# Patient Record
Sex: FEMALE | Race: BLACK OR AFRICAN AMERICAN | Employment: UNEMPLOYED | URBAN - METROPOLITAN AREA
[De-identification: names, ages, dates, MRNs, and addresses within clinical notes are randomized per-mention and may not be internally consistent; named-entity substitution may affect disease eponyms.]

---

## 2017-01-02 ENCOUNTER — HOSPITAL ENCOUNTER (EMERGENCY)
Age: 32
Discharge: HOME | End: 2017-01-02
Attending: EMERGENCY MEDICINE
Payer: MEDICAID

## 2017-01-02 VITALS
DIASTOLIC BLOOD PRESSURE: 86 MMHG | TEMPERATURE: 98.6 F | RESPIRATION RATE: 20 BRPM | OXYGEN SATURATION: 96 % | SYSTOLIC BLOOD PRESSURE: 144 MMHG

## 2017-01-02 DIAGNOSIS — S61.309A NAIL AVULSION, FINGER, INITIAL ENCOUNTER: Primary | ICD-10-CM

## 2017-01-02 PROCEDURE — 99282 EMERGENCY DEPT VISIT SF MDM: CPT

## 2017-01-02 RX ORDER — IBUPROFEN 800 MG/1
800 TABLET ORAL
Qty: 15 TAB | Refills: 0 | Status: SHIPPED | OUTPATIENT
Start: 2017-01-02 | End: 2017-01-07

## 2017-01-02 NOTE — DISCHARGE INSTRUCTIONS
SPECIFIC PATIENT INSTRUCTIONS FROM THE PHYSICIAN WHO TREATED YOU IN THE ER TODAY:  1. Ibuprofen as prescribed until finished. 2. IF Norco was prescribed for pain not controlled with the ibuprofen, then take it as prescribed. 3. Apply ice for the first 24-48 hours after the injury occurred, several times a day. After 48 hours from time of injury, apply heat to injury several times a day the next few days. 4. Return if any concerns or worsening condition(s). 5. FOLLOW UP APPOINTMENT:  Your primary doctor in 1-2 days. Toenail or Fingernail Avulsion: Care Instructions  Your Care Instructions  Losing a toenail or fingernail because of an injury is called avulsion. The nail may be completely or partially torn off after a trauma to the area. Your doctor may have removed the nail, put part of it back into place, or repaired the nail bed. Your toe or finger may be sore after treatment. You may have stitches. You may have some swelling, color changes, and bloody crusting on or around the wound for 2 or 3 days. This is normal. Taking good care of your wound at home will help it heal quickly and reduce your chance of infection. The wound should heal within a few weeks. If completely removed, fingernails may take 6 months to grow back. Toenails may take 12 to 18 months to grow back. Injured nails may look different when they grow back. Follow-up care is a key part of your treatment and safety. Be sure to make and go to all appointments, and call your doctor if you are having problems. It's also a good idea to know your test results and keep a list of the medicines you take. How can you care for yourself at home? · If possible, prop up the injured area on a pillow anytime you sit or lie down during the next 3 days. Try to keep it above the level of your heart. This will help reduce swelling. · Leave the bandage on, and if you have stitches, do not get them wet for the first 24 to 48 hours.  Use a plastic bag to cover the area when you shower. · If your doctor told you how to care for your wound, follow your doctor's instructions. If you did not get instructions, follow this general advice:  ¨ After the first 24 to 48 hours, you can remove the bandage and gently wash around the wound with clean water 2 times a day. If the bandage sticks to the wound, use warm water to loosen it. Do not scrub or soak the area. ¨ You may cover the wound with a thin layer of petroleum jelly, such as Vaseline, and a nonstick bandage. ¨ Apply more petroleum jelly and replace the bandage as needed. · Do not go swimming. · If you have stitches, do not remove them on your own. Your doctor will tell you when to return to have the stitches removed. · Be safe with medicines. Take pain medicines exactly as directed. ¨ If the doctor gave you a prescription medicine for pain, take it as prescribed. ¨ If you are not taking a prescription pain medicine, ask your doctor if you can take an over-the-counter medicine. · If your doctor prescribed antibiotics, take them as directed. Do not stop taking them just because you feel better. You need to take the full course of antibiotics. When should you call for help? Call your doctor now or seek immediate medical care if:  · The skin near the wound is cool or pale or changes color. · The wound starts to bleed, and blood soaks through the bandage. Oozing small amounts of blood is normal.  · You have signs of infection, such as:  ¨ Increased pain, swelling, warmth, or redness. ¨ Red streaks leading from your toe or finger. ¨ Pus draining from your toe or finger. ¨ Swollen lymph nodes in your neck, armpits, or groin. ¨ A fever. Watch closely for changes in your health, and be sure to contact your doctor if:  · You have problems with the nail as it grows back. · You do not get better as expected. Where can you learn more? Go to http://sue-michael.info/.   Enter B723 in the search box to learn more about \"Toenail or Fingernail Avulsion: Care Instructions. \"  Current as of: May 27, 2016  Content Version: 11.1  © 5593-0204 [employer]. Care instructions adapted under license by [employer] (which disclaims liability or warranty for this information). If you have questions about a medical condition or this instruction, always ask your healthcare professional. [address] any warranty or liability for your use of this information. [employer] Activation    Thank you for requesting access to [employer]. Please follow the instructions below to securely access and download your online medical record. [employer] allows you to send messages to your doctor, view your test results, renew your prescriptions, schedule appointments, and more. How Do I Sign Up? 1. In your internet browser, go to [email or web address]. 2. Click on the First Time User? Click Here link in the Sign In box. You will see the New Member Sign Up page. 3. Enter your [employer] Access Code exactly as it appears below. You will not need to use this code after youve completed the sign-up process. If you do not sign up before the expiration date, you must request a new code. [employer] Access Code: [email or web address]  Expires: 2017  1:29 PM (This is the date your [employer] access code will )    4. Enter the last four digits of your Social Security Number (xxxx) and Date of Birth (mm/dd/yyyy) as indicated and click Submit. You will be taken to the next sign-up page. 5. Create a [employer] ID. This will be your [employer] login ID and cannot be changed, so think of one that is secure and easy to remember. 6. Create a [employer] password. You can change your password at any time. 7. Enter your Password Reset Question and Answer. This can be used at a later time if you forget your password. 8. Enter your e-mail address.  You will receive e-mail notification when new information is available in [employer]. 9. Click Sign Up. You can now view and download portions of your medical record. 10. Click the Download Summary menu link to download a portable copy of your medical information. Additional Information    If you have questions, please visit the Frequently Asked Questions section of the [employer] website at [email or web address] Remember, [employer] is NOT to be used for urgent needs. For medical emergencies, dial 911.

## 2017-01-02 NOTE — ED NOTES
I have reviewed discharge instructions with the patient. The patient verbalized understanding. Patient armband removed and shredded  Pt left ED ambulatory, alert and in NAD.
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
## 2017-02-10 LAB — N. GONORRHEA, EXTERNAL: NORMAL

## 2017-03-20 ENCOUNTER — APPOINTMENT (OUTPATIENT)
Dept: GENERAL RADIOLOGY | Age: 32
End: 2017-03-20
Attending: EMERGENCY MEDICINE
Payer: MEDICAID

## 2017-03-20 ENCOUNTER — HOSPITAL ENCOUNTER (EMERGENCY)
Age: 32
Discharge: HOME | End: 2017-03-21
Attending: EMERGENCY MEDICINE
Payer: MEDICAID

## 2017-03-20 VITALS
TEMPERATURE: 98.2 F | BODY MASS INDEX: 43.4 KG/M2 | WEIGHT: 293 LBS | OXYGEN SATURATION: 98 % | DIASTOLIC BLOOD PRESSURE: 87 MMHG | HEIGHT: 69 IN | SYSTOLIC BLOOD PRESSURE: 151 MMHG | RESPIRATION RATE: 24 BRPM | HEART RATE: 81 BPM

## 2017-03-20 DIAGNOSIS — R73.9 HYPERGLYCEMIA: ICD-10-CM

## 2017-03-20 DIAGNOSIS — J45.21 MILD INTERMITTENT ASTHMA WITH ACUTE EXACERBATION: Primary | ICD-10-CM

## 2017-03-20 DIAGNOSIS — R07.9 CHEST PAIN, UNSPECIFIED TYPE: ICD-10-CM

## 2017-03-20 DIAGNOSIS — I10 ESSENTIAL HYPERTENSION: ICD-10-CM

## 2017-03-20 LAB
ANION GAP BLD CALC-SCNC: 10 MMOL/L (ref 3–18)
BASOPHILS # BLD AUTO: 0 K/UL (ref 0–0.1)
BASOPHILS # BLD: 0 % (ref 0–2)
BUN SERPL-MCNC: 11 MG/DL (ref 7–18)
BUN/CREAT SERPL: 14 (ref 12–20)
CALCIUM SERPL-MCNC: 8.9 MG/DL (ref 8.5–10.1)
CHLORIDE SERPL-SCNC: 106 MMOL/L (ref 100–108)
CK MB CFR SERPL CALC: NORMAL % (ref 0–4)
CK MB CFR SERPL CALC: NORMAL % (ref 0–4)
CK MB SERPL-MCNC: <1 NG/ML (ref 5–25)
CK MB SERPL-MCNC: <1 NG/ML (ref 5–25)
CK SERPL-CCNC: 88 U/L (ref 26–192)
CK SERPL-CCNC: 93 U/L (ref 26–192)
CO2 SERPL-SCNC: 26 MMOL/L (ref 21–32)
CREAT SERPL-MCNC: 0.79 MG/DL (ref 0.6–1.3)
DIFFERENTIAL METHOD BLD: ABNORMAL
EOSINOPHIL # BLD: 0.1 K/UL (ref 0–0.4)
EOSINOPHIL NFR BLD: 2 % (ref 0–5)
ERYTHROCYTE [DISTWIDTH] IN BLOOD BY AUTOMATED COUNT: 14.2 % (ref 11.6–14.5)
GLUCOSE SERPL-MCNC: 161 MG/DL (ref 74–99)
HCT VFR BLD AUTO: 36.3 % (ref 35–45)
HGB BLD-MCNC: 11.8 G/DL (ref 12–16)
LYMPHOCYTES # BLD AUTO: 36 % (ref 21–52)
LYMPHOCYTES # BLD: 2.3 K/UL (ref 0.9–3.6)
MCH RBC QN AUTO: 26 PG (ref 24–34)
MCHC RBC AUTO-ENTMCNC: 32.5 G/DL (ref 31–37)
MCV RBC AUTO: 80.1 FL (ref 74–97)
MONOCYTES # BLD: 0.3 K/UL (ref 0.05–1.2)
MONOCYTES NFR BLD AUTO: 4 % (ref 3–10)
NEUTS SEG # BLD: 3.7 K/UL (ref 1.8–8)
NEUTS SEG NFR BLD AUTO: 58 % (ref 40–73)
PLATELET # BLD AUTO: 262 K/UL (ref 135–420)
PMV BLD AUTO: 10.6 FL (ref 9.2–11.8)
POTASSIUM SERPL-SCNC: 3.7 MMOL/L (ref 3.5–5.5)
RBC # BLD AUTO: 4.53 M/UL (ref 4.2–5.3)
SODIUM SERPL-SCNC: 142 MMOL/L (ref 136–145)
TROPONIN I SERPL-MCNC: <0.02 NG/ML (ref 0–0.04)
TROPONIN I SERPL-MCNC: <0.02 NG/ML (ref 0–0.04)
WBC # BLD AUTO: 6.4 K/UL (ref 4.6–13.2)

## 2017-03-20 PROCEDURE — 94640 AIRWAY INHALATION TREATMENT: CPT

## 2017-03-20 PROCEDURE — 93005 ELECTROCARDIOGRAM TRACING: CPT

## 2017-03-20 PROCEDURE — 85025 COMPLETE CBC W/AUTO DIFF WBC: CPT | Performed by: EMERGENCY MEDICINE

## 2017-03-20 PROCEDURE — 74011250637 HC RX REV CODE- 250/637: Performed by: EMERGENCY MEDICINE

## 2017-03-20 PROCEDURE — 82550 ASSAY OF CK (CPK): CPT | Performed by: EMERGENCY MEDICINE

## 2017-03-20 PROCEDURE — 74011000250 HC RX REV CODE- 250: Performed by: EMERGENCY MEDICINE

## 2017-03-20 PROCEDURE — 99285 EMERGENCY DEPT VISIT HI MDM: CPT

## 2017-03-20 PROCEDURE — 71010 XR CHEST PORT: CPT

## 2017-03-20 PROCEDURE — 80048 BASIC METABOLIC PNL TOTAL CA: CPT | Performed by: EMERGENCY MEDICINE

## 2017-03-20 PROCEDURE — 74011636637 HC RX REV CODE- 636/637: Performed by: EMERGENCY MEDICINE

## 2017-03-20 RX ORDER — PREDNISONE 20 MG/1
60 TABLET ORAL
Status: COMPLETED | OUTPATIENT
Start: 2017-03-20 | End: 2017-03-20

## 2017-03-20 RX ORDER — ALBUTEROL SULFATE 90 UG/1
2 AEROSOL, METERED RESPIRATORY (INHALATION)
Status: COMPLETED | OUTPATIENT
Start: 2017-03-20 | End: 2017-03-20

## 2017-03-20 RX ORDER — AZITHROMYCIN 250 MG/1
TABLET, FILM COATED ORAL
Qty: 6 TAB | Refills: 0 | Status: SHIPPED | OUTPATIENT
Start: 2017-03-20 | End: 2017-03-25

## 2017-03-20 RX ORDER — IPRATROPIUM BROMIDE AND ALBUTEROL SULFATE 2.5; .5 MG/3ML; MG/3ML
3 SOLUTION RESPIRATORY (INHALATION) ONCE
Status: COMPLETED | OUTPATIENT
Start: 2017-03-20 | End: 2017-03-20

## 2017-03-20 RX ORDER — PREDNISONE 50 MG/1
50 TABLET ORAL DAILY
Qty: 5 TAB | Refills: 0 | Status: SHIPPED | OUTPATIENT
Start: 2017-03-20 | End: 2017-03-25

## 2017-03-20 RX ORDER — LISINOPRIL AND HYDROCHLOROTHIAZIDE 12.5; 2 MG/1; MG/1
1 TABLET ORAL
Status: COMPLETED | OUTPATIENT
Start: 2017-03-20 | End: 2017-03-20

## 2017-03-20 RX ORDER — LISINOPRIL AND HYDROCHLOROTHIAZIDE 10; 12.5 MG/1; MG/1
1 TABLET ORAL DAILY
Qty: 30 TAB | Refills: 0 | Status: ON HOLD | OUTPATIENT
Start: 2017-03-20 | End: 2018-01-22

## 2017-03-20 RX ADMIN — LISINOPRIL AND HYDROCHLOROTHIAZIDE 1 TABLET: 12.5; 2 TABLET ORAL at 22:40

## 2017-03-20 RX ADMIN — PREDNISONE 60 MG: 20 TABLET ORAL at 21:04

## 2017-03-20 RX ADMIN — IPRATROPIUM BROMIDE AND ALBUTEROL SULFATE 3 ML: .5; 3 SOLUTION RESPIRATORY (INHALATION) at 20:46

## 2017-03-20 RX ADMIN — ALBUTEROL SULFATE 2 PUFF: 90 AEROSOL, METERED RESPIRATORY (INHALATION) at 22:34

## 2017-03-20 NOTE — ED TRIAGE NOTES
Pt c/o chest pain yesterday but feels worse today. Right side is weak & pressure in chest & back. Pt with cough times 1 month.

## 2017-03-21 LAB
ATRIAL RATE: 99 BPM
CALCULATED P AXIS, ECG09: -3 DEGREES
CALCULATED R AXIS, ECG10: 19 DEGREES
CALCULATED T AXIS, ECG11: 49 DEGREES
DIAGNOSIS, 93000: NORMAL
P-R INTERVAL, ECG05: 158 MS
Q-T INTERVAL, ECG07: 332 MS
QRS DURATION, ECG06: 76 MS
QTC CALCULATION (BEZET), ECG08: 426 MS
VENTRICULAR RATE, ECG03: 99 BPM

## 2017-03-21 NOTE — ED NOTES
I have reviewed discharge instructions with the patient. The patient verbalized understanding. Discharge medications reviewed with patient and appropriate educational materials and side effects teaching were provided. Patient armband removed and shredded. Pt is AxOx4, NAD. Pt states that her chest pain and SOB feels better. Pt ambulated out of ED with steady gait.

## 2017-03-21 NOTE — ED PROVIDER NOTES
HPI Comments: 8:09 PM Huong Christopher is a 27 y.o. female with a hx of asthma and HTN who presents to the ED c/o intermittent sharp chest pain that started yesterday. The chest pain radiates into her right arm and into her right upper back. She also reports SOB for the past couple days with associated wheezing. Her chest pain has improved and it now feels like and irritation that radiates into her right arm and right upper back where it becomes a heaviness. She has been using her inhalers more often than usual. She has a three year old and a [de-identified] year old at home, but she does not pick them up. She admits to occasional alcohol use and occasional tobacco use. She is not on blood pressure medications, but she states that her PCP sometimes puts her on \"fluid pills\". The pt has additional complaints of blurred vision which she states sometimes occurs when her BP is elevated. The pt denies sweating, abdominal pain, cardiac hx, recent travel, hx of DVT, birth control use, hx of diabetes, calf pain, and any further complaints. The history is provided by the patient. Past Medical History:   Diagnosis Date    Asthma     Asthma     Community acquired pneumonia     Hypertension     Obese        Past Surgical History:   Procedure Laterality Date    DILATION AND CURETTAGE           Family History:   Problem Relation Age of Onset    Asthma Mother     Hypertension Mother     Diabetes Mother     Asthma Father        Social History     Social History    Marital status: SINGLE     Spouse name: N/A    Number of children: N/A    Years of education: N/A     Occupational History    Not on file.      Social History Main Topics    Smoking status: Current Some Day Smoker     Packs/day: 0.50     Types: Cigarettes    Smokeless tobacco: Never Used      Comment: cigars twice weekly    Alcohol use 1.0 oz/week     2 Standard drinks or equivalent per week      Comment: occaisonally    Drug use: No    Sexual activity: Yes Partners: Female     Birth control/ protection: None     Other Topics Concern    Not on file     Social History Narrative         ALLERGIES: Review of patient's allergies indicates no known allergies. Review of Systems   Constitutional: Negative. Negative for activity change and appetite change. HENT: Negative for congestion, ear discharge, ear pain, facial swelling, nosebleeds, postnasal drip, sinus pressure, sneezing and tinnitus. Eyes: Positive for visual disturbance (blurred vision). Negative for pain, discharge and redness. Respiratory: Positive for shortness of breath and wheezing. Negative for apnea, cough, choking, chest tightness and stridor. Cardiovascular: Positive for chest pain. Negative for leg swelling. Gastrointestinal: Negative for abdominal distention, abdominal pain, anal bleeding, blood in stool, constipation, diarrhea, nausea and vomiting. Genitourinary: Negative for decreased urine volume, difficulty urinating, dyspareunia, dysuria, flank pain, frequency, hematuria, pelvic pain, urgency, vaginal bleeding and vaginal discharge. Musculoskeletal: Positive for back pain (right upper back) and myalgias (Right arm pain). Negative for arthralgias, gait problem, joint swelling, neck pain and neck stiffness. Skin: Negative for color change. Neurological: Negative for dizziness, tremors, seizures, speech difficulty, weakness, numbness and headaches. Hematological: Negative for adenopathy. Does not bruise/bleed easily. Psychiatric/Behavioral: Negative for agitation, dysphoric mood and self-injury. The patient is not nervous/anxious. Vitals:    03/20/17 2115 03/20/17 2130 03/20/17 2145 03/20/17 2236   BP: (!) 134/98 (!) 146/115 129/77 134/65   Pulse: (!) 103 94 95 88   Resp: 22 23 15 20   Temp:       SpO2: 96% 96% 96% 96%   Weight:       Height:                Physical Exam   Constitutional: She is oriented to person, place, and time.  She appears well-developed and well-nourished. Obese   HENT:   Head: Normocephalic and atraumatic. Right Ear: External ear normal.   Left Ear: External ear normal.   Nose: Nose normal.   Mouth/Throat: Oropharynx is clear and moist.   Eyes: Conjunctivae and EOM are normal. Pupils are equal, round, and reactive to light. Neck: Normal range of motion. Neck supple. Cardiovascular: Regular rhythm, normal heart sounds and intact distal pulses. Pulmonary/Chest: Effort normal. No respiratory distress. She has wheezes (1-2+ wheezes). She has no rales. She exhibits no tenderness. Abdominal: Soft. Bowel sounds are normal. She exhibits no distension and no mass. There is no tenderness. There is no rebound and no guarding. Musculoskeletal: Normal range of motion. She exhibits edema (Trace lower extremity edema). Neurological: She is alert and oriented to person, place, and time. Skin: Skin is warm and dry. No rash noted. No erythema. Psychiatric: She has a normal mood and affect. Her behavior is normal. Judgment normal.   Nursing note and vitals reviewed. MDM  Number of Diagnoses or Management Options  Essential hypertension:   Mild intermittent asthma with acute exacerbation:   Diagnosis management comments: Chest pain, differential to include coronary artery disease related,pericardial disease, vascular disease, PE, esophageal or gastric conditions, gallbladder disease,musculoskeletal abnormalities,other possible etiologies. Suspect an asthmatic etiology. Will trend labs,ecg,treat, follow         Amount and/or Complexity of Data Reviewed  Clinical lab tests: ordered  Tests in the radiology section of CPT®: ordered    Risk of Complications, Morbidity, and/or Mortality  Presenting problems: moderate      ED Course       Procedures  Vitals:  Patient Vitals for the past 12 hrs:   Temp Pulse Resp BP SpO2   03/20/17 2236 - 88 20 134/65 96 %   03/20/17 2145 - 95 15 129/77 96 %   03/20/17 2130 - 94 23 (!) 146/115 96 %   03/20/17 2115 - (!) 103 22 (!) 134/98 96 %   03/20/17 2100 - 97 24 (!) 139/107 96 %   03/20/17 2045 - 88 21 (!) 124/94 98 %   03/20/17 2030 - 88 22 (!) 147/108 98 %   03/20/17 1631 98.2 °F (36.8 °C) 96 18 (!) 152/95 100 %   Pulse ox reviewed and WNL      Medications ordered:   Medications   albuterol-ipratropium (DUO-NEB) 2.5 MG-0.5 MG/3 ML (3 mL Nebulization Given 3/20/17 2046)   predniSONE (DELTASONE) tablet 60 mg (60 mg Oral Given 3/20/17 2104)   albuterol (PROVENTIL HFA, VENTOLIN HFA, PROAIR HFA) inhaler 2 Puff (2 Puffs Inhalation Given 3/20/17 2234)   lisinopril-hydroCHLOROthiazide (PRINZIDE, ZESTORETIC) 20-12.5 mg per tablet 1 Tab (1 Tab Oral Given 3/20/17 2240)         Lab findings:  Recent Results (from the past 12 hour(s))   EKG, 12 LEAD, INITIAL    Collection Time: 03/20/17  4:22 PM   Result Value Ref Range    Ventricular Rate 99 BPM    Atrial Rate 99 BPM    P-R Interval 158 ms    QRS Duration 76 ms    Q-T Interval 332 ms    QTC Calculation (Bezet) 426 ms    Calculated P Axis -3 degrees    Calculated R Axis 19 degrees    Calculated T Axis 49 degrees    Diagnosis       Normal sinus rhythm  Nonspecific T wave abnormality  Abnormal ECG  When compared with ECG of 06-MAY-2014 08:36,  T wave inversion no longer evident in Inferior leads  Nonspecific T wave abnormality, worse in Lateral leads     CARDIAC PANEL,(CK, CKMB & TROPONIN)    Collection Time: 03/20/17  8:21 PM   Result Value Ref Range    CK 93 26 - 192 U/L    CK - MB <1.0 <3.6 ng/ml    CK-MB Index Cannot be calulated 0.0 - 4.0 %    Troponin-I, Qt. <0.02 0.0 - 0.045 NG/ML   CBC WITH AUTOMATED DIFF    Collection Time: 03/20/17  8:21 PM   Result Value Ref Range    WBC 6.4 4.6 - 13.2 K/uL    RBC 4.53 4.20 - 5.30 M/uL    HGB 11.8 (L) 12.0 - 16.0 g/dL    HCT 36.3 35.0 - 45.0 %    MCV 80.1 74.0 - 97.0 FL    MCH 26.0 24.0 - 34.0 PG    MCHC 32.5 31.0 - 37.0 g/dL    RDW 14.2 11.6 - 14.5 %    PLATELET 247 014 - 386 K/uL    MPV 10.6 9.2 - 11.8 FL    NEUTROPHILS 58 40 - 73 % LYMPHOCYTES 36 21 - 52 %    MONOCYTES 4 3 - 10 %    EOSINOPHILS 2 0 - 5 %    BASOPHILS 0 0 - 2 %    ABS. NEUTROPHILS 3.7 1.8 - 8.0 K/UL    ABS. LYMPHOCYTES 2.3 0.9 - 3.6 K/UL    ABS. MONOCYTES 0.3 0.05 - 1.2 K/UL    ABS. EOSINOPHILS 0.1 0.0 - 0.4 K/UL    ABS. BASOPHILS 0.0 0.0 - 0.1 K/UL    DF AUTOMATED     METABOLIC PANEL, BASIC    Collection Time: 03/20/17  8:21 PM   Result Value Ref Range    Sodium 142 136 - 145 mmol/L    Potassium 3.7 3.5 - 5.5 mmol/L    Chloride 106 100 - 108 mmol/L    CO2 26 21 - 32 mmol/L    Anion gap 10 3.0 - 18 mmol/L    Glucose 161 (H) 74 - 99 mg/dL    BUN 11 7.0 - 18 MG/DL    Creatinine 0.79 0.6 - 1.3 MG/DL    BUN/Creatinine ratio 14 12 - 20      GFR est AA >60 >60 ml/min/1.73m2    GFR est non-AA >60 >60 ml/min/1.73m2    Calcium 8.9 8.5 - 10.1 MG/DL   CARDIAC PANEL,(CK, CKMB & TROPONIN)    Collection Time: 03/20/17 10:50 PM   Result Value Ref Range    CK 88 26 - 192 U/L    CK - MB <1.0 <3.6 ng/ml    CK-MB Index Cannot be calulated 0.0 - 4.0 %    Troponin-I, Qt. <0.02 0.0 - 0.045 NG/ML       EKG interpretation by ED Physician:  Normal sinus rhythm, rate 99BPM, no STEMI per Dr. Roblero Expose, CT or other radiology findings or impressions:  XR CHEST PORT      No acute changes per Dr. Dagoberto Keating notes, Consult notes or additional Procedure notes:  Blood sugar may be elevated due to the steroids that she took, or she may have diabetes. Will discuss following it closely and following up with her PCP.    11:23 PM Pt reevaluated at this time and is resting comfortably in NAD. Discussed results and findings, as well as, diagnosis and plan for discharge. Pt verbalizes understanding and agreement with plan. All questions addressed at this time. Disposition:  Diagnosis:   1. Mild intermittent asthma with acute exacerbation    2. Essential hypertension    3. Chest pain, unspecified type    4.  Hyperglycemia        Disposition:Discharge    Follow-up Information     Follow up With Details Comments Contact Info    Talat Peralta MD Call in 1 day  [address]      [facility]  As needed, If symptoms worsen [address] [facility] [address]  [phone]           Patient's Medications   Start Taking    AZITHROMYCIN (ZITHROMAX Z-ANKUSH) 250 MG TABLET    Take 2 on day one then one a day    LISINOPRIL-HYDROCHLOROTHIAZIDE (PRINZIDE, ZESTORETIC) 10-12.5 MG PER TABLET    Take 1 Tab by mouth daily. PREDNISONE (DELTASONE) 50 MG TABLET    Take 1 Tab by mouth daily for 5 days. Continue Taking    TRIAMTERENE-HYDROCHLOROTHIAZID PO    Take  by mouth. These Medications have changed    No medications on file   Stop Taking    No medications on file         SCRIBE ATTESTATION STATEMENT  Documented by: Kirk Solares for, and in the presence of, Carlito Jones MD 8:20 PM     Signed by: Shruthi Camilo, 03/20/17 8:20 PM        PROVIDER ATTESTATION STATEMENT  I personally performed the services described in the documentation, reviewed the documentation, as recorded by the scribe in my presence, and it accurately and completely records my words and actions.   Carlito Jones MD

## 2017-03-21 NOTE — DISCHARGE INSTRUCTIONS
Asthma Attack: Care Instructions  Your Care Instructions    During an asthma attack, the airways swell and narrow. This makes it hard to breathe. Severe asthma attacks can be life-threatening, but you can help prevent them by keeping your asthma under control and treating symptoms before they get bad. Symptoms include being short of breath, having chest tightness, coughing, and wheezing. Noting and treating these symptoms can also help you avoid future trips to the emergency room. The doctor has checked you carefully, but problems can develop later. If you notice any problems or new symptoms, get medical treatment right away. Follow-up care is a key part of your treatment and safety. Be sure to make and go to all appointments, and call your doctor if you are having problems. It's also a good idea to know your test results and keep a list of the medicines you take. How can you care for yourself at home? · Follow your asthma action plan to prevent and treat attacks. If you don't have an asthma action plan, work with your doctor to create one. · Take your asthma medicines exactly as prescribed. Talk to your doctor right away if you have any questions about how to take them. ¨ Use your quick-relief medicine when you have symptoms of an attack. Quick-relief medicine is usually an albuterol inhaler. Some people need to use quick-relief medicine before they exercise. ¨ Take your controller medicine every day, not just when you have symptoms. Controller medicine is usually an inhaled corticosteroid. The goal is to prevent problems before they occur. Don't use your controller medicine to treat an attack that has already started. It doesn't work fast enough to help. ¨ If your doctor prescribed corticosteroid pills to use during an attack, take them exactly as prescribed. It may take hours for the pills to work, but they may make the episode shorter and help you breathe better.   ¨ Keep your quick-relief medicine with you at all times. · Talk to your doctor before using other medicines. Some medicines, such as aspirin, can cause asthma attacks in some people. · If you have a peak flow meter, use it to check how well you are breathing. This can help you predict when an asthma attack is going to occur. Then you can take medicine to prevent the asthma attack or make it less severe. · Do not smoke or allow others to smoke around you. Avoid smoky places. Smoking makes asthma worse. If you need help quitting, talk to your doctor about stop-smoking programs and medicines. These can increase your chances of quitting for good. · Learn what triggers an asthma attack for you, and avoid the triggers when you can. Common triggers include colds, smoke, air pollution, dust, pollen, mold, pets, cockroaches, stress, and cold air. · Avoid colds and the flu. Get a pneumococcal vaccine shot. If you have had one before, ask your doctor if you need a second dose. Get a flu vaccine every fall. If you must be around people with colds or the flu, wash your hands often. When should you call for help? Call 911 anytime you think you may need emergency care. For example, call if:  · You have severe trouble breathing. Call your doctor now or seek immediate medical care if:  · Your symptoms do not get better after you have followed your asthma action plan. · You have new or worse trouble breathing. · Your coughing and wheezing get worse. · You cough up dark brown or bloody mucus (sputum). · You have a new or higher fever. Watch closely for changes in your health, and be sure to contact your doctor if:  · You need to use quick-relief medicine on more than 2 days a week (unless it is just for exercise). · You cough more deeply or more often, especially if you notice more mucus or a change in the color of your mucus. · You are not getting better as expected. Where can you learn more?   Go to http://sue-michael.info/. Enter H971 in the search box to learn more about \"Asthma Attack: Care Instructions. \"  Current as of: May 23, 2016  Content Version: 11.1  © 8108-5179 [email or web address]. Care instructions adapted under license by [employer] (which disclaims liability or warranty for this information). If you have questions about a medical condition or this instruction, always ask your healthcare professional. [place] [address] any warranty or liability for your use of this information. Learning About Asthma Triggers  What are triggers? When you have asthma, certain things can make your symptoms worse. These are called triggers. They include:  · Cigarette smoke or air pollution. · Things you are allergic to, such as:  ¨ Pollen, mold, or dust mites. ¨ Pet hair, skin, or saliva. · Illnesses, like colds, flu, or pneumonia. · Exercise. · Dry, cold air. How do triggers affect asthma? Triggers can make it harder for your lungs to work as they should and can lead to sudden difficulty breathing and other symptoms. When you are around a trigger, an asthma attack is more likely. If your symptoms are severe, you may need emergency treatment or have to go to the hospital for treatment. If you know what your triggers are and can avoid them, you may be able to prevent asthma attacks, reduce how often you have them, and make them less severe. What can you do to avoid triggers? The first thing is to know your triggers. When you are having symptoms, note the things around you that might be causing them. Then look for patterns in what may be triggering your symptoms. Record your triggers on a piece of paper or in an asthma diary. When you have your list of possible triggers, work with your doctor to find ways to avoid them. You also can check how well your lungs are working by measuring your peak expiratory flow (PEF) throughout the day.  Your PEF may drop when you are near things that trigger symptoms. Here are some ways to avoid a few common triggers. · Do not smoke or allow others to smoke around you. If you need help quitting, talk to your doctor about stop-smoking programs and medicines. These can increase your chances of quitting for good. · If there is a lot of pollution, pollen, or dust outside, stay at home and keep your windows closed. Use an air conditioner or air filter in your home. Check your local weather report or newspaper for air quality and pollen reports. · Get a flu shot every year. Talk to your doctor about getting a pneumococcal shot. Wash your hands often to prevent infections. · Avoid exercising outdoors in cold weather. If you are outdoors in cold weather, wear a scarf around your face and breathe through your nose. How can you manage an asthma attack? · If you have an asthma action plan, follow the plan. In general:  ¨ Use your quick-relief inhaler as directed by your doctor. If your symptoms do not get better after you use your medicine, have someone take you to the emergency room. Call an ambulance if needed. ¨ If your doctor has given you other inhaled medicines or steroid pills, take them as directed. Where can you learn more? Go to [employer]  Enter M564 in the search box to learn more about \"Learning About Asthma Triggers. \"   © 4223-8208 [employer]. Care instructions adapted under license by [employer] (which disclaims liability or warranty for this information). This care instruction is for use with your licensed healthcare professional. If you have questions about a medical condition or this instruction, always ask your healthcare professional. [place] [address] any warranty or liability for your use of this information. Content Version: 57.1.019545; Last Revised: February 23, 2012                   Learning About Asthma  What is asthma?     Asthma is a long-term condition that affects your breathing. It causes the airways that lead to the lungs to swell. People with asthma may have asthma attacks. During an asthma attack, the airways tighten and become narrower. This makes it hard to breathe, and you may wheeze or cough. If you have a bad asthma attack, you may need emergency care. Asthma affects people in different ways. Some people only have asthma attacks during allergy season, or when they breathe in cold air, or when they exercise. Others have many bad attacks that send them to the doctor often. What are the symptoms? Symptoms of asthma can be mild or severe. You may have mild attacks now and then, you may have severe symptoms every day, or you may have something in between. How often you have symptoms can also change. When you have asthma, you may:  · Wheeze, making a loud or soft whistling noise when you breathe in and out. · Cough a lot. · Feel tightness in your chest.  · Feel short of breath. · Have trouble sleeping because of coughing or having a hard time breathing. · Get tired quickly during exercise. Your symptoms may be worse at night. How can you prevent asthma attacks? Certain things can make asthma symptoms worse. These are called triggers. When you are around a trigger, an asthma attack is more likely. Common triggers include:  · Cigarette smoke or air pollution. · Things you are allergic to, such as:  ¨ Pollen, mold, or dust mites. ¨ Pet hair, skin, or saliva. · Illnesses, like colds, flu, or pneumonia. · Exercise. · Dry, cold air. Here are some ways to avoid a few common triggers:  · Do not smoke or allow others to smoke around you. If you need help quitting, talk to your doctor about stop-smoking programs and medicines. These can increase your chances of quitting for good. · If there is a lot of pollution, pollen, or dust outside, stay at home and keep your windows closed. Use an air conditioner or air filter in your home. Check your local weather report or newspaper for air quality and pollen reports. · Get the flu vaccine every year. Talk to your doctor about getting a pneumococcal shot. Wash your hands often to prevent infections. · Avoid exercising outdoors in cold weather. If you are outdoors in cold weather, wear a scarf around your face and breathe through your nose. How is asthma treated? There are two parts to treating asthma, which are outlined in your asthma action plan. The goals are to:  · Control asthma over the long term. The asthma action plan tells you which medicine you may need to take every day. This is called a controller medicine. It helps to reduce the swelling of the airways and prevent asthma attacks. · Treat asthma attacks when they occur. The asthma action plan tells you what to do when you have an asthma attack. It helps you identify triggers that can cause your attacks. You use quick-relief medicine during an attack. The asthma plan also helps you track your symptoms and know how well the treatment is working. Follow-up care is a key part of your treatment and safety. Be sure to make and go to all appointments, and call your doctor if you are having problems. It's also a good idea to know your test results and keep a list of the medicines you take. Where can you learn more? Go to http://sue-michael.info/. Enter 6544 [id number] in the search box to learn more about \"Learning About Asthma. \"  Current as of: May 23, 2016  Content Version: 11.1  © 0983-5029 [employer]. Care instructions adapted under license by [employer] (which disclaims liability or warranty for this information). If you have questions about a medical condition or this instruction, always ask your healthcare professional. [place] [address] any warranty or liability for your use of this information.          DASH Diet: Care Instructions  Your Care Instructions  The DASH diet is an eating plan that can help lower your blood pressure. DASH stands for Dietary Approaches to Stop Hypertension. Hypertension is high blood pressure. The DASH diet focuses on eating foods that are high in calcium, potassium, and magnesium. These nutrients can lower blood pressure. The foods that are highest in these nutrients are fruits, vegetables, low-fat dairy products, nuts, seeds, and legumes. But taking calcium, potassium, and magnesium supplements instead of eating foods that are high in those nutrients does not have the same effect. The DASH diet also includes whole grains, fish, and poultry. The DASH diet is one of several lifestyle changes your doctor may recommend to lower your high blood pressure. Your doctor may also want you to decrease the amount of sodium in your diet. Lowering sodium while following the DASH diet can lower blood pressure even further than just the DASH diet alone. Follow-up care is a key part of your treatment and safety. Be sure to make and go to all appointments, and call your doctor if you are having problems. It's also a good idea to know your test results and keep a list of the medicines you take. How can you care for yourself at home? Following the DASH diet  · Eat 4 to 5 servings of fruit each day. A serving is 1 medium-sized piece of fruit, ½ cup chopped or canned fruit, 1/4 cup dried fruit, or 4 ounces (½ cup) of fruit juice. Choose fruit more often than fruit juice. · Eat 4 to 5 servings of vegetables each day. A serving is 1 cup of lettuce or raw leafy vegetables, ½ cup of chopped or cooked vegetables, or 4 ounces (½ cup) of vegetable juice. Choose vegetables more often than vegetable juice. · Get 2 to 3 servings of low-fat and fat-free dairy each day. A serving is 8 ounces of milk, 1 cup of yogurt, or 1 ½ ounces of cheese. · Eat 6 to 8 servings of grains each day.  A serving is 1 slice of bread, 1 ounce of dry cereal, or ½ cup of cooked rice, pasta, or cooked cereal. Try to choose whole-grain products as much as possible. · Limit lean meat, poultry, and fish to 2 servings each day. A serving is 3 ounces, about the size of a deck of cards. · Eat 4 to 5 servings of nuts, seeds, and legumes (cooked dried beans, lentils, and split peas) each week. A serving is 1/3 cup of nuts, 2 tablespoons of seeds, or ½ cup of cooked beans or peas. · Limit fats and oils to 2 to 3 servings each day. A serving is 1 teaspoon of vegetable oil or 2 tablespoons of salad dressing. · Limit sweets and added sugars to 5 servings or less a week. A serving is 1 tablespoon jelly or jam, ½ cup sorbet, or 1 cup of lemonade. · Eat less than 2,300 milligrams (mg) of sodium a day. If you limit your sodium to 1,500 mg a day, you can lower your blood pressure even more. Tips for success  · Start small. Do not try to make dramatic changes to your diet all at once. You might feel that you are missing out on your favorite foods and then be more likely to not follow the plan. Make small changes, and stick with them. Once those changes become habit, add a few more changes. · Try some of the following:  ¨ Make it a goal to eat a fruit or vegetable at every meal and at snacks. This will make it easy to get the recommended amount of fruits and vegetables each day. ¨ Try yogurt topped with fruit and nuts for a snack or healthy dessert. ¨ Add lettuce, tomato, cucumber, and onion to sandwiches. ¨ Combine a ready-made pizza crust with low-fat mozzarella cheese and lots of vegetable toppings. Try using tomatoes, squash, spinach, broccoli, carrots, cauliflower, and onions. ¨ Have a variety of cut-up vegetables with a low-fat dip as an appetizer instead of chips and dip. ¨ Sprinkle sunflower seeds or chopped almonds over salads. Or try adding chopped walnuts or almonds to cooked vegetables. ¨ Try some vegetarian meals using beans and peas. Add garbanzo or kidney beans to salads.  Make burritos and tacos with mashed malone beans or black beans. Where can you learn more? Go to http://sue-michael.info/. Enter U561 in the search box to learn more about \"DASH Diet: Care Instructions. \"  Current as of: March 23, 2016  Content Version: 11.1  © 9716-2542 [employer]. Care instructions adapted under license by [employer] (which disclaims liability or warranty for this information). If you have questions about a medical condition or this instruction, always ask your healthcare professional. [address] any warranty or liability for your use of this information. Chest Pain: Care Instructions  Your Care Instructions  There are many things that can cause chest pain. Some are not serious and will get better on their own in a few days. But some kinds of chest pain need more testing and treatment. Your doctor may have recommended a follow-up visit in the next 8 to 12 hours. If you are not getting better, you may need more tests or treatment. Even though your doctor has released you, you still need to watch for any problems. The doctor carefully checked you, but sometimes problems can develop later. If you have new symptoms or if your symptoms do not get better, get medical care right away. If you have worse or different chest pain or pressure that lasts more than 5 minutes or you passed out (lost consciousness), call 911 or seek other emergency help right away. A medical visit is only one step in your treatment. Even if you feel better, you still need to do what your doctor recommends, such as going to all suggested follow-up appointments and taking medicines exactly as directed. This will help you recover and help prevent future problems. How can you care for yourself at home? · Rest until you feel better. · Take your medicine exactly as prescribed. Call your doctor if you think you are having a problem with your medicine.   · Do not drive after taking a prescription pain medicine. When should you call for help? Call 911 if:  · You passed out (lost consciousness). · You have severe difficulty breathing. · You have symptoms of a heart attack. These may include:  ¨ Chest pain or pressure, or a strange feeling in your chest.  ¨ Sweating. ¨ Shortness of breath. ¨ Nausea or vomiting. ¨ Pain, pressure, or a strange feeling in your back, neck, jaw, or upper belly or in one or both shoulders or arms. ¨ Lightheadedness or sudden weakness. ¨ A fast or irregular heartbeat. After you call 911, the  may tell you to chew 1 adult-strength or 2 to 4 low-dose aspirin. Wait for an ambulance. Do not try to drive yourself. Call your doctor today if:  · You have any trouble breathing. · Your chest pain gets worse. · You are dizzy or lightheaded, or you feel like you may faint. · You are not getting better as expected. · You are having new or different chest pain. Where can you learn more? Go to http://sue-michael.info/. Enter A120 in the search box to learn more about \"Chest Pain: Care Instructions. \"  Current as of: May 27, 2016  Content Version: 11.1  © 4172-8644 Healthwise, Incorporated. Care instructions adapted under license by [employer] (which disclaims liability or warranty for this information). If you have questions about a medical condition or this instruction, always ask your healthcare professional. [place] [address] any warranty or liability for your use of this information. High Blood Pressure: Care Instructions  Your Care Instructions  If your blood pressure is usually above 140/90, you have high blood pressure, or hypertension. That means the top number is 140 or higher or the bottom number is 90 or higher, or both. Despite what a lot of people think, high blood pressure usually doesn't cause headaches or make you feel dizzy or lightheaded. It usually has no symptoms.  But it does increase your risk for heart attack, stroke, and kidney or eye damage. The higher your blood pressure, the more your risk increases. Your doctor will give you a goal for your blood pressure. Your goal will be based on your health and your age. An example of a goal is to keep your blood pressure below 140/90. Lifestyle changes, such as eating healthy and being active, are always important to help lower blood pressure. You might also take medicine to reach your blood pressure goal.  Follow-up care is a key part of your treatment and safety. Be sure to make and go to all appointments, and call your doctor if you are having problems. It's also a good idea to know your test results and keep a list of the medicines you take. How can you care for yourself at home? Medical treatment  · If you stop taking your medicine, your blood pressure will go back up. You may take one or more types of medicine to lower your blood pressure. Be safe with medicines. Take your medicine exactly as prescribed. Call your doctor if you think you are having a problem with your medicine. · Talk to your doctor before you start taking aspirin every day. Aspirin can help certain people lower their risk of a heart attack or stroke. But taking aspirin isn't right for everyone, because it can cause serious bleeding. · See your doctor regularly. You may need to see the doctor more often at first or until your blood pressure comes down. · If you are taking blood pressure medicine, talk to your doctor before you take decongestants or anti-inflammatory medicine, such as ibuprofen. Some of these medicines can raise blood pressure. · Learn how to check your blood pressure at home. Lifestyle changes  · Stay at a healthy weight. This is especially important if you put on weight around the waist. Losing even 10 pounds can help you lower your blood pressure. · If your doctor recommends it, get more exercise. Walking is a good choice.  Bit by bit, increase the amount you walk every day. Try for at least 30 minutes on most days of the week. You also may want to swim, bike, or do other activities. · Avoid or limit alcohol. Talk to your doctor about whether you can drink any alcohol. · Try to limit how much sodium you eat to less than 2,300 milligrams (mg) a day. Your doctor may ask you to try to eat less than 1,500 mg a day. · Eat plenty of fruits (such as bananas and oranges), vegetables, legumes, whole grains, and low-fat dairy products. · Lower the amount of saturated fat in your diet. Saturated fat is found in animal products such as milk, cheese, and meat. Limiting these foods may help you lose weight and also lower your risk for heart disease. · Do not smoke. Smoking increases your risk for heart attack and stroke. If you need help quitting, talk to your doctor about stop-smoking programs and medicines. These can increase your chances of quitting for good. When should you call for help? Call 911 anytime you think you may need emergency care. This may mean having symptoms that suggest that your blood pressure is causing a serious heart or blood vessel problem. Your blood pressure may be over 180/110. For example, call 911 if:  · You have symptoms of a heart attack. These may include:  ¨ Chest pain or pressure, or a strange feeling in the chest.  ¨ Sweating. ¨ Shortness of breath. ¨ Nausea or vomiting. ¨ Pain, pressure, or a strange feeling in the back, neck, jaw, or upper belly or in one or both shoulders or arms. ¨ Lightheadedness or sudden weakness. ¨ A fast or irregular heartbeat. · You have symptoms of a stroke. These may include:  ¨ Sudden numbness, tingling, weakness, or loss of movement in your face, arm, or leg, especially on only one side of your body. ¨ Sudden vision changes. ¨ Sudden trouble speaking. ¨ Sudden confusion or trouble understanding simple statements. ¨ Sudden problems with walking or balance.   ¨ A sudden, severe headache that is different from past headaches. · You have severe back or belly pain. Do not wait until your blood pressure comes down on its own. Get help right away. Call your doctor now or seek immediate care if:  · Your blood pressure is much higher than normal (such as 180/110 or higher), but you don't have symptoms. · You think high blood pressure is causing symptoms, such as:  ¨ Severe headache. ¨ Blurry vision. Watch closely for changes in your health, and be sure to contact your doctor if:  · Your blood pressure measures 140/90 or higher at least 2 times. That means the top number is 140 or higher or the bottom number is 90 or higher, or both. · You think you may be having side effects from your blood pressure medicine. · Your blood pressure is usually normal, but it goes above normal at least 2 times. Where can you learn more? Go to http://sue-michael.info/. Enter Y783 in the search box to learn more about \"High Blood Pressure: Care Instructions. \"  Current as of: August 8, 2016  Content Version: 11.1  © 2255-3176 [employer]. Care instructions adapted under license by [employer] (which disclaims liability or warranty for this information). If you have questions about a medical condition or this instruction, always ask your healthcare professional. [address] any warranty or liability for your use of this information. Learning About High Blood Pressure  What is high blood pressure? Blood pressure is a measure of how hard the blood pushes against the walls of your arteries. It's normal for blood pressure to go up and down throughout the day, but if it stays up, you have high blood pressure. Another name for high blood pressure is hypertension. Two numbers tell you your blood pressure. The first number is the systolic pressure. It shows how hard the blood pushes when your heart is pumping.  The second number is the diastolic pressure. It shows how hard the blood pushes between heartbeats, when your heart is relaxed and filling with blood. A blood pressure of less than 120/80 (say \"120 over 80\") is ideal for an adult. High blood pressure is 140/90 or higher. You have high blood pressure if your top number is 140 or higher or your bottom number is 90 or higher, or both. Many people fall into the category in between, called prehypertension. People with prehypertension need to make lifestyle changes to bring their blood pressure down and help prevent or delay high blood pressure. What happens when you have high blood pressure? · Blood flows through your arteries with too much force. Over time, this damages the walls of your arteries. But you can't feel it. High blood pressure usually doesn't cause symptoms. · Fat and calcium start to build up in your arteries. This buildup is called plaque. Plaque makes your arteries narrower and stiffer. Blood can't flow through them as easily. · This lack of good blood flow starts to damage some of the organs in your body. This can lead to problems such as coronary artery disease and heart attack, heart failure, stroke, kidney failure, and eye damage. How can you prevent high blood pressure? · Stay at a healthy weight. · Try to limit how much sodium you eat to less than 2,300 milligrams (mg) a day. If you limit your sodium to 1,500 mg a day, you can lower your blood pressure even more. ¨ Buy foods that are labeled \"unsalted,\" \"sodium-free,\" or \"low-sodium. \" Foods labeled \"reduced-sodium\" and \"light sodium\" may still have too much sodium. ¨ Flavor your food with garlic, lemon juice, onion, vinegar, herbs, and spices instead of salt. Do not use soy sauce, steak sauce, onion salt, garlic salt, mustard, or ketchup on your food. ¨ Use less salt (or none) when recipes call for it. You can often use half the salt a recipe calls for without losing flavor. · Be physically active.  Get at least 30 minutes of exercise on most days of the week. Walking is a good choice. You also may want to do other activities, such as running, swimming, cycling, or playing tennis or team sports. · Limit alcohol to 2 drinks a day for men and 1 drink a day for women. · Eat plenty of fruits, vegetables, and low-fat dairy products. Eat less saturated and total fats. How is high blood pressure treated? · Your doctor will suggest making lifestyle changes. For example, your doctor may ask you to eat healthy foods, quit smoking, lose extra weight, and be more active. · If lifestyle changes don't help enough or your blood pressure is very high, you will have to take medicine every day. Follow-up care is a key part of your treatment and safety. Be sure to make and go to all appointments, and call your doctor if you are having problems. It's also a good idea to know your test results and keep a list of the medicines you take. Where can you learn more? Go to http://sue-michael.info/. Enter P501 in the search box to learn more about \"Learning About High Blood Pressure. \"  Current as of: March 23, 2016  Content Version: 11.1  © 2628-0028 [employer]. Care instructions adapted under license by [employer] (which disclaims liability or warranty for this information). If you have questions about a medical condition or this instruction, always ask your healthcare professional. [place] [address] any warranty or liability for your use of this information.

## 2017-03-21 NOTE — ED NOTES
Patient presents with right sided chest onset at rest,constant. There is a history of asthma,with an ongoing episode at this time

## 2017-07-12 ENCOUNTER — OFFICE VISIT (OUTPATIENT)
Dept: OBGYN CLINIC | Age: 32
End: 2017-07-12

## 2017-07-12 VITALS
SYSTOLIC BLOOD PRESSURE: 140 MMHG | DIASTOLIC BLOOD PRESSURE: 78 MMHG | WEIGHT: 293 LBS | BODY MASS INDEX: 43.4 KG/M2 | HEART RATE: 91 BPM | HEIGHT: 69 IN

## 2017-07-12 DIAGNOSIS — N92.6 MISSED MENSES: Primary | ICD-10-CM

## 2017-07-12 LAB
HCG URINE, QL. (POC): POSITIVE
VALID INTERNAL CONTROL?: YES

## 2017-07-12 NOTE — MR AVS SNAPSHOT
Visit Information Date & Time Provider Department Dept. Phone Encounter #  
 7/12/2017 11:00 AM [facility] [id number] Follow-up Instructions Return in about 7 days (around 7/19/2017), or if symptoms worsen or fail to improve, for prenatal care/US. Your Appointments 7/18/2017 10:45 AM  
OB VISIT with Daysi Powell MD  
[facility] ([facility]) Appt Note: 8 wks ob  
 [address], [address] [address] [facility] [address]  
[phone]  
  
   
 [address], [address] [address] [facility] Upcoming Health Maintenance Date Due  
 PAP AKA CERVICAL CYTOLOGY 6/22/2014 INFLUENZA AGE 9 TO ADULT 8/1/2017 Allergies as of 7/12/2017  Review Complete On: 7/12/2017 By: Daysi Powell MD  
 No Known Allergies Current Immunizations  Reviewed on 6/13/2013 No immunizations on file. Not reviewed this visit You Were Diagnosed With   
  
 Codes Comments Missed menses    -  Primary ICD-10-CM: N92.6 ICD-9-CM: 626.4 Vitals BP Pulse Height(growth percentile) Weight(growth percentile) LMP BMI  
 140/78 (BP 1 Location: Right arm, BP Patient Position: Sitting) 91 5' 9\" (1.753 m) (!) 413 lb 8 oz (187.6 kg) 05/24/2017 61.06 kg/m2 OB Status Smoking Status Pregnant Current Some Day Smoker BMI and BSA Data Body Mass Index Body Surface Area 61.06 kg/m 2 3.02 m 2 Preferred Pharmacy Pharmacy Name Phone [facility]  [facility], [address] [address] [place], [place] [address] [phone] Your Updated Medication List  
  
   
This list is accurate as of: 7/12/17 12:19 PM.  Always use your most recent med list.  
  
  
  
  
 lisinopril-hydroCHLOROthiazide 10-12.5 mg per tablet Commonly known as:  Arsh Sinning Take 1 Tab by mouth daily. PNV No.22-Iron Cbn&Gluc-FA-DOS 27-1-50 mg Tab Take 1 Tab by mouth daily for 1 dose. TRIAMTERENE-HYDROCHLOROTHIAZID PO Take  by mouth. Prescriptions Sent to Pharmacy Refills PNV No.22-Iron Cbn&Gluc-FA-DOS 27-1-50 mg tab 4 Sig: Take 1 Tab by mouth daily for 1 dose. Class: Normal  
 Pharmacy: [facility] [address] #: [phone] Route: Oral  
  
We Performed the Following AMB POC URINE PREGNANCY TEST, VISUAL COLOR COMPARISON [20176 CPT(R)] Follow-up Instructions Return in about 7 days (around 7/19/2017), or if symptoms worsen or fail to improve, for prenatal care/US. Patient Instructions Secondary Amenorrhea: Care Instructions Your Care Instructions Amenorrhea means you do not have menstrual periods. There are two types. Primary amenorrhea means you never start your periods. Secondary amenorrhea means you have had periods, and then they stop, especially for more than 3 months. Even if you don't have periods, you could still get pregnant. You may not know what caused your periods to stop. Possible causes include pregnancy, hormonal changes, and losing or gaining a lot of weight quickly. Some medicines and stress could also cause it. Being active in endurance sports can also cause you to miss your period or stop menstruating. Female athletes may try to lose or maintain weight in harmful ways. These include dieting too much or binging and purging. But doing these things can lead to eating disorders, amenorrhea, and osteoporosis. If you exercise less or gain a little weight, your periods will probably start again. Your doctor may order tests to find out why your periods have stopped. Your doctor may give you the hormone progestin. It can cause you to have a period. Talk to your doctor if you do not have a period for 3 months or more. Going for a long amount of time without a period can raise your chance of getting cancer of the lining of the uterus later in life. Follow-up care is a key part of your treatment and safety. Be sure to make and go to all appointments, and call your doctor if you are having problems. It's also a good idea to know your test results and keep a list of the medicines you take. How can you care for yourself at home? · Eat a healthy, balanced diet. This includes fruits, vegetables, whole grains, proteins, and low-fat dairy products. · Do light exercise, unless your doctor told you not to exercise. · Use birth control if you do not want to get pregnant. When should you call for help? Watch closely for changes in your health, and be sure to contact your doctor if: 
· You think you might be pregnant. · You have very heavy bleeding after not having had your period for several months. Where can you learn more? Go to http://sue-michael.info/. Enter [id number] in the search box to learn more about \"Secondary Amenorrhea: Care Instructions. \" Current as of: October 13, 2016 Content Version: 11.3 © 6104-4654 [employer]. Care instructions adapted under license by [employer] (which disclaims liability or warranty for this information). If you have questions about a medical condition or this instruction, always ask your healthcare professional. [address] any warranty or liability for your use of this information. Introducing [facility]! Latia Sheridan introduces [employer] patient portal. Now you can access parts of your medical record, email your doctor's office, and request medication refills online. 1. In your internet browser, go to [email or web address] 2. Click on the First Time User? Click Here link in the Sign In box. You will see the New Member Sign Up page. 3. Enter your [employer] Access Code exactly as it appears below. You will not need to use this code after youve completed the sign-up process.  If you do not sign up before the expiration date, you must request a new code. · [employer] Access Code: [email or web address] Expires: 10/10/2017 12:18 PM 
 
4. Enter the last four digits of your Social Security Number (xxxx) and Date of Birth (mm/dd/yyyy) as indicated and click Submit. You will be taken to the next sign-up page. 5. Create a [employer] ID. This will be your [employer] login ID and cannot be changed, so think of one that is secure and easy to remember. 6. Create a [employer] password. You can change your password at any time. 7. Enter your Password Reset Question and Answer. This can be used at a later time if you forget your password. 8. Enter your e-mail address. You will receive e-mail notification when new information is available in [address] 9. Click Sign Up. You can now view and download portions of your medical record. 10. Click the Download Summary menu link to download a portable copy of your medical information. If you have questions, please visit the Frequently Asked Questions section of the [employer] website. Remember, [employer] is NOT to be used for urgent needs. For medical emergencies, dial 911. Now available from your iPhone and Android! Please provide this summary of care documentation to your next provider. Your primary care clinician is listed as Esau Coins. If you have any questions after today's visit, please call [phone].

## 2017-07-12 NOTE — LETTER
NOTIFICATION OF RETURN TO WORK / SCHOOL 
 
7/12/2017 12:07 PM 
 
Ms. Mary [address] [phone] [address] [address] Julissa Catherine To Whom It May Concern: 
 
Negin Florian was under the care of [address] [place] [address] from July 12, 2017. She is approximately 7 weeks pregnant. Julissa Catherine If there are questions or concerns please have the patient contact our office. Sincerely, Lorelei Bourgeois MD

## 2017-07-12 NOTE — PROGRESS NOTES
Progress Note    Patient: Fariba Maria  MRN: [id number]  Date: 7/12/2017     Age:  27 y.o.,      Sex: female    YOB: 1985    Fariba Maria is a 27y.o. year-old female, G 8 P 5 ( all normal vaginal deliveries)  whose last normal menstrual period was 5/24/17 . She is not on any contraceptive. She presents today for amenorrhea. Review of Systems: General, Cardiovascular, Respiratory, Gastrointestinal, Genitourinary, Neuropsychiatry, Musculoskeletal. Patient denies any problems associated with these systems except for morbid obesity. .    General Examination: She is a well-developed, well-nourished female in no acute distress. Abdomen--soft, nontender, and normal active. Pelvic exam--External genitalia and BUS--normal.             Cervix: Normal    Vagina: vaginal discharge normal and physiologic                          Uterus: enlarged, < 8 weeks size, regular contour    Adnexa: normal bimanual exam    Impression. ----Amenorrhea. Morbid Obesity. Plan: --RTC-1 week to start her prenatal care. Rx- for prenatal care-sent.     Camila Henley MD  July 12, 2017

## 2017-07-12 NOTE — PATIENT INSTRUCTIONS
Secondary Amenorrhea: Care Instructions  Your Care Instructions  Amenorrhea means you do not have menstrual periods. There are two types. Primary amenorrhea means you never start your periods. Secondary amenorrhea means you have had periods, and then they stop, especially for more than 3 months. Even if you don't have periods, you could still get pregnant. You may not know what caused your periods to stop. Possible causes include pregnancy, hormonal changes, and losing or gaining a lot of weight quickly. Some medicines and stress could also cause it. Being active in endurance sports can also cause you to miss your period or stop menstruating. Female athletes may try to lose or maintain weight in harmful ways. These include dieting too much or binging and purging. But doing these things can lead to eating disorders, amenorrhea, and osteoporosis. If you exercise less or gain a little weight, your periods will probably start again. Your doctor may order tests to find out why your periods have stopped. Your doctor may give you the hormone progestin. It can cause you to have a period. Talk to your doctor if you do not have a period for 3 months or more. Going for a long amount of time without a period can raise your chance of getting cancer of the lining of the uterus later in life. Follow-up care is a key part of your treatment and safety. Be sure to make and go to all appointments, and call your doctor if you are having problems. It's also a good idea to know your test results and keep a list of the medicines you take. How can you care for yourself at home? · Eat a healthy, balanced diet. This includes fruits, vegetables, whole grains, proteins, and low-fat dairy products. · Do light exercise, unless your doctor told you not to exercise. · Use birth control if you do not want to get pregnant. When should you call for help?   Watch closely for changes in your health, and be sure to contact your doctor if:  · You think you might be pregnant. · You have very heavy bleeding after not having had your period for several months. Where can you learn more? Go to http://sue-michael.info/. Enter [id number] in the search box to learn more about \"Secondary Amenorrhea: Care Instructions. \"  Current as of: October 13, 2016  Content Version: 11.3  © 6939-7168 [employer]. Care instructions adapted under license by [employer] (which disclaims liability or warranty for this information). If you have questions about a medical condition or this instruction, always ask your healthcare professional. [address] any warranty or liability for your use of this information.

## 2017-07-18 ENCOUNTER — ROUTINE PRENATAL (OUTPATIENT)
Dept: OBGYN CLINIC | Age: 32
End: 2017-07-18

## 2017-07-18 ENCOUNTER — HOSPITAL ENCOUNTER (OUTPATIENT)
Dept: ULTRASOUND IMAGING | Age: 32
Discharge: HOME | End: 2017-07-18
Attending: OBSTETRICS & GYNECOLOGY
Payer: MEDICAID

## 2017-07-18 VITALS
BODY MASS INDEX: 43.4 KG/M2 | DIASTOLIC BLOOD PRESSURE: 90 MMHG | SYSTOLIC BLOOD PRESSURE: 143 MMHG | HEART RATE: 75 BPM | WEIGHT: 293 LBS | HEIGHT: 69 IN

## 2017-07-18 DIAGNOSIS — N91.2 AMENORRHEA: ICD-10-CM

## 2017-07-18 DIAGNOSIS — I10 ESSENTIAL HYPERTENSION: ICD-10-CM

## 2017-07-18 DIAGNOSIS — Z34.81 ENCOUNTER FOR SUPERVISION OF OTHER NORMAL PREGNANCY IN FIRST TRIMESTER: ICD-10-CM

## 2017-07-18 DIAGNOSIS — N91.2 AMENORRHEA: Primary | ICD-10-CM

## 2017-07-18 PROCEDURE — 76817 TRANSVAGINAL US OBSTETRIC: CPT

## 2017-07-18 RX ORDER — LABETALOL 200 MG/1
200 TABLET, FILM COATED ORAL 2 TIMES DAILY
Qty: 60 TAB | Refills: 3 | Status: SHIPPED | OUTPATIENT
Start: 2017-07-18 | End: 2018-04-26 | Stop reason: SDUPTHER

## 2017-07-18 NOTE — MR AVS SNAPSHOT
Visit Information Date & Time Provider Department Dept. Phone Encounter #  
 7/18/2017 10:45 AM Josue Mauricio, [address] [place] [address] [phone] [id number] Follow-up Instructions Return in about 4 weeks (around 8/15/2017). Follow-up and Disposition History Your Appointments 8/15/2017  9:15 AM  
OB VISIT with Tanya Cobian MD  
[facility] ([address]) Appt Note: 12 wk ob  
 [address], [address] [place] [id number]  
[phone]  
  
   
 [address], [address] [address] [place] Upcoming Health Maintenance Date Due  
 PAP AKA CERVICAL CYTOLOGY 6/22/2014 INFLUENZA AGE 9 TO ADULT 8/1/2017 Allergies as of 7/18/2017  Review Complete On: 7/18/2017 By: Josue Mauricio MD  
 No Known Allergies Current Immunizations  Reviewed on 6/13/2013 No immunizations on file. Not reviewed this visit You Were Diagnosed With   
  
 Codes Comments Amenorrhea    -  Primary ICD-10-CM: N91.2 ICD-9-CM: 626.0 Essential hypertension     ICD-10-CM: I10 
ICD-9-CM: 401.9 Encounter for supervision of other normal pregnancy in first trimester     ICD-10-CM: Z34.81 ICD-9-CM: V22.1 Vitals BP Pulse Height(growth percentile) Weight(growth percentile) LMP BMI  
 143/90 (BP 1 Location: Right arm, BP Patient Position: Sitting) 75 5' 9\" (1.753 m) (!) 416 lb 8 oz (188.9 kg) 05/24/2017 61.51 kg/m2 OB Status Smoking Status Pregnant Current Some Day Smoker BMI and BSA Data Body Mass Index Body Surface Area 61.51 kg/m 2 3.03 m 2 Preferred Pharmacy Pharmacy Name Phone [facility], [address] [place] [address],[address] [place] [address] [phone] Your Updated Medication List  
  
   
This list is accurate as of: 7/18/17  9:37 PM.  Always use your most recent med list.  
  
  
  
  
 labetalol 200 mg tablet Commonly known as:  Kylevernon Weston  
 Take 1 Tab by mouth two (2) times a day. Indications: hypertension  
  
 lisinopril-hydroCHLOROthiazide 10-12.5 mg per tablet Commonly known as:  Omega  Take 1 Tab by mouth daily. TRIAMTERENE-HYDROCHLOROTHIAZID PO Take  by mouth. Prescriptions Sent to Pharmacy Refills  
 labetalol (NORMODYNE) 200 mg tablet 3 Sig: Take 1 Tab by mouth two (2) times a day. Indications: hypertension Class: Normal  
 Pharmacy: [facility] Erla Schwab, [address] [place] [address] #: [phone] Route: Oral  
  
Follow-up Instructions Return in about 4 weeks (around 8/15/2017). Patient Instructions Secondary Amenorrhea: Care Instructions Your Care Instructions Amenorrhea means you do not have menstrual periods. There are two types. Primary amenorrhea means you never start your periods. Secondary amenorrhea means you have had periods, and then they stop, especially for more than 3 months. Even if you don't have periods, you could still get pregnant. You may not know what caused your periods to stop. Possible causes include pregnancy, hormonal changes, and losing or gaining a lot of weight quickly. Some medicines and stress could also cause it. Being active in endurance sports can also cause you to miss your period or stop menstruating. Female athletes may try to lose or maintain weight in harmful ways. These include dieting too much or binging and purging. But doing these things can lead to eating disorders, amenorrhea, and osteoporosis. If you exercise less or gain a little weight, your periods will probably start again. Your doctor may order tests to find out why your periods have stopped. Your doctor may give you the hormone progestin. It can cause you to have a period. Talk to your doctor if you do not have a period for 3 months or more.  Going for a long amount of time without a period can raise your chance of getting cancer of the lining of the uterus later in life. Follow-up care is a key part of your treatment and safety. Be sure to make and go to all appointments, and call your doctor if you are having problems. It's also a good idea to know your test results and keep a list of the medicines you take. How can you care for yourself at home? · Eat a healthy, balanced diet. This includes fruits, vegetables, whole grains, proteins, and low-fat dairy products. · Do light exercise, unless your doctor told you not to exercise. · Use birth control if you do not want to get pregnant. When should you call for help? Watch closely for changes in your health, and be sure to contact your doctor if: 
· You think you might be pregnant. · You have very heavy bleeding after not having had your period for several months. Where can you learn more? Go to http://sue-michael.info/. Enter [id number] in the search box to learn more about \"Secondary Amenorrhea: Care Instructions. \" Current as of: October 13, 2016 Content Version: 11.3 © 1385-3768 [employer]. Care instructions adapted under license by [employer] (which disclaims liability or warranty for this information). If you have questions about a medical condition or this instruction, always ask your healthcare professional. [address] any warranty or liability for your use of this information. High Blood Pressure: Care Instructions Your Care Instructions If your blood pressure is usually above 140/90, you have high blood pressure, or hypertension. That means the top number is 140 or higher or the bottom number is 90 or higher, or both. Despite what a lot of people think, high blood pressure usually doesn't cause headaches or make you feel dizzy or lightheaded. It usually has no symptoms.  But it does increase your risk for heart attack, stroke, and kidney or eye damage. The higher your blood pressure, the more your risk increases. Your doctor will give you a goal for your blood pressure. Your goal will be based on your health and your age. An example of a goal is to keep your blood pressure below 140/90. Lifestyle changes, such as eating healthy and being active, are always important to help lower blood pressure. You might also take medicine to reach your blood pressure goal. 
Follow-up care is a key part of your treatment and safety. Be sure to make and go to all appointments, and call your doctor if you are having problems. It's also a good idea to know your test results and keep a list of the medicines you take. How can you care for yourself at home? Medical treatment · If you stop taking your medicine, your blood pressure will go back up. You may take one or more types of medicine to lower your blood pressure. Be safe with medicines. Take your medicine exactly as prescribed. Call your doctor if you think you are having a problem with your medicine. · Talk to your doctor before you start taking aspirin every day. Aspirin can help certain people lower their risk of a heart attack or stroke. But taking aspirin isn't right for everyone, because it can cause serious bleeding. · See your doctor regularly. You may need to see the doctor more often at first or until your blood pressure comes down. · If you are taking blood pressure medicine, talk to your doctor before you take decongestants or anti-inflammatory medicine, such as ibuprofen. Some of these medicines can raise blood pressure. · Learn how to check your blood pressure at home. Lifestyle changes · Stay at a healthy weight. This is especially important if you put on weight around the waist. Losing even 10 pounds can help you lower your blood pressure. · If your doctor recommends it, get more exercise. Walking is a good choice. Bit by bit, increase the amount you walk every day.  Try for at least 30 minutes on most days of the week. You also may want to swim, bike, or do other activities. · Avoid or limit alcohol. Talk to your doctor about whether you can drink any alcohol. · Try to limit how much sodium you eat to less than 2,300 milligrams (mg) a day. Your doctor may ask you to try to eat less than 1,500 mg a day. · Eat plenty of fruits (such as bananas and oranges), vegetables, legumes, whole grains, and low-fat dairy products. · Lower the amount of saturated fat in your diet. Saturated fat is found in animal products such as milk, cheese, and meat. Limiting these foods may help you lose weight and also lower your risk for heart disease. · Do not smoke. Smoking increases your risk for heart attack and stroke. If you need help quitting, talk to your doctor about stop-smoking programs and medicines. These can increase your chances of quitting for good. When should you call for help? Call 911 anytime you think you may need emergency care. This may mean having symptoms that suggest that your blood pressure is causing a serious heart or blood vessel problem. Your blood pressure may be over 180/110. For example, call 911 if: 
· You have symptoms of a heart attack. These may include: ¨ Chest pain or pressure, or a strange feeling in the chest. 
¨ Sweating. ¨ Shortness of breath. ¨ Nausea or vomiting. ¨ Pain, pressure, or a strange feeling in the back, neck, jaw, or upper belly or in one or both shoulders or arms. ¨ Lightheadedness or sudden weakness. ¨ A fast or irregular heartbeat. · You have symptoms of a stroke. These may include: 
¨ Sudden numbness, tingling, weakness, or loss of movement in your face, arm, or leg, especially on only one side of your body. ¨ Sudden vision changes. ¨ Sudden trouble speaking. ¨ Sudden confusion or trouble understanding simple statements. ¨ Sudden problems with walking or balance. ¨ A sudden, severe headache that is different from past headaches. · You have severe back or belly pain. Do not wait until your blood pressure comes down on its own. Get help right away. Call your doctor now or seek immediate care if: 
· Your blood pressure is much higher than normal (such as 180/110 or higher), but you don't have symptoms. · You think high blood pressure is causing symptoms, such as: ¨ Severe headache. ¨ Blurry vision. Watch closely for changes in your health, and be sure to contact your doctor if: 
· Your blood pressure measures 140/90 or higher at least 2 times. That means the top number is 140 or higher or the bottom number is 90 or higher, or both. · You think you may be having side effects from your blood pressure medicine. · Your blood pressure is usually normal, but it goes above normal at least 2 times. Where can you learn more? Go to http://sue-michael.info/. Enter A075 in the search box to learn more about \"High Blood Pressure: Care Instructions. \" Current as of: August 8, 2016 Content Version: 11.3 © 9461-4485 [employer]er[employer]. Care instructions adapted under license by [employer] (which disclaims liability or warranty for this information). If you have questions about a medical condition or this instruction, always ask your healthcare professional. [address] any warranty or liability for your use of this information. Introducing [facility]! Jossy Neil introduces [employer] patient portal. Now you can access parts of your medical record, email your doctor's office, and request medication refills online. 1. In your internet browser, go to [email or web address] 2. Click on the First Time User? Click Here link in the Sign In box. You will see the New Member Sign Up page. 3. Enter your [employer] Access Code exactly as it appears below. You will not need to use this code after youve completed the sign-up process.  If you do not sign up before the expiration date, you must request a new code. · [facility] Access Code: [email or web address] Expires: 10/10/2017 12:18 PM 
 
4. Enter the last four digits of your Social Security Number (xxxx) and Date of Birth (mm/dd/yyyy) as indicated and click Submit. You will be taken to the next sign-up page. 5. Create a [facility] ID. This will be your [facility] login ID and cannot be changed, so think of one that is secure and easy to remember. 6. Create a [facility] password. You can change your password at any time. 7. Enter your Password Reset Question and Answer. This can be used at a later time if you forget your password. 8. Enter your e-mail address. You will receive e-mail notification when new information is available in [address] 9. Click Sign Up. You can now view and download portions of your medical record. 10. Click the Download Summary menu link to download a portable copy of your medical information. If you have questions, please visit the Frequently Asked Questions section of the [facility] website. Remember, [facility] is NOT to be used for urgent needs. For medical emergencies, dial 911. Now available from your iPhone and Android! Please provide this summary of care documentation to your next provider. Your primary care clinician is listed as Ivan Flecther. If you have any questions after today's visit, please call [phone].

## 2017-07-18 NOTE — PATIENT INSTRUCTIONS
Secondary Amenorrhea: Care Instructions  Your Care Instructions  Amenorrhea means you do not have menstrual periods. There are two types. Primary amenorrhea means you never start your periods. Secondary amenorrhea means you have had periods, and then they stop, especially for more than 3 months. Even if you don't have periods, you could still get pregnant. You may not know what caused your periods to stop. Possible causes include pregnancy, hormonal changes, and losing or gaining a lot of weight quickly. Some medicines and stress could also cause it. Being active in endurance sports can also cause you to miss your period or stop menstruating. Female athletes may try to lose or maintain weight in harmful ways. These include dieting too much or binging and purging. But doing these things can lead to eating disorders, amenorrhea, and osteoporosis. If you exercise less or gain a little weight, your periods will probably start again. Your doctor may order tests to find out why your periods have stopped. Your doctor may give you the hormone progestin. It can cause you to have a period. Talk to your doctor if you do not have a period for 3 months or more. Going for a long amount of time without a period can raise your chance of getting cancer of the lining of the uterus later in life. Follow-up care is a key part of your treatment and safety. Be sure to make and go to all appointments, and call your doctor if you are having problems. It's also a good idea to know your test results and keep a list of the medicines you take. How can you care for yourself at home? · Eat a healthy, balanced diet. This includes fruits, vegetables, whole grains, proteins, and low-fat dairy products. · Do light exercise, unless your doctor told you not to exercise. · Use birth control if you do not want to get pregnant. When should you call for help?   Watch closely for changes in your health, and be sure to contact your doctor if:  · You think you might be pregnant. · You have very heavy bleeding after not having had your period for several months. Where can you learn more? Go to http://sue-michael.info/. Enter [id number] in the search box to learn more about \"Secondary Amenorrhea: Care Instructions. \"  Current as of: October 13, 2016  Content Version: 11.3  © 3899-2694 [employer]. Care instructions adapted under license by [employer] (which disclaims liability or warranty for this information). If you have questions about a medical condition or this instruction, always ask your healthcare professional. [place] [address] any warranty or liability for your use of this information. High Blood Pressure: Care Instructions  Your Care Instructions  If your blood pressure is usually above 140/90, you have high blood pressure, or hypertension. That means the top number is 140 or higher or the bottom number is 90 or higher, or both. Despite what a lot of people think, high blood pressure usually doesn't cause headaches or make you feel dizzy or lightheaded. It usually has no symptoms. But it does increase your risk for heart attack, stroke, and kidney or eye damage. The higher your blood pressure, the more your risk increases. Your doctor will give you a goal for your blood pressure. Your goal will be based on your health and your age. An example of a goal is to keep your blood pressure below 140/90. Lifestyle changes, such as eating healthy and being active, are always important to help lower blood pressure. You might also take medicine to reach your blood pressure goal.  Follow-up care is a key part of your treatment and safety. Be sure to make and go to all appointments, and call your doctor if you are having problems. It's also a good idea to know your test results and keep a list of the medicines you take. How can you care for yourself at home?   Medical treatment  · If you stop taking your medicine, your blood pressure will go back up. You may take one or more types of medicine to lower your blood pressure. Be safe with medicines. Take your medicine exactly as prescribed. Call your doctor if you think you are having a problem with your medicine. · Talk to your doctor before you start taking aspirin every day. Aspirin can help certain people lower their risk of a heart attack or stroke. But taking aspirin isn't right for everyone, because it can cause serious bleeding. · See your doctor regularly. You may need to see the doctor more often at first or until your blood pressure comes down. · If you are taking blood pressure medicine, talk to your doctor before you take decongestants or anti-inflammatory medicine, such as ibuprofen. Some of these medicines can raise blood pressure. · Learn how to check your blood pressure at home. Lifestyle changes  · Stay at a healthy weight. This is especially important if you put on weight around the waist. Losing even 10 pounds can help you lower your blood pressure. · If your doctor recommends it, get more exercise. Walking is a good choice. Bit by bit, increase the amount you walk every day. Try for at least 30 minutes on most days of the week. You also may want to swim, bike, or do other activities. · Avoid or limit alcohol. Talk to your doctor about whether you can drink any alcohol. · Try to limit how much sodium you eat to less than 2,300 milligrams (mg) a day. Your doctor may ask you to try to eat less than 1,500 mg a day. · Eat plenty of fruits (such as bananas and oranges), vegetables, legumes, whole grains, and low-fat dairy products. · Lower the amount of saturated fat in your diet. Saturated fat is found in animal products such as milk, cheese, and meat. Limiting these foods may help you lose weight and also lower your risk for heart disease. · Do not smoke. Smoking increases your risk for heart attack and stroke.  If you need help quitting, talk to your doctor about stop-smoking programs and medicines. These can increase your chances of quitting for good. When should you call for help? Call 911 anytime you think you may need emergency care. This may mean having symptoms that suggest that your blood pressure is causing a serious heart or blood vessel problem. Your blood pressure may be over 180/110. For example, call 911 if:  · You have symptoms of a heart attack. These may include:  ¨ Chest pain or pressure, or a strange feeling in the chest.  ¨ Sweating. ¨ Shortness of breath. ¨ Nausea or vomiting. ¨ Pain, pressure, or a strange feeling in the back, neck, jaw, or upper belly or in one or both shoulders or arms. ¨ Lightheadedness or sudden weakness. ¨ A fast or irregular heartbeat. · You have symptoms of a stroke. These may include:  ¨ Sudden numbness, tingling, weakness, or loss of movement in your face, arm, or leg, especially on only one side of your body. ¨ Sudden vision changes. ¨ Sudden trouble speaking. ¨ Sudden confusion or trouble understanding simple statements. ¨ Sudden problems with walking or balance. ¨ A sudden, severe headache that is different from past headaches. · You have severe back or belly pain. Do not wait until your blood pressure comes down on its own. Get help right away. Call your doctor now or seek immediate care if:  · Your blood pressure is much higher than normal (such as 180/110 or higher), but you don't have symptoms. · You think high blood pressure is causing symptoms, such as:  ¨ Severe headache. ¨ Blurry vision. Watch closely for changes in your health, and be sure to contact your doctor if:  · Your blood pressure measures 140/90 or higher at least 2 times. That means the top number is 140 or higher or the bottom number is 90 or higher, or both. · You think you may be having side effects from your blood pressure medicine.   · Your blood pressure is usually normal, but it goes above normal at least 2 times. Where can you learn more? Go to http://sue-michael.info/. Enter A003 in the search box to learn more about \"High Blood Pressure: Care Instructions. \"  Current as of: August 8, 2016  Content Version: 11.3  © 4404-2729 [employer]. Care instructions adapted under license by [employer] (which disclaims liability or warranty for this information). If you have questions about a medical condition or this instruction, always ask your healthcare professional. [address] any warranty or liability for your use of this information.

## 2017-07-18 NOTE — PROGRESS NOTES
Madeline Olsen presents today for her first prenatal visit. OB History      Para Term  AB Living    8 5 5  1 5    SAB TAB Ectopic Molar Multiple Live Births    1             I have reviewed the patient's medical, obstetrical, social, and family histories, medications, and available lab results. Subjective:   She has no unusual complaints    Objective:   See Prenatal Flowsheet and Physical Exam section. Assessment/Plan:   Normal pregnancy  Routine Prenatal care    ICD-10-CM ICD-9-CM    1. Amenorrhea N91.2 626.0 US TRANSVAGINAL      CANCELED: US TRANSVAGINAL   2.  Essential hypertension I10 401.9 labetalol (NORMODYNE) 200 mg tablet

## 2017-07-31 LAB
ABO GROUP BLD: NORMAL
ANTIBODY SCREEN, EXTERNAL: NORMAL
BASOPHILS # BLD AUTO: 0 X10E3/UL (ref 0–0.2)
BASOPHILS NFR BLD AUTO: 0 %
C TRACH RRNA SPEC QL NAA+PROBE: NEGATIVE
CFTR MUT ANL BLD/T: NORMAL
EOSINOPHIL # BLD AUTO: 0.1 X10E3/UL (ref 0–0.4)
EOSINOPHIL NFR BLD AUTO: 2 %
ERYTHROCYTE [DISTWIDTH] IN BLOOD BY AUTOMATED COUNT: 15.4 % (ref 12.3–15.4)
GENE DIS ANL CARRIER INTERP-IMP: NORMAL
GRBS, EXTERNAL: NORMAL
HBSAG, EXTERNAL: NORMAL
HBV SURFACE AG SERPL QL IA: NEGATIVE
HCT VFR BLD AUTO: 34.3 % (ref 34–46.6)
HGB A MFR BLD: 97.9 % (ref 94–98)
HGB A2 MFR BLD COLUMN CHROM: 2.1 % (ref 0.7–3.1)
HGB BLD-MCNC: 11.2 G/DL (ref 11.1–15.9)
HGB C MFR BLD: 0 %
HGB F MFR BLD: 0 % (ref 0–2)
HGB FRACT BLD-IMP: NORMAL
HGB S BLD QL SOLY: NEGATIVE
HGB S MFR BLD: 0 %
HIV 1+2 AB+HIV1 P24 AG SERPL QL IA: NON REACTIVE
HIV, EXTERNAL: NORMAL
IMM GRANULOCYTES # BLD: 0 X10E3/UL (ref 0–0.1)
IMM GRANULOCYTES NFR BLD: 0 %
LYMPHOCYTES # BLD AUTO: 1.8 X10E3/UL (ref 0.7–3.1)
LYMPHOCYTES NFR BLD AUTO: 33 %
MCH RBC QN AUTO: 26 PG (ref 26.6–33)
MCHC RBC AUTO-ENTMCNC: 32.7 G/DL (ref 31.5–35.7)
MCV RBC AUTO: 80 FL (ref 79–97)
MONOCYTES # BLD AUTO: 0.2 X10E3/UL (ref 0.1–0.9)
MONOCYTES NFR BLD AUTO: 4 %
N GONORRHOEA RRNA SPEC QL NAA+PROBE: NEGATIVE
NEUTROPHILS # BLD AUTO: 3.3 X10E3/UL (ref 1.4–7)
NEUTROPHILS NFR BLD AUTO: 61 %
PLATELET # BLD AUTO: 247 X10E3/UL (ref 150–379)
RBC # BLD AUTO: 4.3 X10E6/UL (ref 3.77–5.28)
RH BLD: POSITIVE
RPR SER QL: NON REACTIVE
RPR, EXTERNAL: NORMAL
RUBELLA, EXTERNAL: NORMAL
RUBV IGG SERPL IA-ACNC: 1.87 INDEX
T VAGINALIS RRNA SPEC QL NAA+PROBE: NEGATIVE
WBC # BLD AUTO: 5.5 X10E3/UL (ref 3.4–10.8)

## 2017-08-15 ENCOUNTER — ROUTINE PRENATAL (OUTPATIENT)
Dept: OBGYN CLINIC | Age: 32
End: 2017-08-15

## 2017-08-15 VITALS
HEIGHT: 69 IN | SYSTOLIC BLOOD PRESSURE: 145 MMHG | DIASTOLIC BLOOD PRESSURE: 89 MMHG | WEIGHT: 293 LBS | HEART RATE: 77 BPM | BODY MASS INDEX: 43.4 KG/M2

## 2017-08-15 DIAGNOSIS — O99.210 OBESITY AFFECTING PREGNANCY: Primary | ICD-10-CM

## 2017-08-15 NOTE — PROGRESS NOTES
This is a 76-year-old female  8 para 6016 at 11 weeks and 1 day with chronic hypertension and morbid obesity and pregnancy. She denies any bleeding or pelvic pain. See flow sheet  Pelvic ultrasound showed a viable 11 week 1 day fetus with fetal heart in the 140s. Morbid obesity and chronic hypertension at 11 weeks and 1 day. Patient will continue labetalol twice daily. She will follow-up in 1 month.

## 2017-08-15 NOTE — PATIENT INSTRUCTIONS
Weeks 10 to 14 of Your Pregnancy: Care Instructions  Your Care Instructions    By weeks 10 to 14 of your pregnancy, the placenta has formed inside your uterus. It is possible to hear your baby's heartbeat with a special ultrasound device. Your baby's eyes can and do move. The arms and legs can bend. This is a good time to think about testing for birth defects. There are two types of tests: screening and diagnostic. Screening tests show the chance that a baby has a certain birth defect. They can't tell you for sure that your baby has a problem. Diagnostic tests show if a baby has a certain birth defect. It's your choice whether to have these tests. You and your partner can talk to your doctor or midwife about birth defects tests. Follow-up care is a key part of your treatment and safety. Be sure to make and go to all appointments, and call your doctor if you are having problems. It's also a good idea to know your test results and keep a list of the medicines you take. How can you care for yourself at home? Decide about tests  · You can have screening tests and diagnostic tests to check for birth defects. The decision to have a test for birth defects is personal. Think about your age, your chance of passing on a family disease, your need to know about any problems, and what you might do after you have the test results. ¨ Triple or quadruple (quad) blood tests. These screening tests can be done between 15 and 20 weeks of pregnancy. They check the amounts of three or four substances in your blood. The doctor looks at these test results, along with your age and other factors, to find out the chance that your baby may have certain problems. ¨ Amniocentesis. This diagnostic test is used to look for chromosomal problems in the baby's cells.  It can be done between 15 and 20 weeks of pregnancy, usually around week 16.  ¨ Nuchal translucency test. This test uses ultrasound to measure the thickness of the area at the back of the baby's neck. An increase in the thickness can be an early sign of Down syndrome. ¨ Chorionic villus sampling (CVS). This is a test that looks for certain genetic problems with your baby. The same genes that are in your baby are in the placenta. A small piece of the placenta is taken out and tested. This test is done when you are 10 to 13 weeks pregnant. Ease discomfort  · Slow down and take naps when you feel tired. · If your emotions swing, talk to someone. Crying, anxiety, and concentration problems are common. · If your gums bleed, try a softer toothbrush. If your gums are puffy and bleed a lot, see your dentist.  · If you feel dizzy:  ¨ Get up slowly after sitting or lying down. ¨ Drink plenty of fluids. ¨ Eat small snacks to keep your blood sugar stable. ¨ Put your head between your legs as though you were tying your shoelaces. ¨ Lie down with your legs higher than your head. Use pillows to prop up your feet. · If you have a headache:  ¨ Lie down. ¨ Ask your partner or a good friend for a neck massage. ¨ Try cool cloths over your forehead or across the back of your neck. ¨ Use acetaminophen (Tylenol) for pain relief. Do not use nonsteroidal anti-inflammatory drugs (NSAIDs), such as ibuprofen (Advil, Motrin) or naproxen (Aleve), unless your doctor says it is okay. · If you have a nosebleed, pinch your nose gently, and hold it for a short while. To prevent nosebleeds, try massaging a small dab of petroleum jelly, such as Vaseline, in your nostrils. · If your nose is stuffed up, try saline (saltwater) nose sprays. Do not use decongestant sprays. Care for your breasts  · Wear a bra that gives you good support. · Know that changes in your breasts are normal.  ¨ Your breasts may get larger and more tender. Tenderness usually gets better by 12 weeks. ¨ Your nipples may get darker and larger, and small bumps around your nipples may show more.   ¨ The veins in your chest and breasts may show more. · Don't worry about \"toughening'\" your nipples. Breastfeeding will naturally do this. Where can you learn more? Go to http://sue-michael.info/. Enter M091 in the search box to learn more about \"Weeks 10 to 14 of Your Pregnancy: Care Instructions. \"  Current as of: March 16, 2017  Content Version: 11.3  © 6955-4601 [employer]. Care instructions adapted under license by [employer] (which disclaims liability or warranty for this information). If you have questions about a medical condition or this instruction, always ask your healthcare professional. [address] any warranty or liability for your use of this information.

## 2017-10-10 ENCOUNTER — ROUTINE PRENATAL (OUTPATIENT)
Dept: OBGYN CLINIC | Age: 32
End: 2017-10-10

## 2017-10-10 VITALS
HEIGHT: 69 IN | HEART RATE: 88 BPM | WEIGHT: 293 LBS | DIASTOLIC BLOOD PRESSURE: 83 MMHG | SYSTOLIC BLOOD PRESSURE: 139 MMHG | BODY MASS INDEX: 43.4 KG/M2

## 2017-10-10 DIAGNOSIS — O10.919 CHRONIC HYPERTENSION AFFECTING PREGNANCY: Primary | ICD-10-CM

## 2017-10-10 DIAGNOSIS — O99.212 OBESITY AFFECTING PREGNANCY IN SECOND TRIMESTER: ICD-10-CM

## 2017-10-10 DIAGNOSIS — Z34.82 ENCOUNTER FOR SUPERVISION OF OTHER NORMAL PREGNANCY, SECOND TRIMESTER: ICD-10-CM

## 2017-10-10 NOTE — PATIENT INSTRUCTIONS

## 2017-10-10 NOTE — PROGRESS NOTES
19w1d   Presents to the office for group prenatal care visit  Delivered all children vaginally  Hx of PreE in first pregnancy, denies vision changes, H/A or epigastric pain  Has not been taking HTN medication in 2 weeks, highly encouraged to take as directed  Discussed taking BP at home  C/o pelvic pain c/w round ligament pain, painful when moving  Denies VB or LOF  Discussed maternity band and slow movements, PT if needed  Rh +  Reviewed prenatal labs  AFP Quad screen collected  Morph US ordered  Pt has questions re: lying on stomach in pregnancy, answered all questions  Discussed proper nutrition in pregnancy, healthy vs non-healthy foods  Counseled on appropriate weight gain in pregnancy  Reviewed group guidelines and orientation  Discussed prenatal testing  She verbalizes understanding of all teaching  See flow sheet  A/P: Normal prenatal visit.   F/U in 4 weeks

## 2017-10-14 LAB
2ND TRIMESTER 4 SCREEN SERPL-IMP: NORMAL
2ND TRIMESTER 4 SCREEN SERPL-IMP: NORMAL
AFP ADJ MOM SERPL: 0.97
AFP SERPL-MCNC: 33.1 NG/ML
AGE AT DELIVERY: 33.1 YEARS
COMMENTS,018272: NORMAL
FET TS 18 RISK FROM MAT AGE: NORMAL
FET TS 21 RISK FROM MAT AGE: 436
GA METHOD: NORMAL
GA: 19.1 WEEKS
HCG ADJ MOM SERPL: 0.88
HCG SERPL-ACNC: NORMAL MIU/ML
IDDM PATIENT QL: NO
INHIBIN A ADJ MOM SERPL: 0.85
INHIBIN A SERPL-MCNC: 102.45 PG/ML
MULTIPLE PREGNANCY: NO
NEURAL TUBE DEFECT RISK FETUS: NORMAL %
RESULTS, 017389: NORMAL
TS 18 RISK FETUS: NORMAL
TS 21 RISK FETUS: 1510
U ESTRIOL ADJ MOM SERPL: 0.67
U ESTRIOL SERPL-MCNC: 0.91 NG/ML

## 2017-10-17 ENCOUNTER — HOSPITAL ENCOUNTER (OUTPATIENT)
Dept: ULTRASOUND IMAGING | Age: 32
Discharge: HOME | End: 2017-10-17
Attending: ADVANCED PRACTICE MIDWIFE
Payer: MEDICAID

## 2017-10-17 DIAGNOSIS — O10.919 CHRONIC HYPERTENSION AFFECTING PREGNANCY: ICD-10-CM

## 2017-10-17 DIAGNOSIS — O99.212 OBESITY AFFECTING PREGNANCY IN SECOND TRIMESTER: ICD-10-CM

## 2017-10-17 PROCEDURE — 76805 OB US >/= 14 WKS SNGL FETUS: CPT

## 2017-11-14 ENCOUNTER — HOSPITAL ENCOUNTER (OUTPATIENT)
Dept: ULTRASOUND IMAGING | Age: 32
Discharge: HOME | End: 2017-11-14
Attending: OBSTETRICS & GYNECOLOGY
Payer: MEDICAID

## 2017-11-14 ENCOUNTER — ROUTINE PRENATAL (OUTPATIENT)
Dept: OBGYN CLINIC | Age: 32
End: 2017-11-14

## 2017-11-14 VITALS
DIASTOLIC BLOOD PRESSURE: 76 MMHG | HEIGHT: 69 IN | WEIGHT: 293 LBS | SYSTOLIC BLOOD PRESSURE: 136 MMHG | HEART RATE: 75 BPM | BODY MASS INDEX: 43.4 KG/M2

## 2017-11-14 DIAGNOSIS — Z34.82 ENCOUNTER FOR SUPERVISION OF OTHER NORMAL PREGNANCY IN SECOND TRIMESTER: ICD-10-CM

## 2017-11-14 DIAGNOSIS — O42.012 PRETERM PREMATURE RUPTURE OF MEMBRANES WITH ONSET OF LABOR WITHIN 24 HOURS OF RUPTURE IN SECOND TRIMESTER: Primary | ICD-10-CM

## 2017-11-14 DIAGNOSIS — O42.012 PRETERM PREMATURE RUPTURE OF MEMBRANES WITH ONSET OF LABOR WITHIN 24 HOURS OF RUPTURE IN SECOND TRIMESTER: ICD-10-CM

## 2017-11-14 PROCEDURE — 76815 OB US LIMITED FETUS(S): CPT

## 2017-11-14 NOTE — MR AVS SNAPSHOT
Visit Information Date & Time Provider Department Dept. Phone Encounter #  
 2017 10:45 AM Christina Byrd, [facility] [phone] [id number] Follow-up Instructions Return in about 4 weeks (around 2017). 2017  9:00 AM  
OB VISIT with Christina Byrd MD  
[facility] ([address]) Appt Note: ob visit 28 wks 1 hr gtt [address], [place]isha Carole [facility] [id number]  
[phone]  
  
   
 [address], [address] [facility] Upcoming Health Maintenance Date Due  
 PAP AKA CERVICAL CYTOLOGY 2014 Influenza Age 5 to Adult 2017 Allergies as of 2017  Review Complete On: 2017 By: Christina Byrd MD  
 No Known Allergies Current Immunizations  Reviewed on 2013 No immunizations on file. Not reviewed this visit You Were Diagnosed With   
  
 Codes Comments  premature rupture of membranes with onset of labor within 24 hours of rupture in second trimester    -  Primary ICD-10-CM: O42.012 
ICD-9-CM: 658.13 Encounter for supervision of other normal pregnancy in second trimester     ICD-10-CM: Z34.82 
ICD-9-CM: V22.1 Vitals BP Pulse Height(growth percentile) Weight(growth percentile) LMP BMI  
 136/76 (BP 1 Location: Right arm, BP Patient Position: Sitting) 75 5' 9\" (1.753 m) (!) 412 lb (186.9 kg) 2017 60.84 kg/m2 OB Status Smoking Status Pregnant Current Some Day Smoker BMI and BSA Data Body Mass Index Body Surface Area 60.84 kg/m 2 3.02 m 2 Preferred Pharmacy [facility] Name Phone [facility] [address] [place] [address],[address] [place], [place] [address] [phone] Your Updated Medication List  
  
   
This list is accurate as of: 17  1:32 PM.  Always use your most recent med list.  
  
  
  
  
 labetalol 200 mg tablet Commonly known as:  Dominga Rooney  
 Take 1 Tab by mouth two (2) times a day. Indications: hypertension  
  
 lisinopril-hydroCHLOROthiazide 10-12.5 mg per tablet Commonly known as:  Sonja Hodgkin Take 1 Tab by mouth daily. TRIAMTERENE-HYDROCHLOROTHIAZID PO Take  by mouth. Follow-up Instructions Return in about 4 weeks (around 2017). To-Do List   
 2017 Imaging:  US PREG UTS > 14 WKS SNGL   
  
 2017  2:00 PM  
  Appointment with [address] at [address] [place] [address] ([phone]) OUTSIDE FILMS  - Any outside films related to the study being scheduled should be brought with you on the day of the exam.  If this cannot be done there may be a delay in the reading of the study. MEDICATIONS  - Patient must bring a complete list of all medications currently taking to include prescriptions, over-the-counter meds, herbals, vitamins & any dietary supplements  GENERAL INSTRUCTIONS:  No more than two family members allowed in exam room with patient. Any children under the age of 8 must be accompanied by an adult. Patient Instructions Learning About Premature Rupture of Membranes (PROM) What is premature rupture of membranes? Before a baby is born, the amniotic sac breaks open. This causes amniotic fluid to either leak slowly or gush out. It's often called \"having your water break. \" When this happens before contractions start, it is called premature rupture of membranes (PROM). PROM can occur at any time during pregnancy before labor begins. Early PROM can happen before 37 full weeks of pregnancy. Then it's called  premature rupture of membranes, or pPROM. Smoking while pregnant increases the risk of PROM. What happens when you have PROM? · Labor usually starts soon after PROM. If it doesn't, your doctor may induce labor (use medicine to start it). · The amniotic sac protects the baby from infection.  After the sac is torn, the risk for infection is much higher. If you think your sac has broken open, avoid letting anything enter your vagina. Don't have sex or flush your vagina with fluid (douche). · After the sac ruptures, the baby moves down into the pelvis. The baby may press on the umbilical cord. This is not common, but it can cut off the baby's oxygen and blood supplies. In that case the baby must be delivered quickly. What are the symptoms? · When your water breaks, it often feels like a large gush of water. Or it may feel like you're leaking a small amount of water. · Water from the amniotic sac is normally a cloudy-white to an varinder-straw color. If you notice that your water is dark or greenish, foul-smelling, or bloody, tell your doctor. How is PROM treated? · Your doctor will probably have you go to the hospital. 
· If labor doesn't start in 12 to 24 hours, your doctor may want to induce. · If your doctor is worried about infection, you may be given antibiotics. Follow-up care is a key part of your treatment and safety. Be sure to make and go to all appointments, and call your doctor if you are having problems. It's also a good idea to know your test results and keep a list of the medicines you take. Where can you learn more? Go to http://sue-michael.info/. Enter U686 in the search box to learn more about \"Learning About Premature Rupture of Membranes (PROM). \" 
Current as of: March 16, 2017 Content Version: 11.4 © 0826-3828 [employer]. Care instructions adapted under license by [employer] (which disclaims liability or warranty for this information). If you have questions about a medical condition or this instruction, always ask your healthcare professional. [place] [address] any warranty or liability for your use of this information. Weeks 22 to 26 of Your Pregnancy: Care Instructions Your Care Instructions As you enter your 7th month of pregnancy at week 26, your baby's lungs are growing stronger and getting ready to breathe. You may notice that your baby responds to the sound of your or your partner's voice. You may also notice that your baby does less turning and twisting and more squirming or jerking. Jerking often means that your baby has the hiccups. Hiccups are perfectly normal and are only temporary. You may want to think about attending a childbirth preparation class. This is also a good time to start thinking about whether you want to have pain medicine during labor. Most pregnant women are tested for gestational diabetes between weeks 25 and 28. Gestational diabetes occurs when your blood sugar level gets too high when you're pregnant. The test is important, because you can have gestational diabetes and not know it. But the condition can cause problems for your baby. Follow-up care is a key part of your treatment and safety. Be sure to make and go to all appointments, and call your doctor if you are having problems. It's also a good idea to know your test results and keep a list of the medicines you take. How can you care for yourself at home? Ease discomfort from your baby's kicking · Change your position. Sometimes this will cause your baby to change position too. · Take a deep breath while you raise your arm over your head. Then breathe out while you drop your arm. Do Kegel exercises to prevent urine from leaking · You can do Kegel exercises while you stand or sit. ¨ Squeeze the same muscles you would use to stop your urine. Your belly and thighs should not move. ¨ Hold the squeeze for 3 seconds, and then relax for 3 seconds. ¨ Start with 3 seconds. Then add 1 second each week until you are able to squeeze for 10 seconds. ¨ Repeat the exercise 10 to 15 times for each session. Do three or more sessions each day. Ease or reduce swelling in your feet, ankles, hands, and fingers · If your fingers are puffy, take off your rings. · Do not eat high-salt foods, such as potato chips. · Prop up your feet on a stool or couch as much as possible. Sleep with pillows under your feet. · Do not stand for long periods of time or wear tight shoes. · Wear support stockings. Where can you learn more? Go to http://sue-michael.info/. Enter G264 in the search box to learn more about \"Weeks 22 to 26 of Your Pregnancy: Care Instructions. \" Current as of: March 16, 2017 Content Version: 11.4 © 3895-3806 [employer]. Care instructions adapted under license by Pollsb (which disclaims liability or warranty for this information). If you have questions about a medical condition or this instruction, always ask your healthcare professional. [address] any warranty or liability for your use of this information. Introducing hospitals & HEALTH SERVICES! Kory Fiore introduces CollabNet patient portal. Now you can access parts of your medical record, email your doctor's office, and request medication refills online. 1. In your internet browser, go to [email or web address]/Trust Digital 2. Click on the First Time User? Click Here link in the Sign In box. You will see the New Member Sign Up page. 3. Enter your CollabNet Access Code exactly as it appears below. You will not need to use this code after youve completed the sign-up process. If you do not sign up before the expiration date, you must request a new code. · CollabNet Access Code: [email or web address] Expires: 1/8/2018 11:36 AM 
 
4. Enter the last four digits of your Social Security Number (xxxx) and Date of Birth (mm/dd/yyyy) as indicated and click Submit. You will be taken to the next sign-up page. 5. Create a CollabNet ID. This will be your CollabNet login ID and cannot be changed, so think of one that is secure and easy to remember. 6. Create a [email or web address] password. You can change your password at any time. 7. Enter your Password Reset Question and Answer. This can be used at a later time if you forget your password. 8. Enter your e-mail address. You will receive e-mail notification when new information is available in [address] 9. Click Sign Up. You can now view and download portions of your medical record. 10. Click the Download Summary menu link to download a portable copy of your medical information. If you have questions, please visit the Frequently Asked Questions section of the [email or web address] website. Remember, [email or web address] is NOT to be used for urgent needs. For medical emergencies, dial 911. Now available from your iPhone and Android! Please provide this summary of care documentation to your next provider. Your primary care clinician is listed as Vin. If you have any questions after today's visit, please call [phone].

## 2017-11-14 NOTE — PATIENT INSTRUCTIONS
Learning About Premature Rupture of Membranes (PROM)  What is premature rupture of membranes? Before a baby is born, the amniotic sac breaks open. This causes amniotic fluid to either leak slowly or gush out. It's often called \"having your water break. \" When this happens before contractions start, it is called premature rupture of membranes (PROM). PROM can occur at any time during pregnancy before labor begins. Early PROM can happen before 37 full weeks of pregnancy. Then it's called  premature rupture of membranes, or pPROM. Smoking while pregnant increases the risk of PROM. What happens when you have PROM? · Labor usually starts soon after PROM. If it doesn't, your doctor may induce labor (use medicine to start it). · The amniotic sac protects the baby from infection. After the sac is torn, the risk for infection is much higher. If you think your sac has broken open, avoid letting anything enter your vagina. Don't have sex or flush your vagina with fluid (douche). · After the sac ruptures, the baby moves down into the pelvis. The baby may press on the umbilical cord. This is not common, but it can cut off the baby's oxygen and blood supplies. In that case the baby must be delivered quickly. What are the symptoms? · When your water breaks, it often feels like a large gush of water. Or it may feel like you're leaking a small amount of water. · Water from the amniotic sac is normally a cloudy-white to an varinder-straw color. If you notice that your water is dark or greenish, foul-smelling, or bloody, tell your doctor. How is PROM treated? · Your doctor will probably have you go to the hospital.  · If labor doesn't start in 12 to 24 hours, your doctor may want to induce. · If your doctor is worried about infection, you may be given antibiotics. Follow-up care is a key part of your treatment and safety. Be sure to make and go to all appointments, and call your doctor if you are having problems. It's also a good idea to know your test results and keep a list of the medicines you take. Where can you learn more? Go to http://sue-michael.info/. Enter A322 in the search box to learn more about \"Learning About Premature Rupture of Membranes (PROM). \"  Current as of: March 16, 2017  Content Version: 11.4  © 1936-8996 [employer]. Care instructions adapted under license by [employer] (which disclaims liability or warranty for this information). If you have questions about a medical condition or this instruction, always ask your healthcare professional. [place] [address] any warranty or liability for your use of this information. Weeks 22 to 26 of Your Pregnancy: Care Instructions  Your Care Instructions    As you enter your 7th month of pregnancy at week 26, your baby's lungs are growing stronger and getting ready to breathe. You may notice that your baby responds to the sound of your or your partner's voice. You may also notice that your baby does less turning and twisting and more squirming or jerking. Jerking often means that your baby has the hiccups. Hiccups are perfectly normal and are only temporary. You may want to think about attending a childbirth preparation class. This is also a good time to start thinking about whether you want to have pain medicine during labor. Most pregnant women are tested for gestational diabetes between weeks 25 and 28. Gestational diabetes occurs when your blood sugar level gets too high when you're pregnant. The test is important, because you can have gestational diabetes and not know it. But the condition can cause problems for your baby. Follow-up care is a key part of your treatment and safety. Be sure to make and go to all appointments, and call your doctor if you are having problems. It's also a good idea to know your test results and keep a list of the medicines you take.   How can you care for yourself at home? Ease discomfort from your baby's kicking  · Change your position. Sometimes this will cause your baby to change position too. · Take a deep breath while you raise your arm over your head. Then breathe out while you drop your arm. Do Kegel exercises to prevent urine from leaking  · You can do Kegel exercises while you stand or sit. ¨ Squeeze the same muscles you would use to stop your urine. Your belly and thighs should not move. ¨ Hold the squeeze for 3 seconds, and then relax for 3 seconds. ¨ Start with 3 seconds. Then add 1 second each week until you are able to squeeze for 10 seconds. ¨ Repeat the exercise 10 to 15 times for each session. Do three or more sessions each day. Ease or reduce swelling in your feet, ankles, hands, and fingers  · If your fingers are puffy, take off your rings. · Do not eat high-salt foods, such as potato chips. · Prop up your feet on a stool or couch as much as possible. Sleep with pillows under your feet. · Do not stand for long periods of time or wear tight shoes. · Wear support stockings. Where can you learn more? Go to http://sue-michael.info/. Enter G264 in the search box to learn more about \"Weeks 22 to 26 of Your Pregnancy: Care Instructions. \"  Current as of: March 16, 2017  Content Version: 11.4  © 9815-8935 [employer]. Care instructions adapted under license by We[employer]alk (which disclaims liability or warranty for this information). If you have questions about a medical condition or this instruction, always ask your healthcare professional. [place] [address] any warranty or liability for your use of this information.

## 2017-11-14 NOTE — PROGRESS NOTES
G 8 P 5@ 24 w 1 d. Has no complaints except for possible PROM. See prenatal flow sheet. Normal prenatal exam.  RTC 4 weeks. Will schedule for US to evaluate the fluids.

## 2017-12-18 ENCOUNTER — ROUTINE PRENATAL (OUTPATIENT)
Dept: OBGYN CLINIC | Age: 32
End: 2017-12-18

## 2017-12-18 VITALS
WEIGHT: 293 LBS | BODY MASS INDEX: 60.69 KG/M2 | DIASTOLIC BLOOD PRESSURE: 80 MMHG | SYSTOLIC BLOOD PRESSURE: 124 MMHG | HEART RATE: 89 BPM

## 2017-12-18 DIAGNOSIS — J06.9 UPPER RESPIRATORY TRACT INFECTION, UNSPECIFIED TYPE: ICD-10-CM

## 2017-12-18 DIAGNOSIS — O99.213 OBESITY AFFECTING PREGNANCY IN THIRD TRIMESTER: ICD-10-CM

## 2017-12-18 DIAGNOSIS — Z34.83 PRENATAL CARE, SUBSEQUENT PREGNANCY IN THIRD TRIMESTER: Primary | ICD-10-CM

## 2017-12-18 DIAGNOSIS — Z34.83 PRENATAL CARE, SUBSEQUENT PREGNANCY IN THIRD TRIMESTER: ICD-10-CM

## 2017-12-18 NOTE — PATIENT INSTRUCTIONS
Upper Respiratory Infection (Cold): Care Instructions  Your Care Instructions    An upper respiratory infection, or URI, is an infection of the nose, sinuses, or throat. URIs are spread by coughs, sneezes, and direct contact. The common cold is the most frequent kind of URI. The flu and sinus infections are other kinds of URIs. Almost all URIs are caused by viruses. Antibiotics won't cure them. But you can treat most infections with home care. This may include drinking lots of fluids and taking over-the-counter pain medicine. You will probably feel better in 4 to 10 days. The doctor has checked you carefully, but problems can develop later. If you notice any problems or new symptoms, get medical treatment right away. Follow-up care is a key part of your treatment and safety. Be sure to make and go to all appointments, and call your doctor if you are having problems. It's also a good idea to know your test results and keep a list of the medicines you take. How can you care for yourself at home? · To prevent dehydration, drink plenty of fluids, enough so that your urine is light yellow or clear like water. Choose water and other caffeine-free clear liquids until you feel better. If you have kidney, heart, or liver disease and have to limit fluids, talk with your doctor before you increase the amount of fluids you drink. · Take an over-the-counter pain medicine, such as acetaminophen (Tylenol), ibuprofen (Advil, Motrin), or naproxen (Aleve). Read and follow all instructions on the label. · Before you use cough and cold medicines, check the label. These medicines may not be safe for young children or for people with certain health problems. · Be careful when taking over-the-counter cold or flu medicines and Tylenol at the same time. Many of these medicines have acetaminophen, which is Tylenol. Read the labels to make sure that you are not taking more than the recommended dose.  Too much acetaminophen (Tylenol) can be harmful. · Get plenty of rest.  · Do not smoke or allow others to smoke around you. If you need help quitting, talk to your doctor about stop-smoking programs and medicines. These can increase your chances of quitting for good. When should you call for help? Call 911 anytime you think you may need emergency care. For example, call if:  ? · You have severe trouble breathing. ?Call your doctor now or seek immediate medical care if:  ? · You seem to be getting much sicker. ? · You have new or worse trouble breathing. ? · You have a new or higher fever. ? · You have a new rash. ? Watch closely for changes in your health, and be sure to contact your doctor if:  ? · You have a new symptom, such as a sore throat, an earache, or sinus pain. ? · You cough more deeply or more often, especially if you notice more mucus or a change in the color of your mucus. ? · You do not get better as expected. Where can you learn more? Go to http://sue-michael.info/. Enter Y996 in the search box to learn more about \"Upper Respiratory Infection (Cold): Care Instructions. \"  Current as of: May 12, 2017  Content Version: 11.4  © 3631-9139 Healthwise, Incorporated. Care instructions adapted under license by [employer] (which disclaims liability or warranty for this information). If you have questions about a medical condition or this instruction, always ask your healthcare professional. [place] [address] any warranty or liability for your use of this information.

## 2017-12-18 NOTE — MR AVS SNAPSHOT
Visit Information Date & Time Provider Department Dept. Phone Encounter #  
 12/18/2017 10:30 AM Coleen Stevenson, [address] [place] [address] [id number] Follow-up Instructions Return in about 2 weeks (around 1/1/2018). Upcoming Health Maintenance Date Due  
 PAP AKA CERVICAL CYTOLOGY 6/22/2014 Influenza Age 5 to Adult 8/1/2017 OB 3RD TRIMESTER TDAP 12/4/2017 Allergies as of 12/18/2017  Review Complete On: 12/18/2017 By: Coleen Stevenson CNM No Known Allergies Current Immunizations  Reviewed on 6/13/2013 No immunizations on file. Not reviewed this visit You Were Diagnosed With   
  
 Codes Comments Prenatal care, subsequent pregnancy in third trimester    -  Primary ICD-10-CM: Z34.83 ICD-9-CM: V22.1 Obesity affecting pregnancy in third trimester     ICD-10-CM: O99.213 ICD-9-CM: 649.13 Upper respiratory tract infection, unspecified type     ICD-10-CM: J06.9 ICD-9-CM: 465.9 Vitals BP Pulse Weight(growth percentile) LMP BMI OB Status 124/80 (BP 1 Location: Right arm, BP Patient Position: Sitting) 89 (!) 411 lb (186.4 kg) 05/24/2017 60.69 kg/m2 Pregnant Smoking Status Current Some Day Smoker Vitals History BMI and BSA Data Body Mass Index Body Surface Area  
 60.69 kg/m 2 3.01 m 2 Preferred Pharmacy Pharmacy Name Phone [facility], [address] [place] [address],[address] [place] [address] [phone] Your Updated Medication List  
  
   
This list is accurate as of: 12/18/17 11:36 AM.  Always use your most recent med list.  
  
  
  
  
 labetalol 200 mg tablet Commonly known as:  Ramona Almeida Take 1 Tab by mouth two (2) times a day. Indications: hypertension  
  
 lisinopril-hydroCHLOROthiazide 10-12.5 mg per tablet Commonly known as:  Noé Doyle Take 1 Tab by mouth daily. TRIAMTERENE-HYDROCHLOROTHIAZID PO Take  by mouth. We Performed the Following REFERRAL TO PRIMARY CARE [AMQ582 CPT(R)] Comments:  
 Please evaluate patient for URI and SOB in pregnancy. Patient c/o productive cough for approximately 1 month. Follow-up Instructions Return in about 2 weeks (around 1/1/2018). To-Do List   
 12/18/2017 Lab:  [facility] 1-HR SCREEN Referral Information Referral ID Referred By Referred To  
  
 [id number] MD Samantha Trejo [address] [place] [address] [facility] [place], [address]  Phone: [phone] Fax: [phone] Visits Status Start Date End Date 1 New Request 12/18/17 12/18/18 If your referral has a status of pending review or denied, additional information will be sent to support the outcome of this decision. Patient Instructions Upper Respiratory Infection (Cold): Care Instructions Your Care Instructions An upper respiratory infection, or URI, is an infection of the nose, sinuses, or throat. URIs are spread by coughs, sneezes, and direct contact. The common cold is the most frequent kind of URI. The flu and sinus infections are other kinds of URIs. Almost all URIs are caused by viruses. Antibiotics won't cure them. But you can treat most infections with home care. This may include drinking lots of fluids and taking over-the-counter pain medicine. You will probably feel better in 4 to 10 days. The doctor has checked you carefully, but problems can develop later. If you notice any problems or new symptoms, get medical treatment right away. Follow-up care is a key part of your treatment and safety. Be sure to make and go to all appointments, and call your doctor if you are having problems. It's also a good idea to know your test results and keep a list of the medicines you take. How can you care for yourself at home? · To prevent dehydration, drink plenty of fluids, enough so that your urine is light yellow or clear like water. Choose water and other caffeine-free clear liquids until you feel better. If you have kidney, heart, or liver disease and have to limit fluids, talk with your doctor before you increase the amount of fluids you drink. · Take an over-the-counter pain medicine, such as acetaminophen (Tylenol), ibuprofen (Advil, Motrin), or naproxen (Aleve). Read and follow all instructions on the label. · Before you use cough and cold medicines, check the label. These medicines may not be safe for young children or for people with certain health problems. · Be careful when taking over-the-counter cold or flu medicines and Tylenol at the same time. Many of these medicines have acetaminophen, which is Tylenol. Read the labels to make sure that you are not taking more than the recommended dose. Too much acetaminophen (Tylenol) can be harmful. · Get plenty of rest. 
· Do not smoke or allow others to smoke around you. If you need help quitting, talk to your doctor about stop-smoking programs and medicines. These can increase your chances of quitting for good. When should you call for help? Call 911 anytime you think you may need emergency care. For example, call if: 
? · You have severe trouble breathing. ?Call your doctor now or seek immediate medical care if: 
? · You seem to be getting much sicker. ? · You have new or worse trouble breathing. ? · You have a new or higher fever. ? · You have a new rash. ? Watch closely for changes in your health, and be sure to contact your doctor if: 
? · You have a new symptom, such as a sore throat, an earache, or sinus pain. ? · You cough more deeply or more often, especially if you notice more mucus or a change in the color of your mucus. ? · You do not get better as expected. Where can you learn more? Go to http://sue-michael.info/. Enter G968 in the search box to learn more about \"Upper Respiratory Infection (Cold): Care Instructions. \" Current as of: May 12, 2017 Content Version: 11.4 © 9161-8046 Healthwise, Incorporated. Care instructions adapted under license by [employer] (which disclaims liability or warranty for this information). If you have questions about a medical condition or this instruction, always ask your healthcare professional. [place] [address] any warranty or liability for your use of this information. Introducing [facility]! [employer] introduces Wowboard patient portal. Now you can access parts of your medical record, email your doctor's office, and request medication refills online. 1. In your internet browser, go to [email or web address] 2. Click on the First Time User? Click Here link in the Sign In box. You will see the New Member Sign Up page. 3. Enter your Wowboard Access Code exactly as it appears below. You will not need to use this code after youve completed the sign-up process. If you do not sign up before the expiration date, you must request a new code. · Wowboard Access Code: [email or web address] Expires: 1/8/2018 11:36 AM 
 
4. Enter the last four digits of your Social Security Number (xxxx) and Date of Birth (mm/dd/yyyy) as indicated and click Submit. You will be taken to the next sign-up page. 5. Create a Wowboard ID. This will be your Wowboard login ID and cannot be changed, so think of one that is secure and easy to remember. 6. Create a Wowboard password. You can change your password at any time. 7. Enter your Password Reset Question and Answer. This can be used at a later time if you forget your password. 8. Enter your e-mail address. You will receive e-mail notification when new information is available in [address] 9. Click Sign Up. You can now view and download portions of your medical record. 10. Click the Download Summary menu link to download a portable copy of your medical information. If you have questions, please visit the Frequently Asked Questions section of the [employer] website. Remember, [employer] is NOT to be used for urgent needs. For medical emergencies, dial 911. Now available from your iPhone and Android! Please provide this summary of care documentation to your next provider. Your primary care clinician is listed as Ramona Holbrook. If you have any questions after today's visit, please call [phone].

## 2017-12-18 NOTE — PROGRESS NOTES
@ 29w 0d  No OB complaints of VB, pelvic pain or LOF  Unable to do 1 hr GTT today - ordered in CC and sent to lab for testing  Productive cough --> clear sputum, lasting 1 month, accompanied with SOB  Has tried Robitussin without relief, denies hx of asthma  Referred to PCP for URI evaluation  Obesity --> discussed weight gain, losing weight, has appetite, discussed maintaining weight and 11-15lb weight gain  Reviewed US --> normal BRIONNA and fetal weight  See flow sheet  A/P: Routine OB visit  RTC in 2 weeks

## 2018-01-22 ENCOUNTER — HOSPITAL ENCOUNTER (EMERGENCY)
Age: 33
Discharge: HOME | End: 2018-01-22
Attending: OBSTETRICS & GYNECOLOGY | Admitting: OBSTETRICS & GYNECOLOGY
Payer: MEDICAID

## 2018-01-22 ENCOUNTER — ROUTINE PRENATAL (OUTPATIENT)
Dept: OBGYN CLINIC | Age: 33
End: 2018-01-22

## 2018-01-22 ENCOUNTER — APPOINTMENT (OUTPATIENT)
Dept: ULTRASOUND IMAGING | Age: 33
End: 2018-01-22
Attending: OBSTETRICS & GYNECOLOGY
Payer: MEDICAID

## 2018-01-22 VITALS
WEIGHT: 293 LBS | RESPIRATION RATE: 17 BRPM | SYSTOLIC BLOOD PRESSURE: 145 MMHG | OXYGEN SATURATION: 100 % | HEIGHT: 69 IN | TEMPERATURE: 97 F | DIASTOLIC BLOOD PRESSURE: 84 MMHG | BODY MASS INDEX: 43.4 KG/M2 | HEART RATE: 96 BPM

## 2018-01-22 VITALS
HEART RATE: 91 BPM | SYSTOLIC BLOOD PRESSURE: 131 MMHG | RESPIRATION RATE: 18 BRPM | WEIGHT: 293 LBS | TEMPERATURE: 98.6 F | HEIGHT: 69 IN | BODY MASS INDEX: 43.4 KG/M2 | DIASTOLIC BLOOD PRESSURE: 77 MMHG

## 2018-01-22 DIAGNOSIS — O99.213 OBESITY AFFECTING PREGNANCY IN THIRD TRIMESTER: ICD-10-CM

## 2018-01-22 DIAGNOSIS — O10.919 CHRONIC HYPERTENSION AFFECTING PREGNANCY: ICD-10-CM

## 2018-01-22 DIAGNOSIS — Z34.93 PRENATAL CARE, THIRD TRIMESTER: Primary | ICD-10-CM

## 2018-01-22 DIAGNOSIS — O99.810 GLUCOSE INTOLERANCE OF PREGNANCY: ICD-10-CM

## 2018-01-22 LAB
ALBUMIN SERPL-MCNC: 2.4 G/DL (ref 3.4–5)
ALBUMIN/GLOB SERPL: 0.6 {RATIO} (ref 0.8–1.7)
ALP SERPL-CCNC: 74 U/L (ref 45–117)
ALT SERPL-CCNC: 10 U/L (ref 13–56)
ANION GAP SERPL CALC-SCNC: 7 MMOL/L (ref 3–18)
APPEARANCE UR: CLEAR
AST SERPL-CCNC: 7 U/L (ref 15–37)
BILIRUB SERPL-MCNC: 0.2 MG/DL (ref 0.2–1)
BILIRUB UR QL: NEGATIVE
BUN SERPL-MCNC: 6 MG/DL (ref 7–18)
BUN/CREAT SERPL: 14 (ref 12–20)
CALCIUM SERPL-MCNC: 8.4 MG/DL (ref 8.5–10.1)
CHLORIDE SERPL-SCNC: 108 MMOL/L (ref 100–108)
CO2 SERPL-SCNC: 26 MMOL/L (ref 21–32)
COLOR UR: YELLOW
CREAT SERPL-MCNC: 0.42 MG/DL (ref 0.6–1.3)
CREAT UR-MCNC: 208 MG/DL (ref 30–125)
ERYTHROCYTE [DISTWIDTH] IN BLOOD BY AUTOMATED COUNT: 14.7 % (ref 11.6–14.5)
GLOBULIN SER CALC-MCNC: 4.2 G/DL (ref 2–4)
GLUCOSE SERPL-MCNC: 82 MG/DL (ref 74–99)
GLUCOSE UR QL STRIP.AUTO: 100 MG/DL
HCT VFR BLD AUTO: 30.4 % (ref 35–45)
HGB BLD-MCNC: 9.7 G/DL (ref 12–16)
KETONES UR-MCNC: NEGATIVE MG/DL
LDH SERPL L TO P-CCNC: 122 U/L (ref 81–234)
LEUKOCYTE ESTERASE UR QL STRIP: NEGATIVE
MCH RBC QN AUTO: 23.9 PG (ref 24–34)
MCHC RBC AUTO-ENTMCNC: 31.9 G/DL (ref 31–37)
MCV RBC AUTO: 74.9 FL (ref 74–97)
NITRITE UR QL: POSITIVE
PH UR: 6 [PH] (ref 5–9)
PLATELET # BLD AUTO: 199 K/UL (ref 135–420)
PMV BLD AUTO: 10.6 FL (ref 9.2–11.8)
POTASSIUM SERPL-SCNC: 3.6 MMOL/L (ref 3.5–5.5)
PROT SERPL-MCNC: 6.6 G/DL (ref 6.4–8.2)
PROT UR QL: ABNORMAL MG/DL
PROT UR-MCNC: 21 MG/DL
PROT/CREAT UR-RTO: 0.1
RBC # BLD AUTO: 4.06 M/UL (ref 4.2–5.3)
RBC # UR STRIP: NEGATIVE /UL
SERVICE CMNT-IMP: ABNORMAL
SODIUM SERPL-SCNC: 141 MMOL/L (ref 136–145)
SP GR UR: >1.03 (ref 1–1.02)
URATE SERPL-MCNC: 3.7 MG/DL (ref 2.6–7.2)
UROBILINOGEN UR QL: 1 EU/DL (ref 0.2–1)
WBC # BLD AUTO: 6.6 K/UL (ref 4.6–13.2)

## 2018-01-22 PROCEDURE — 76818 FETAL BIOPHYS PROFILE W/NST: CPT

## 2018-01-22 PROCEDURE — 83615 LACTATE (LD) (LDH) ENZYME: CPT | Performed by: OBSTETRICS & GYNECOLOGY

## 2018-01-22 PROCEDURE — 99281 EMR DPT VST MAYX REQ PHY/QHP: CPT

## 2018-01-22 PROCEDURE — 36415 COLL VENOUS BLD VENIPUNCTURE: CPT | Performed by: OBSTETRICS & GYNECOLOGY

## 2018-01-22 PROCEDURE — 80053 COMPREHEN METABOLIC PANEL: CPT | Performed by: OBSTETRICS & GYNECOLOGY

## 2018-01-22 PROCEDURE — 84550 ASSAY OF BLOOD/URIC ACID: CPT | Performed by: OBSTETRICS & GYNECOLOGY

## 2018-01-22 PROCEDURE — 85027 COMPLETE CBC AUTOMATED: CPT | Performed by: OBSTETRICS & GYNECOLOGY

## 2018-01-22 PROCEDURE — 84156 ASSAY OF PROTEIN URINE: CPT | Performed by: OBSTETRICS & GYNECOLOGY

## 2018-01-22 PROCEDURE — 59025 FETAL NON-STRESS TEST: CPT

## 2018-01-22 PROCEDURE — 99283 EMERGENCY DEPT VISIT LOW MDM: CPT

## 2018-01-22 PROCEDURE — 81003 URINALYSIS AUTO W/O SCOPE: CPT

## 2018-01-22 RX ORDER — ALBUTEROL SULFATE 90 UG/1
1-2 AEROSOL, METERED RESPIRATORY (INHALATION)
COMMUNITY
Start: 2017-05-04 | End: 2021-10-14 | Stop reason: SDUPTHER

## 2018-01-22 NOTE — PROGRESS NOTES
34w0d   Presents to the office for a routine prenatal visit. 1 hr GTT in progress  Obesity in pregnancy --> 422 lbs  HTN --> taking labetalol 200mg BID  Hx of PIH during 1st preg, 16 years ago  C/o seeing spots, no H/A, decreased FM since yesterday, no VB or pelvic pain  Will send to L&D now for  NST and BPP  Also start fetal surveillance   Rh +  Counseled on weight gain in pregnancy  Discussed PTL precautions and when to visit hospital  She verbalizes understanding of all teaching  See flow sheet  A/P: Normal prenatal visit.   F/U in 1 weeks - Send to L&D now, pt verbalizes understanding

## 2018-01-22 NOTE — IP AVS SNAPSHOT
[address] Patient: Johanne Griffin MRN: [id number] :1985 A check jewels indicates which time of day the medication should be taken. My Medications ASK your doctor about these medications Instructions Each Dose to Equal  
 Morning Noon Evening Bedtime  
 albuterol 90 mcg/actuation inhaler Commonly known as:  PROVENTIL HFA, VENTOLIN HFA, PROAIR HFA Your last dose was: Your next dose is:    
   
   
 1-2 Puffs. 1-2 Puff  
    
   
   
   
  
 labetalol 200 mg tablet Commonly known as:  Dorla [place] Your last dose was: Your next dose is: Take 1 Tab by mouth two (2) times a day. Indications: hypertension 200 mg  
    
   
   
   
  
 TRIAMTERENE-HYDROCHLOROTHIAZID PO Your last dose was: Your next dose is: Take  by mouth.

## 2018-01-22 NOTE — DISCHARGE INSTRUCTIONS
Please keep all appointments. Please drink plenty of fluids. Please return to L&D on Wednesday for NST. Please return to L&D for any bleeding, leakage of fluid, decreased fetal movement, or contraction pain lasting longer than one hour with worsening intensity. Patient discharged without removing armband and transfered to another healthcare acute, sub acute , or extended care facility. Informed of privacy risks if armband lost or stolen     MyChart Activation    Thank you for requesting access to [employer]. Please follow the instructions below to securely access and download your online medical record. [employer] allows you to send messages to your doctor, view your test results, renew your prescriptions, schedule appointments, and more. How Do I Sign Up? 1. In your internet browser, go to [email or web address]  2. Click on the First Time User? Click Here link in the Sign In box. You will be redirect to the New Member Sign Up page. 3. Enter your [employer] Access Code exactly as it appears below. You will not need to use this code after youve completed the sign-up process. If you do not sign up before the expiration date, you must request a new code. [employer] Access Code: [email or web address]  Expires: 2018  9:03 AM (This is the date your [employer] access code will )    4. Enter the last four digits of your Social Security Number (xxxx) and Date of Birth (mm/dd/yyyy) as indicated and click Submit. You will be taken to the next sign-up page. 5. Create a [employer] ID. This will be your [employer] login ID and cannot be changed, so think of one that is secure and easy to remember. 6. Create a [employer] password. You can change your password at any time. 7. Enter your Password Reset Question and Answer. This can be used at a later time if you forget your password. 8. Enter your e-mail address. You will receive e-mail notification when new information is available in [address] 9. Click Sign Up.  You can now view and download portions of your medical record. 10. Click the Download Summary menu link to download a portable copy of your medical information. Additional Information    If you have questions, please visit the Frequently Asked Questions section of the [employer] website at [email or web address] Remember, [employer] is NOT to be used for urgent needs. For medical emergencies, dial 911.

## 2018-01-22 NOTE — PROGRESS NOTES
Pt returned from [place]. Mother now feels baby move more. BPP 8/10, unable to keep patient on for full twenty minutes due to loss of contact 2/2 large body habitus. However, areas of tracing where baby was being monitored shows baseline of 135bpm with moderate variabilty, +accels, no decels, no contractions. BP  123-130/70s, PIH labs wnls, Pr/Cr ratio pending. Pt asymptomatic. Will go ahead discharge patient home. With  labor and preeclampsia options and Kick counts. Follow up with [facility].

## 2018-01-22 NOTE — PROGRESS NOTES
Chief Complaint   Patient presents with    Routine Prenatal Visit     (Rm #7) pt also states she doesnt feel the baby moving as much as he usually does   Jerson

## 2018-01-22 NOTE — H&P
History & Physical    Name: Geri Mtz MRN: [id number]  SSN: [id number]    YOB: 1985  Age: 34 y.o. Sex: female        Subjective:     Estimated Date of Delivery: 3/5/18  OB History      Para Term  AB Living    8 6 6  1 6    SAB TAB Ectopic Molar Multiple Live Births    1               Ms. Mary Owen was sent from the office for evaluation of pregnancy  at 34w0d for decreased fetal movement x1d. Pt states her baby usually moves alot. , however last ngiht she could not feel the baby move at all. Denies ctx, lof, or vaginal bleeding. Prenatal course is complicated by Chtn on labetalol 200mg BID. Her BP in the office was 145/84. She denies h/a, blurred vision, cp, sob, or RUQ pain. Her. Please see prenatal records for details. Past Medical History:   Diagnosis Date    Asthma     Asthma     Community acquired pneumonia     Hypertension     Obese      Past Surgical History:   Procedure Laterality Date    DILATION AND CURETTAGE       No Known Allergies  Prior to Admission medications    Medication Sig Start Date End Date Taking? Authorizing Provider   albuterol (PROVENTIL HFA, VENTOLIN HFA, PROAIR HFA) 90 mcg/actuation inhaler 1-2 Puffs. 17  Yes Historical Provider   labetalol (NORMODYNE) 200 mg tablet Take 1 Tab by mouth two (2) times a day. Indications: hypertension 17  Yes Coleen Laird MD   TRIAMTERENE-HYDROCHLOROTHIAZID PO Take  by mouth. Yes Aman Campbell MD      Social History     Occupational History    Not on file.      Social History Main Topics    Smoking status: Current Some Day Smoker     Packs/day: 0.50     Types: Cigarettes    Smokeless tobacco: Never Used      Comment: cigars twice weekly    Alcohol use 1.0 oz/week     2 Standard drinks or equivalent per week      Comment: occaisonally    Drug use: No    Sexual activity: Yes     Partners: Female     Birth control/ protection: None     Family History   Problem Relation Age of Onset    Asthma Mother  Hypertension Mother     Diabetes Mother     Asthma Father        Review of Systems: A comprehensive review of systems was negative except for that written in the HPI. Objective:     Vitals:  Vitals:    01/22/18 1102 01/22/18 1121   Pulse:  (!) 105   Resp:  18   Temp:  98.6 °F (37 °C)   Weight: (!) 422 lb (191.4 kg)    Height: 5' 9\" (1.753 m)         Physical Exam:  Patient without distress. Heart: Regular rate and rhythm  Lung: clear to auscultation throughout lung fields, no wheezes, no rales, no rhonchi and normal respiratory effort  Abdomen: gravid, soft, nontender  Membranes:  Intact  Fetal Heart Rate: Baseline: 140 per minute  Variability: moderate  Accelerations: no  Decelerations: none  Uterine contractions: none    Prenatal Labs:   Lab Results   Component Value Date/Time    Rubella, External Immune 11/19/2012    GrBStrep, External Negative 05/23/2013    HBsAg, External Negative 11/19/2012    HIV, External Negative 11/19/2012    RPR, External Non Reactive 11/19/2012    Gonorrhea, External Negative 11/19/2012    Chlamydia, External Negative 11/19/2012        No results found for this or any previous visit (from the past 24 hour(s)).     Assessment/Plan:     Patient Active Problem List   Diagnosis Code    Obesity complicating pregnancy, childbirth, or the puerperium, unspecified as to episode of care or not applicable(649.10) [id number]    Hyperglycemia R73.9    Cough R05       Plan: 34w0d with chtn presents with decreased fetal movement   NST, BPP   Chtn r/o preeclampsia-> PIH labs, Pr/Cr ratio, BP monitoring, continue on Labetalol 200mg BID   If fetal status reassuring, PIH labs and BP wnl   Will d/c home   F/u  1 week with [facility]    Signed By:  Jennifer Fernandez MD     January 22, 2018 12:06 PM

## 2018-01-22 NOTE — PROGRESS NOTES
presents to [email or web address] from MD office for decreased fetal movement and unable to obtain heart beat patient has hx of asthma Chronic HTN and Obesity.  Denies bleeding and leaking of fluid TOCO and EFM applied    1220 Dr Dannielle Berman @  Bedside for hx and physical    [address] to ultrasound for BPP via wheelchair    [phone] Patient returned from ultra sound placed back on monitor for 20 minutes    1522  Dip in strip pt states she was switching hands to hold TOCO on    1555 Patient given verbal discharge instructions verbalized understanding of teaching    [phone]  Patient ambulated off the unit in no distress

## 2018-01-22 NOTE — PATIENT INSTRUCTIONS

## 2018-01-22 NOTE — IP AVS SNAPSHOT
[address] [id number] [facility] Bertha Tolentino 17 Patient: Jos Moran MRN: [id number] :1985 About your hospitalization You were admitted on:  N/A You last received care in the:  [facility] 2 [address] [facility] You were discharged on:  2018 Why you were hospitalized Your primary diagnosis was:  Not on File Follow-up Information None Your Scheduled Appointments 2018  4:00 PM EST  
OB VISIT with Zuleyka Santos MD  
[facility] ([address] [place] [address]) [address], [address] [address]  
[phone] Discharge Orders None A check jewels indicates which time of day the medication should be taken. My Medications ASK your doctor about these medications Instructions Each Dose to Equal  
 Morning Noon Evening Bedtime  
 albuterol 90 mcg/actuation inhaler Commonly known as:  PROVENTIL HFA, VENTOLIN HFA, PROAIR HFA Your last dose was: Your next dose is:    
   
   
 1-2 Puffs. 1-2 Puff  
    
   
   
   
  
 labetalol 200 mg tablet Commonly known as:  Costa Merrill Your last dose was: Your next dose is: Take 1 Tab by mouth two (2) times a day. Indications: hypertension 200 mg  
    
   
   
   
  
 TRIAMTERENE-HYDROCHLOROTHIAZID PO Your last dose was: Your next dose is: Take  by mouth. Discharge Instructions Please keep all appointments. Please drink plenty of fluids. Please return to L&D on Wednesday for NST. Please return to L&D for any bleeding, leakage of fluid, decreased fetal movement, or contraction pain lasting longer than one hour with worsening intensity. Patient discharged without removing armband and transfered to another healthcare acute, sub acute , or extended care facility.   Informed of privacy risks if armband lost or stolen MyChart Activation Thank you for requesting access to [employer]. Please follow the instructions below to securely access and download your online medical record. [employer] allows you to send messages to your doctor, view your test results, renew your prescriptions, schedule appointments, and more. How Do I Sign Up? 1. In your internet browser, go to [email or web address] 
2. Click on the First Time User? Click Here link in the Sign In box. You will be redirect to the New Member Sign Up page. 3. Enter your [employer] Access Code exactly as it appears below. You will not need to use this code after youve completed the sign-up process. If you do not sign up before the expiration date, you must request a new code. [employer] Access Code: [email or web address] Expires: 2018  9:03 AM (This is the date your [employer] access code will ) 4. Enter the last four digits of your Social Security Number (xxxx) and Date of Birth (mm/dd/yyyy) as indicated and click Submit. You will be taken to the next sign-up page. 5. Create a [employer] ID. This will be your [employer] login ID and cannot be changed, so think of one that is secure and easy to remember. 6. Create a [employer] password. You can change your password at any time. 7. Enter your Password Reset Question and Answer. This can be used at a later time if you forget your password. 8. Enter your e-mail address. You will receive e-mail notification when new information is available in [address] 9. Click Sign Up. You can now view and download portions of your medical record. 10. Click the Download Summary menu link to download a portable copy of your medical information. Additional Information If you have questions, please visit the Frequently Asked Questions section of the [employer] website at [email or web address] Remember, [employer] is NOT to be used for urgent needs.  For medical emergencies, dial 911. Introducing [facility]! [place] [facility] introduces [employer] patient portal. Now you can access parts of your medical record, email your doctor's office, and request medication refills online. 1. In your internet browser, go to [email or web address] 2. Click on the First Time User? Click Here link in the Sign In box. You will see the New Member Sign Up page. 3. Enter your [employer] Access Code exactly as it appears below. You will not need to use this code after youve completed the sign-up process. If you do not sign up before the expiration date, you must request a new code. · [employer] Access Code: [email or web address] Expires: 4/22/2018  9:03 AM 
 
4. Enter the last four digits of your Social Security Number (xxxx) and Date of Birth (mm/dd/yyyy) as indicated and click Submit. You will be taken to the next sign-up page. 5. Create a [employer] ID. This will be your [employer] login ID and cannot be changed, so think of one that is secure and easy to remember. 6. Create a [employer] password. You can change your password at any time. 7. Enter your Password Reset Question and Answer. This can be used at a later time if you forget your password. 8. Enter your e-mail address. You will receive e-mail notification when new information is available in [address] 9. Click Sign Up. You can now view and download portions of your medical record. 10. Click the Download Summary menu link to download a portable copy of your medical information. If you have questions, please visit the Frequently Asked Questions section of the [employer] website. Remember, [employer] is NOT to be used for urgent needs. For medical emergencies, dial 911. Now available from your iPhone and Android! Unresulted Labs-Please follow up with your PCP about these lab tests Order Current Status LD In process Providers Seen During Your Hospitalization Provider Specialty Primary office phone Elver Durant MD Obstetrics & Gynecology [phone] Your Primary Care Physician (PCP) Primary Care Physician Office Phone Office Fax [place] Monday [phone] You are allergic to the following No active allergies Recent Documentation Height Weight BMI OB Status Smoking Status 1.753 m (!) 191.4 kg 62.32 kg/m2 Pregnant Current Some Day Smoker Emergency Contacts Name Discharge Info Relation Home Work Mobile [employer] CAREGIVER [3] Other Relative [6] [phone] Melonie Kim DISCHARGE CAREGIVER [3] Parent [1] [phone] Patient Belongings The following personal items are in your possession at time of discharge: 
                             
 
  
  
Discharge Instructions Attachments/References PREGNANCY: BIOPHYSICAL PROFILE (ENGLISH) PREGNANCY: GENERAL INFO (ENGLISH) PREGNANCY: KICK COUNTS (ENGLISH) PREGNANCY: PRECAUTIONS (ENGLISH) PREGNANCY: WEEKS 34 TO 36 (ENGLISH) Patient Handouts Biophysical Profile: About This Test 
What is it? A biophysical profile (BPP) measures the health of your baby during your pregnancy. The BPP checks your baby's heart rate, muscle tone, movement, and breathing. It also measures the amount of amniotic fluid around your baby. Looking at these five areas helps your doctor know how well your baby is doing. This test is painless. It uses only a monitor and ultrasound sensors, which are placed on your belly. Why is this test done? A BPP is often done in the last trimester of pregnancy when there is any question about how the baby is doing. Some women with high-risk pregnancies may have a BPP test every week or twice a week during later pregnancy.  
How can you prepare for the test? 
· If you smoke, you will be asked to stop smoking for 2 hours before testing. This is because smoking affects your baby's heart rate and movements. · You may be asked to drink water or other liquids just before testing. You will be able to empty your bladder after the test. 
What happens during the test? 
The [facility] has two parts. First you have a nonstress test, and then you have a fetal ultrasound. For the tests, you will lie back on a padded exam table. If you become short of breath or lightheaded while lying on your back, say so. The technician can help you change your position. Nonstress test 
· Two elastic belts with sensors are placed across your belly. One sensor tracks your baby's heart rate with reflected sound waves (Doppler ultrasound). The other sensor measures how long your contractions are, if you are having any. · You may hear your baby's heartbeat as a beeping sound. You may see it printed out on a chart. · You may be asked to push a button on the machine when your baby moves or you have a contraction. This helps your doctor look at how your baby's heart reacts to movement and contractions. · If there isn't much movement, it may be because the baby is asleep. If this happens during your test, the technician may try to wake the baby with a loud noise or by having you eat or drink something. Fetal ultrasound · A gel will be spread on your belly. This helps the passage of sound waves. · A small, handheld sensor will be pressed against the gel on your skin and moved across your belly a few times. · You may be able to watch the screen to see the picture of your baby during the test. 
How long does the test take? · The nonstress test will take about 20 to 40 minutes. · The fetal ultrasound will take about 30 to 60 minutes. What happens after the test? 
· The doctor will read the results from the test and talk to you about them. You will also find out if more testing is needed. · You will probably be able to go home right away. · You can go back to your usual activities right away. · If you smoke, you may have noticed that not smoking for 2 hours before testing helps your baby get more oxygen and move more. If you need help quitting, talk to your doctor about stop-smoking programs and medicines. These can increase your chances of quitting for good. Follow-up care is a key part of your treatment and safety. Be sure to make and go to all appointments, and call your doctor if you are having problems. It's also a good idea to keep a list of the medicines you take. Ask your doctor when you can expect to have your test results. Where can you learn more? Go to http://sueGigaCretemichael.info/. Enter G639 in the search box to learn more about \"Biophysical Profile: About This Test.\" Current as of: March 16, 2017 Content Version: 11.4 © 5270-8329 Eyeview. Care instructions adapted under license by M[employer]Dot Network (which disclaims liability or warranty for this information). If you have questions about a medical condition or this instruction, always ask your healthcare professional. [address] any warranty or liability for your use of this information. Learning About Pregnancy Your Care Instructions Your health in the early weeks of your pregnancy is particularly important for your baby's health. Take good care of yourself. Anything you do that harms your body can also harm your baby. Make sure to go to all of your doctor appointments. Regular checkups will help keep you and your baby healthy. How can you care for yourself at home? Diet ? · Eat a balanced diet. Make sure your diet includes plenty of beans, peas, and leafy green vegetables. ? · Do not skip meals or go for many hours without eating. If you are nauseated, try to eat a small, healthy snack every 2 to 3 hours.   
? · Do not eat fish that has a high level of mercury, such as shark, swordfish, or mackerel. Do not eat more than one can of tuna each week. ? · Drink plenty of fluids, enough so that your urine is light yellow or clear like water. If you have kidney, heart, or liver disease and have to limit fluids, talk with your doctor before you increase the amount of fluids you drink. ? · Cut down on caffeine, such as coffee, tea, and cola. ? · Do not drink alcohol, such as beer, wine, or hard liquor. ? · Take a multivitamin that contains at least 400 micrograms (mcg) of folic acid to help prevent birth defects. Fortified cereal and whole wheat bread are good additional sources of folic acid. ? · Increase the calcium in your diet. Try to drink a quart of skim milk each day. You may also take calcium supplements and choose foods such as cheese and yogurt. ? Lifestyle ? · Make sure you go to your follow-up appointments. ? · Get plenty of rest. You may be unusually tired while you are pregnant. ? · Get at least 30 minutes of exercise on most days of the week. Walking is a good choice. If you have not exercised in the past, start out slowly. Take several short walks each day. ? · Do not smoke. If you need help quitting, talk to your doctor about stop-smoking programs. These can increase your chances of quitting for good. ? · Do not touch cat feces or litter boxes. Also, wash your hands after you handle raw meat, and fully cook all meat before you eat it. Wear gloves when you work in the yard or garden, and wash your hands well when you are done. Cat feces, raw or undercooked meat, and contaminated dirt can cause an infection that may harm your baby or lead to a miscarriage. ? · Do not use saunas or hot tubs. Raising your body temperature may harm your baby. ? · Avoid chemical fumes, paint fumes, or poisons. ? · Do not use illegal drugs or alcohol. Medicines ? · Review all of your medicines with your doctor.  Some of your routine medicines may need to be changed to protect your baby. ? · Use acetaminophen (Tylenol) to relieve minor problems, such as a mild headache or backache or a mild fever with cold symptoms. Do not use nonsteroidal anti-inflammatory drugs (NSAIDs), such as ibuprofen (Advil, Motrin) or naproxen (Aleve), unless your doctor says it is okay. ? · Do not take two or more pain medicines at the same time unless the doctor told you to. Many pain medicines have acetaminophen, which is Tylenol. Too much acetaminophen (Tylenol) can be harmful. ? · Take your medicines exactly as prescribed. Call your doctor if you think you are having a problem with your medicine. ?To manage morning sickness ? · If you feel sick when you first wake up, try eating a small snack (such as crackers) before you get out of bed. Allow some time to digest the snack, and then get out of bed slowly. ? · Do not skip meals or go for long periods without eating. An empty stomach can make nausea worse. ? · Eat small, frequent meals instead of three large meals each day. ? · Drink plenty of fluids. Sports drinks, such as Gatorade or Powerade, are good choices. ? · Eat foods that are high in protein but low in fat. ? · If you are taking iron supplements, ask your doctor if they are necessary. Iron can make nausea worse. ? · Avoid any smells, such as coffee, that make you feel sick. ? · Get lots of rest. Morning sickness may be worse when you are tired. Follow-up care is a key part of your treatment and safety. Be sure to make and go to all appointments, and call your doctor if you are having problems. It's also a good idea to know your test results and keep a list of the medicines you take. Where can you learn more? Go to http://sue-michael.info/. Enter E692 in the search box to learn more about \"Learning About Pregnancy. \" Current as of: March 16, 2017 Content Version: 11.4 © 3189-0098 Miralupa. Care instructions adapted under license by Perfect Market (which disclaims liability or warranty for this information). If you have questions about a medical condition or this instruction, always ask your healthcare professional. [address] any warranty or liability for your use of this information. Counting Your Baby's Kicks: Care Instructions Your Care Instructions Counting your baby's kicks is one way your doctor can tell that your baby is healthy. Most women-especially in a first pregnancy-feel their baby move for the first time between 16 and 22 weeks. The movement may feel like flutters rather than kicks. Your baby may move more at certain times of the day. When you are active, you may notice less kicking than when you are resting. At your prenatal visits, your doctor will ask whether the baby is active. In your last trimester, your doctor may ask you to count the number of times you feel your baby move. Follow-up care is a key part of your treatment and safety. Be sure to make and go to all appointments, and call your doctor if you are having problems. It's also a good idea to know your test results and keep a list of the medicines you take. How do you count fetal kicks? · A common method of checking your baby's movement is to count the number of kicks or moves you feel in 1 hour. Ten movements (such as kicks, flutters, or rolls) in 1 hour are normal. Some doctors suggest that you count in the morning until you get to 10 movements. Then you can quit for that day and start again the next day. · Pick your baby's most active time of day to count. This may be any time from morning to evening. · If you do not feel 10 movements in an hour, your baby may be sleeping. Wait for the next hour and count again. When should you call for help? Call your doctor now or seek immediate medical care if: ? · You noticed that your baby has stopped moving or is moving much less than normal. ? Watch closely for changes in your health, and be sure to contact your doctor if you have any problems. Where can you learn more? Go to http://sue-michael.info/. Enter N573 in the search box to learn more about \"Counting Your Baby's Kicks: Care Instructions. \" Current as of: 2017 Content Version: 11.4 © 5098-7083 Likewise Software. Care instructions adapted under license by Operax (which disclaims liability or warranty for this information). If you have questions about a medical condition or this instruction, always ask your healthcare professional. [address] any warranty or liability for your use of this information. Pregnancy Precautions: Care Instructions Your Care Instructions There is no sure way to prevent labor before your due date ( labor) or to prevent most other pregnancy problems. But there are things you can do to increase your chances of a healthy pregnancy. Go to your appointments, follow your doctor's advice, and take good care of yourself. Eat well, and exercise (if your doctor agrees). And make sure to drink plenty of water. Follow-up care is a key part of your treatment and safety. Be sure to make and go to all appointments, and call your doctor if you are having problems. It's also a good idea to know your test results and keep a list of the medicines you take. How can you care for yourself at home? · Make sure you go to your prenatal appointments. At each visit, your doctor will check your blood pressure. Your doctor will also check to see if you have protein in your urine. High blood pressure and protein in urine are signs of preeclampsia. This condition can be dangerous for you and your baby.  
· Drink plenty of fluids, enough so that your urine is light yellow or clear like water. Dehydration can cause contractions. If you have kidney, heart, or liver disease and have to limit fluids, talk with your doctor before you increase the amount of fluids you drink. · Tell your doctor right away if you notice any symptoms of an infection, such as: ¨ Burning when you urinate. ¨ A foul-smelling discharge from your vagina. ¨ Vaginal itching. ¨ Unexplained fever. ¨ Unusual pain or soreness in your uterus or lower belly. · Eat a balanced diet. Include plenty of foods that are high in calcium and iron. ¨ Foods high in calcium include milk, cheese, yogurt, almonds, and broccoli. ¨ Foods high in iron include red meat, shellfish, poultry, eggs, beans, raisins, whole-grain bread, and leafy green vegetables. · Do not smoke. If you need help quitting, talk to your doctor about stop-smoking programs and medicines. These can increase your chances of quitting for good. · Do not drink alcohol or use illegal drugs. · Follow your doctor's directions about activity. Your doctor will let you know how much, if any, exercise you can do. · Ask your doctor if you can have sex. If you are at risk for early labor, your doctor may ask you to not have sex. · Take care to prevent falls. During pregnancy, your joints are loose, and your balance is off. Sports such as bicycling, skiing, or in-line skating can increase your risk of falling. And don't ride horses or motorcycles, dive, water ski, scuba dive, or parachute jump while you are pregnant. · Avoid getting very hot. Do not use saunas or hot tubs. Avoid staying out in the sun in hot weather for long periods. Take acetaminophen (Tylenol) to lower a high fever. · Do not take any over-the-counter or herbal medicines or supplements without talking to your doctor or pharmacist first. 
When should you call for help? Call 911 anytime you think you may need emergency care. For example, call if: 
? · You passed out (lost consciousness). ? · You have severe vaginal bleeding. ? · You have severe pain in your belly or pelvis. ? · You have had fluid gushing or leaking from your vagina and you know or think the umbilical cord is bulging into your vagina. If this happens, immediately get down on your knees so your rear end (buttocks) is higher than your head. This will decrease the pressure on the cord until help arrives. · ?Call your doctor now or seek immediate medical care if: 
? · You have signs of preeclampsia, such as: 
¨ Sudden swelling of your face, hands, or feet. ¨ New vision problems (such as dimness or blurring). ¨ A severe headache. ? · You have any vaginal bleeding. ? · You have belly pain or cramping. ? · You have a fever. ? · You have had regular contractions (with or without pain) for an hour. This means that you have 8 or more within 1 hour or 4 or more in 20 minutes after you change your position and drink fluids. ? · You have a sudden release of fluid from your vagina. ? · You have low back pain or pelvic pressure that does not go away. ? · You notice that your baby has stopped moving or is moving much less than normal. ? Watch closely for changes in your health, and be sure to contact your doctor if you have any problems. Where can you learn more? Go to http://sue-michael.info/. Enter [id number] in the search box to learn more about \"Pregnancy Precautions: Care Instructions. \" Current as of: March 16, 2017 Content Version: 11.4 © 2086-2577 Think Sky. Care instructions adapted under license by Arkmicro (which disclaims liability or warranty for this information). If you have questions about a medical condition or this instruction, always ask your healthcare professional. [address] [address] any warranty or liability for your use of this information. Weeks 34 to 36 of Your Pregnancy: Care Instructions Your Care Instructions By now, your baby and your belly have grown quite large. It is almost time to give birth. A full-term pregnancy can deliver between 37 and 42 weeks. Your baby's lungs are almost ready to breathe air. The bones in your baby's head are now firm enough to protect it, but soft enough to move down through the birth canal. 
You may feel excited, happy, anxious, or scared. You may wonder how you will know if you are in labor or what to expect during labor. Try to be flexible in your expectations of the birth. Because each birth is different, there is no way to know exactly what childbirth will be like for you. This care sheet will help you know what to expect and how to prepare. This may make your childbirth easier. If you haven't already had the Tdap shot during this pregnancy, talk to your doctor about getting it. It will help protect your  against pertussis infection. In the 36th week, most women have a test for group B streptococcus (GBS). GBS is a common bacteria that can live in the vagina and rectum. It can make your baby sick after birth. If you test positive, you will get antibiotics during labor. The medicine will keep your baby from getting the bacteria. Follow-up care is a key part of your treatment and safety. Be sure to make and go to all appointments, and call your doctor if you are having problems. It's also a good idea to know your test results and keep a list of the medicines you take. How can you care for yourself at home? Learn about pain relief choices · Pain is different for every woman. Talk with your doctor about your feelings about pain. · You can choose from several types of pain relief. These include medicine or breathing techniques, as well as comfort measures. You can use more than one option. · If you choose to have pain medicine during labor, talk to your doctor about your options.  Some medicines lower anxiety and help with some of the pain. Others make your lower body numb so that you won't feel pain. · Be sure to tell your doctor about your pain medicine choice before you start labor or very early in your labor. You may be able to change your mind as labor progresses. · Rarely, a woman is put to sleep by medicine given through a mask or an IV. Labor and delivery · The first stage of labor has three parts: early, active, and transition. ¨ Most women have early labor at home. You can stay busy or rest, eat light snacks, drink clear fluids, and start counting contractions. ¨ When talking during a contraction gets hard, you may be moving to active labor. During active labor, you should head for the hospital if you are not there already. ¨ You are in active labor when contractions come every 3 to 4 minutes and last about 60 seconds. Your cervix is opening more rapidly. ¨ If your water breaks, contractions will come faster and stronger. ¨ During transition, your cervix is stretching, and contractions are coming more rapidly. ¨ You may want to push, but your cervix might not be ready. Your doctor will tell you when to push. · The second stage starts when your cervix is completely opened and you are ready to push. ¨ Contractions are very strong to push the baby down the birth canal. 
¨ You will feel the urge to push. You may feel like you need to have a bowel movement. ¨ You may be coached to push with contractions. These contractions will be very strong, but you will not have them as often. You can get a little rest between contractions. ¨ You may be emotional and irritable. You may not be aware of what is going on around you. ¨ One last push, and your baby is born. · The third stage is when a few more contractions push out the placenta. This may take 30 minutes or less. · The fourth stage is the welcome recovery. You may feel overwhelmed with emotions and exhausted but alert. This is a good time to start breastfeeding. Where can you learn more? Go to http://sue-michael.info/. Enter T428 in the search box to learn more about \"Weeks 34 to 36 of Your Pregnancy: Care Instructions. \" Current as of: March 16, 2017 Content Version: 11.4 © 7801-3158 [employer]. Care instructions adapted under license by [employer] (which disclaims liability or warranty for this information). If you have questions about a medical condition or this instruction, always ask your healthcare professional. [address] any warranty or liability for your use of this information. Please provide this summary of care documentation to your next provider. Signatures-by signing, you are acknowledging that this After Visit Summary has been reviewed with you and you have received a copy. Patient Signature:  ____________________________________________________________ Date:  ____________________________________________________________  
  
Johanne Mcneal Provider Signature:  ____________________________________________________________ Date:  ____________________________________________________________

## 2018-01-23 LAB
BASOPHILS # BLD AUTO: 0 X10E3/UL (ref 0–0.2)
BASOPHILS NFR BLD AUTO: 0 %
EOSINOPHIL # BLD AUTO: 0.1 X10E3/UL (ref 0–0.4)
EOSINOPHIL NFR BLD AUTO: 2 %
ERYTHROCYTE [DISTWIDTH] IN BLOOD BY AUTOMATED COUNT: 16.2 % (ref 12.3–15.4)
GTT GEST 2H PNL UR+SERPL: 214 MG/DL (ref 65–139)
HCT VFR BLD AUTO: 30.7 % (ref 34–46.6)
HGB BLD-MCNC: 9.5 G/DL (ref 11.1–15.9)
IMM GRANULOCYTES # BLD: 0 X10E3/UL (ref 0–0.1)
IMM GRANULOCYTES NFR BLD: 0 %
LYMPHOCYTES # BLD AUTO: 1.5 X10E3/UL (ref 0.7–3.1)
LYMPHOCYTES NFR BLD AUTO: 23 %
MCH RBC QN AUTO: 24 PG (ref 26.6–33)
MCHC RBC AUTO-ENTMCNC: 30.9 G/DL (ref 31.5–35.7)
MCV RBC AUTO: 78 FL (ref 79–97)
MONOCYTES # BLD AUTO: 0.2 X10E3/UL (ref 0.1–0.9)
MONOCYTES NFR BLD AUTO: 3 %
NEUTROPHILS # BLD AUTO: 4.6 X10E3/UL (ref 1.4–7)
NEUTROPHILS NFR BLD AUTO: 72 %
PLATELET # BLD AUTO: 206 X10E3/UL (ref 150–379)
RBC # BLD AUTO: 3.96 X10E6/UL (ref 3.77–5.28)
WBC # BLD AUTO: 6.4 X10E3/UL (ref 3.4–10.8)

## 2018-01-24 DIAGNOSIS — D50.9 IRON DEFICIENCY ANEMIA DURING PREGNANCY: ICD-10-CM

## 2018-01-24 DIAGNOSIS — O99.019 IRON DEFICIENCY ANEMIA DURING PREGNANCY: ICD-10-CM

## 2018-01-24 DIAGNOSIS — O24.410 DIET CONTROLLED GESTATIONAL DIABETES MELLITUS (GDM) IN THIRD TRIMESTER: Primary | ICD-10-CM

## 2018-01-24 RX ORDER — LANCETS
EACH MISCELLANEOUS
Qty: 100 EACH | Refills: 1 | Status: SHIPPED | OUTPATIENT
Start: 2018-01-24 | End: 2020-01-27 | Stop reason: ALTCHOICE

## 2018-01-24 RX ORDER — FERROUS SULFATE 325(65) MG
325 TABLET, DELAYED RELEASE (ENTERIC COATED) ORAL
Qty: 90 TAB | Refills: 2 | Status: SHIPPED | OUTPATIENT
Start: 2018-01-24 | End: 2020-01-27 | Stop reason: ALTCHOICE

## 2018-01-24 RX ORDER — INSULIN PUMP SYRINGE, 3 ML
EACH MISCELLANEOUS
Qty: 1 KIT | Refills: 0 | Status: SHIPPED | OUTPATIENT
Start: 2018-01-24 | End: 2020-01-27 | Stop reason: ALTCHOICE

## 2018-01-24 NOTE — PROGRESS NOTES
Please notify patient that she has gestational diabetes. I have escribed her diabetes testing supplies to her pharmacy. She will need to start taking her blood sugar four times per day: upon waking before breakfast and two hours after breakfast, lunch and dinner. We will send her to [facility] for diabetes counseling. In the meantime, she should limit carbohydrates and sugary foods/drinks and focus on lean meats, vegetables, water and moderate dairy. Lastly, her iron is low and she will need to take the iron supplements also sent to her pharmacy.

## 2018-01-25 ENCOUNTER — TELEPHONE (OUTPATIENT)
Dept: OBGYN CLINIC | Age: 33
End: 2018-01-25

## 2018-01-25 NOTE — TELEPHONE ENCOUNTER
Call made to patient using two identifiers, name and , patient was given lab results. Also informed the patient that she will need to start taking her blood sugar four times per day: upon waking before breakfast and two hours after breakfast, lunch and dinner. Referral was faxed to [facility] for diabetes counseling. Pt verbalized understanding.

## 2018-01-31 ENCOUNTER — ROUTINE PRENATAL (OUTPATIENT)
Dept: OBGYN CLINIC | Age: 33
End: 2018-01-31

## 2018-01-31 VITALS
DIASTOLIC BLOOD PRESSURE: 78 MMHG | OXYGEN SATURATION: 100 % | HEIGHT: 69 IN | HEART RATE: 65 BPM | RESPIRATION RATE: 18 BRPM | BODY MASS INDEX: 43.4 KG/M2 | SYSTOLIC BLOOD PRESSURE: 129 MMHG | TEMPERATURE: 97.6 F | WEIGHT: 293 LBS

## 2018-01-31 DIAGNOSIS — O99.213 OBESITY AFFECTING PREGNANCY IN THIRD TRIMESTER: ICD-10-CM

## 2018-01-31 DIAGNOSIS — O24.410 DIET CONTROLLED GESTATIONAL DIABETES MELLITUS (GDM) IN THIRD TRIMESTER: ICD-10-CM

## 2018-01-31 DIAGNOSIS — Z34.93 PRENATAL CARE, THIRD TRIMESTER: Primary | ICD-10-CM

## 2018-01-31 DIAGNOSIS — O10.919 CHRONIC HYPERTENSION AFFECTING PREGNANCY: ICD-10-CM

## 2018-01-31 NOTE — PROGRESS NOTES
35w2d   Presents to the office for a routine prenatal visit. GDM --> 1 Failed hr GTT, 200+  Referred to [facility], missed GDM appointment, rescheduled today in office  Ate sauteed chicken with broccoli for dinner last night and breakfast this AM  , 2 hr , discussed taking BS QID and risks of uncontrolled DM at length  Discussed possible need for insulin  Bi-weekly NST with alternating BPP --> will start tomorrow at L&D  Obesity in pregnancy --> 422 lbs  HTN --> taking labetalol 200mg BID, PIH labs WNL at L&D  Hx of [facility] during 1st preg, 16 years ago  Denies vision changes, H/A or epigastric pain  Rh +  Counseled on weight gain in pregnancy  Discussed PTL precautions and when to visit hospital  Reviewed kick counts  She verbalizes understanding of all teaching  See flow sheet  A/P: Normal prenatal visit.   F/U in 1 weeks - GBS/CT/GC/trich next visit

## 2018-01-31 NOTE — PATIENT INSTRUCTIONS
Weeks 34 to 36 of Your Pregnancy: Care Instructions  Your Care Instructions    By now, your baby and your belly have grown quite large. It is almost time to give birth. A full-term pregnancy can deliver between 37 and 42 weeks. Your baby's lungs are almost ready to breathe air. The bones in your baby's head are now firm enough to protect it, but soft enough to move down through the birth canal.  You may feel excited, happy, anxious, or scared. You may wonder how you will know if you are in labor or what to expect during labor. Try to be flexible in your expectations of the birth. Because each birth is different, there is no way to know exactly what childbirth will be like for you. This care sheet will help you know what to expect and how to prepare. This may make your childbirth easier. If you haven't already had the Tdap shot during this pregnancy, talk to your doctor about getting it. It will help protect your  against pertussis infection. In the 36th week, most women have a test for group B streptococcus (GBS). GBS is a common bacteria that can live in the vagina and rectum. It can make your baby sick after birth. If you test positive, you will get antibiotics during labor. The medicine will keep your baby from getting the bacteria. Follow-up care is a key part of your treatment and safety. Be sure to make and go to all appointments, and call your doctor if you are having problems. It's also a good idea to know your test results and keep a list of the medicines you take. How can you care for yourself at home? Learn about pain relief choices  · Pain is different for every woman. Talk with your doctor about your feelings about pain. · You can choose from several types of pain relief. These include medicine or breathing techniques, as well as comfort measures. You can use more than one option. · If you choose to have pain medicine during labor, talk to your doctor about your options.  Some medicines lower anxiety and help with some of the pain. Others make your lower body numb so that you won't feel pain. · Be sure to tell your doctor about your pain medicine choice before you start labor or very early in your labor. You may be able to change your mind as labor progresses. · Rarely, a woman is put to sleep by medicine given through a mask or an IV. Labor and delivery  · The first stage of labor has three parts: early, active, and transition. ¨ Most women have early labor at home. You can stay busy or rest, eat light snacks, drink clear fluids, and start counting contractions. ¨ When talking during a contraction gets hard, you may be moving to active labor. During active labor, you should head for the hospital if you are not there already. ¨ You are in active labor when contractions come every 3 to 4 minutes and last about 60 seconds. Your cervix is opening more rapidly. ¨ If your water breaks, contractions will come faster and stronger. ¨ During transition, your cervix is stretching, and contractions are coming more rapidly. ¨ You may want to push, but your cervix might not be ready. Your doctor will tell you when to push. · The second stage starts when your cervix is completely opened and you are ready to push. ¨ Contractions are very strong to push the baby down the birth canal.  ¨ You will feel the urge to push. You may feel like you need to have a bowel movement. ¨ You may be coached to push with contractions. These contractions will be very strong, but you will not have them as often. You can get a little rest between contractions. ¨ You may be emotional and irritable. You may not be aware of what is going on around you. ¨ One last push, and your baby is born. · The third stage is when a few more contractions push out the placenta. This may take 30 minutes or less. · The fourth stage is the welcome recovery. You may feel overwhelmed with emotions and exhausted but alert.  This is a good time to start breastfeeding. Where can you learn more? Go to http://sue-michael.info/. Enter T486 in the search box to learn more about \"Weeks 34 to 36 of Your Pregnancy: Care Instructions. \"  Current as of: March 16, 2017  Content Version: 11.4  © 5513-6653 [employer]. Care instructions adapted under license by [employer] (which disclaims liability or warranty for this information). If you have questions about a medical condition or this instruction, always ask your healthcare professional. [address] any warranty or liability for your use of this information.

## 2018-02-01 ENCOUNTER — APPOINTMENT (OUTPATIENT)
Dept: ULTRASOUND IMAGING | Age: 33
End: 2018-02-01
Attending: OBSTETRICS & GYNECOLOGY
Payer: MEDICAID

## 2018-02-01 ENCOUNTER — HOSPITAL ENCOUNTER (EMERGENCY)
Age: 33
Discharge: HOME | End: 2018-02-01
Attending: OBSTETRICS & GYNECOLOGY | Admitting: OBSTETRICS & GYNECOLOGY
Payer: MEDICAID

## 2018-02-01 VITALS
HEART RATE: 99 BPM | DIASTOLIC BLOOD PRESSURE: 64 MMHG | TEMPERATURE: 98.5 F | RESPIRATION RATE: 20 BRPM | SYSTOLIC BLOOD PRESSURE: 104 MMHG

## 2018-02-01 PROBLEM — Z34.90 PREGNANCY: Status: ACTIVE | Noted: 2018-02-01

## 2018-02-01 LAB
APPEARANCE UR: ABNORMAL
BILIRUB UR QL: ABNORMAL
COLOR UR: ABNORMAL
GLUCOSE BLD STRIP.AUTO-MCNC: 101 MG/DL (ref 70–110)
GLUCOSE UR QL STRIP.AUTO: NEGATIVE MG/DL
KETONES UR-MCNC: 80 MG/DL
LEUKOCYTE ESTERASE UR QL STRIP: NEGATIVE
NITRITE UR QL: POSITIVE
PH UR: 6.5 [PH] (ref 5–9)
PROT UR QL: 100 MG/DL
RBC # UR STRIP: NEGATIVE /UL
SERVICE CMNT-IMP: ABNORMAL
SP GR UR: >1.03 (ref 1–1.02)
UROBILINOGEN UR QL: 1 EU/DL (ref 0.2–1)

## 2018-02-01 PROCEDURE — 76817 TRANSVAGINAL US OBSTETRIC: CPT

## 2018-02-01 PROCEDURE — 99284 EMERGENCY DEPT VISIT MOD MDM: CPT

## 2018-02-01 PROCEDURE — 76819 FETAL BIOPHYS PROFIL W/O NST: CPT

## 2018-02-01 PROCEDURE — 82962 GLUCOSE BLOOD TEST: CPT

## 2018-02-01 PROCEDURE — 81003 URINALYSIS AUTO W/O SCOPE: CPT

## 2018-02-01 PROCEDURE — 59025 FETAL NON-STRESS TEST: CPT

## 2018-02-01 NOTE — PROGRESS NOTES
Pt arrived ambulatory for c/o high BS taken last night 258, \" a long time after dinner\", with today 115 this am, eggs eating before 1200 today with BS @ 145 at 1400. Denies bleeding, denies LOF, +FM, abdomen non tender and soft. EFM/TOCO applied. 1454-  1515-Dr. Brennan arrived for assessment.    1520-Pt transported to ultrasound via wheelchair  1630-BPP 8/8, with reactive NST  1650-Pt verbalized understanding of d/c instructions with Dr. Susan Wiggins  1700-Pt ambulated off unit in stable condition

## 2018-02-01 NOTE — IP AVS SNAPSHOT
[address] Patient: Aisha Griffin MRN: [id number] :1985 A check jewels indicates which time of day the medication should be taken. My Medications ASK your doctor about these medications Instructions Each Dose to Equal  
 Morning Noon Evening Bedtime  
 albuterol 90 mcg/actuation inhaler Commonly known as:  PROVENTIL HFA, VENTOLIN HFA, PROAIR HFA Your last dose was: Your next dose is:    
   
   
 1-2 Puffs. 1-2 Puff Blood-Glucose Meter monitoring kit Your last dose was: Your next dose is:    
   
   
 Test blood sugar four times daily: fasting every morning and two hours after breakfast, lunch and dinner. ferrous sulfate 325 mg (65 mg iron) EC tablet Commonly known as:  IRON Your last dose was: Your next dose is: Take 1 Tab by mouth three (3) times daily (with meals). 325 mg  
    
   
   
   
  
 glucose blood VI test strips strip Commonly known as:  ASCENSIA AUTODISC VI, ONE TOUCH ULTRA TEST VI Your last dose was: Your next dose is:    
   
   
 Test blood sugar four times daily: fasting every morning and two hours after breakfast, lunch and dinner. labetalol 200 mg tablet Commonly known as:  Telly Owens Your last dose was: Your next dose is: Take 1 Tab by mouth two (2) times a day. Indications: hypertension 200 mg Lancets Misc Your last dose was: Your next dose is:    
   
   
 Test blood sugar four times daily: fasting every morning and two hours after breakfast, lunch and dinner. TRIAMTERENE-HYDROCHLOROTHIAZID PO Your last dose was: Your next dose is: Take  by mouth.

## 2018-02-01 NOTE — H&P
History & Physical    Name: Eben Person MRN: [id number]  SSN: [id number]    YOB: 1985  Age: 34 y.o. Sex: female        Subjective:     Estimated Date of Delivery: 3/5/18  OB History      Para Term  AB Living    8 6 6  1 6    SAB TAB Ectopic Molar Multiple Live Births    1               Ms. El Gomez presents for evaluation of pregnancy at 35w3d for elevated blood sugars. Prenatal course was complicated by chtn on labetalol 200mg BID and GDM (pt failed her 1hr ), and morbid obesity. Please see prenatal records for details. Pt states she FS last night was 258. Pt states she missed dinner last night. Her fasting was 115 and her postprandial sugar after breakfast was 145. She called the office and was told to come to L&D. She missed her GDM appointment, the office is currently trying to get her another appointment. She denies any symptoms. Her fingerstick on L&D was 101. Past Medical History:   Diagnosis Date    Asthma     Asthma     Community acquired pneumonia     Hypertension     Obese      Past Surgical History:   Procedure Laterality Date    DILATION AND CURETTAGE       No Known Allergies  Prior to Admission medications    Medication Sig Start Date End Date Taking? Authorizing Provider   Lancets misc Test blood sugar four times daily: fasting every morning and two hours after breakfast, lunch and dinner. 18   Radha Lemus CNM   glucose blood VI test strips (ASCENSIA AUTODISC VI, ONE TOUCH ULTRA TEST VI) strip Test blood sugar four times daily: fasting every morning and two hours after breakfast, lunch and dinner. 18   Radha Lucas CNM   Blood-Glucose Meter monitoring kit Test blood sugar four times daily: fasting every morning and two hours after breakfast, lunch and dinner. 18   Radha Lucas CNM   ferrous sulfate (IRON) 325 mg (65 mg iron) EC tablet Take 1 Tab by mouth three (3) times daily (with meals).  18 Radha Lemus CNM   albuterol (PROVENTIL HFA, VENTOLIN HFA, PROAIR HFA) 90 mcg/actuation inhaler 1-2 Puffs. 5/4/17   Historical Provider   labetalol (NORMODYNE) 200 mg tablet Take 1 Tab by mouth two (2) times a day. Indications: hypertension 7/18/17   Zuleyka Santos MD   TRIAMTERENE-HYDROCHLOROTHIAZID PO Take  by mouth. Aman Campbell MD      Social History     Occupational History    Not on file. Social History Main Topics    Smoking status: Current Some Day Smoker     Packs/day: 0.50     Types: Cigarettes    Smokeless tobacco: Never Used      Comment: cigars twice weekly    Alcohol use 1.0 oz/week     2 Standard drinks or equivalent per week      Comment: occaisonally    Drug use: No    Sexual activity: Yes     Partners: Female     Birth control/ protection: None     Family History   Problem Relation Age of Onset    Asthma Mother     Hypertension Mother     Diabetes Mother     Asthma Father        Review of Systems: A comprehensive review of systems was negative except for that written in the HPI. Objective:     Vitals:  Vitals:    02/01/18 1458 02/01/18 1459 02/01/18 1501   BP:  116/66 104/64   Pulse:  92 99   Resp: 20     Temp: 98.5 °F (36.9 °C)          Physical Exam:  Patient without distress.   Membranes:  Intact  Fetal Heart Rate: Reactive  Baseline: 135 per minute  Variability: moderate  Accelerations: yes  Decelerations: none  Uterine contractions: none    Prenatal Labs:   Lab Results   Component Value Date/Time    Rubella, External Immune 11/19/2012    GrBStrep, External Negative 05/23/2013    HBsAg, External Negative 11/19/2012    HIV, External Negative 11/19/2012    RPR, External Non Reactive 11/19/2012    Gonorrhea, External Negative 11/19/2012    Chlamydia, External Negative 11/19/2012          Recent Results (from the past 24 hour(s))   GLUCOSE, POC    Collection Time: 02/01/18  2:54 PM   Result Value Ref Range    Glucose (POC) 101 70 - 110 mg/dL   POC URINE MACROSCOPIC    Collection Time: 02/01/18  4:05 PM   Result Value Ref Range    Color OTHER      Appearance SLIGHTLY CLOUDY      Spec. gravity (POC) >1.030 (H) 1.001 - 1.023    pH, urine  (POC) 6.5 5.0 - 9.0      Protein (POC) 100 (A) NEG mg/dL    Glucose, urine (POC) NEGATIVE  NEG mg/dL    Ketones (POC) 80 (A) NEG mg/dL    Bilirubin (POC) SMALL (A) NEG      Blood (POC) NEGATIVE  NEG      Urobilinogen (POC) 1.0 0.2 - 1.0 EU/dL    Nitrite (POC) POSITIVE (A) NEG      Leukocyte esterase (POC) NEGATIVE  NEG      Performed by Mae Black        Assessment/Plan:     Patient Active Problem List   Diagnosis Code    Obesity complicating pregnancy, childbirth, or the puerperium, unspecified as to episode of care or not applicable(649.10) [id number]    Hyperglycemia R73.9    Cough R05    Pregnancy Z34.90       Plan: 35w3d grand mulitparous with chtn and gdm poorly controlled on diet, here for blood sugar control and NST/BPP  Chtn: BP today wnl.  Continue labetalol 200mg bID, preeclampsia precautions given  GDM: FS in L&D not high, pt education given on diet, discussed taking FS 4x/day, fasting, and 2hr postprandial. Pt to record FS  Pt scheduled for MFM on Feb 12 will try to get an appointment sooner-->office to call patient  NST reactive, BPP 8/8, EFW 3344g (>97%)  Continue Bi-Weekly testing, pt come on Saturday Feb 3 for NST  No evidence of labor  Reassuring fetal status  D/C home  F/u in 1week with WBOB    Signed By:  Devante Davalos MD     February 1, 2018 3:52 PM

## 2018-02-01 NOTE — IP AVS SNAPSHOT
[address] [facility] 
 
 
 [address] [facility] Ber[address] Tolentino 17 Patient: Johanne Griffin MRN: [id number] :1985 About your hospitalization You were admitted on:  N/A You last received care in the:  [facility] [address] [facility] You were discharged on:  2018 Why you were hospitalized Your primary diagnosis was:  Not on File Your diagnoses also included:  Pregnancy Follow-up Information Follow up With Details Comments Contact Info Quentin Greene MD   [address] [place] [address] [address] [facility] [address] 
[phone] Your Scheduled Appointments 2018 12:15 PM EST  
OB VISIT with Anthony Ferguson MD  
[facility] ([facility]) [address] [address]  
[phone] Discharge Orders None A check jewels indicates which time of day the medication should be taken. My Medications ASK your doctor about these medications Instructions Each Dose to Equal  
 Morning Noon Evening Bedtime  
 albuterol 90 mcg/actuation inhaler Commonly known as:  PROVENTIL HFA, VENTOLIN HFA, PROAIR HFA Your last dose was: Your next dose is:    
   
   
 1-2 Puffs. 1-2 Puff Blood-Glucose Meter monitoring kit Your last dose was: Your next dose is:    
   
   
 Test blood sugar four times daily: fasting every morning and two hours after breakfast, lunch and dinner. ferrous sulfate 325 mg (65 mg iron) EC tablet Commonly known as:  IRON Your last dose was: Your next dose is: Take 1 Tab by mouth three (3) times daily (with meals). 325 mg  
    
   
   
   
  
 glucose blood VI test strips strip Commonly known as:  ASCENSIA AUTODISC VI, ONE TOUCH ULTRA TEST VI Your last dose was: Your next dose is: Test blood sugar four times daily: fasting every morning and two hours after breakfast, lunch and dinner. labetalol 200 mg tablet Commonly known as:  Elikevyn Boop Your last dose was: Your next dose is: Take 1 Tab by mouth two (2) times a day. Indications: hypertension 200 mg Lancets Misc Your last dose was: Your next dose is:    
   
   
 Test blood sugar four times daily: fasting every morning and two hours after breakfast, lunch and dinner. TRIAMTERENE-HYDROCHLOROTHIAZID PO Your last dose was: Your next dose is: Take  by mouth. Discharge Instructions None Introducing [facility]! Rama Garcia introduces markedup patient portal. Now you can access parts of your medical record, email your doctor's office, and request medication refills online. 1. In your internet browser, go to [email or web address] 2. Click on the First Time User? Click Here link in the Sign In box. You will see the New Member Sign Up page. 3. Enter your markedup Access Code exactly as it appears below. You will not need to use this code after youve completed the sign-up process. If you do not sign up before the expiration date, you must request a new code. · markedup Access Code: [email or web address] Expires: 4/22/2018  9:03 AM 
 
4. Enter the last four digits of your Social Security Number (xxxx) and Date of Birth (mm/dd/yyyy) as indicated and click Submit. You will be taken to the next sign-up page. 5. Create a markedup ID. This will be your markedup login ID and cannot be changed, so think of one that is secure and easy to remember. 6. Create a markedup password. You can change your password at any time. 7. Enter your Password Reset Question and Answer. This can be used at a later time if you forget your password. 8. Enter your e-mail address. You will receive e-mail notification when new information is available in [address] 9. Click Sign Up. You can now view and download portions of your medical record. 10. Click the Download Summary menu link to download a portable copy of your medical information. If you have questions, please visit the Frequently Asked Questions section of the [facility] website. Remember, [facility] is NOT to be used for urgent needs. For medical emergencies, dial 911. Now available from your iPhone and Android! Unresulted Labs-Please follow up with your PCP about these lab tests Order Current Status  [address] In process Providers Seen During Your Hospitalization Provider Specialty Primary office phone Adriano Mcconnell MD Obstetrics & Gynecology [phone] Your Primary Care Physician (PCP) Primary Care Physician Office Phone Office Fax Leonel Lund [phone] You are allergic to the following No active allergies Recent Documentation OB Status Smoking Status Pregnant Current Some Day Smoker Emergency Contacts Name Discharge Info Relation Home Work Mobile [address] CAREGIVER [3] Other Relative [6] [phone] Melonie Kim DISCHARGE CAREGIVER [3] Parent [1] [phone] Patient Belongings The following personal items are in your possession at time of discharge: 
                             
 
  
  
Discharge Instructions Attachments/References GESTATIONAL DIABETES (ENGLISH) Patient Handouts Gestational Diabetes: Care Instructions Your Care Instructions Gestational diabetes can develop during pregnancy. When you have this condition, the insulin in your body is not able to keep your blood sugar in a normal range.  If you do not control your blood sugar, your baby can grow too big and have problems right after birth such as low blood sugar. Most of the time, gestational diabetes goes away after a baby is born. But if you have had gestational diabetes, you have a greater chance of having it in a future pregnancy and of developing type 2 diabetes. To check for diabetes, you may have a follow-up glucose tolerance test 4 to 12 weeks after your baby is born or after you stop breastfeeding your baby. If the results of this test are normal, experts recommend that you get tested for type 2 diabetes at least every 3 years. You may be able to control your blood sugar with a healthy diet and regular exercise. Staying at a healthy weight also may keep you from getting type 2 diabetes later on. If diet and exercise do not lower your blood sugar enough, you may need to take diabetes medicine or insulin. Follow-up care is a key part of your treatment and safety. Be sure to make and go to all appointments, and call your doctor if you are having problems. It's also a good idea to know your test results and keep a list of the medicines you take. How can you care for yourself at home? · If your doctor prescribes insulin, inject it daily as directed. Your doctor will tell you how and when to take your insulin. · Check your blood sugar as directed. Your doctor will tell you how and when to check your blood sugar. · Monitor your baby's movement as directed. Your doctor may ask you to report how many times in an hour you feel your baby move. · Eat a balanced diet. You may want to meet with a registered dietitian. He or she can teach you how to spread carbohydrates through the day. This may keep your blood sugar from going up quickly after meals. If you are taking insulin, you also can learn to match the amount of insulin you take at meals to the amount of carbohydrates you eat. · Do not diet to lose weight. It is not healthy when you are pregnant. · Get daily exercise. This can help lower your blood sugar. Walking and swimming are good choices. But do not do any exercise without talking with your doctor first. 
When should you call for help? Call 911 anytime you think you may need emergency care. For example, call if: 
? · You passed out (lost consciousness), or you suddenly become very sleepy or confused. (You may have very low blood sugar.) ? · You have symptoms of high blood sugar, such as: ¨ Blurred vision. ¨ Trouble staying awake or being woken up. ¨ Fast, deep breathing. ¨ Breath that smells fruity. ¨ Belly pain, not feeling hungry, and vomiting. ¨ Feeling confused. ?Call your doctor now or seek immediate medical care if: 
? · You are sick and cannot control your blood sugar. ? · You have been vomiting or have had diarrhea for more than 6 hours. ? · Your blood sugar stays higher than the level your doctor has set for you. ? · You have symptoms of low blood sugar, such as: ¨ Sweating. ¨ Feeling nervous, shaky, and weak. ¨ Extreme hunger and slight nausea. ¨ Dizziness and headache. ¨ Blurred vision. ¨ Confusion. ? Watch closely for changes in your health, and be sure to contact your doctor if: 
? · You have a hard time knowing when your blood sugar is low. ? · You have trouble keeping your blood sugar in the target range. ? · You often have problems controlling your blood sugar. Where can you learn more? Go to http://sue-michael.info/. Enter A800 in the search box to learn more about \"Gestational Diabetes: Care Instructions. \" Current as of: March 13, 2017 Content Version: 11.4 © 4222-8459 [employer]. Care instructions adapted under license by [employer] (which disclaims liability or warranty for this information).  If you have questions about a medical condition or this instruction, always ask your healthcare professional. [employer] disclaims any warranty or liability for your use of this information. Please provide this summary of care documentation to your next provider. Signatures-by signing, you are acknowledging that this After Visit Summary has been reviewed with you and you have received a copy. Patient Signature:  ____________________________________________________________ Date:  ____________________________________________________________  
  
Geeta Sleeper Provider Signature:  ____________________________________________________________ Date:  ____________________________________________________________

## 2018-02-06 ENCOUNTER — ROUTINE PRENATAL (OUTPATIENT)
Dept: OBGYN CLINIC | Age: 33
End: 2018-02-06

## 2018-02-06 VITALS
SYSTOLIC BLOOD PRESSURE: 150 MMHG | TEMPERATURE: 97.8 F | OXYGEN SATURATION: 100 % | RESPIRATION RATE: 12 BRPM | HEART RATE: 101 BPM | BODY MASS INDEX: 43.4 KG/M2 | WEIGHT: 293 LBS | DIASTOLIC BLOOD PRESSURE: 90 MMHG | HEIGHT: 69 IN

## 2018-02-06 DIAGNOSIS — B37.31 YEAST VAGINITIS: ICD-10-CM

## 2018-02-06 DIAGNOSIS — O16.3 HYPERTENSION AFFECTING PREGNANCY IN THIRD TRIMESTER: ICD-10-CM

## 2018-02-06 DIAGNOSIS — Z34.83 ENCOUNTER FOR SUPERVISION OF OTHER NORMAL PREGNANCY IN THIRD TRIMESTER: Primary | ICD-10-CM

## 2018-02-06 DIAGNOSIS — E10.65 HYPERGLYCEMIA DUE TO TYPE 1 DIABETES MELLITUS (HCC): ICD-10-CM

## 2018-02-06 RX ORDER — METFORMIN HYDROCHLORIDE 500 MG/1
500 TABLET ORAL 2 TIMES DAILY WITH MEALS
Qty: 60 TAB | Refills: 1 | Status: SHIPPED | OUTPATIENT
Start: 2018-02-06 | End: 2020-01-27 | Stop reason: ALTCHOICE

## 2018-02-06 RX ORDER — TERCONAZOLE 8 MG/G
1 CREAM VAGINAL
Qty: 20 G | Refills: 1 | Status: SHIPPED | OUTPATIENT
Start: 2018-02-06 | End: 2020-01-27 | Stop reason: ALTCHOICE

## 2018-02-06 NOTE — MR AVS SNAPSHOT
[facility] 
 
 
 [address], [address] [address] [facility] [address] 
[phone] Patient: Jos Moran MRN: [id number] :1985 Visit Information Date & Time Provider Department Dept. Phone Encounter #  
 2018 12:15 PM [place] Burton [id number] Follow-up Instructions Return in 1 week (on 2018), or if symptoms worsen or fail to improve. 2018 10:30 AM  
OB VISIT with Zuleyka Santos MD  
[facility] ([address] [facility]) Appt Note: ob visit [address], [place] [address] [facility] [address]  
[phone]  
  
   
 [address], [address] [facility] Upcoming Health Maintenance Date Due  
 PAP AKA CERVICAL CYTOLOGY 2014 Influenza Age 5 to Adult 2017 OB 3RD TRIMESTER TDAP 2017 Allergies as of 2018  Review Complete On: 2018 By: Zuleyka Santos MD  
 No Known Allergies Current Immunizations  Reviewed on 2013 Name Date Tdap 2016 12:00 AM  
  
 Not reviewed this visit You Were Diagnosed With   
  
 Codes Comments Encounter for supervision of other normal pregnancy in third trimester    -  Primary ICD-10-CM: Z34.83 ICD-9-CM: V22.1 Yeast vaginitis     ICD-10-CM: B37.3 ICD-9-CM: 112.1 Hyperglycemia due to type 1 diabetes mellitus ([facility]     ICD-10-CM: E10.65 ICD-9-CM: 250.01 Hypertension affecting pregnancy in third trimester     ICD-10-CM: O16.3 ICD-9-CM: 642.93 Vitals BP Pulse Temp Resp Height(growth percentile) Weight(growth percentile) 150/90 (BP 1 Location: Left arm, BP Patient Position: Sitting) (!) 101 97.8 °F (36.6 °C) (Oral) 12 5' 9\" (1.753 m) (!) 424 lb (192.3 kg) LMP SpO2 BMI OB Status Smoking Status 2017 100% 62.61 kg/m2 Pregnant Current Some Day Smoker Vitals History BMI and BSA Data Body Mass Index Body Surface Area 62.61 kg/m 2 3.06 m 2 Preferred Pharmacy Pharmacy Name Phone [facility] Karthikeyan Jain, [address] [place] [address] [phone] Your Updated Medication List  
  
   
This list is accurate as of: 2/6/18  9:46 PM.  Always use your most recent med list.  
  
  
  
  
 albuterol 90 mcg/actuation inhaler Commonly known as:  PROVENTIL HFA, VENTOLIN HFA, PROAIR HFA  
1-2 Puffs. Blood-Glucose Meter monitoring kit Test blood sugar four times daily: fasting every morning and two hours after breakfast, lunch and dinner. ferrous sulfate 325 mg (65 mg iron) EC tablet Commonly known as:  IRON Take 1 Tab by mouth three (3) times daily (with meals). glucose blood VI test strips strip Commonly known as:  ASCENSIA AUTODISC VI, ONE TOUCH ULTRA TEST VI Test blood sugar four times daily: fasting every morning and two hours after breakfast, lunch and dinner. labetalol 200 mg tablet Commonly known as:  Gayl Kanu Take 1 Tab by mouth two (2) times a day. Indications: hypertension Lancets Misc Test blood sugar four times daily: fasting every morning and two hours after breakfast, lunch and dinner. metFORMIN 500 mg tablet Commonly known as:  GLUCOPHAGE Take 1 Tab by mouth two (2) times daily (with meals). Indications: Gestational Diabetes Mellitus  
  
 terconazole 0.8 % vaginal cream  
Commonly known as:  TERAZOL 3 Insert 1 Applicator into vagina nightly. TRIAMTERENE-HYDROCHLOROTHIAZID PO Take  by mouth. Prescriptions Sent to Pharmacy Refills  
 metFORMIN (GLUCOPHAGE) 500 mg tablet 1 Sig: Take 1 Tab by mouth two (2) times daily (with meals). Indications: Gestational Diabetes Mellitus Class: Normal  
 Pharmacy: [facility] [address] #: [phone] Route: Oral  
 terconazole (TERAZOL 3) 0.8 % vaginal cream 1 Sig: Insert 1 Applicator into vagina nightly.   
 Class: Normal  
 Pharmacy: [facility] [address] #: [phone] Route: Vaginal  
  
We Performed the Following CT/NG/T.VAGINALIS AMPLIFICATION [id number] CPT(R)] CULTURE, GENITAL GROUP B STREP [46147 CPT(R)] Follow-up Instructions Return in 1 week (on 2/13/2018), or if symptoms worsen or fail to improve. Patient Instructions Learning About Diabetes Food Guidelines Your Care Instructions Meal planning is important to manage diabetes. It helps keep your blood sugar at a target level (which you set with your doctor). You don't have to eat special foods. You can eat what your family eats, including sweets once in a while. But you do have to pay attention to how often you eat and how much you eat of certain foods. You may want to work with a dietitian or a certified diabetes educator (CDE) to help you plan meals and snacks. A dietitian or CDE can also help you lose weight if that is one of your goals. What should you know about eating carbs? Managing the amount of carbohydrate (carbs) you eat is an important part of healthy meals when you have diabetes. Carbohydrate is found in many foods. · Learn which foods have carbs. And learn the amounts of carbs in different foods. ¨ Bread, cereal, pasta, and rice have about 15 grams of carbs in a serving. A serving is 1 slice of bread (1 ounce), ½ cup of cooked cereal, or 1/3 cup of cooked pasta or rice. ¨ Fruits have 15 grams of carbs in a serving. A serving is 1 small fresh fruit, such as an apple or orange; ½ of a banana; ½ cup of cooked or canned fruit; ½ cup of fruit juice; 1 cup of melon or raspberries; or 2 tablespoons of dried fruit. ¨ Milk and no-sugar-added yogurt have 15 grams of carbs in a serving. A serving is 1 cup of milk or 2/3 cup of no-sugar-added yogurt. ¨ Starchy vegetables have 15 grams of carbs in a serving.  A serving is ½ cup of mashed potatoes or sweet potato; 1 cup winter squash; ½ of a small baked potato; ½ cup of cooked beans; or ½ cup cooked corn or green peas. · Learn how much carbs to eat each day and at each meal. A dietitian or CDE can teach you how to keep track of the amount of carbs you eat. This is called carbohydrate counting. · If you are not sure how to count carbohydrate grams, use the Plate Method to plan meals. It is a good, quick way to make sure that you have a balanced meal. It also helps you spread carbs throughout the day. ¨ Divide your plate by types of foods. Put non-starchy vegetables on half the plate, meat or other protein food on one-quarter of the plate, and a grain or starchy vegetable in the final quarter of the plate. To this you can add a small piece of fruit and 1 cup of milk or yogurt, depending on how many carbs you are supposed to eat at a meal. 
· Try to eat about the same amount of carbs at each meal. Do not \"save up\" your daily allowance of carbs to eat at one meal. 
· Proteins have very little or no carbs per serving. Examples of proteins are beef, chicken, turkey, fish, eggs, tofu, cheese, cottage cheese, and peanut butter. A serving size of meat is 3 ounces, which is about the size of a deck of cards. Examples of meat substitute serving sizes (equal to 1 ounce of meat) are 1/4 cup of cottage cheese, 1 egg, 1 tablespoon of peanut butter, and ½ cup of tofu. How can you eat out and still eat healthy? · Learn to estimate the serving sizes of foods that have carbohydrate. If you measure food at home, it will be easier to estimate the amount in a serving of restaurant food. · If the meal you order has too much carbohydrate (such as potatoes, corn, or baked beans), ask to have a low-carbohydrate food instead. Ask for a salad or green vegetables. · If you use insulin, check your blood sugar before and after eating out to help you plan how much to eat in the future. · If you eat more carbohydrate at a meal than you had planned, take a walk or do other exercise. This will help lower your blood sugar. What else should you know? · Limit saturated fat, such as the fat from meat and dairy products. This is a healthy choice because people who have diabetes are at higher risk of heart disease. So choose lean cuts of meat and nonfat or low-fat dairy products. Use olive or canola oil instead of butter or shortening when cooking. · Don't skip meals. Your blood sugar may drop too low if you skip meals and take insulin or certain medicines for diabetes. · Check with your doctor before you drink alcohol. Alcohol can cause your blood sugar to drop too low. Alcohol can also cause a bad reaction if you take certain diabetes medicines. Follow-up care is a key part of your treatment and safety. Be sure to make and go to all appointments, and call your doctor if you are having problems. It's also a good idea to know your test results and keep a list of the medicines you take. Where can you learn more? Go to http://sue-michael.info/. Enter P810 in the search box to learn more about \"Learning About Diabetes Food Guidelines. \" Current as of: March 13, 2017 Content Version: 11.4 © 7722-2381 Nfoshare. Care instructions adapted under license by [employer] (which disclaims liability or warranty for this information). If you have questions about a medical condition or this instruction, always ask your healthcare professional. [address] any warranty or liability for your use of this information. Candidiasis: Care Instructions Your Care Instructions Candidiasis (say \"ebj-nyi-HI-uh-saravanan\") is a yeast infection. Yeast normally lives in your body. But it can cause problems if your body's defenses don't work as they should. Some medicines can increase your chance of getting a yeast infection. These include antibiotics, steroids, and cancer drugs. And some diseases like AIDS and diabetes can make you more likely to get yeast infections. There are different types of yeast infections. Cloria Lek is a yeast infection in the mouth. It usually occurs in people with weak immune systems. It causes white patches inside the mouth and throat. Yeast infections of the skin usually occur in skin folds where the skin stays moist. They cause red, oozing patches on your skin. Babies can get these infections under the diaper. People who often wear gloves can get them on their hands. Many women get vaginal yeast infections. They are most common when women take antibiotics. These infections can cause the vagina to itch and burn. They also cause white discharge that looks like cottage cheese. In rare cases, yeast infects the blood. This can cause serious disease. This kind of infection is treated with medicine given through a needle into a vein (IV). After you start treatment, a yeast infection usually goes away quickly. But if your immune system is weak, the infection may come back. Tell your doctor if you get yeast infections often. Follow-up care is a key part of your treatment and safety. Be sure to make and go to all appointments, and call your doctor if you are having problems. It's also a good idea to know your test results and keep a list of the medicines you take. How can you care for yourself at home? · Take your medicines exactly as prescribed. Call your doctor if you think you are having a problem with your medicine. · Use antibiotics only as directed by your doctor. · Eat yogurt with live cultures. It has bacteria called lactobacillus. It may help prevent some types of yeast infections. · Keep your skin clean and dry. Put powder on moist places. · If you are using a cream or suppository to treat a vaginal yeast infection, don't use condoms or a diaphragm. Use a different type of birth control. · Eat a healthy diet and get regular exercise. This will help keep your immune system strong. When should you call for help? Watch closely for changes in your health, and be sure to contact your doctor if: 
? · You do not get better as expected. Where can you learn more? Go to http://sue-michael.info/. Enter S411 in the search box to learn more about \"Candidiasis: Care Instructions. \" Current as of: October 13, 2016 Content Version: 11.4 © 8176-5488 Mimosa. Care instructions adapted under license by [employer] (which disclaims liability or warranty for this information). If you have questions about a medical condition or this instruction, always ask your healthcare professional. [address] any warranty or liability for your use of this information. Gestational Diabetes: Care Instructions Your Care Instructions Gestational diabetes can develop during pregnancy. When you have this condition, the insulin in your body is not able to keep your blood sugar in a normal range. If you do not control your blood sugar, your baby can grow too big and have problems right after birth such as low blood sugar. Most of the time, gestational diabetes goes away after a baby is born. But if you have had gestational diabetes, you have a greater chance of having it in a future pregnancy and of developing type 2 diabetes. To check for diabetes, you may have a follow-up glucose tolerance test 4 to 12 weeks after your baby is born or after you stop breastfeeding your baby. If the results of this test are normal, experts recommend that you get tested for type 2 diabetes at least every 3 years. You may be able to control your blood sugar with a healthy diet and regular exercise. Staying at a healthy weight also may keep you from getting type 2 diabetes later on.  If diet and exercise do not lower your blood sugar enough, you may need to take diabetes medicine or insulin. Follow-up care is a key part of your treatment and safety. Be sure to make and go to all appointments, and call your doctor if you are having problems. It's also a good idea to know your test results and keep a list of the medicines you take. How can you care for yourself at home? · If your doctor prescribes insulin, inject it daily as directed. Your doctor will tell you how and when to take your insulin. · Check your blood sugar as directed. Your doctor will tell you how and when to check your blood sugar. · Monitor your baby's movement as directed. Your doctor may ask you to report how many times in an hour you feel your baby move. · Eat a balanced diet. You may want to meet with a registered dietitian. He or she can teach you how to spread carbohydrates through the day. This may keep your blood sugar from going up quickly after meals. If you are taking insulin, you also can learn to match the amount of insulin you take at meals to the amount of carbohydrates you eat. · Do not diet to lose weight. It is not healthy when you are pregnant. · Get daily exercise. This can help lower your blood sugar. Walking and swimming are good choices. But do not do any exercise without talking with your doctor first. 
When should you call for help? Call 911 anytime you think you may need emergency care. For example, call if: 
? · You passed out (lost consciousness), or you suddenly become very sleepy or confused. (You may have very low blood sugar.) ? · You have symptoms of high blood sugar, such as: ¨ Blurred vision. ¨ Trouble staying awake or being woken up. ¨ Fast, deep breathing. ¨ Breath that smells fruity. ¨ Belly pain, not feeling hungry, and vomiting. ¨ Feeling confused. ?Call your doctor now or seek immediate medical care if: 
? · You are sick and cannot control your blood sugar. ? · You have been vomiting or have had diarrhea for more than 6 hours. ? · Your blood sugar stays higher than the level your doctor has set for you. ? · You have symptoms of low blood sugar, such as: ¨ Sweating. ¨ Feeling nervous, shaky, and weak. ¨ Extreme hunger and slight nausea. ¨ Dizziness and headache. ¨ Blurred vision. ¨ Confusion. ? Watch closely for changes in your health, and be sure to contact your doctor if: 
? · You have a hard time knowing when your blood sugar is low. ? · You have trouble keeping your blood sugar in the target range. ? · You often have problems controlling your blood sugar. Where can you learn more? Go to http://sue-michael.info/. Enter A800 in the search box to learn more about \"Gestational Diabetes: Care Instructions. \" Current as of: March 13, 2017 Content Version: 11.4 © 8290-3964 Cordia. Care instructions adapted under license by [employer] (which disclaims liability or warranty for this information). If you have questions about a medical condition or this instruction, always ask your healthcare professional. [place] [address] any warranty or liability for your use of this information. High Blood Pressure in Pregnancy: Care Instructions Your Care Instructions High blood pressure (hypertension) means that the force of blood against your artery walls is too strong. Mild high blood pressure during pregnancy is not usually dangerous. Your doctor will probably just want to watch you closely. But when blood pressure is very high, it can reduce oxygen to your baby. This can affect how well your baby grows. High blood pressure also means that you are at higher risk for: · Preeclampsia. This is a problem that includes high blood pressure and damage to your liver or kidneys. It can also reduce how much oxygen your baby gets. In some cases, it leads to eclampsia. Eclampsia causes seizures. · Placental abruption. This is a problem when the placenta separates from the uterus before birth. It prevents the baby from getting enough oxygen and nutrients. Sometimes it can cause death for the baby and the mother. To prevent problems for you or your baby, you will have to check your blood pressure often. You will do this until after your baby is born. If your blood pressure rises suddenly or is very high during your pregnancy, your doctor may prescribe medicines. They can usually control blood pressure. If your blood pressure affects your or your baby's health, your doctor may need to deliver your baby early. After your baby is born, your blood pressure will probably improve. But sometimes blood pressure problems continue after birth. Follow-up care is a key part of your treatment and safety. Be sure to make and go to all appointments, and call your doctor if you are having problems. It's also a good idea to know your test results and keep a list of the medicines you take. How can you care for yourself at home? · Take and write down your blood pressure at home if your doctor tells you to. · Take your medicines exactly as prescribed. Call your doctor if you think you are having a problem with your medicine. · Do not smoke. If you need help quitting, talk to your doctor about stop-smoking programs and medicines. These can increase your chances of quitting for good. · Do not gain too much weight during your pregnancy. Talk to your doctor about how much weight gain is healthy. · Eat a healthy diet. · If your doctor says it's okay, get regular exercise. Walking or swimming several times a week can be healthy for you and your baby. · Reduce stress, and find time to relax. This is very important if you continue to work or have a busy schedule. It's also important if you have small children at home. When should you call for help? Call 911 anytime you think you may need emergency care.  For example, call if: 
? · You passed out (lost consciousness). ? · You have a seizure. ?Call your doctor now or seek immediate medical care if: 
? · You have symptoms of preeclampsia, such as: 
¨ Sudden swelling of your face, hands, or feet. ¨ New vision problems (such as dimness or blurring). ¨ A severe headache. ? · Your blood pressure is higher than it should be or rises suddenly. ? · You have new nausea or vomiting. ? · You think that you are in labor. ? · You have pain in your belly or pelvis. ? Watch closely for changes in your health, and be sure to contact your doctor if: 
? · You gain weight rapidly. Where can you learn more? Go to http://sue-michael.info/. Enter [phone] in the search box to learn more about \"High Blood Pressure in Pregnancy: Care Instructions. \" Current as of: March 16, 2017 Content Version: 11.4 © 0660-6455 FOODit. Care instructions adapted under license by [employer] (which disclaims liability or warranty for this information). If you have questions about a medical condition or this instruction, always ask your healthcare professional. [address] any warranty or liability for your use of this information. Introducing [facility]! Rhea Li introduces ThirstyVIP patient portal. Now you can access parts of your medical record, email your doctor's office, and request medication refills online. 1. In your internet browser, go to [email or web address] 2. Click on the First Time User? Click Here link in the Sign In box. You will see the New Member Sign Up page. 3. Enter your ThirstyVIP Access Code exactly as it appears below. You will not need to use this code after youve completed the sign-up process. If you do not sign up before the expiration date, you must request a new code. · ThirstyVIP Access Code: [email or web address] Expires: 4/22/2018  9:03 AM 
 
 4. Enter the last four digits of your Social Security Number (xxxx) and Date of Birth (mm/dd/yyyy) as indicated and click Submit. You will be taken to the next sign-up page. 5. Create a MD Lingo ID. This will be your MD Lingo login ID and cannot be changed, so think of one that is secure and easy to remember. 6. Create a MD Lingo password. You can change your password at any time. 7. Enter your Password Reset Question and Answer. This can be used at a later time if you forget your password. 8. Enter your e-mail address. You will receive e-mail notification when new information is available in [address] 9. Click Sign Up. You can now view and download portions of your medical record. 10. Click the Download Summary menu link to download a portable copy of your medical information. If you have questions, please visit the Frequently Asked Questions section of the MD Lingo website. Remember, MD Lingo is NOT to be used for urgent needs. For medical emergencies, dial 911. Now available from your iPhone and Android! Please provide this summary of care documentation to your next provider. Your primary care clinician is listed as Aydee [address]. If you have any questions after today's visit, please call [phone].

## 2018-02-07 ENCOUNTER — APPOINTMENT (OUTPATIENT)
Dept: ULTRASOUND IMAGING | Age: 33
End: 2018-02-07
Attending: OBSTETRICS & GYNECOLOGY
Payer: MEDICAID

## 2018-02-07 ENCOUNTER — HOSPITAL ENCOUNTER (EMERGENCY)
Age: 33
Discharge: HOME | End: 2018-02-07
Attending: OBSTETRICS & GYNECOLOGY | Admitting: OBSTETRICS & GYNECOLOGY
Payer: MEDICAID

## 2018-02-07 VITALS
HEART RATE: 105 BPM | WEIGHT: 293 LBS | HEIGHT: 69 IN | TEMPERATURE: 98.8 F | SYSTOLIC BLOOD PRESSURE: 138 MMHG | BODY MASS INDEX: 43.4 KG/M2 | DIASTOLIC BLOOD PRESSURE: 59 MMHG

## 2018-02-07 LAB — GLUCOSE BLD STRIP.AUTO-MCNC: 96 MG/DL (ref 70–110)

## 2018-02-07 PROCEDURE — 82962 GLUCOSE BLOOD TEST: CPT

## 2018-02-07 PROCEDURE — 76819 FETAL BIOPHYS PROFIL W/O NST: CPT

## 2018-02-07 PROCEDURE — 59025 FETAL NON-STRESS TEST: CPT

## 2018-02-07 PROCEDURE — 76817 TRANSVAGINAL US OBSTETRIC: CPT

## 2018-02-07 NOTE — IP AVS SNAPSHOT
Summary of Care Report The Summary of Care report has been created to help improve care coordination. Users with access to [employer] or [facility] (Web-based application) may access additional patient information including the Discharge Summary. If you are not currently a [address] [facility] user and need more information, please call the number listed below in the [place] [address] section and ask to be connected with Medical Records. Facility Information Name Address Phone [address] [address] [phone] Patient Information Patient Name Sex KARO Armijo ([id number]) Female 1985 Discharge Information Admitting Provider Service Area Unit Дмитрий Ko MD / [address] Labor & Delivery / [phone] Discharge Provider Discharge Date/Time Discharge Disposition Destination (none) 2018 Afternoon (Pending) AHR (none) Patient Language Language ENGLISH [13] Hospital Problems as of 2018  Reviewed: 2018  9:19 PM by Дмитрий Ko MD  
  
  
  
 Class Noted - Resolved Last Modified POA Active Problems Pregnancy  2018 - Present 2018 by Дмитрий oK MD Unknown Entered by Nereida Koenig MD  
  
Non-Hospital Problems as of 2018  Reviewed: 2018  9:19 PM by Дмитрий Ko MD  
  
  
  
 Class Noted - Resolved Last Modified Active Problems Obesity complicating pregnancy, childbirth, or the puerperium, unspecified as to episode of care or not applicable(649.10)  5895 - Present 2011 by Verlan Crigler. Entered by Verlan Crigler. Hyperglycemia  2011 - Present 2011 by Verlan Crigler. Entered by Verlan Crigler. Cough  2014 - Present 2014   Entered by Amber Wolfe MD  
  
 You are allergic to the following No active allergies Current Discharge Medication List  
  
ASK your doctor about these medications Dose & Instructions Dispensing Information Comments  
 albuterol 90 mcg/actuation inhaler Commonly known as:  PROVENTIL HFA, VENTOLIN HFA, PROAIR HFA Dose:  1-2 Puff  
1-2 Puffs. Refills:  0 Blood-Glucose Meter monitoring kit Test blood sugar four times daily: fasting every morning and two hours after breakfast, lunch and dinner. Quantity:  1 Kit Refills:  0  
   
 ferrous sulfate 325 mg (65 mg iron) EC tablet Commonly known as:  IRON Dose:  325 mg Take 1 Tab by mouth three (3) times daily (with meals). Quantity:  90 Tab Refills:  2  
   
 glucose blood VI test strips strip Commonly known as:  ASCENSIA AUTODISC VI, ONE TOUCH ULTRA TEST VI Test blood sugar four times daily: fasting every morning and two hours after breakfast, lunch and dinner. Quantity:  100 Strip Refills:  1  
   
 labetalol 200 mg tablet Commonly known as:  Welaka Argue Dose:  200 mg Take 1 Tab by mouth two (2) times a day. Indications: hypertension Quantity:  60 Tab Refills:  3 Lancets Misc Test blood sugar four times daily: fasting every morning and two hours after breakfast, lunch and dinner. Quantity:  100 Each Refills:  1  
   
 metFORMIN 500 mg tablet Commonly known as:  GLUCOPHAGE Dose:  500 mg Take 1 Tab by mouth two (2) times daily (with meals). Indications: Gestational Diabetes Mellitus Quantity:  60 Tab Refills:  1  
   
 terconazole 0.8 % vaginal cream  
Commonly known as:  TERAZOL 3 Dose:  1 Applicator Insert 1 Applicator into vagina nightly. Quantity:  20 g Refills:  1  
   
 TRIAMTERENE-HYDROCHLOROTHIAZID PO Take  by mouth. Refills:  0 Current Immunizations Name Date Tdap 2/25/2016 Follow-up Information Follow up With Details Comments Contact Info Rosalio Garcia MD   [address] [place] [address] 
[phone] Discharge Instructions Prenatal Discharge Instructions Follow up appointment: As scheduled Diet: Diabetic diet with lots of fluids Activity: moderate with plenty of rest 
 
Medications: as orderded Call your physician or return to Labor and Delivery if any of the following symptoms occur: ? Signs of labor (contractions every 5-10 minutes for 1 hour) ? Vaginal bleeding ? Rupture of amniotic membranes (water breaks) ? Fever ? Decreased fetal movement (less than 5-10 movements in 1 hour) Chart Review Routing History Recipient Method Report Sent By Lenny Fowler [facility] Fax: [phone] Phone: [phone] Fax [facility] IP AMB RESULT REPORT IMAGING Giuliana David [[id number]] 2/1/2018  4:31 PM 2/1/2018

## 2018-02-07 NOTE — PATIENT INSTRUCTIONS
Learning About Diabetes Food Guidelines  Your Care Instructions    Meal planning is important to manage diabetes. It helps keep your blood sugar at a target level (which you set with your doctor). You don't have to eat special foods. You can eat what your family eats, including sweets once in a while. But you do have to pay attention to how often you eat and how much you eat of certain foods. You may want to work with a dietitian or a certified diabetes educator (CDE) to help you plan meals and snacks. A dietitian or CDE can also help you lose weight if that is one of your goals. What should you know about eating carbs? Managing the amount of carbohydrate (carbs) you eat is an important part of healthy meals when you have diabetes. Carbohydrate is found in many foods. · Learn which foods have carbs. And learn the amounts of carbs in different foods. ¨ Bread, cereal, pasta, and rice have about 15 grams of carbs in a serving. A serving is 1 slice of bread (1 ounce), ½ cup of cooked cereal, or 1/3 cup of cooked pasta or rice. ¨ Fruits have 15 grams of carbs in a serving. A serving is 1 small fresh fruit, such as an apple or orange; ½ of a banana; ½ cup of cooked or canned fruit; ½ cup of fruit juice; 1 cup of melon or raspberries; or 2 tablespoons of dried fruit. ¨ Milk and no-sugar-added yogurt have 15 grams of carbs in a serving. A serving is 1 cup of milk or 2/3 cup of no-sugar-added yogurt. ¨ Starchy vegetables have 15 grams of carbs in a serving. A serving is ½ cup of mashed potatoes or sweet potato; 1 cup winter squash; ½ of a small baked potato; ½ cup of cooked beans; or ½ cup cooked corn or green peas. · Learn how much carbs to eat each day and at each meal. A dietitian or CDE can teach you how to keep track of the amount of carbs you eat. This is called carbohydrate counting. · If you are not sure how to count carbohydrate grams, use the Plate Method to plan meals.  It is a good, quick way to make sure that you have a balanced meal. It also helps you spread carbs throughout the day. ¨ Divide your plate by types of foods. Put non-starchy vegetables on half the plate, meat or other protein food on one-quarter of the plate, and a grain or starchy vegetable in the final quarter of the plate. To this you can add a small piece of fruit and 1 cup of milk or yogurt, depending on how many carbs you are supposed to eat at a meal.  · Try to eat about the same amount of carbs at each meal. Do not \"save up\" your daily allowance of carbs to eat at one meal.  · Proteins have very little or no carbs per serving. Examples of proteins are beef, chicken, turkey, fish, eggs, tofu, cheese, cottage cheese, and peanut butter. A serving size of meat is 3 ounces, which is about the size of a deck of cards. Examples of meat substitute serving sizes (equal to 1 ounce of meat) are 1/4 cup of cottage cheese, 1 egg, 1 tablespoon of peanut butter, and ½ cup of tofu. How can you eat out and still eat healthy? · Learn to estimate the serving sizes of foods that have carbohydrate. If you measure food at home, it will be easier to estimate the amount in a serving of restaurant food. · If the meal you order has too much carbohydrate (such as potatoes, corn, or baked beans), ask to have a low-carbohydrate food instead. Ask for a salad or green vegetables. · If you use insulin, check your blood sugar before and after eating out to help you plan how much to eat in the future. · If you eat more carbohydrate at a meal than you had planned, take a walk or do other exercise. This will help lower your blood sugar. What else should you know? · Limit saturated fat, such as the fat from meat and dairy products. This is a healthy choice because people who have diabetes are at higher risk of heart disease. So choose lean cuts of meat and nonfat or low-fat dairy products.  Use olive or canola oil instead of butter or shortening when cooking. · Don't skip meals. Your blood sugar may drop too low if you skip meals and take insulin or certain medicines for diabetes. · Check with your doctor before you drink alcohol. Alcohol can cause your blood sugar to drop too low. Alcohol can also cause a bad reaction if you take certain diabetes medicines. Follow-up care is a key part of your treatment and safety. Be sure to make and go to all appointments, and call your doctor if you are having problems. It's also a good idea to know your test results and keep a list of the medicines you take. Where can you learn more? Go to http://sue-michael.info/. Enter X630 in the search box to learn more about \"Learning About Diabetes Food Guidelines. \"  Current as of: March 13, 2017  Content Version: 11.4  © 3529-7594 [employer]. Care instructions adapted under license by [employer] (which disclaims liability or warranty for this information). If you have questions about a medical condition or this instruction, always ask your healthcare professional. [place] [address] any warranty or liability for your use of this information. Candidiasis: Care Instructions  Your Care Instructions  Candidiasis (say \"pvx-aet-KH-uh-saravanan\") is a yeast infection. Yeast normally lives in your body. But it can cause problems if your body's defenses don't work as they should. Some medicines can increase your chance of getting a yeast infection. These include antibiotics, steroids, and cancer drugs. And some diseases like AIDS and diabetes can make you more likely to get yeast infections. There are different types of yeast infections. Amairani Art is a yeast infection in the mouth. It usually occurs in people with weak immune systems. It causes white patches inside the mouth and throat. Yeast infections of the skin usually occur in skin folds where the skin stays moist. They cause red, oozing patches on your skin.  Babies can get these infections under the diaper. People who often wear gloves can get them on their hands. Many women get vaginal yeast infections. They are most common when women take antibiotics. These infections can cause the vagina to itch and burn. They also cause white discharge that looks like cottage cheese. In rare cases, yeast infects the blood. This can cause serious disease. This kind of infection is treated with medicine given through a needle into a vein (IV). After you start treatment, a yeast infection usually goes away quickly. But if your immune system is weak, the infection may come back. Tell your doctor if you get yeast infections often. Follow-up care is a key part of your treatment and safety. Be sure to make and go to all appointments, and call your doctor if you are having problems. It's also a good idea to know your test results and keep a list of the medicines you take. How can you care for yourself at home? · Take your medicines exactly as prescribed. Call your doctor if you think you are having a problem with your medicine. · Use antibiotics only as directed by your doctor. · Eat yogurt with live cultures. It has bacteria called lactobacillus. It may help prevent some types of yeast infections. · Keep your skin clean and dry. Put powder on moist places. · If you are using a cream or suppository to treat a vaginal yeast infection, don't use condoms or a diaphragm. Use a different type of birth control. · Eat a healthy diet and get regular exercise. This will help keep your immune system strong. When should you call for help? Watch closely for changes in your health, and be sure to contact your doctor if:  ? · You do not get better as expected. Where can you learn more? Go to http://sue-michael.info/. Enter J725 in the search box to learn more about \"Candidiasis: Care Instructions. \"  Current as of: October 13, 2016  Content Version: 11.4  © 5349-3009 [employer]. Care instructions adapted under license by [employer] (which disclaims liability or warranty for this information). If you have questions about a medical condition or this instruction, always ask your healthcare professional. [address] any warranty or liability for your use of this information. Gestational Diabetes: Care Instructions  Your Care Instructions    Gestational diabetes can develop during pregnancy. When you have this condition, the insulin in your body is not able to keep your blood sugar in a normal range. If you do not control your blood sugar, your baby can grow too big and have problems right after birth such as low blood sugar. Most of the time, gestational diabetes goes away after a baby is born. But if you have had gestational diabetes, you have a greater chance of having it in a future pregnancy and of developing type 2 diabetes. To check for diabetes, you may have a follow-up glucose tolerance test 4 to 12 weeks after your baby is born or after you stop breastfeeding your baby. If the results of this test are normal, experts recommend that you get tested for type 2 diabetes at least every 3 years. You may be able to control your blood sugar with a healthy diet and regular exercise. Staying at a healthy weight also may keep you from getting type 2 diabetes later on. If diet and exercise do not lower your blood sugar enough, you may need to take diabetes medicine or insulin. Follow-up care is a key part of your treatment and safety. Be sure to make and go to all appointments, and call your doctor if you are having problems. It's also a good idea to know your test results and keep a list of the medicines you take. How can you care for yourself at home? · If your doctor prescribes insulin, inject it daily as directed. Your doctor will tell you how and when to take your insulin. · Check your blood sugar as directed.  Your doctor will tell you how and when to check your blood sugar. · Monitor your baby's movement as directed. Your doctor may ask you to report how many times in an hour you feel your baby move. · Eat a balanced diet. You may want to meet with a registered dietitian. He or she can teach you how to spread carbohydrates through the day. This may keep your blood sugar from going up quickly after meals. If you are taking insulin, you also can learn to match the amount of insulin you take at meals to the amount of carbohydrates you eat. · Do not diet to lose weight. It is not healthy when you are pregnant. · Get daily exercise. This can help lower your blood sugar. Walking and swimming are good choices. But do not do any exercise without talking with your doctor first.  When should you call for help? Call 911 anytime you think you may need emergency care. For example, call if:  ? · You passed out (lost consciousness), or you suddenly become very sleepy or confused. (You may have very low blood sugar.)   ? · You have symptoms of high blood sugar, such as:  ¨ Blurred vision. ¨ Trouble staying awake or being woken up. ¨ Fast, deep breathing. ¨ Breath that smells fruity. ¨ Belly pain, not feeling hungry, and vomiting. ¨ Feeling confused. ?Call your doctor now or seek immediate medical care if:  ? · You are sick and cannot control your blood sugar. ? · You have been vomiting or have had diarrhea for more than 6 hours. ? · Your blood sugar stays higher than the level your doctor has set for you. ? · You have symptoms of low blood sugar, such as:  ¨ Sweating. ¨ Feeling nervous, shaky, and weak. ¨ Extreme hunger and slight nausea. ¨ Dizziness and headache. ¨ Blurred vision. ¨ Confusion. ? Watch closely for changes in your health, and be sure to contact your doctor if:  ? · You have a hard time knowing when your blood sugar is low. ? · You have trouble keeping your blood sugar in the target range.    ? · You often have problems controlling your blood sugar. Where can you learn more? Go to http://sue-michael.info/. Enter A800 in the search box to learn more about \"Gestational Diabetes: Care Instructions. \"  Current as of: March 13, 2017  Content Version: 11.4  © 1180-8944 [employer]. Care instructions adapted under license by [employer] (which disclaims liability or warranty for this information). If you have questions about a medical condition or this instruction, always ask your healthcare professional. [place] [address] any warranty or liability for your use of this information. High Blood Pressure in Pregnancy: Care Instructions  Your Care Instructions    High blood pressure (hypertension) means that the force of blood against your artery walls is too strong. Mild high blood pressure during pregnancy is not usually dangerous. Your doctor will probably just want to watch you closely. But when blood pressure is very high, it can reduce oxygen to your baby. This can affect how well your baby grows. High blood pressure also means that you are at higher risk for:  · Preeclampsia. This is a problem that includes high blood pressure and damage to your liver or kidneys. It can also reduce how much oxygen your baby gets. In some cases, it leads to eclampsia. Eclampsia causes seizures. · Placental abruption. This is a problem when the placenta separates from the uterus before birth. It prevents the baby from getting enough oxygen and nutrients. Sometimes it can cause death for the baby and the mother. To prevent problems for you or your baby, you will have to check your blood pressure often. You will do this until after your baby is born. If your blood pressure rises suddenly or is very high during your pregnancy, your doctor may prescribe medicines. They can usually control blood pressure.   If your blood pressure affects your or your baby's health, your doctor may need to deliver your baby early. After your baby is born, your blood pressure will probably improve. But sometimes blood pressure problems continue after birth. Follow-up care is a key part of your treatment and safety. Be sure to make and go to all appointments, and call your doctor if you are having problems. It's also a good idea to know your test results and keep a list of the medicines you take. How can you care for yourself at home? · Take and write down your blood pressure at home if your doctor tells you to. · Take your medicines exactly as prescribed. Call your doctor if you think you are having a problem with your medicine. · Do not smoke. If you need help quitting, talk to your doctor about stop-smoking programs and medicines. These can increase your chances of quitting for good. · Do not gain too much weight during your pregnancy. Talk to your doctor about how much weight gain is healthy. · Eat a healthy diet. · If your doctor says it's okay, get regular exercise. Walking or swimming several times a week can be healthy for you and your baby. · Reduce stress, and find time to relax. This is very important if you continue to work or have a busy schedule. It's also important if you have small children at home. When should you call for help? Call 911 anytime you think you may need emergency care. For example, call if:  ? · You passed out (lost consciousness). ? · You have a seizure. ?Call your doctor now or seek immediate medical care if:  ? · You have symptoms of preeclampsia, such as:  ¨ Sudden swelling of your face, hands, or feet. ¨ New vision problems (such as dimness or blurring). ¨ A severe headache. ? · Your blood pressure is higher than it should be or rises suddenly. ? · You have new nausea or vomiting. ? · You think that you are in labor. ? · You have pain in your belly or pelvis. ? Watch closely for changes in your health, and be sure to contact your doctor if:  ? · You gain weight rapidly. Where can you learn more? Go to http://sue-michael.info/. Enter [phone] in the search box to learn more about \"High Blood Pressure in Pregnancy: Care Instructions. \"  Current as of: March 16, 2017  Content Version: 11.4  © 8881-3467 [employer]. Care instructions adapted under license by [employer] (which disclaims liability or warranty for this information). If you have questions about a medical condition or this instruction, always ask your healthcare professional. [address] any warranty or liability for your use of this information.

## 2018-02-07 NOTE — PROGRESS NOTES
1355: Patient presents for scheduled NST and BPP. Denies any vaginal bleeding or vaginal fluid leaking. Statse she had had occasional Laural Lightning. [place] and EFM applied with positive fetal heart tones, abdomen is soft and non tender to palpation. Patient verbalizes positive fetal movements. 1423: Patient off unit for BPP, picked up by transport. 1456: Patient back from Ultrasound  1500: Dr Dejah Jeter notified of patient BPP, patient able to be discharged. 1309: I have reviewed discharge instructions with the patient. The patient verbalized understanding including follow up care and next OB appointments with [facility] and [facility]. Patient ambulated off unit in no acute distress.

## 2018-02-07 NOTE — IP AVS SNAPSHOT
[address] Patient: Eben Person MRN: [id number] :1985 A check jewels indicates which time of day the medication should be taken. My Medications ASK your doctor about these medications Instructions Each Dose to Equal  
 Morning Noon Evening Bedtime  
 albuterol 90 mcg/actuation inhaler Commonly known as:  PROVENTIL HFA, VENTOLIN HFA, PROAIR HFA Your last dose was: Your next dose is:    
   
   
 1-2 Puffs. 1-2 Puff Blood-Glucose Meter monitoring kit Your last dose was: Your next dose is:    
   
   
 Test blood sugar four times daily: fasting every morning and two hours after breakfast, lunch and dinner. ferrous sulfate 325 mg (65 mg iron) EC tablet Commonly known as:  IRON Your last dose was: Your next dose is: Take 1 Tab by mouth three (3) times daily (with meals). 325 mg  
    
   
   
   
  
 glucose blood VI test strips strip Commonly known as:  ASCENSIA AUTODISC VI, ONE TOUCH ULTRA TEST VI Your last dose was: Your next dose is:    
   
   
 Test blood sugar four times daily: fasting every morning and two hours after breakfast, lunch and dinner. labetalol 200 mg tablet Commonly known as:  Coby Nail Your last dose was: Your next dose is: Take 1 Tab by mouth two (2) times a day. Indications: hypertension 200 mg Lancets Misc Your last dose was: Your next dose is:    
   
   
 Test blood sugar four times daily: fasting every morning and two hours after breakfast, lunch and dinner. metFORMIN 500 mg tablet Commonly known as:  GLUCOPHAGE Your last dose was: Your next dose is: Take 1 Tab by mouth two (2) times daily (with meals). Indications: Gestational Diabetes Mellitus 500 mg  
    
   
   
   
  
 terconazole 0.8 % vaginal cream  
Commonly known as:  TERAZOL 3 Your last dose was: Your next dose is: Insert 1 Applicator into vagina nightly. 1 Applicator TRIAMTERENE-HYDROCHLOROTHIAZID PO Your last dose was: Your next dose is: Take  by mouth.

## 2018-02-07 NOTE — PROGRESS NOTES
G 8 P 5@ 36 w 1 d. Hx of gestational DM, and (FBlood sugar --130), and a hx of hypertension on labetalol 200 mg po bid  Was referred to [facility] for consultation,but she has not made the visit yet. US done on 2/1/18--3344 grams. Hypertension. RTC 1 week. Advised to keep her appt here and for the consultation @ [facility]. To L&D on 2/7/18 for NST and BPP  Possible mode of delivery discussed.

## 2018-02-07 NOTE — IP AVS SNAPSHOT
[address] [facility] 
 
 
 920 [facility] Bertha Tolentino 17 Patient: MaryJ o Fiore MRN: [id number] :1985 About your hospitalization You were admitted on:  N/A You last received care in the:  [facility] 2 [address] [facility] You were discharged on:  2018 Why you were hospitalized Your primary diagnosis was:  Not on File Your diagnoses also included:  Pregnancy Follow-up Information Follow up With Details Comments Contact Info Haider Stuart MD   [address] [place] [address] 103 [facility] [place] [address] 
[phone] Your Scheduled Appointments 2018 10:30 AM EST  
OB VISIT with Felicita Coreas MD  
[facility] ([address]) [address] [address]  
[phone] Discharge Orders None A check jewels indicates which time of day the medication should be taken. My Medications ASK your doctor about these medications Instructions Each Dose to Equal  
 Morning Noon Evening Bedtime  
 albuterol 90 mcg/actuation inhaler Commonly known as:  PROVENTIL HFA, VENTOLIN HFA, PROAIR HFA Your last dose was: Your next dose is:    
   
   
 1-2 Puffs. 1-2 Puff Blood-Glucose Meter monitoring kit Your last dose was: Your next dose is:    
   
   
 Test blood sugar four times daily: fasting every morning and two hours after breakfast, lunch and dinner. ferrous sulfate 325 mg (65 mg iron) EC tablet Commonly known as:  IRON Your last dose was: Your next dose is: Take 1 Tab by mouth three (3) times daily (with meals). 325 mg  
    
   
   
   
  
 glucose blood VI test strips strip Commonly known as:  ASCENSIA AUTODISC VI, ONE TOUCH ULTRA TEST VI Your last dose was: Your next dose is: Test blood sugar four times daily: fasting every morning and two hours after breakfast, lunch and dinner. labetalol 200 mg tablet Commonly known as:  Georafa Merrill Your last dose was: Your next dose is: Take 1 Tab by mouth two (2) times a day. Indications: hypertension 200 mg Lancets Misc Your last dose was: Your next dose is:    
   
   
 Test blood sugar four times daily: fasting every morning and two hours after breakfast, lunch and dinner. metFORMIN 500 mg tablet Commonly known as:  GLUCOPHAGE Your last dose was: Your next dose is: Take 1 Tab by mouth two (2) times daily (with meals). Indications: Gestational Diabetes Mellitus 500 mg  
    
   
   
   
  
 terconazole 0.8 % vaginal cream  
Commonly known as:  TERAZOL 3 Your last dose was: Your next dose is: Insert 1 Applicator into vagina nightly. 1 Applicator TRIAMTERENE-HYDROCHLOROTHIAZID PO Your last dose was: Your next dose is: Take  by mouth. Discharge Instructions Prenatal Discharge Instructions Follow up appointment: As scheduled Diet: Diabetic diet with lots of fluids Activity: moderate with plenty of rest 
 
Medications: as orderded Call your physician or return to Labor and Delivery if any of the following symptoms occur: ? Signs of labor (contractions every 5-10 minutes for 1 hour) ? Vaginal bleeding ? Rupture of amniotic membranes (water breaks) ? Fever ? Decreased fetal movement (less than 5-10 movements in 1 hour) Introducing [facility]! Nancy Aragon introduces [employer] patient portal. Now you can access parts of your medical record, email your doctor's office, and request medication refills online.    
 
1. In your internet browser, go to [email or web address] 2. Click on the First Time User? Click Here link in the Sign In box. You will see the New Member Sign Up page. 3. Enter your Svpply Access Code exactly as it appears below. You will not need to use this code after youve completed the sign-up process. If you do not sign up before the expiration date, you must request a new code. · Svpply Access Code: [email or web address] Expires: 4/22/2018  9:03 AM 
 
4. Enter the last four digits of your Social Security Number (xxxx) and Date of Birth (mm/dd/yyyy) as indicated and click Submit. You will be taken to the next sign-up page. 5. Create a Svpply ID. This will be your Svpply login ID and cannot be changed, so think of one that is secure and easy to remember. 6. Create a Svpply password. You can change your password at any time. 7. Enter your Password Reset Question and Answer. This can be used at a later time if you forget your password. 8. Enter your e-mail address. You will receive e-mail notification when new information is available in [address] 9. Click Sign Up. You can now view and download portions of your medical record. 10. Click the Download Summary menu link to download a portable copy of your medical information. If you have questions, please visit the Frequently Asked Questions section of the Svpply website. Remember, Svpply is NOT to be used for urgent needs. For medical emergencies, dial 911. Now available from your iPhone and Android! Unresulted Labs-Please follow up with your PCP about these lab tests Order Current Status  [address] In process Providers Seen During Your Hospitalization Provider Specialty Primary office phone John Burroughs MD Obstetrics & Gynecology [phone] Your Primary Care Physician (PCP) Primary Care Physician Office Phone Office Fax Darrion Ortiz [phone] You are allergic to the following No active allergies Recent Documentation Height Weight BMI OB Status Smoking Status 1.753 m (!) 192.3 kg 62.61 kg/m2 Pregnant Current Some Day Smoker Emergency Contacts Name Discharge Info Relation Home Work Mobile [address] CAREGIVER [3] Other Relative [6] [phone] Melonie Kim DISCHARGE CAREGIVER [3] Parent [1] [phone] Patient Belongings The following personal items are in your possession at time of discharge: 
                             
 
  
  
Discharge Instructions Attachments/References PREGNANCY: WEEK 37 (ENGLISH) PREGNANCY: WEEKS 34 TO 36 (ENGLISH) Patient Handouts Week 37 of Your Pregnancy: Care Instructions Your Care Instructions You are near the end of your pregnancy-and you're probably pretty uncomfortable. It may be harder to walk around. Lying down probably isn't comfortable either. You may have trouble getting to sleep or staying asleep. Most women deliver their babies between 40 and 41 weeks. This is a good time to think about packing a bag for the hospital with items you'll need. Then you'll be ready when labor starts. Follow-up care is a key part of your treatment and safety. Be sure to make and go to all appointments, and call your doctor if you are having problems. It's also a good idea to know your test results and keep a list of the medicines you take. How can you care for yourself at home? Learn about breastfeeding · Breastfeeding is best for your baby and good for you. · Breast milk has antibodies to help your baby fight infections. · Mothers who breastfeed often lose weight faster, because making milk burns calories. · Learning the best ways to hold your baby will make breastfeeding easier. · Let your partner bathe and diaper the baby to keep your partner from feeling left out. Snuggle together when you breastfeed. · You may want to learn how to use a breast pump and store your milk. · If you choose to bottle feed, make the feeding feel like breastfeeding so you can bond with your baby. Always hold your baby and the bottle. Do not prop bottles or let your baby fall asleep with a bottle. Learn about crying · It is common for babies to cry for 1 to 3 hours a day. Some cry more, some cry less. · Babies don't cry to make you upset or because you are a bad parent. · Crying is how your baby communicates. Your baby may be hungry; have gas; need a diaper change; or feel cold, warm, tired, lonely, or tense. Sometimes babies cry for unknown reasons. · If you respond to your baby's needs, he or she will learn to trust you. · Try to stay calm when your baby cries. Your baby may get more upset if he or she senses that you are upset. Know how to care for your  · Your baby's umbilical cord stump will drop off on its own, usually between 1 and 2 weeks. To care for your baby's umbilical cord area: ¨ Clean the area at the bottom of the cord 2 or 3 times a day. ¨ Pay special attention to the area where the cord attaches to the skin. ¨ Keep the diaper folded below the cord. ¨ Use a damp washcloth or cotton ball to sponge bathe your baby until the stump has come off. · Your baby's first dark stool is called meconium. After the meconium is passed, your baby will develop his or her own bowel pattern. ¨ Some babies, especially  babies, have several bowel movements a day. Others have one or two a day, or one every 2 to 3 days. ¨  babies often have loose, yellow stools. Formula-fed babies have more formed stools. ¨ If your baby's stools look like little pellets, he or she is constipated. After 2 days of constipation, call your baby's doctor. · If your baby will be circumcised, you can care for him at home. ¨ Gently rinse his penis with warm water after every diaper change.  Do not try to remove the film that forms on the penis. This film will go away on its own. Pat dry. ¨ Put petroleum ointment, such as Vaseline, on the area of the diaper that will touch your baby's penis. This will keep the diaper from sticking to your baby. ¨ Ask the doctor about giving your baby acetaminophen (Tylenol) for pain. Where can you learn more? Go to http://sue-michael.info/. Enter 68 30 97 in the search box to learn more about \"Week 37 of Your Pregnancy: Care Instructions. \" Current as of: 2017 Content Version: 11.4 © 0424-8015 "[employer]. Care instructions adapted under license by [employer] (which disclaims liability or warranty for this information). If you have questions about a medical condition or this instruction, always ask your healthcare professional. [place] [address] any warranty or liability for your use of this information. Weeks 34 to 36 of Your Pregnancy: Care Instructions Your Care Instructions By now, your baby and your belly have grown quite large. It is almost time to give birth. A full-term pregnancy can deliver between 37 and 42 weeks. Your baby's lungs are almost ready to breathe air. The bones in your baby's head are now firm enough to protect it, but soft enough to move down through the birth canal. 
You may feel excited, happy, anxious, or scared. You may wonder how you will know if you are in labor or what to expect during labor. Try to be flexible in your expectations of the birth. Because each birth is different, there is no way to know exactly what childbirth will be like for you. This care sheet will help you know what to expect and how to prepare. This may make your childbirth easier. If you haven't already had the Tdap shot during this pregnancy, talk to your doctor about getting it. It will help protect your  against pertussis infection. In the 36th week, most women have a test for group B streptococcus (GBS). GBS is a common bacteria that can live in the vagina and rectum. It can make your baby sick after birth. If you test positive, you will get antibiotics during labor. The medicine will keep your baby from getting the bacteria. Follow-up care is a key part of your treatment and safety. Be sure to make and go to all appointments, and call your doctor if you are having problems. It's also a good idea to know your test results and keep a list of the medicines you take. How can you care for yourself at home? Learn about pain relief choices · Pain is different for every woman. Talk with your doctor about your feelings about pain. · You can choose from several types of pain relief. These include medicine or breathing techniques, as well as comfort measures. You can use more than one option. · If you choose to have pain medicine during labor, talk to your doctor about your options. Some medicines lower anxiety and help with some of the pain. Others make your lower body numb so that you won't feel pain. · Be sure to tell your doctor about your pain medicine choice before you start labor or very early in your labor. You may be able to change your mind as labor progresses. · Rarely, a woman is put to sleep by medicine given through a mask or an IV. Labor and delivery · The first stage of labor has three parts: early, active, and transition. ¨ Most women have early labor at home. You can stay busy or rest, eat light snacks, drink clear fluids, and start counting contractions. ¨ When talking during a contraction gets hard, you may be moving to active labor. During active labor, you should head for the hospital if you are not there already. ¨ You are in active labor when contractions come every 3 to 4 minutes and last about 60 seconds. Your cervix is opening more rapidly. ¨ If your water breaks, contractions will come faster and stronger. ¨ During transition, your cervix is stretching, and contractions are coming more rapidly. ¨ You may want to push, but your cervix might not be ready. Your doctor will tell you when to push. · The second stage starts when your cervix is completely opened and you are ready to push. ¨ Contractions are very strong to push the baby down the birth canal. 
¨ You will feel the urge to push. You may feel like you need to have a bowel movement. ¨ You may be coached to push with contractions. These contractions will be very strong, but you will not have them as often. You can get a little rest between contractions. ¨ You may be emotional and irritable. You may not be aware of what is going on around you. ¨ One last push, and your baby is born. · The third stage is when a few more contractions push out the placenta. This may take 30 minutes or less. · The fourth stage is the welcome recovery. You may feel overwhelmed with emotions and exhausted but alert. This is a good time to start breastfeeding. Where can you learn more? Go to http://sue-michael.info/. Enter E857 in the search box to learn more about \"Weeks 34 to 36 of Your Pregnancy: Care Instructions. \" Current as of: March 16, 2017 Content Version: 11.4 © 2476-7726 [employer]wise[employer]. Care instructions adapted under license by [employer] (which disclaims liability or warranty for this information). If you have questions about a medical condition or this instruction, always ask your healthcare professional. [place] [address] any warranty or liability for your use of this information. Please provide this summary of care documentation to your next provider. Signatures-by signing, you are acknowledging that this After Visit Summary has been reviewed with you and you have received a copy.   
  
 
  
    
    
 Patient Signature: ____________________________________________________________ Date:  ____________________________________________________________  
  
Cherylle Cumins Provider Signature:  ____________________________________________________________ Date:  ____________________________________________________________

## 2018-02-07 NOTE — DISCHARGE INSTRUCTIONS
Prenatal Discharge Instructions  Follow up appointment: As scheduled     Diet: Diabetic diet with lots of fluids    Activity: moderate with plenty of rest    Medications: as orderded    Call your physician or return to Labor and Delivery if any of the following symptoms occur:   Signs of labor (contractions every 5-10 minutes for 1 hour)   Vaginal bleeding   Rupture of amniotic membranes (water breaks)   Fever   Decreased fetal movement (less than 5-10 movements in 1 hour)

## 2018-02-08 NOTE — H&P
High Risk Obstetrics Progress Note    Name: Geri Mtz MRN: [id number]  SSN: [id number]    YOB: 1985  Age: 34 y.o. Sex: female      Subjective:      LOS: 0 days    Estimated Date of Delivery: 3/5/18   Gestational Age Today: 37w1d     Patient admitted for  NST and BPP. . States she does have mild contractions and moderate pelvic pressure and does not have contractions, headache , swelling, vaginal bleeding , vaginal leaking of fluid  and visual disturbances. Objective:     Vitals:  Blood pressure 138/59, pulse (!) 105, temperature 98.8 °F (37.1 °C), height 5' 9\" (1.753 m), weight (!) 424 lb (192.3 kg), last menstrual period 2017. Temp (24hrs), Av.8 °F (37.1 °C), Min:98.8 °F (37.1 °C), Max:98.8 °F (70.7 °C)    Systolic (87TRO), NFH:127 , Min:138 , SANDRO:386      Diastolic (44VJM), GWI:76, Min:59, Max:59       Intake and Output:          Physical Exam:  Patient without distress. Heart: Regular rate and rhythm, S1S2 present or without murmur or extra heart sounds  Abdomen: soft, nontender  Perineum: blood absent, amniotic fluid absent       Membranes:  Intact    Uterine Activity:  None    Fetal Heart Rate:  Reactive  Baseline: 145 per minute  Accelerations: yes        Labs:   Recent Results (from the past 36 hour(s))   GLUCOSE, POC    Collection Time: 18  2:59 PM   Result Value Ref Range    Glucose (POC) 96 70 - 110 mg/dL       Assessment and Plan: Active Problems:    Pregnancy (2018)       36+ weeks with Hypertension and Gestational DM with a Reactive NST and BPP.     Signed By: Coleen Laird MD     2018

## 2018-02-10 LAB
B-HEM STREP SPEC QL CULT: NEGATIVE
C TRACH RRNA SPEC QL NAA+PROBE: NEGATIVE
CHLAMYDIA, EXTERNAL: NORMAL
GRBS, EXTERNAL: NORMAL
N GONORRHOEA RRNA SPEC QL NAA+PROBE: NEGATIVE
N. GONORRHEA, EXTERNAL: NORMAL
N. GONORRHEA, EXTERNAL: NORMAL
T VAGINALIS RRNA SPEC QL NAA+PROBE: NEGATIVE

## 2018-02-11 ENCOUNTER — HOSPITAL ENCOUNTER (INPATIENT)
Age: 33
LOS: 5 days | Discharge: HOME | DRG: 560 | End: 2018-02-17
Attending: OBSTETRICS & GYNECOLOGY | Admitting: OBSTETRICS & GYNECOLOGY
Payer: MEDICAID

## 2018-02-11 ENCOUNTER — APPOINTMENT (OUTPATIENT)
Dept: GENERAL RADIOLOGY | Age: 33
DRG: 560 | End: 2018-02-11
Attending: OBSTETRICS & GYNECOLOGY
Payer: MEDICAID

## 2018-02-11 DIAGNOSIS — D50.8 OTHER IRON DEFICIENCY ANEMIA: ICD-10-CM

## 2018-02-11 DIAGNOSIS — K59.00 CONSTIPATION, UNSPECIFIED CONSTIPATION TYPE: Primary | ICD-10-CM

## 2018-02-11 DIAGNOSIS — R52 POSTPARTUM PAIN: ICD-10-CM

## 2018-02-11 PROBLEM — I10 HYPERTENSION: Status: ACTIVE | Noted: 2018-02-11

## 2018-02-11 PROBLEM — Z87.09 HISTORY OF ASTHMA: Status: ACTIVE | Noted: 2018-02-11

## 2018-02-11 PROBLEM — O24.419 GESTATIONAL DIABETES: Status: ACTIVE | Noted: 2018-02-11

## 2018-02-11 PROBLEM — E66.01 MORBID OBESITY (HCC): Status: ACTIVE | Noted: 2018-02-11

## 2018-02-11 PROBLEM — O36.8390 ANTEPARTUM FETAL TACHYCARDIA AFFECTING CARE OF MOTHER: Status: ACTIVE | Noted: 2018-02-11

## 2018-02-11 PROBLEM — R06.02 SHORTNESS OF BREATH: Status: ACTIVE | Noted: 2018-02-11

## 2018-02-11 LAB
A1 MICROGLOB PLACENTAL VAG QL: NEGATIVE
ALBUMIN SERPL-MCNC: 2.7 G/DL (ref 3.4–5)
ALBUMIN/GLOB SERPL: 0.7 {RATIO} (ref 0.8–1.7)
ALP SERPL-CCNC: 98 U/L (ref 45–117)
ALT SERPL-CCNC: 10 U/L (ref 13–56)
ANION GAP SERPL CALC-SCNC: 10 MMOL/L (ref 3–18)
ARTERIAL PATENCY WRIST A: YES
AST SERPL-CCNC: 16 U/L (ref 15–37)
BASE DEFICIT BLD-SCNC: 2 MMOL/L
BASOPHILS NFR BLD: 0 % (ref 0–3)
BDY SITE: ABNORMAL
BILIRUB SERPL-MCNC: 0.3 MG/DL (ref 0.2–1)
BLASTS NFR BLD MANUAL: 0 %
BUN SERPL-MCNC: 4 MG/DL (ref 7–18)
BUN/CREAT SERPL: 9 (ref 12–20)
CALCIUM SERPL-MCNC: 8.8 MG/DL (ref 8.5–10.1)
CHLORIDE SERPL-SCNC: 106 MMOL/L (ref 100–108)
CO2 SERPL-SCNC: 24 MMOL/L (ref 21–32)
CONTROL LINE PRESENT?: YES
CREAT SERPL-MCNC: 0.45 MG/DL (ref 0.6–1.3)
DIFFERENTIAL METHOD BLD: ABNORMAL
EOSINOPHIL NFR BLD: 0 % (ref 0–5)
ERYTHROCYTE [DISTWIDTH] IN BLOOD BY AUTOMATED COUNT: 15.5 % (ref 11.6–14.5)
EST. AVERAGE GLUCOSE BLD GHB EST-MCNC: 140 MG/DL
EXPIRATION DATE: NORMAL
FLUAV AG NPH QL IA: NEGATIVE
FLUBV AG NOSE QL IA: NEGATIVE
GAS FLOW.O2 O2 DELIVERY SYS: ABNORMAL L/MIN
GLOBULIN SER CALC-MCNC: 3.7 G/DL (ref 2–4)
GLUCOSE BLD STRIP.AUTO-MCNC: 106 MG/DL (ref 70–110)
GLUCOSE SERPL-MCNC: 89 MG/DL (ref 74–99)
HBA1C MFR BLD: 6.5 % (ref 4.2–5.6)
HCO3 BLD-SCNC: 21.5 MMOL/L (ref 22–26)
HCT VFR BLD AUTO: 30.2 % (ref 35–45)
HGB BLD-MCNC: 9.8 G/DL (ref 12–16)
INTERNAL NEGATIVE CONTROL: NEGATIVE
KIT LOT NO.: NORMAL
LYMPHOCYTES # BLD: 0.6 K/UL (ref 0.8–3.5)
LYMPHOCYTES NFR BLD: 10 % (ref 20–51)
MANUAL DIFFERENTIAL PERFORMED BLD QL: ABNORMAL
MCH RBC QN AUTO: 23.7 PG (ref 24–34)
MCHC RBC AUTO-ENTMCNC: 32.5 G/DL (ref 31–37)
MCV RBC AUTO: 72.9 FL (ref 74–97)
METAMYELOCYTES NFR BLD MANUAL: 0 %
MONOCYTES # BLD: 0.3 K/UL (ref 0–1)
MONOCYTES NFR BLD: 4 % (ref 2–9)
MYELOCYTES NFR BLD MANUAL: 0 %
NEUTS BAND NFR BLD MANUAL: 0 % (ref 0–5)
NEUTS SEG # BLD: 5.4 K/UL (ref 1.8–8)
NEUTS SEG NFR BLD: 86 % (ref 42–75)
O2/TOTAL GAS SETTING VFR VENT: 21 %
OTHER CELLS NFR BLD MANUAL: 0 %
PCO2 BLD: 28.2 MMHG (ref 35–45)
PH BLD: 7.49 [PH] (ref 7.35–7.45)
PLATELET # BLD AUTO: 182 K/UL (ref 135–420)
PLATELET COMMENTS,PCOM: ABNORMAL
PMV BLD AUTO: 10.4 FL (ref 9.2–11.8)
PO2 BLD: 62 MMHG (ref 80–100)
POTASSIUM SERPL-SCNC: 3.8 MMOL/L (ref 3.5–5.5)
PROMYELOCYTES NFR BLD MANUAL: 0 %
PROT SERPL-MCNC: 6.4 G/DL (ref 6.4–8.2)
RBC # BLD AUTO: 4.14 M/UL (ref 4.2–5.3)
RBC MORPH BLD: ABNORMAL
SAO2 % BLD: 94 % (ref 92–97)
SERVICE CMNT-IMP: ABNORMAL
SODIUM SERPL-SCNC: 140 MMOL/L (ref 136–145)
SPECIMEN TYPE: ABNORMAL
URATE SERPL-MCNC: 3.7 MG/DL (ref 2.6–7.2)
WBC # BLD AUTO: 6.3 K/UL (ref 4.6–13.2)

## 2018-02-11 PROCEDURE — 85007 BL SMEAR W/DIFF WBC COUNT: CPT | Performed by: OBSTETRICS & GYNECOLOGY

## 2018-02-11 PROCEDURE — 80053 COMPREHEN METABOLIC PANEL: CPT | Performed by: OBSTETRICS & GYNECOLOGY

## 2018-02-11 PROCEDURE — 36415 COLL VENOUS BLD VENIPUNCTURE: CPT | Performed by: OBSTETRICS & GYNECOLOGY

## 2018-02-11 PROCEDURE — 94640 AIRWAY INHALATION TREATMENT: CPT

## 2018-02-11 PROCEDURE — 74011000250 HC RX REV CODE- 250: Performed by: OBSTETRICS & GYNECOLOGY

## 2018-02-11 PROCEDURE — 74011250637 HC RX REV CODE- 250/637: Performed by: INTERNAL MEDICINE

## 2018-02-11 PROCEDURE — 84112 EVAL AMNIOTIC FLUID PROTEIN: CPT | Performed by: OBSTETRICS & GYNECOLOGY

## 2018-02-11 PROCEDURE — 74011000250 HC RX REV CODE- 250

## 2018-02-11 PROCEDURE — 87804 INFLUENZA ASSAY W/OPTIC: CPT | Performed by: INTERNAL MEDICINE

## 2018-02-11 PROCEDURE — 83615 LACTATE (LD) (LDH) ENZYME: CPT | Performed by: OBSTETRICS & GYNECOLOGY

## 2018-02-11 PROCEDURE — 82962 GLUCOSE BLOOD TEST: CPT

## 2018-02-11 PROCEDURE — 74011000250 HC RX REV CODE- 250: Performed by: INTERNAL MEDICINE

## 2018-02-11 PROCEDURE — 83036 HEMOGLOBIN GLYCOSYLATED A1C: CPT | Performed by: INTERNAL MEDICINE

## 2018-02-11 PROCEDURE — 74011636637 HC RX REV CODE- 636/637: Performed by: INTERNAL MEDICINE

## 2018-02-11 PROCEDURE — 71046 X-RAY EXAM CHEST 2 VIEWS: CPT

## 2018-02-11 PROCEDURE — 74011250637 HC RX REV CODE- 250/637: Performed by: OBSTETRICS & GYNECOLOGY

## 2018-02-11 PROCEDURE — 99218 HC RM OBSERVATION: CPT

## 2018-02-11 PROCEDURE — 84550 ASSAY OF BLOOD/URIC ACID: CPT | Performed by: OBSTETRICS & GYNECOLOGY

## 2018-02-11 PROCEDURE — 74011000250 HC RX REV CODE- 250: Performed by: FAMILY MEDICINE

## 2018-02-11 PROCEDURE — 82803 BLOOD GASES ANY COMBINATION: CPT

## 2018-02-11 PROCEDURE — 36600 WITHDRAWAL OF ARTERIAL BLOOD: CPT

## 2018-02-11 PROCEDURE — 77030013140 HC MSK NEB VYRM -A

## 2018-02-11 RX ORDER — ACETAMINOPHEN 325 MG/1
650 TABLET ORAL ONCE
Status: COMPLETED | OUTPATIENT
Start: 2018-02-12 | End: 2018-02-11

## 2018-02-11 RX ORDER — IPRATROPIUM BROMIDE AND ALBUTEROL SULFATE 2.5; .5 MG/3ML; MG/3ML
3 SOLUTION RESPIRATORY (INHALATION)
Status: DISCONTINUED | OUTPATIENT
Start: 2018-02-11 | End: 2018-02-12

## 2018-02-11 RX ORDER — ALBUTEROL SULFATE 0.83 MG/ML
SOLUTION RESPIRATORY (INHALATION)
Status: COMPLETED
Start: 2018-02-11 | End: 2018-02-11

## 2018-02-11 RX ORDER — INSULIN LISPRO 100 [IU]/ML
INJECTION, SOLUTION INTRAVENOUS; SUBCUTANEOUS
Status: DISCONTINUED | OUTPATIENT
Start: 2018-02-11 | End: 2018-02-12

## 2018-02-11 RX ORDER — METFORMIN HYDROCHLORIDE 500 MG/1
500 TABLET ORAL 2 TIMES DAILY WITH MEALS
Status: DISCONTINUED | OUTPATIENT
Start: 2018-02-12 | End: 2018-02-12

## 2018-02-11 RX ORDER — MAGNESIUM SULFATE 100 %
4 CRYSTALS MISCELLANEOUS AS NEEDED
Status: DISCONTINUED | OUTPATIENT
Start: 2018-02-11 | End: 2018-02-16

## 2018-02-11 RX ORDER — DOCUSATE SODIUM 100 MG/1
100 CAPSULE, LIQUID FILLED ORAL 2 TIMES DAILY
Status: DISCONTINUED | OUTPATIENT
Start: 2018-02-11 | End: 2018-02-16 | Stop reason: SDUPTHER

## 2018-02-11 RX ORDER — FLUTICASONE PROPIONATE 50 MCG
2 SPRAY, SUSPENSION (ML) NASAL DAILY
Status: DISCONTINUED | OUTPATIENT
Start: 2018-02-12 | End: 2018-02-17 | Stop reason: HOSPADM

## 2018-02-11 RX ORDER — DIPHENHYDRAMINE HCL 25 MG
25 CAPSULE ORAL
Status: DISCONTINUED | OUTPATIENT
Start: 2018-02-11 | End: 2018-02-17 | Stop reason: HOSPADM

## 2018-02-11 RX ORDER — SODIUM CHLORIDE 0.9 % (FLUSH) 0.9 %
5-10 SYRINGE (ML) INJECTION AS NEEDED
Status: DISCONTINUED | OUTPATIENT
Start: 2018-02-11 | End: 2018-02-12

## 2018-02-11 RX ORDER — SODIUM CHLORIDE 0.9 % (FLUSH) 0.9 %
5-10 SYRINGE (ML) INJECTION EVERY 8 HOURS
Status: DISCONTINUED | OUTPATIENT
Start: 2018-02-11 | End: 2018-02-12

## 2018-02-11 RX ORDER — IPRATROPIUM BROMIDE AND ALBUTEROL SULFATE 2.5; .5 MG/3ML; MG/3ML
3 SOLUTION RESPIRATORY (INHALATION)
Status: COMPLETED | OUTPATIENT
Start: 2018-02-11 | End: 2018-02-11

## 2018-02-11 RX ORDER — DEXTROSE 50 % IN WATER (D50W) INTRAVENOUS SYRINGE
25-50 AS NEEDED
Status: DISCONTINUED | OUTPATIENT
Start: 2018-02-11 | End: 2018-02-16

## 2018-02-11 RX ORDER — IPRATROPIUM BROMIDE AND ALBUTEROL SULFATE 2.5; .5 MG/3ML; MG/3ML
3 SOLUTION RESPIRATORY (INHALATION)
Status: DISCONTINUED | OUTPATIENT
Start: 2018-02-11 | End: 2018-02-11 | Stop reason: SDUPTHER

## 2018-02-11 RX ORDER — PREDNISONE 20 MG/1
40 TABLET ORAL
Status: DISCONTINUED | OUTPATIENT
Start: 2018-02-11 | End: 2018-02-15

## 2018-02-11 RX ORDER — MONTELUKAST SODIUM 10 MG/1
10 TABLET ORAL
Status: DISCONTINUED | OUTPATIENT
Start: 2018-02-11 | End: 2018-02-17 | Stop reason: HOSPADM

## 2018-02-11 RX ORDER — MAG HYDROX/ALUMINUM HYD/SIMETH 200-200-20
30 SUSPENSION, ORAL (FINAL DOSE FORM) ORAL
Status: DISCONTINUED | OUTPATIENT
Start: 2018-02-11 | End: 2018-02-17 | Stop reason: HOSPADM

## 2018-02-11 RX ORDER — DIPHENHYDRAMINE HCL 25 MG
50 CAPSULE ORAL
Status: DISCONTINUED | OUTPATIENT
Start: 2018-02-11 | End: 2018-02-17 | Stop reason: HOSPADM

## 2018-02-11 RX ORDER — BUDESONIDE 0.5 MG/2ML
1000 INHALANT ORAL
Status: DISCONTINUED | OUTPATIENT
Start: 2018-02-11 | End: 2018-02-16

## 2018-02-11 RX ORDER — ACETAMINOPHEN 500 MG
1000 TABLET ORAL
Status: COMPLETED | OUTPATIENT
Start: 2018-02-11 | End: 2018-02-11

## 2018-02-11 RX ADMIN — ACETAMINOPHEN 1000 MG: 500 TABLET ORAL at 18:19

## 2018-02-11 RX ADMIN — IPRATROPIUM BROMIDE AND ALBUTEROL SULFATE 3 ML: 2.5; .5 SOLUTION RESPIRATORY (INHALATION) at 21:43

## 2018-02-11 RX ADMIN — DOCUSATE SODIUM 100 MG: 100 CAPSULE, LIQUID FILLED ORAL at 21:58

## 2018-02-11 RX ADMIN — ALBUTEROL SULFATE 2.5 MG: 2.5 SOLUTION RESPIRATORY (INHALATION) at 16:37

## 2018-02-11 RX ADMIN — BUDESONIDE 1000 MCG: 0.5 INHALANT RESPIRATORY (INHALATION) at 21:51

## 2018-02-11 RX ADMIN — ACETAMINOPHEN 650 MG: 325 TABLET ORAL at 23:50

## 2018-02-11 RX ADMIN — PREDNISONE 40 MG: 20 TABLET ORAL at 22:00

## 2018-02-11 RX ADMIN — IPRATROPIUM BROMIDE AND ALBUTEROL SULFATE 3 ML: .5; 3 SOLUTION RESPIRATORY (INHALATION) at 18:19

## 2018-02-11 RX ADMIN — MONTELUKAST SODIUM 10 MG: 10 TABLET, COATED ORAL at 21:48

## 2018-02-11 NOTE — IP AVS SNAPSHOT
Romana Halo 
 
 
 [address] Patient: Stacey Hayes MRN: [id number] :1985 About your hospitalization You were admitted on:  2018 You last received care in the:  [facility] [address] You were discharged on:  2018 Why you were hospitalized Your primary diagnosis was:  Not on File Your diagnoses also included:  Shortness Of Breath, Morbid Obesity (Hcc), Gestational Diabetes, Antepartum Fetal Tachycardia Affecting Care Of Mother, Hypertension, History Of Asthma, Pregnancy, Asthma Attack Follow-up Information Follow up With Details Comments Contact Info Thu Garcia MD   [address] [place] [address] 
[phone] Discharge Orders None A check jewels indicates which time of day the medication should be taken. My Medications START taking these medications Instructions Each Dose to Equal  
 Morning Noon Evening Bedtime  
 docusate sodium 100 mg capsule Commonly known as:  Mozelle Clause Start taking on:  2018 Your last dose was: Your next dose is: Take 1 Cap by mouth daily for 90 days. Indications: constipation 100 mg  
    
   
   
   
  
 fluticasone 110 mcg/actuation inhaler Commonly known as:  FLOVENT HFA Your last dose was: Your next dose is: Take 1 Puff by inhalation every twelve (12) hours. 1 Puff  
    
   
   
   
  
 ibuprofen 800 mg tablet Commonly known as:  MOTRIN Your last dose was: Your next dose is: Take 1 Tab by mouth every eight (8) hours as needed. Indications: Pain 800 mg  
    
   
   
   
  
 montelukast 10 mg tablet Commonly known as:  SINGULAIR Your last dose was: Your next dose is: Take 1 Tab by mouth nightly.   
 10 mg  
    
   
   
   
  
 oxyCODONE-acetaminophen 5-325 mg per tablet Commonly known as:  PERCOCET Your last dose was: Your next dose is: Take 2 Tabs by mouth every four (4) hours as needed. Max Daily Amount: 12 Tabs. Indications: Pain 2 Tab CHANGE how you take these medications Instructions Each Dose to Equal  
 Morning Noon Evening Bedtime * ferrous sulfate 325 mg (65 mg iron) EC tablet Commonly known as:  IRON What changed:  Another medication with the same name was added. Make sure you understand how and when to take each. Your last dose was: Your next dose is: Take 1 Tab by mouth three (3) times daily (with meals). 325 mg  
    
   
   
   
  
 * ferrous sulfate 325 mg (65 mg iron) tablet What changed: You were already taking a medication with the same name, and this prescription was added. Make sure you understand how and when to take each. Your last dose was: Your next dose is: Take 1 Tab by mouth three (3) times daily (with meals). Indications: Iron Deficiency Anemia 325 mg  
    
   
   
   
  
 * Notice: This list has 2 medication(s) that are the same as other medications prescribed for you. Read the directions carefully, and ask your doctor or other care provider to review them with you. CONTINUE taking these medications Instructions Each Dose to Equal  
 Morning Noon Evening Bedtime  
 albuterol 90 mcg/actuation inhaler Commonly known as:  PROVENTIL HFA, VENTOLIN HFA, PROAIR HFA Your last dose was: Your next dose is:    
   
   
 1-2 Puffs. 1-2 Puff Blood-Glucose Meter monitoring kit Your last dose was: Your next dose is:    
   
   
 Test blood sugar four times daily: fasting every morning and two hours after breakfast, lunch and dinner. glucose blood VI test strips strip Commonly known as:  ASCENSIA AUTODISC VI, ONE TOUCH ULTRA TEST VI Your last dose was: Your next dose is:    
   
   
 Test blood sugar four times daily: fasting every morning and two hours after breakfast, lunch and dinner. labetalol 200 mg tablet Commonly known as:  Elenora Sheela Your last dose was: Your next dose is: Take 1 Tab by mouth two (2) times a day. Indications: hypertension 200 mg Lancets Misc Your last dose was: Your next dose is:    
   
   
 Test blood sugar four times daily: fasting every morning and two hours after breakfast, lunch and dinner. metFORMIN 500 mg tablet Commonly known as:  GLUCOPHAGE Your last dose was: Your next dose is: Take 1 Tab by mouth two (2) times daily (with meals). Indications: Gestational Diabetes Mellitus 500 mg  
    
   
   
   
  
 terconazole 0.8 % vaginal cream  
Commonly known as:  TERAZOL 3 Your last dose was: Your next dose is: Insert 1 Applicator into vagina nightly. 1 Applicator STOP taking these medications TRIAMTERENE-HYDROCHLOROTHIAZID PO Where to Get Your Medications These medications were sent to [facility] [place], [address] [place] [address] Drive, [address] [place], [place] [id number] Phone:  [phone]  
  fluticasone 110 mcg/actuation inhaler  
 montelukast 10 mg tablet Information on where to get these meds will be given to you by the nurse or doctor. ! Ask your nurse or doctor about these medications  
  docusate sodium 100 mg capsule  
 ferrous sulfate 325 mg (65 mg iron) tablet  
 ibuprofen 800 mg tablet  
 oxyCODONE-acetaminophen 5-325 mg per tablet Discharge Instructions Learning About Asthma Triggers What are triggers? When you have asthma, certain things can make your symptoms worse. These are called triggers. They include: · Cigarette smoke or air pollution. · Things you are allergic to, such as: 
¨ Pollen, mold, or dust mites. ¨ Pet hair, skin, or saliva. · Illnesses, like colds, flu, or pneumonia. · Exercise. · Dry, cold air. How do triggers affect asthma? Triggers can make it harder for your lungs to work as they should and can lead to sudden difficulty breathing and other symptoms. When you are around a trigger, an asthma attack is more likely. If your symptoms are severe, you may need emergency treatment or have to go to the hospital for treatment. If you know what your triggers are and can avoid them, you may be able to prevent asthma attacks, reduce how often you have them, and make them less severe. What can you do to avoid triggers? The first thing is to know your triggers. When you are having symptoms, note the things around you that might be causing them. Then look for patterns in what may be triggering your symptoms. Record your triggers on a piece of paper or in an asthma diary. When you have your list of possible triggers, work with your doctor to find ways to avoid them. You also can check how well your lungs are working by measuring your peak expiratory flow (PEF) throughout the day. Your PEF may drop when you are near things that trigger symptoms. Here are some ways to avoid a few common triggers. · Do not smoke or allow others to smoke around you. If you need help quitting, talk to your doctor about stop-smoking programs and medicines. These can increase your chances of quitting for good. · If there is a lot of pollution, pollen, or dust outside, stay at home and keep your windows closed. Use an air conditioner or air filter in your home. Check your local weather report or newspaper for air quality and pollen reports. · Get a flu shot every year.  Talk to your doctor about getting a pneumococcal shot. Wash your hands often to prevent infections. · Avoid exercising outdoors in cold weather. If you are outdoors in cold weather, wear a scarf around your face and breathe through your nose. How can you manage an asthma attack? · If you have an asthma action plan, follow the plan. In general: ¨ Use your quick-relief inhaler as directed by your doctor. If your symptoms do not get better after you use your medicine, have someone take you to the emergency room. Call an ambulance if needed. ¨ If your doctor has given you other inhaled medicines or steroid pills, take them as directed. Where can you learn more? Go to [employer] Enter P771 in the search box to learn more about \"Learning About Asthma Triggers. \"  
© 6813-2470 Healthwise, Incorporated. Care instructions adapted under license by [employer] (which disclaims liability or warranty for this information). This care instruction is for use with your licensed healthcare professional. If you have questions about a medical condition or this instruction, always ask your healthcare professional. [place] [address] any warranty or liability for your use of this information. Content Version: 92.1.358563; Last Revised: February 23, 2012 EDUonGo Announcement We are excited to announce that we are making your provider's discharge notes available to you in EDUonGo. You will see these notes when they are completed and signed by the physician that discharged you from your recent hospital stay. If you have any questions or concerns about any information you see in EDUonGo, please call the Health Information Department where you were seen or reach out to your Primary Care Provider for more information about your plan of care. Introducing [facility]!    
 [employer] introduces EDUonGo patient portal. Now you can access parts of your medical record, email your doctor's office, and request medication refills online. 1. In your internet browser, go to [email or web address] 2. Click on the First Time User? Click Here link in the Sign In box. You will see the New Member Sign Up page. 3. Enter your [employer] Access Code exactly as it appears below. You will not need to use this code after youve completed the sign-up process. If you do not sign up before the expiration date, you must request a new code. · [employer] Access Code: [email or web address] Expires: 4/22/2018  9:03 AM 
 
4. Enter the last four digits of your Social Security Number (xxxx) and Date of Birth (mm/dd/yyyy) as indicated and click Submit. You will be taken to the next sign-up page. 5. Create a [employer] ID. This will be your [employer] login ID and cannot be changed, so think of one that is secure and easy to remember. 6. Create a [employer] password. You can change your password at any time. 7. Enter your Password Reset Question and Answer. This can be used at a later time if you forget your password. 8. Enter your e-mail address. You will receive e-mail notification when new information is available in [address] 9. Click Sign Up. You can now view and download portions of your medical record. 10. Click the Download Summary menu link to download a portable copy of your medical information. If you have questions, please visit the Frequently Asked Questions section of the [employer] website. Remember, [employer] is NOT to be used for urgent needs. For medical emergencies, dial 911. Now available from your iPhone and Android! Providers Seen During Your Hospitalization Provider Specialty Primary office phone Elinor Tee MD [facility] [phone] Rian Melendrez MD Obstetrics & Gynecology [phone] Immunizations Administered for This Admission Name Date Influenza Vaccine (Quad) PF  Deferred () Your Primary Care Physician (PCP) Primary Care Physician Office Phone Office Fax Jeanna Alaniz [phone] You are allergic to the following No active allergies Recent Documentation Breastfeeding? OB Status Smoking Status Unknown Recent pregnancy Former Smoker Emergency Contacts Name Discharge Info Relation Home Work Mobile [address] CAREGIVER [3] Other Relative [6] [phone] Melonie Kim DISCHARGE CAREGIVER [3] Parent [1] [phone] Patient Belongings The following personal items are in your possession at time of discharge: 
  Dental Appliances: None  Visual Aid: None      Home Medications: None   Jewelry: Body Piercing  Clothing: At bedside    Other Valuables: None Discharge Instructions Attachments/References POSTPARTUM (ENGLISH) ASTHMA : ADULT (ENGLISH) DIABETES: COUNTING CARBOHYDRATE WHEN YOU TAKE INSULIN (ENGLISH) BOTTLE-FEEDING (ENGLISH) Patient Handouts After Your Delivery (the Postpartum Period): Care Instructions Your Care Instructions Congratulations on the birth of your baby. Like pregnancy, the  period can be a time of excitement, олег, and exhaustion. You may look at your wondrous little baby and feel happy. You may also be overwhelmed by your new sleep hours and new responsibilities. At first, babies often sleep during the days and are awake at night. They do not have a pattern or routine. They may make sudden gasps, jerk themselves awake, or look like they have crossed eyes. These are all normal, and they may even make you smile. In these first weeks after delivery, try to take good care of yourself. It may take 4 to 6 weeks to feel like yourself again, and possibly longer if you had a  birth. You will likely feel very tired for several weeks. Your days will be full of ups and downs, but lots of олег as well. Follow-up care is a key part of your treatment and safety. Be sure to make and go to all appointments, and call your doctor if you are having problems. It's also a good idea to know your test results and keep a list of the medicines you take. How can you care for yourself at home? Take care of your body after delivery · Use pads instead of tampons for the bloody flow that may last as long as 2 weeks. · Ease cramps with ibuprofen (Advil, Motrin). · Ease soreness of hemorrhoids and the area between your vagina and rectum with ice compresses or witch hazel pads. · Ease constipation by drinking lots of fluid and eating high-fiber foods. Ask your doctor about over-the-counter stool softeners. · Cleanse yourself with a gentle squeeze of warm water from a bottle instead of wiping with toilet paper. · Take a sitz bath in warm water several times a day. · Wear a good nursing bra. Ease sore and swollen breasts with warm, wet washcloths. · If you are not breastfeeding, use ice rather than heat for breast soreness. · Your period may not start for several months if you are breastfeeding. You may bleed more, and longer at first, than you did before you got pregnant. · Wait until you are healed (about 4 to 6 weeks) before you have sexual intercourse. Your doctor will tell you when it is okay to have sex. · Try not to travel with your baby for 5 or 6 weeks. If you take a long car trip, make frequent stops to walk around and stretch. Avoid exhaustion · Rest every day. Try to nap when your baby naps. · Ask another adult to be with you for a few days after delivery. · Plan for  if you have other children. · Stay flexible so you can eat at odd hours and sleep when you need to. Both you and your baby are making new schedules. · Plan small trips to get out of the house. Change can make you feel less tired. · Ask for help with housework, cooking, and shopping.  Remind yourself that your job is to care for your baby. Know about help for postpartum depression · \"Baby blues\" are common for the first 1 to 2 weeks after birth. You may cry or feel sad or irritable for no reason. · Rest whenever you can. Being tired makes it harder to handle your emotions. · Go for walks with your baby. · Talk to your partner, friends, and family about your feelings. · If your symptoms last for more than a few weeks, or if you feel very depressed, ask your doctor for help. · Postpartum depression can be treated. Support groups and counseling can help. Sometimes medicine can also help. Stay healthy · Eat healthy foods so you have more energy, make good breast milk, and lose extra baby pounds. · If you breastfeed, avoid alcohol and drugs. Stay smoke-free. If you quit during pregnancy, congratulations. · Start daily exercise after 4 to 6 weeks, but rest when you feel tired. · Learn exercises to tone your belly. Do Kegel exercises to regain strength in your pelvic muscles. You can do these exercises while you stand or sit. ¨ Squeeze the same muscles you would use to stop your urine. Your belly and thighs should not move. ¨ Hold the squeeze for 3 seconds, and then relax for 3 seconds. ¨ Start with 3 seconds. Then add 1 second each week until you are able to squeeze for 10 seconds. ¨ Repeat the exercise 10 to 15 times for each session. Do three or more sessions each day. · Find a class for new mothers and new babies that has an exercise time. · If you had a  birth, give yourself a bit more time before you exercise, and be careful. When should you call for help? Call 911 anytime you think you may need emergency care. For example, call if: 
? · You passed out (lost consciousness). ?Call your doctor now or seek immediate medical care if: 
? · You have severe vaginal bleeding. This means you are passing blood clots and soaking through a pad each hour for 2 or more hours. ? · You are dizzy or lightheaded, or you feel like you may faint. ? · You have a fever. ? · You have new belly pain, or your pain gets worse. ? Watch closely for changes in your health, and be sure to contact your doctor if: 
? · Your vaginal bleeding seems to be getting heavier. ? · You have new or worse vaginal discharge. ? · You feel sad, anxious, or hopeless for more than a few days. ? · You do not get better as expected. Where can you learn more? Go to http://sue-michael.info/. Enter A461 in the search box to learn more about \"After Your Delivery (the Postpartum Period): Care Instructions. \" Current as of: March 16, 2017 Content Version: 11.4 © 8146-7329 [employer]. Care instructions adapted under license by [employer] (which disclaims liability or warranty for this information). If you have questions about a medical condition or this instruction, always ask your healthcare professional. [place] [address] any warranty or liability for your use of this information. Asthma in Adults: Care Instructions Your Care Instructions During an asthma attack, your airways swell and narrow as a reaction to certain things (triggers). This makes it hard to breathe. You may be able to prevent asthma attacks if you avoid the things that set off your asthma symptoms. Keeping your asthma under control and treating symptoms before they get bad can help you avoid severe attacks. If you can control your asthma, you may be able to do all of your normal daily activities. You may also avoid asthma attacks and trips to the hospital. 
Follow-up care is a key part of your treatment and safety. Be sure to make and go to all appointments, and call your doctor if you are having problems. It's also a good idea to know your test results and keep a list of the medicines you take. How can you care for yourself at home? · Follow your asthma action plan so you can manage your symptoms at home. An asthma action plan will help you prevent and control airway reactions and will tell you what to do during an asthma attack. If you do not have an asthma action plan, work with your doctor to build one. · Take your asthma medicine exactly as prescribed. Medicine plays an important role in controlling asthma. Talk to your doctor right away if you have any questions about what to take and how to take it. ¨ Use your quick-relief medicine when you have symptoms of an attack. Quick-relief medicine often is an albuterol inhaler. Some people need to use quick-relief medicine before they exercise. ¨ Take your controller medicine every day, not just when you have symptoms. Controller medicine is usually an inhaled corticosteroid. The goal is to prevent problems before they occur. Do not use your controller medicine to try to treat an attack that has already started. It does not work fast enough to help. ¨ If your doctor prescribed corticosteroid pills to use during an attack, take them as directed. They may take hours to work, but they may shorten the attack and help you breathe better. ¨ Keep your quick-relief medicine with you at all times. · Talk to your doctor before using other medicines. Some medicines, such as aspirin, can cause asthma attacks in some people. · Check yourself for asthma symptoms to know which step to follow in your action plan. Watch for things like being short of breath, having chest tightness, coughing, and wheezing. Also notice if symptoms wake you up at night or if you get tired quickly when you exercise. · If you have a peak flow meter, use it to check how well you are breathing. This can help you predict when an asthma attack is going to occur. Then you can take medicine to prevent the asthma attack or make it less severe. · See your doctor regularly.  These visits will help you learn more about asthma and what you can do to control it. Your doctor will monitor your treatment to make sure the medicine is helping you. · Keep track of your asthma attacks and your treatment. After you have had an attack, write down what triggered it, what helped end it, and any concerns you have about your asthma action plan. Take your diary when you see your doctor. You can then review your asthma action plan and decide if it is working. · Do not smoke or allow others to smoke around you. Avoid smoky places. Smoking makes asthma worse. If you need help quitting, talk to your doctor about stop-smoking programs and medicines. These can increase your chances of quitting for good. · Learn what triggers an asthma attack for you, and avoid the triggers when you can. Common triggers include colds, smoke, air pollution, dust, pollen, mold, pets, cockroaches, stress, and cold air. · Avoid colds and the flu. Get a pneumococcal vaccine shot. If you have had one before, ask your doctor whether you need a second dose. Get a flu vaccine every fall. If you must be around people with colds or the flu, wash your hands often. When should you call for help? Call 911 anytime you think you may need emergency care. For example, call if: 
? · You have severe trouble breathing. ?Call your doctor now or seek immediate medical care if: 
? · Your symptoms do not get better after you have followed your asthma action plan. ? · You cough up yellow, dark brown, or bloody mucus (sputum). ? Watch closely for changes in your health, and be sure to contact your doctor if: 
? · Your coughing and wheezing get worse. ? · You need to use quick-relief medicine on more than 2 days a week (unless it is just for exercise). ? · You need help figuring out what is triggering your asthma attacks. Where can you learn more? Go to http://sue-michael.info/.  
Enter P597 in the search box to learn more about \"Asthma in Adults: Care Instructions. \" Current as of: May 12, 2017 Content Version: 11.4 © 6531-7186 [facility]. Care instructions adapted under license by [employer] (which disclaims liability or warranty for this information). If you have questions about a medical condition or this instruction, always ask your healthcare professional. [address] any warranty or liability for your use of this information. Counting Carbohydrates When You Take Insulin: Care Instructions Your Care Instructions You don't have to eat special foods when you take insulin. You just have to be careful to eat healthy foods. And you have to spread throughout the day the carbohydrates you eat. Carbohydrates raise blood sugar higher and more quickly than any other nutrient. It is found in desserts, breads and cereals, and fruit. It's also found in starchy vegetables such as potatoes and corn, grains such as rice and pasta, and milk and yogurt. The more carbohydrates, or carbs, you eat at one time, the higher your blood sugar will rise. Spreading carbs throughout the day helps keep your blood sugar levels within your target range. Counting carbs is one of the best ways to keep your blood sugar under control when you use insulin. It helps you match the right amount of insulin to the number of grams of carbohydrates in a meal. You need to test your blood sugar several times a day to learn how carbs affect you. Then you can change your diet and insulin dose as needed. A registered dietitian or certified diabetes educator can help you plan meals and snacks. Follow-up care is a key part of your treatment and safety. Be sure to make and go to all appointments, and call your doctor if you are having problems. It's also a good idea to know your test results and keep a list of the medicines you take. How can you care for yourself at home? Know your daily amount of carbohydrates Your daily amount depends on several things, including your weight, how active you are, which diabetes medicines you take, and what your goals are for your blood sugar levels. A registered dietitian or certified diabetes educator can help you plan how many carbohydrates to include in each meal and snack. For most adults, a guideline for the daily amount of carbohydrates is: · 45 to 60 grams at each meal. That's about the same as 3 to 4 carbohydrate servings. · 15 to 20 grams at each snack. That's about the same as 1 carbohydrate serving. Count carbs If you take insulin, you need to know how many grams of carbohydrates are in a meal. This lets you know how much rapid-acting insulin to take before you eat. If you use an insulin pump, you get a constant rate of insulin during the day. So the pump must be programmed at meals to give you extra insulin to cover the rise in blood sugar after meals. When you know how many carbohydrates you will eat, you can take the right amount of insulin. Or, if you always use the same amount of insulin, you need to make sure that you eat the same amount of carbs at meals. · Learn your own insulin-to-carbohydrates ratio. You and your diabetes health professional will figure out the ratio. You can do this by testing your blood sugar after meals. For example, you may need a certain amount of insulin for every 15 grams of carbohydrates. · Add up the carbohydrate grams in a meal. Then you can figure out how many units of insulin to take based on your insulin-to-carbohydrates ratio. · Look at labels on packaged foods. This can tell you how many carbohydrates are in a serving. You can also use guides from the American [employer] Association. · Be aware of portions, or serving sizes. If a package has two servings and you eat the whole package, you need to double the number of grams of carbohydrates listed for one serving. · Protein, fat, and fiber do not raise blood sugar as much as carbs do. If you eat a lot of these nutrients in a meal, your blood sugar will rise more slowly than it would otherwise. · Exercise lowers blood sugar. You can use less insulin than you would if you were not doing exercise. Keep in mind that timing matters. If you exercise within 1 hour after a meal, your body may need less insulin for that meal than it would if you exercised 3 hours after the meal. Test your blood sugar to find out how exercise affects your need for insulin. Eat from all food groups · Eat at least three meals a day. · Plan meals to include food from all the food groups. ¨ Grains: 1 slice of bread (1 ounce), ½ cup of cooked cereal, and 1/3 cup of cooked pasta or rice. These have about 15 grams of carbohydrates in a serving. Choose whole grains. These include whole wheat bread or crackers, oatmeal, and brown rice. Have them more often than refined grains. ¨ Fruit: 1 small fresh fruit, such as an apple or orange; ½ of a banana; ½ cup of chopped, cooked, or canned fruit; ½ cup of fruit juice; 1 cup of melon or raspberries; and 2 tablespoons of dried fruit. These have about 15 grams of carbohydrates in a serving. ¨ Dairy: 1 cup of nonfat or low-fat milk and 2/3 cup of plain yogurt. These have about 15 grams of carbohydrates in a serving. ¨ Protein foods: Beef, chicken, turkey, fish, eggs, tofu, cheese, cottage cheese, and peanut butter. A serving size of meat is 3 ounces. This is about the size of a deck of cards. Examples of meat substitute serving sizes (equal to 1 ounce of meat) are 1/4 cup of cottage cheese, 1 egg, 1 tablespoon of peanut butter, and ½ cup of tofu. These have very little or no carbohydrates in a serving. ¨ Vegetables: Starchy vegetables such as ½ cup of cooked beans, peas, potatoes, or corn have about 15 grams of carbohydrates. Nonstarchy vegetables have very little carbohydrates.  These include 1 cup of raw leafy vegetables (such as spinach), ½ cup of other vegetables (cooked or chopped), and 3/4 cup of vegetable juice. · Talk to your dietitian or diabetes educator about ways to add limited amounts of sweets into your meal plan. · If you drink alcohol: ¨ Limit it to no more than 1 drink a day for women and 2 drinks a day for men. (One drink is 12 fl oz of beer, 5 fl oz of wine, or 1.5 fl oz liquor.) ¨ Make sure to count drink mixers that have sugar in your total carbohydrate count. These include cola, tonic water, jared mix, and fruit juice. ¨ Eat a carbohydrate food along with your alcoholic drink. ¨ Check your blood sugar more often. This is because alcohol can lower your blood sugar too much. This may happen even hours later while you sleep. You may want to eat and adjust your insulin dose when you drink alcohol to prevent severe low blood sugar. ¨ Talk to your doctor. Alcohol may not be recommended when you are taking certain diabetes medicines. Where can you learn more? Go to http://sue-michael.info/. Enter S299 in the search box to learn more about \"Counting Carbohydrates When You Take Insulin: Care Instructions. \" Current as of: March 13, 2017 Content Version: 11.4 © 4562-1735 [employer]. Care instructions adapted under license by [employer] (which disclaims liability or warranty for this information). If you have questions about a medical condition or this instruction, always ask your healthcare professional. [place] [address] any warranty or liability for your use of this information. Bottle-Feeding: Care Instructions Your Care Instructions Your reasons for wanting to bottle-feed your baby with formula are personal. You and your partner can make the best decision for you and your baby. Formulas can provide all the calories and nutrients your baby needs in the first 6 months of life. Several types of formulas are available. Most babies start with a cow's milk-based formula, such as Enfamil, Good Start, or Similac. Talk to your doctor before trying other types of formulas, which include soy and lactose-free formulas. At first, preparing the bottles and formula can seem confusing, but it gets easier and faster with some practice. Your  baby probably will want to eat every 2 to 3 hours. Do not worry about the exact timing for the first few weeks, but feed your baby whenever he or she is hungry. In general, your baby should not go longer than 4 hours without eating during the day for the first few months. Sit in a comfortable chair with your arms supported on pillows. Look into your baby's eyes and talk or sing while you are giving the bottle. Enjoy this special time you have with your baby. Follow-up care is a key part of your child's treatment and safety. Be sure to make and go to all appointments, and call your doctor if your child is having problems. It's also a good idea to know your child's test results and keep a list of the medicines your child takes. At each well-baby visit, talk to your doctor about your baby's nutritional needs, which change as he or she grows and develops. How can you care for yourself at home? · Prepare your supplies for bottle-feeding before your baby is born, if possible. ¨ Have a supply of small bottles (usually 4 ounces) for your baby's first few weeks. ¨ You may want to buy a variety of bottle nipples so you can see which type your baby likes. ¨ Before using bottles and nipples the first time, wash them in hot water and dish soap and rinse with hot water. · Ask your doctor which formula to use. You can buy formula as a liquid concentrate or a powder that you mix with water. Formulas also come in a ready-to-feed form. Always use formula with added iron unless the doctor says not to. · Make sure you have clean, safe water to mix with the formula. If you are not sure if your water is safe, you can use bottled water or you can boil tap water. ¨ Boil cold tap water for 1 minute, then cool the water to room temperature. ¨ Use the boiled water to mix the formula within 30 minutes. · Wash your hands before preparing formula. · Read the label to see how much water to mix with the formula. If you add too little water, it can upset your baby's stomach. If you add too much water, your baby will not get the right nutrition. · Cover the prepared formula and store it in a refrigerator. Use it within 24 hours. · Soak dirty baby bottles in water and dish soap. Wash bottles and nipples in the upper rack of the  or hand-wash them in hot water with dish soap. To bottle-feed your baby · Warm the formula to room temperature or body temperature before feeding. The best way to warm it is in a bowl of heated water. Do not use a microwave, which can cause hot spots in the formula that can burn your baby's mouth. · Before feeding your baby, check the temperature of the formula by dripping 2 or 3 drops on the inside of your wrist. It should be warm, not cold or hot. · Place a bib or cloth under your baby's chin to help keep clothes clean. Have a second cloth handy to use when burping your baby. · Support your baby with one arm, with your baby's head resting in the bend of your elbow. Keep your baby's head higher than his or her chest. 
· Stroke the center of your baby's lower lip to encourage the mouth to open wider. A wide mouth will cover more of the nipple and will help reduce the amount of air the baby sucks in. · Angle the bottle so the neck of the bottle and the nipple stay full of milk. This helps reduce how much air your baby swallows. · Do not prop the bottle in your baby's mouth or let him or her hold it alone. This increases your baby's chances of choking or getting ear infections. · During the first few weeks, burp your baby after every 2 ounces of formula. This helps get rid of swallowed air and reduces spitting up. · You will know your baby is full when he or she stops sucking. Your baby may spit out the nipple, turn his or her head away, or fall asleep when full. [place] babies usually drink from 1 to 3 ounces each feeding. · Throw away any formula left in the bottle after you have fed your baby. Bacteria can grow in the leftover formula. · It can be helpful to hold your baby upright for about 30 minutes after eating to reduce spitting up. When should you call for help? Watch closely for changes in your child's health, and be sure to contact your doctor if: 
? · Your child does not seem to be growing and gaining weight. ? · Your child has trouble passing stools, or his or her stools are hard and dry. ? · Your child is vomiting. ? · Your child has diarrhea or a skin rash. ? · Your child cries most of the time. ? · Your child has gas, bloating, or cramps after drinking a bottle. Where can you learn more? Go to http://sue-micahel.info/. Enter P111 in the search box to learn more about \"Bottle-Feeding: Care Instructions. \" Current as of: May 12, 2017 Content Version: 11.4 © 9361-6665 [employer]. Care instructions adapted under license by "[employer] (which disclaims liability or warranty for this information). If you have questions about a medical condition or this instruction, always ask your healthcare professional. [place] [address] any warranty or liability for your use of this information. Please provide this summary of care documentation to your next provider. Signatures-by signing, you are acknowledging that this After Visit Summary has been reviewed with you and you have received a copy.   
  
 
  
    
    
 Patient Signature: ____________________________________________________________ Date:  ____________________________________________________________  
  
Devota Dandy Provider Signature:  ____________________________________________________________ Date:  ____________________________________________________________

## 2018-02-11 NOTE — IP AVS SNAPSHOT
[address] Patient: Eva Vanegas MRN: [id number] :1985 About your hospitalization You were admitted on:  2018 You last received care in the:  [facility] [address] You were discharged on:  2018 Why you were hospitalized Your primary diagnosis was:  Not on File Your diagnoses also included:  Shortness Of Breath, Morbid Obesity (Hcc), Gestational Diabetes, Antepartum Fetal Tachycardia Affecting Care Of Mother, Hypertension, History Of Asthma, Pregnancy, Asthma Attack Follow-up Information None Discharge Orders None A check jewels indicates which time of day the medication should be taken. My Medications START taking these medications Instructions Each Dose to Equal  
 Morning Noon Evening Bedtime  
 fluticasone 110 mcg/actuation inhaler Commonly known as:  FLOVENT HFA Your last dose was: Your next dose is: Take 1 Puff by inhalation every twelve (12) hours. 1 Puff  
    
   
   
   
  
 montelukast 10 mg tablet Commonly known as:  SINGULAIR Your last dose was: Your next dose is: Take 1 Tab by mouth nightly. 10 mg CONTINUE taking these medications Instructions Each Dose to Equal  
 Morning Noon Evening Bedtime  
 albuterol 90 mcg/actuation inhaler Commonly known as:  PROVENTIL HFA, VENTOLIN HFA, PROAIR HFA Your last dose was: Your next dose is:    
   
   
 1-2 Puffs. 1-2 Puff Blood-Glucose Meter monitoring kit Your last dose was: Your next dose is:    
   
   
 Test blood sugar four times daily: fasting every morning and two hours after breakfast, lunch and dinner. ferrous sulfate 325 mg (65 mg iron) EC tablet Commonly known as:  IRON  
   
 Your last dose was: Your next dose is: Take 1 Tab by mouth three (3) times daily (with meals). 325 mg  
    
   
   
   
  
 glucose blood VI test strips strip Commonly known as:  ASCENSIA AUTODISC VI, ONE TOUCH ULTRA TEST VI Your last dose was: Your next dose is:    
   
   
 Test blood sugar four times daily: fasting every morning and two hours after breakfast, lunch and dinner. labetalol 200 mg tablet Commonly known as:  Rawland Shake Your last dose was: Your next dose is: Take 1 Tab by mouth two (2) times a day. Indications: hypertension 200 mg Lancets Misc Your last dose was: Your next dose is:    
   
   
 Test blood sugar four times daily: fasting every morning and two hours after breakfast, lunch and dinner. metFORMIN 500 mg tablet Commonly known as:  GLUCOPHAGE Your last dose was: Your next dose is: Take 1 Tab by mouth two (2) times daily (with meals). Indications: Gestational Diabetes Mellitus 500 mg  
    
   
   
   
  
 terconazole 0.8 % vaginal cream  
Commonly known as:  TERAZOL 3 Your last dose was: Your next dose is: Insert 1 Applicator into vagina nightly. 1 Applicator STOP taking these medications TRIAMTERENE-HYDROCHLOROTHIAZID PO Where to Get Your Medications These medications were sent to [facility] [address] - [place], [address], [address] [facility] [address] Phone:  [phone]  
  fluticasone 110 mcg/actuation inhaler  
 montelukast 10 mg tablet Discharge Instructions Learning About Asthma Triggers What are triggers? When you have asthma, certain things can make your symptoms worse. These are called triggers. They include: · Cigarette smoke or air pollution. · Things you are allergic to, such as: 
¨ Pollen, mold, or dust mites. ¨ Pet hair, skin, or saliva. · Illnesses, like colds, flu, or pneumonia. · Exercise. · Dry, cold air. How do triggers affect asthma? Triggers can make it harder for your lungs to work as they should and can lead to sudden difficulty breathing and other symptoms. When you are around a trigger, an asthma attack is more likely. If your symptoms are severe, you may need emergency treatment or have to go to the hospital for treatment. If you know what your triggers are and can avoid them, you may be able to prevent asthma attacks, reduce how often you have them, and make them less severe. What can you do to avoid triggers? The first thing is to know your triggers. When you are having symptoms, note the things around you that might be causing them. Then look for patterns in what may be triggering your symptoms. Record your triggers on a piece of paper or in an asthma diary. When you have your list of possible triggers, work with your doctor to find ways to avoid them. You also can check how well your lungs are working by measuring your peak expiratory flow (PEF) throughout the day. Your PEF may drop when you are near things that trigger symptoms. Here are some ways to avoid a few common triggers. · Do not smoke or allow others to smoke around you. If you need help quitting, talk to your doctor about stop-smoking programs and medicines. These can increase your chances of quitting for good. · If there is a lot of pollution, pollen, or dust outside, stay at home and keep your windows closed. Use an air conditioner or air filter in your home. Check your local weather report or newspaper for air quality and pollen reports. · Get a flu shot every year. Talk to your doctor about getting a pneumococcal shot. Wash your hands often to prevent infections. · Avoid exercising outdoors in cold weather.  If you are outdoors in cold weather, wear a scarf around your face and breathe through your nose. How can you manage an asthma attack? · If you have an asthma action plan, follow the plan. In general: ¨ Use your quick-relief inhaler as directed by your doctor. If your symptoms do not get better after you use your medicine, have someone take you to the emergency room. Call an ambulance if needed. ¨ If your doctor has given you other inhaled medicines or steroid pills, take them as directed. Where can you learn more? Go to Amaru.be Enter H354 in the search box to learn more about \"Learning About Asthma Triggers. \"  
© 3424-2958 Healthwise, Incorporated. Care instructions adapted under license by [employer] (which disclaims liability or warranty for this information). This care instruction is for use with your licensed healthcare professional. If you have questions about a medical condition or this instruction, always ask your healthcare professional. [place] [address] any warranty or liability for your use of this information. Content Version: 33.4.142894; Last Revised: February 23, 2012 Introducing [facility]! [employer] introduces The Simple patient portal. Now you can access parts of your medical record, email your doctor's office, and request medication refills online. 1. In your internet browser, go to [email or web address] 2. Click on the First Time User? Click Here link in the Sign In box. You will see the New Member Sign Up page. 3. Enter your The Simple Access Code exactly as it appears below. You will not need to use this code after youve completed the sign-up process. If you do not sign up before the expiration date, you must request a new code. · The Simple Access Code: [email or web address] Expires: 4/22/2018  9:03 AM 
 
4.  Enter the last four digits of your Social Security Number (xxxx) and Date of Birth (mm/dd/yyyy) as indicated and click Submit. You will be taken to the next sign-up page. 5. Create a Variable ID. This will be your Variable login ID and cannot be changed, so think of one that is secure and easy to remember. 6. Create a Variable password. You can change your password at any time. 7. Enter your Password Reset Question and Answer. This can be used at a later time if you forget your password. 8. Enter your e-mail address. You will receive e-mail notification when new information is available in [address] 9. Click Sign Up. You can now view and download portions of your medical record. 10. Click the Download Summary menu link to download a portable copy of your medical information. If you have questions, please visit the Frequently Asked Questions section of the Variable website. Remember, Variable is NOT to be used for urgent needs. For medical emergencies, dial 911. Now available from your iPhone and Android! Providers Seen During Your Hospitalization Provider Specialty Primary office phone Yulia Luke MD [facility] [phone] Beatriz Dooley MD Obstetrics & Gynecology [phone] Immunizations Administered for This Admission Name Date Influenza Vaccine (Quad) PF  Deferred () Your Primary Care Physician (PCP) Primary Care Physician Office Phone Office Fax Claudette Pool [phone] You are allergic to the following No active allergies Recent Documentation Breastfeeding? OB Status Smoking Status Unknown Recent pregnancy Former Smoker Emergency Contacts Name Discharge Info Relation Home Work Mobile [address] CAREGIVER [3] Other Relative [6] [phone] Melonie Kim DISCHARGE CAREGIVER [3] Parent [1] [phone] Patient Belongings The following personal items are in your possession at time of discharge: Dental Appliances: None  Visual Aid: None      Home Medications: None   Jewelry: Body Piercing  Clothing: At bedside    Other Valuables: None Discharge Instructions Attachments/References POSTPARTUM (ENGLISH) ASTHMA : ADULT (ENGLISH) DIABETES: COUNTING CARBOHYDRATE WHEN YOU TAKE INSULIN (ENGLISH) BOTTLE-FEEDING (ENGLISH) Patient Handouts After Your Delivery (the Postpartum Period): Care Instructions Your Care Instructions Congratulations on the birth of your baby. Like pregnancy, the  period can be a time of excitement, олег, and exhaustion. You may look at your wondrous little baby and feel happy. You may also be overwhelmed by your new sleep hours and new responsibilities. At first, babies often sleep during the days and are awake at night. They do not have a pattern or routine. They may make sudden gasps, jerk themselves awake, or look like they have crossed eyes. These are all normal, and they may even make you smile. In these first weeks after delivery, try to take good care of yourself. It may take 4 to 6 weeks to feel like yourself again, and possibly longer if you had a  birth. You will likely feel very tired for several weeks. Your days will be full of ups and downs, but lots of олег as well. Follow-up care is a key part of your treatment and safety. Be sure to make and go to all appointments, and call your doctor if you are having problems. It's also a good idea to know your test results and keep a list of the medicines you take. How can you care for yourself at home? Take care of your body after delivery · Use pads instead of tampons for the bloody flow that may last as long as 2 weeks. · Ease cramps with ibuprofen (Advil, Motrin). · Ease soreness of hemorrhoids and the area between your vagina and rectum with ice compresses or witch hazel pads. · Ease constipation by drinking lots of fluid and eating high-fiber foods. Ask your doctor about over-the-counter stool softeners. · Cleanse yourself with a gentle squeeze of warm water from a bottle instead of wiping with toilet paper. · Take a sitz bath in warm water several times a day. · Wear a good nursing bra. Ease sore and swollen breasts with warm, wet washcloths. · If you are not breastfeeding, use ice rather than heat for breast soreness. · Your period may not start for several months if you are breastfeeding. You may bleed more, and longer at first, than you did before you got pregnant. · Wait until you are healed (about 4 to 6 weeks) before you have sexual intercourse. Your doctor will tell you when it is okay to have sex. · Try not to travel with your baby for 5 or 6 weeks. If you take a long car trip, make frequent stops to walk around and stretch. Avoid exhaustion · Rest every day. Try to nap when your baby naps. · Ask another adult to be with you for a few days after delivery. · Plan for  if you have other children. · Stay flexible so you can eat at odd hours and sleep when you need to. Both you and your baby are making new schedules. · Plan small trips to get out of the house. Change can make you feel less tired. · Ask for help with housework, cooking, and shopping. Remind yourself that your job is to care for your baby. Know about help for postpartum depression · \"Baby blues\" are common for the first 1 to 2 weeks after birth. You may cry or feel sad or irritable for no reason. · Rest whenever you can. Being tired makes it harder to handle your emotions. · Go for walks with your baby. · Talk to your partner, friends, and family about your feelings. · If your symptoms last for more than a few weeks, or if you feel very depressed, ask your doctor for help. · Postpartum depression can be treated. Support groups and counseling can help. Sometimes medicine can also help. Stay healthy · Eat healthy foods so you have more energy, make good breast milk, and lose extra baby pounds. · If you breastfeed, avoid alcohol and drugs. Stay smoke-free. If you quit during pregnancy, congratulations. · Start daily exercise after 4 to 6 weeks, but rest when you feel tired. · Learn exercises to tone your belly. Do Kegel exercises to regain strength in your pelvic muscles. You can do these exercises while you stand or sit. ¨ Squeeze the same muscles you would use to stop your urine. Your belly and thighs should not move. ¨ Hold the squeeze for 3 seconds, and then relax for 3 seconds. ¨ Start with 3 seconds. Then add 1 second each week until you are able to squeeze for 10 seconds. ¨ Repeat the exercise 10 to 15 times for each session. Do three or more sessions each day. · Find a class for new mothers and new babies that has an exercise time. · If you had a  birth, give yourself a bit more time before you exercise, and be careful. When should you call for help? Call 911 anytime you think you may need emergency care. For example, call if: 
? · You passed out (lost consciousness). ?Call your doctor now or seek immediate medical care if: 
? · You have severe vaginal bleeding. This means you are passing blood clots and soaking through a pad each hour for 2 or more hours. ? · You are dizzy or lightheaded, or you feel like you may faint. ? · You have a fever. ? · You have new belly pain, or your pain gets worse. ? Watch closely for changes in your health, and be sure to contact your doctor if: 
? · Your vaginal bleeding seems to be getting heavier. ? · You have new or worse vaginal discharge. ? · You feel sad, anxious, or hopeless for more than a few days. ? · You do not get better as expected. Where can you learn more? Go to http://sue-michael.info/.  
Enter X805 in the search box to learn more about \"After Your Delivery (the Postpartum Period): Care Instructions. \" Current as of: March 16, 2017 Content Version: 11.4 © 1075-4578 [employer]. Care instructions adapted under license by [employer] (which disclaims liability or warranty for this information). If you have questions about a medical condition or this instruction, always ask your healthcare professional. [address] any warranty or liability for your use of this information. Asthma in Adults: Care Instructions Your Care Instructions During an asthma attack, your airways swell and narrow as a reaction to certain things (triggers). This makes it hard to breathe. You may be able to prevent asthma attacks if you avoid the things that set off your asthma symptoms. Keeping your asthma under control and treating symptoms before they get bad can help you avoid severe attacks. If you can control your asthma, you may be able to do all of your normal daily activities. You may also avoid asthma attacks and trips to the hospital. 
Follow-up care is a key part of your treatment and safety. Be sure to make and go to all appointments, and call your doctor if you are having problems. It's also a good idea to know your test results and keep a list of the medicines you take. How can you care for yourself at home? · Follow your asthma action plan so you can manage your symptoms at home. An asthma action plan will help you prevent and control airway reactions and will tell you what to do during an asthma attack. If you do not have an asthma action plan, work with your doctor to build one. · Take your asthma medicine exactly as prescribed. Medicine plays an important role in controlling asthma. Talk to your doctor right away if you have any questions about what to take and how to take it. ¨ Use your quick-relief medicine when you have symptoms of an attack. Quick-relief medicine often is an albuterol inhaler.  Some people need to use quick-relief medicine before they exercise. ¨ Take your controller medicine every day, not just when you have symptoms. Controller medicine is usually an inhaled corticosteroid. The goal is to prevent problems before they occur. Do not use your controller medicine to try to treat an attack that has already started. It does not work fast enough to help. ¨ If your doctor prescribed corticosteroid pills to use during an attack, take them as directed. They may take hours to work, but they may shorten the attack and help you breathe better. ¨ Keep your quick-relief medicine with you at all times. · Talk to your doctor before using other medicines. Some medicines, such as aspirin, can cause asthma attacks in some people. · Check yourself for asthma symptoms to know which step to follow in your action plan. Watch for things like being short of breath, having chest tightness, coughing, and wheezing. Also notice if symptoms wake you up at night or if you get tired quickly when you exercise. · If you have a peak flow meter, use it to check how well you are breathing. This can help you predict when an asthma attack is going to occur. Then you can take medicine to prevent the asthma attack or make it less severe. · See your doctor regularly. These visits will help you learn more about asthma and what you can do to control it. Your doctor will monitor your treatment to make sure the medicine is helping you. · Keep track of your asthma attacks and your treatment. After you have had an attack, write down what triggered it, what helped end it, and any concerns you have about your asthma action plan. Take your diary when you see your doctor. You can then review your asthma action plan and decide if it is working. · Do not smoke or allow others to smoke around you. Avoid smoky places. Smoking makes asthma worse.  If you need help quitting, talk to your doctor about stop-smoking programs and medicines. These can increase your chances of quitting for good. · Learn what triggers an asthma attack for you, and avoid the triggers when you can. Common triggers include colds, smoke, air pollution, dust, pollen, mold, pets, cockroaches, stress, and cold air. · Avoid colds and the flu. Get a pneumococcal vaccine shot. If you have had one before, ask your doctor whether you need a second dose. Get a flu vaccine every fall. If you must be around people with colds or the flu, wash your hands often. When should you call for help? Call 911 anytime you think you may need emergency care. For example, call if: 
? · You have severe trouble breathing. ?Call your doctor now or seek immediate medical care if: 
? · Your symptoms do not get better after you have followed your asthma action plan. ? · You cough up yellow, dark brown, or bloody mucus (sputum). ? Watch closely for changes in your health, and be sure to contact your doctor if: 
? · Your coughing and wheezing get worse. ? · You need to use quick-relief medicine on more than 2 days a week (unless it is just for exercise). ? · You need help figuring out what is triggering your asthma attacks. Where can you learn more? Go to http://sue-michael.info/. Enter P597 in the search box to learn more about \"Asthma in Adults: Care Instructions. \" Current as of: May 12, 2017 Content Version: 11.4 © 0900-6113 [employer]. Care instructions adapted under license by [employer] (which disclaims liability or warranty for this information). If you have questions about a medical condition or this instruction, always ask your healthcare professional. [place] [address] any warranty or liability for your use of this information. Counting Carbohydrates When You Take Insulin: Care Instructions Your Care Instructions You don't have to eat special foods when you take insulin. You just have to be careful to eat healthy foods. And you have to spread throughout the day the carbohydrates you eat. Carbohydrates raise blood sugar higher and more quickly than any other nutrient. It is found in desserts, breads and cereals, and fruit. It's also found in starchy vegetables such as potatoes and corn, grains such as rice and pasta, and milk and yogurt. The more carbohydrates, or carbs, you eat at one time, the higher your blood sugar will rise. Spreading carbs throughout the day helps keep your blood sugar levels within your target range. Counting carbs is one of the best ways to keep your blood sugar under control when you use insulin. It helps you match the right amount of insulin to the number of grams of carbohydrates in a meal. You need to test your blood sugar several times a day to learn how carbs affect you. Then you can change your diet and insulin dose as needed. A registered dietitian or certified diabetes educator can help you plan meals and snacks. Follow-up care is a key part of your treatment and safety. Be sure to make and go to all appointments, and call your doctor if you are having problems. It's also a good idea to know your test results and keep a list of the medicines you take. How can you care for yourself at home? Know your daily amount of carbohydrates Your daily amount depends on several things, including your weight, how active you are, which diabetes medicines you take, and what your goals are for your blood sugar levels. A registered dietitian or certified diabetes educator can help you plan how many carbohydrates to include in each meal and snack. For most adults, a guideline for the daily amount of carbohydrates is: · 45 to 60 grams at each meal. That's about the same as 3 to 4 carbohydrate servings. · 15 to 20 grams at each snack. That's about the same as 1 carbohydrate serving. Count carbs If you take insulin, you need to know how many grams of carbohydrates are in a meal. This lets you know how much rapid-acting insulin to take before you eat. If you use an insulin pump, you get a constant rate of insulin during the day. So the pump must be programmed at meals to give you extra insulin to cover the rise in blood sugar after meals. When you know how many carbohydrates you will eat, you can take the right amount of insulin. Or, if you always use the same amount of insulin, you need to make sure that you eat the same amount of carbs at meals. · Learn your own insulin-to-carbohydrates ratio. You and your diabetes health professional will figure out the ratio. You can do this by testing your blood sugar after meals. For example, you may need a certain amount of insulin for every 15 grams of carbohydrates. · Add up the carbohydrate grams in a meal. Then you can figure out how many units of insulin to take based on your insulin-to-carbohydrates ratio. · Look at labels on packaged foods. This can tell you how many carbohydrates are in a serving. You can also use guides from the American Diabetes Association. · Be aware of portions, or serving sizes. If a package has two servings and you eat the whole package, you need to double the number of grams of carbohydrates listed for one serving. · Protein, fat, and fiber do not raise blood sugar as much as carbs do. If you eat a lot of these nutrients in a meal, your blood sugar will rise more slowly than it would otherwise. · Exercise lowers blood sugar. You can use less insulin than you would if you were not doing exercise. Keep in mind that timing matters. If you exercise within 1 hour after a meal, your body may need less insulin for that meal than it would if you exercised 3 hours after the meal. Test your blood sugar to find out how exercise affects your need for insulin. Eat from all food groups · Eat at least three meals a day. · Plan meals to include food from all the food groups. ¨ Grains: 1 slice of bread (1 ounce), ½ cup of cooked cereal, and 1/3 cup of cooked pasta or rice. These have about 15 grams of carbohydrates in a serving. Choose whole grains. These include whole wheat bread or crackers, oatmeal, and brown rice. Have them more often than refined grains. ¨ Fruit: 1 small fresh fruit, such as an apple or orange; ½ of a banana; ½ cup of chopped, cooked, or canned fruit; ½ cup of fruit juice; 1 cup of melon or raspberries; and 2 tablespoons of dried fruit. These have about 15 grams of carbohydrates in a serving. ¨ Dairy: 1 cup of nonfat or low-fat milk and 2/3 cup of plain yogurt. These have about 15 grams of carbohydrates in a serving. ¨ Protein foods: Beef, chicken, turkey, fish, eggs, tofu, cheese, cottage cheese, and peanut butter. A serving size of meat is 3 ounces. This is about the size of a deck of cards. Examples of meat substitute serving sizes (equal to 1 ounce of meat) are 1/4 cup of cottage cheese, 1 egg, 1 tablespoon of peanut butter, and ½ cup of tofu. These have very little or no carbohydrates in a serving. ¨ Vegetables: Starchy vegetables such as ½ cup of cooked beans, peas, potatoes, or corn have about 15 grams of carbohydrates. Nonstarchy vegetables have very little carbohydrates. These include 1 cup of raw leafy vegetables (such as spinach), ½ cup of other vegetables (cooked or chopped), and 3/4 cup of vegetable juice. · Talk to your dietitian or diabetes educator about ways to add limited amounts of sweets into your meal plan. · If you drink alcohol: ¨ Limit it to no more than 1 drink a day for women and 2 drinks a day for men. (One drink is 12 fl oz of beer, 5 fl oz of wine, or 1.5 fl oz liquor.) ¨ Make sure to count drink mixers that have sugar in your total carbohydrate count. These include cola, tonic water, jared mix, and fruit juice. ¨ Eat a carbohydrate food along with your alcoholic drink. ¨ Check your blood sugar more often. This is because alcohol can lower your blood sugar too much. This may happen even hours later while you sleep. You may want to eat and adjust your insulin dose when you drink alcohol to prevent severe low blood sugar. ¨ Talk to your doctor. Alcohol may not be recommended when you are taking certain diabetes medicines. Where can you learn more? Go to http://sue-michael.info/. Enter M006 in the search box to learn more about \"Counting Carbohydrates When You Take Insulin: Care Instructions. \" Current as of: 2017 Content Version: 11.4 © 1853-6808 [employer]. Care instructions adapted under license by [employer] (which disclaims liability or warranty for this information). If you have questions about a medical condition or this instruction, always ask your healthcare professional. [place] [address] any warranty or liability for your use of this information. Bottle-Feeding: Care Instructions Your Care Instructions Your reasons for wanting to bottle-feed your baby with formula are personal. You and your partner can make the best decision for you and your baby. Formulas can provide all the calories and nutrients your baby needs in the first 6 months of life. Several types of formulas are available. Most babies start with a cow's milk-based formula, such as Enfamil, Good Start, or Similac. Talk to your doctor before trying other types of formulas, which include soy and lactose-free formulas. At first, preparing the bottles and formula can seem confusing, but it gets easier and faster with some practice. Your  baby probably will want to eat every 2 to 3 hours. Do not worry about the exact timing for the first few weeks, but feed your baby whenever he or she is hungry.  In general, your baby should not go longer than 4 hours without eating during the day for the first few months. Sit in a comfortable chair with your arms supported on pillows. Look into your baby's eyes and talk or sing while you are giving the bottle. Enjoy this special time you have with your baby. Follow-up care is a key part of your child's treatment and safety. Be sure to make and go to all appointments, and call your doctor if your child is having problems. It's also a good idea to know your child's test results and keep a list of the medicines your child takes. At each well-baby visit, talk to your doctor about your baby's nutritional needs, which change as he or she grows and develops. How can you care for yourself at home? · Prepare your supplies for bottle-feeding before your baby is born, if possible. ¨ Have a supply of small bottles (usually 4 ounces) for your baby's first few weeks. ¨ You may want to buy a variety of bottle nipples so you can see which type your baby likes. ¨ Before using bottles and nipples the first time, wash them in hot water and dish soap and rinse with hot water. · Ask your doctor which formula to use. You can buy formula as a liquid concentrate or a powder that you mix with water. Formulas also come in a ready-to-feed form. Always use formula with added iron unless the doctor says not to. · Make sure you have clean, safe water to mix with the formula. If you are not sure if your water is safe, you can use bottled water or you can boil tap water. ¨ Boil cold tap water for 1 minute, then cool the water to room temperature. ¨ Use the boiled water to mix the formula within 30 minutes. · Wash your hands before preparing formula. · Read the label to see how much water to mix with the formula. If you add too little water, it can upset your baby's stomach. If you add too much water, your baby will not get the right nutrition. · Cover the prepared formula and store it in a refrigerator. Use it within 24 hours. · Soak dirty baby bottles in water and dish soap. Wash bottles and nipples in the upper rack of the  or hand-wash them in hot water with dish soap. To bottle-feed your baby · Warm the formula to room temperature or body temperature before feeding. The best way to warm it is in a bowl of heated water. Do not use a microwave, which can cause hot spots in the formula that can burn your baby's mouth. · Before feeding your baby, check the temperature of the formula by dripping 2 or 3 drops on the inside of your wrist. It should be warm, not cold or hot. · Place a bib or cloth under your baby's chin to help keep clothes clean. Have a second cloth handy to use when burping your baby. · Support your baby with one arm, with your baby's head resting in the bend of your elbow. Keep your baby's head higher than his or her chest. 
· Stroke the center of your baby's lower lip to encourage the mouth to open wider. A wide mouth will cover more of the nipple and will help reduce the amount of air the baby sucks in. · Angle the bottle so the neck of the bottle and the nipple stay full of milk. This helps reduce how much air your baby swallows. · Do not prop the bottle in your baby's mouth or let him or her hold it alone. This increases your baby's chances of choking or getting ear infections. · During the first few weeks, burp your baby after every 2 ounces of formula. This helps get rid of swallowed air and reduces spitting up. · You will know your baby is full when he or she stops sucking. Your baby may spit out the nipple, turn his or her head away, or fall asleep when full. [place] babies usually drink from 1 to 3 ounces each feeding. · Throw away any formula left in the bottle after you have fed your baby. Bacteria can grow in the leftover formula. · It can be helpful to hold your baby upright for about 30 minutes after eating to reduce spitting up. When should you call for help? Watch closely for changes in your child's health, and be sure to contact your doctor if: 
? · Your child does not seem to be growing and gaining weight. ? · Your child has trouble passing stools, or his or her stools are hard and dry. ? · Your child is vomiting. ? · Your child has diarrhea or a skin rash. ? · Your child cries most of the time. ? · Your child has gas, bloating, or cramps after drinking a bottle. Where can you learn more? Go to http://sue-michael.info/. Enter P111 in the search box to learn more about \"Bottle-Feeding: Care Instructions. \" Current as of: May 12, 2017 Content Version: 11.4 © 1704-2658 Healthwise, Incorporated. Care instructions adapted under license by [employer] (which disclaims liability or warranty for this information). If you have questions about a medical condition or this instruction, always ask your healthcare professional. [address] any warranty or liability for your use of this information. Please provide this summary of care documentation to your next provider. Signatures-by signing, you are acknowledging that this After Visit Summary has been reviewed with you and you have received a copy. Patient Signature:  ____________________________________________________________ Date:  ____________________________________________________________  
  
Ayaka Sun Provider Signature:  ____________________________________________________________ Date:  ____________________________________________________________

## 2018-02-11 NOTE — PROGRESS NOTES
Patient arrives for shortness of breath that she states has been getting worse since last night. Patient states she has asthma but is not really looked after by a physician for it. TOCO and EFM applied with positive fetal HT. Abdomen is soft and non tender to palpation. Dr Naga Coppola is at bedside assessing patient. 1749: Rapid Response called as patient has having a difficult time breathing. 1815: [place] adjusted. 1827: Patient currently refusing oxygen at this time stating that \" it doesn't help\"  [id number]: Transport here to take patient for x-ray  1915: Patient back from X-ray. 1917: Bedside and Verbal shift change report given to Nate Moreno RN (oncoming nurse) by Omid Tobar RN (offgoing nurse). Report included the following information SBAR.

## 2018-02-11 NOTE — IP AVS SNAPSHOT
[address] Patient: Deb Murillo MRN: [id number] :1985 A check jewels indicates which time of day the medication should be taken. My Medications START taking these medications Instructions Each Dose to Equal  
 Morning Noon Evening Bedtime  
 docusate sodium 100 mg capsule Commonly known as:  Nurysey Ke Start taking on:  2018 Your last dose was: Your next dose is: Take 1 Cap by mouth daily for 90 days. Indications: constipation 100 mg  
    
   
   
   
  
 fluticasone 110 mcg/actuation inhaler Commonly known as:  FLOVENT HFA Your last dose was: Your next dose is: Take 1 Puff by inhalation every twelve (12) hours. 1 Puff  
    
   
   
   
  
 ibuprofen 800 mg tablet Commonly known as:  MOTRIN Your last dose was: Your next dose is: Take 1 Tab by mouth every eight (8) hours as needed. Indications: Pain 800 mg  
    
   
   
   
  
 montelukast 10 mg tablet Commonly known as:  SINGULAIR Your last dose was: Your next dose is: Take 1 Tab by mouth nightly. 10 mg  
    
   
   
   
  
 oxyCODONE-acetaminophen 5-325 mg per tablet Commonly known as:  PERCOCET Your last dose was: Your next dose is: Take 2 Tabs by mouth every four (4) hours as needed. Max Daily Amount: 12 Tabs. Indications: Pain 2 Tab CHANGE how you take these medications Instructions Each Dose to Equal  
 Morning Noon Evening Bedtime * ferrous sulfate 325 mg (65 mg iron) EC tablet Commonly known as:  IRON What changed:  Another medication with the same name was added. Make sure you understand how and when to take each. Your last dose was: Your next dose is: Take 1 Tab by mouth three (3) times daily (with meals). 325 mg  
    
   
   
   
  
 * ferrous sulfate 325 mg (65 mg iron) tablet What changed: You were already taking a medication with the same name, and this prescription was added. Make sure you understand how and when to take each. Your last dose was: Your next dose is: Take 1 Tab by mouth three (3) times daily (with meals). Indications: Iron Deficiency Anemia 325 mg  
    
   
   
   
  
 * Notice: This list has 2 medication(s) that are the same as other medications prescribed for you. Read the directions carefully, and ask your doctor or other care provider to review them with you. CONTINUE taking these medications Instructions Each Dose to Equal  
 Morning Noon Evening Bedtime  
 albuterol 90 mcg/actuation inhaler Commonly known as:  PROVENTIL HFA, VENTOLIN HFA, PROAIR HFA Your last dose was: Your next dose is:    
   
   
 1-2 Puffs. 1-2 Puff Blood-Glucose Meter monitoring kit Your last dose was: Your next dose is:    
   
   
 Test blood sugar four times daily: fasting every morning and two hours after breakfast, lunch and dinner. glucose blood VI test strips strip Commonly known as:  ASCENSIA AUTODISC VI, ONE TOUCH ULTRA TEST VI Your last dose was: Your next dose is:    
   
   
 Test blood sugar four times daily: fasting every morning and two hours after breakfast, lunch and dinner. labetalol 200 mg tablet Commonly known as:  Wendelyn Dakins Your last dose was: Your next dose is: Take 1 Tab by mouth two (2) times a day. Indications: hypertension 200 mg Lancets Misc Your last dose was: Your next dose is:    
   
   
 Test blood sugar four times daily: fasting every morning and two hours after breakfast, lunch and dinner. metFORMIN 500 mg tablet Commonly known as:  GLUCOPHAGE Your last dose was: Your next dose is: Take 1 Tab by mouth two (2) times daily (with meals). Indications: Gestational Diabetes Mellitus 500 mg  
    
   
   
   
  
 terconazole 0.8 % vaginal cream  
Commonly known as:  TERAZOL 3 Your last dose was: Your next dose is: Insert 1 Applicator into vagina nightly. 1 Applicator STOP taking these medications TRIAMTERENE-HYDROCHLOROTHIAZID PO Where to Get Your Medications These medications were sent to [facility] - Иван Carballo, [facility] [address], [address] [facility] [address]  [address] Phone:  [phone]  
  fluticasone 110 mcg/actuation inhaler  
 montelukast 10 mg tablet Information on where to get these meds will be given to you by the nurse or doctor. ! Ask your nurse or doctor about these medications  
  docusate sodium 100 mg capsule  
 ferrous sulfate 325 mg (65 mg iron) tablet  
 ibuprofen 800 mg tablet  
 oxyCODONE-acetaminophen 5-325 mg per tablet

## 2018-02-11 NOTE — PROGRESS NOTES
1640 : Rapid response team in room. Dr J Luis Willams asking questions about breathing problems with patient. Patient able to answer questions in full sentences. 1647: Dr J Luis Willams left room to discuss plan of care with Dr Ronny Irizarryw: Dr Jl Sanchez left room after discussion with patient.

## 2018-02-11 NOTE — IP AVS SNAPSHOT
Summary of Care Report The Summary of Care report has been created to help improve care coordination. Users with access to [employer] or [employer] (Web-based application) may access additional patient information including the Discharge Summary. If you are not currently a [employer] user and need more information, please call the number listed below in the [place] [address] section and ask to be connected with Medical Records. Facility Information Name Address Phone [address] [facility] [address] [phone] Patient Information Patient Name Sex  Frida Santos ([id number]) Female 1985 Discharge Information Admitting Provider Service Area Unit Selwyn Bravo MD / [address] Mother Baby Unit / [phone] Discharge Provider Discharge Date/Time Discharge Disposition Destination (none) (none) (none) (none) Patient Language Language ENGLISH [13] Hospital Problems as of 2018  Reviewed: 2018 11:27 AM by Maria L Nguyen MD  
  
  
  
 Class Noted - Resolved Last Modified POA Active Problems Pregnancy  2018 - Present 2018 by Selwyn Bravo MD Unknown Entered by Emily Shankar MD  
  Shortness of breath  2018 - Present 2018 by Selwyn Bravo MD Unknown Entered by Selwyn Bravo MD  
  Morbid obesity ([facility] [address]  2018 - Present 2018 by Selwyn Bravo MD Unknown Entered by Selwyn Bravo MD  
  Gestational diabetes  2018 - Present 2018 by Selwyn Bravo MD Unknown Entered by Selwyn Bravo MD  
  Antepartum fetal tachycardia affecting care of mother  2018 - Present 2018 by Selwyn Bravo MD Unknown   Entered by Selwyn Bravo MD  
  Hypertension  2018 - Present 2018 by Selwyn Bravo MD Unknown Entered by Brooke Kline MD  
  History of asthma  2/11/2018 - Present 2/11/2018 by Brooke Kline MD Unknown Entered by Brooke Kline MD  
  Asthma attack  2/13/2018 - Present 2/13/2018 by Brooke Kline MD Unknown Entered by Brooke Kline MD  
  
Non-Hospital Problems as of 2/17/2018  Reviewed: 2/17/2018 11:27 AM by Sharif Bruner MD  
  
  
  
 Class Noted - Resolved Last Modified Active Problems Obesity complicating pregnancy, childbirth, or the puerperium, unspecified as to episode of care or not applicable(649.10)  2/1/7678 - Present 9/1/2011 by [facility]. Entered by Buddykatya Sanches. Hyperglycemia  9/1/2011 - Present 9/1/2011 by Buddykatya Sanches. Entered by Buddykatya Sanches. Cough  5/6/2014 - Present 5/6/2014 Entered by Kiarra Carreon MD  
  
You are allergic to the following No active allergies Current Discharge Medication List  
  
START taking these medications Dose & Instructions Dispensing Information Comments  
 fluticasone 110 mcg/actuation inhaler Commonly known as:  FLOVENT HFA Dose:  1 Puff Take 1 Puff by inhalation every twelve (12) hours. Quantity:  1 Inhaler Refills:  1  
   
 montelukast 10 mg tablet Commonly known as:  SINGULAIR Dose:  10 mg Take 1 Tab by mouth nightly. Quantity:  30 Tab Refills:  1 CONTINUE these medications which have NOT CHANGED Dose & Instructions Dispensing Information Comments  
 albuterol 90 mcg/actuation inhaler Commonly known as:  PROVENTIL HFA, VENTOLIN HFA, PROAIR HFA Dose:  1-2 Puff  
1-2 Puffs. Refills:  0 Blood-Glucose Meter monitoring kit Test blood sugar four times daily: fasting every morning and two hours after breakfast, lunch and dinner. Quantity:  1 Kit Refills:  0  
   
 ferrous sulfate 325 mg (65 mg iron) EC tablet Commonly known as:  IRON  Dose:  325 mg  
 Take 1 Tab by mouth three (3) times daily (with meals). Quantity:  90 Tab Refills:  2  
   
 glucose blood VI test strips strip Commonly known as:  ASCENSIA AUTODISC VI, ONE TOUCH ULTRA TEST VI Test blood sugar four times daily: fasting every morning and two hours after breakfast, lunch and dinner. Quantity:  100 Strip Refills:  1  
   
 labetalol 200 mg tablet Commonly known as:  José Miguel Aspen Dose:  200 mg Take 1 Tab by mouth two (2) times a day. Indications: hypertension Quantity:  60 Tab Refills:  3 Lancets Misc Test blood sugar four times daily: fasting every morning and two hours after breakfast, lunch and dinner. Quantity:  100 Each Refills:  1  
   
 metFORMIN 500 mg tablet Commonly known as:  GLUCOPHAGE Dose:  500 mg Take 1 Tab by mouth two (2) times daily (with meals). Indications: Gestational Diabetes Mellitus Quantity:  60 Tab Refills:  1  
   
 terconazole 0.8 % vaginal cream  
Commonly known as:  TERAZOL 3 Dose:  1 Applicator Insert 1 Applicator into vagina nightly. Quantity:  20 g Refills:  1 STOP taking these medications Comments TRIAMTERENE-HYDROCHLOROTHIAZID PO Current Immunizations Name Date Influenza Vaccine (Quad) PF  Deferred () Tdap 2/25/2016 Surgery Information ID Date/Time Status Primary Surgeon All Procedures Location [id number] 2/15/2018 Posted   [facility] - DO NOT SCHEDULE Follow-up Information None Discharge Instructions Learning About Asthma Triggers What are triggers? When you have asthma, certain things can make your symptoms worse. These are called triggers. They include: · Cigarette smoke or air pollution. · Things you are allergic to, such as: 
¨ Pollen, mold, or dust mites. ¨ Pet hair, skin, or saliva. · Illnesses, like colds, flu, or pneumonia. · Exercise. · Dry, cold air. How do triggers affect asthma? Triggers can make it harder for your lungs to work as they should and can lead to sudden difficulty breathing and other symptoms. When you are around a trigger, an asthma attack is more likely. If your symptoms are severe, you may need emergency treatment or have to go to the hospital for treatment. If you know what your triggers are and can avoid them, you may be able to prevent asthma attacks, reduce how often you have them, and make them less severe. What can you do to avoid triggers? The first thing is to know your triggers. When you are having symptoms, note the things around you that might be causing them. Then look for patterns in what may be triggering your symptoms. Record your triggers on a piece of paper or in an asthma diary. When you have your list of possible triggers, work with your doctor to find ways to avoid them. You also can check how well your lungs are working by measuring your peak expiratory flow (PEF) throughout the day. Your PEF may drop when you are near things that trigger symptoms. Here are some ways to avoid a few common triggers. · Do not smoke or allow others to smoke around you. If you need help quitting, talk to your doctor about stop-smoking programs and medicines. These can increase your chances of quitting for good. · If there is a lot of pollution, pollen, or dust outside, stay at home and keep your windows closed. Use an air conditioner or air filter in your home. Check your local weather report or newspaper for air quality and pollen reports. · Get a flu shot every year. Talk to your doctor about getting a pneumococcal shot. Wash your hands often to prevent infections. · Avoid exercising outdoors in cold weather. If you are outdoors in cold weather, wear a scarf around your face and breathe through your nose. How can you manage an asthma attack? · If you have an asthma action plan, follow the plan. In general: ¨ Use your quick-relief inhaler as directed by your doctor. If your symptoms do not get better after you use your medicine, have someone take you to the emergency room. Call an ambulance if needed. ¨ If your doctor has given you other inhaled medicines or steroid pills, take them as directed. Where can you learn more? Go to Smit O[employer].be Enter E297 in the search box to learn more about \"Learning About Asthma Triggers. \"  
© 4155-8466 Healthwise, Incorporated. Care instructions adapted under license by Michele Mansfield (which disclaims liability or warranty for this information). This care instruction is for use with your licensed healthcare professional. If you have questions about a medical condition or this instruction, always ask your healthcare professional. [address] any warranty or liability for your use of this information. Content Version: 36.1.962183; Last Revised: February 23, 2012 Chart Review Routing History Recipient Method Report Sent By Usama Waller [facility] Fax: [phone] Phone: [phone] Fax [facility] IP AMB RESULT REPORT IMAGING Kacieedwina Esparza [[id number]] 2/1/2018  4:31 PM 2/1/2018 [facility] Fax: [phone] Phone: [phone] Fax [facility] IP AMB RESULT REPORT IMAGING Rachelle Vilma [[id number]] 2/14/2018  5:30 PM 2/14/2018

## 2018-02-11 NOTE — PROGRESS NOTES
Rapid Response Note  [facility]    Patient: Deb Murillo 34 y.o. female  [id number]  1985      Admit Date: 2/11/2018   Admission Diagnosis: short of breath  Shortness of breath    RAPID RESPONSE     Rapid response called for SOB. Patient reports feeling SOB with wheezing, ABG before albuterol nebs indicative of PO2 62, patient reports improving symptoms after albuterol. Ms. Bentley Hannon reports a 6 month history of cough, with occasional wheezing when ill. She reports that all of her children are sick and believes she has caught something. History of asthma. Has incontinence when she coughs. Patient resting comfortably in bed, breathing without difficulty on exam. SpO2 100% on 6L NC. Review of Systems   Respiratory: Positive for cough, shortness of breath and wheezing. Cardiovascular: Negative for chest pain and palpitations. Genitourinary: Negative for dysuria and urgency. Some stress incontinence        OBJECTIVE     Visit Vitals    BP (!) 140/98    Pulse (!) 119    Temp 100 °F (37.8 °C)    LMP 05/24/2017    SpO2 97%       Physical Exam   Constitutional: She appears well-developed and well-nourished. No distress. Cardiovascular: Normal rate and regular rhythm. Exam reveals no gallop and no friction rub. No murmur heard. Pulmonary/Chest: Effort normal. No respiratory distress. She has wheezes. She has no rales. Speaking in full sentences without difficulty   Abdominal:   Gravid   Musculoskeletal: She exhibits no edema. Neurological: She is alert. Skin: Skin is warm and dry. No rash noted. She is not diaphoretic. No erythema. No pallor. Medications administered: Duo-nebs    ASSESSMENT, PLAN & DISPOSITION   Deb Murillo is a 34y.o. year old female admitted for short of breath  Shortness of breath. Rapid response called for SOB. Patient condition currently: stable.     Respiratory distress vs Reactive airway vs Asthma  Patient has a history of wheezing with illness which may be secondary to asthma. Patient reports improvement in symptoms following albuterol treatment, SpO2 100% on 6L. Discussed with Dr. Giorgio Denney, inpatient consult to hospitalist and duo-nebs ordered. Labs deferred to Dr. Giorgio Denney at her request.     Disposition: Patient room, no transfer of care at this time     Dr. Giorgio Denney notified of rapid response. In agreement with plan. Primary team resuming care.        Irma Cohen MD  02/11/18 5:36 PM

## 2018-02-11 NOTE — H&P
History & Physical    Name: Amrik Zapien MRN: [id number]  SSN: [id number]    YOB: 1985  Age: 34 y.o. Sex: female        Subjective:     Estimated Date of Delivery: 3/5/18  OB History      Para Term  AB Living    8 6 6  1 6    SAB TAB Ectopic Molar Multiple Live Births    1     6          Ms. Alejandro Campos presents for evaluation of pregnancy at 36w6d for SOB, notes that she has had a cough for the past several months with clear mucus production, notes that it became worse last night, thinks that she had a fever today, + SOB, had asthma as a child, sometimes it get worse when she is sick, had an asthma attack last month, PCP is Dr. Rayne May, is not on any asthma medication, ran out of her albuterol inhaler, last took a nebulizer treatment today before she came to the hospital. Prenatal course was complicated by gestational DM and HTN, suspect possible macrosomia. Fasting blood sugars 115-135 usually, lowest around 96, 2 hour PP usually in the 150s, has been as high as 158. Please see prenatal records for details. Nurse notes cervix 3/50-3. Past Medical History:   Diagnosis Date    Asthma     Community acquired pneumonia     Hypertension     Obese      Past Surgical History:   Procedure Laterality Date    DILATION AND CURETTAGE       No Known Allergies  Prior to Admission medications    Medication Sig Start Date End Date Taking? Authorizing Provider   metFORMIN (GLUCOPHAGE) 500 mg tablet Take 1 Tab by mouth two (2) times daily (with meals). Indications: Gestational Diabetes Mellitus 18   Santiago Guidry MD           Lancets misc Test blood sugar four times daily: fasting every morning and two hours after breakfast, lunch and dinner. 18   Radha Lemus CNM   glucose blood VI test strips (ASCENSIA AUTODISC VI, ONE TOUCH ULTRA TEST VI) strip Test blood sugar four times daily: fasting every morning and two hours after breakfast, lunch and dinner.  18   Radha Marc Earl CNM   Blood-Glucose Meter monitoring kit Test blood sugar four times daily: fasting every morning and two hours after breakfast, lunch and dinner. 1/24/18   Radha Pittman CNM   ferrous sulfate (IRON) 325 mg (65 mg iron) EC tablet Take 1 Tab by mouth three (3) times daily (with meals). 1/24/18   Radha Pittman CNM   albuterol (PROVENTIL HFA, VENTOLIN HFA, PROAIR HFA) 90 mcg/actuation inhaler 1-2 Puffs. 5/4/17   Historical Provider   labetalol (NORMODYNE) 200 mg tablet Take 1 Tab by mouth two (2) times a day. Indications: hypertension 7/18/17   John Burroughs MD              Social History     Occupational History    Not on file. Social History Main Topics    Smoking status: Former Smoker     Packs/day: 0.50     Types: Cigarettes    Smokeless tobacco: Never Used      Comment: cigars twice weekly    Alcohol use No    Drug use: No    Sexual activity: Yes     Partners: Female     Birth control/ protection: None     Family History   Problem Relation Age of Onset   Ina Rhoades Asthma Mother     Hypertension Mother     Diabetes Mother     Asthma Father        Review of Systems: Pertinent items are noted in HPI. + fevers, + sweats, + cough for several months, productive of clear mucus, became worse last night, HA with her cough since last night, SOB, + N/V, + incontinence    Objective:     Vitals:  Vitals:    02/11/18 1601   BP: (!) 140/98   Pulse: (!) 119   Temp: 100 °F (37.8 °C)   SpO2: 97%        Physical Exam:  GENERAL:  Well developed, well nourished, alert, oriented x 3, in no distress  HEENT: Normal cephalic, atraumatic, good dentition, neck supple.  No adenopathy or thyromegaly  CV: tachy and regular rhythm  LUNGS: diffuse wheezing noted B  ABDOMEN: gravid, soft without tenderness, no guarding, rebound or masses  EXTREMITIES: no edema or erythema noted  NEURO: Grossly intact       Membranes:  Intact  Fetal Heart Rate: Baseline: 165 per minute  Variability: moderate  Accelerations: yes  Decelerations: none  Uterine contractions: none    Prenatal Labs:   Lab Results   Component Value Date/Time    Rubella, External Immune 11/19/2012    GrBStrep, External Negative 05/23/2013    HBsAg, External Negative 11/19/2012    HIV, External Negative 11/19/2012    RPR, External Non Reactive 11/19/2012    Gonorrhea, External Negative 11/19/2012    Chlamydia, External Negative 11/19/2012          Recent Results (from the past 24 hour(s))   CBC WITH MANUAL DIFF    Collection Time: 02/11/18  4:00 PM   Result Value Ref Range    WBC 6.3 4.6 - 13.2 K/uL    RBC 4.14 (L) 4.20 - 5.30 M/uL    HGB 9.8 (L) 12.0 - 16.0 g/dL    HCT 30.2 (L) 35.0 - 45.0 %    MCV 72.9 (L) 74.0 - 97.0 FL    MCH 23.7 (L) 24.0 - 34.0 PG    MCHC 32.5 31.0 - 37.0 g/dL    RDW 15.5 (H) 11.6 - 14.5 %    PLATELET 726 441 - 342 K/uL    MPV 10.4 9.2 - 11.8 FL    NEUTROPHILS PENDING %    LYMPHOCYTES PENDING %    MONOCYTES PENDING %    EOSINOPHILS PENDING %    BASOPHILS PENDING %    BAND NEUTROPHILS PENDING %    PROMYELOCYTES PENDING %    MYELOCYTES PENDING %    METAMYELOCYTES PENDING %    BLASTS PENDING %    OTHER CELL PENDING     ABS. NEUTROPHILS PENDING K/UL    ABS. LYMPHOCYTES PENDING K/UL    ABS. MONOCYTES PENDING K/UL    RBC COMMENTS PENDING     DF PENDING     DIFFERENTIAL MANUAL DIFFERENTIAL ORDERED     METABOLIC PANEL, COMPREHENSIVE    Collection Time: 02/11/18  4:00 PM   Result Value Ref Range    Sodium 140 136 - 145 mmol/L    Potassium 3.8 3.5 - 5.5 mmol/L    Chloride 106 100 - 108 mmol/L    CO2 24 21 - 32 mmol/L    Anion gap 10 3.0 - 18 mmol/L    Glucose 89 74 - 99 mg/dL    BUN 4 (L) 7.0 - 18 MG/DL    Creatinine 0.45 (L) 0.6 - 1.3 MG/DL    BUN/Creatinine ratio 9 (L) 12 - 20      GFR est AA >60 >60 ml/min/1.73m2    GFR est non-AA >60 >60 ml/min/1.73m2    Calcium 8.8 8.5 - 10.1 MG/DL    Bilirubin, total 0.3 0.2 - 1.0 MG/DL    ALT (SGPT) 10 (L) 13 - 56 U/L    AST (SGOT) 16 15 - 37 U/L    Alk.  phosphatase 98 45 - 117 U/L Protein, total 6.4 6.4 - 8.2 g/dL    Albumin 2.7 (L) 3.4 - 5.0 g/dL    Globulin 3.7 2.0 - 4.0 g/dL    A-G Ratio 0.7 (L) 0.8 - 1.7     URIC ACID    Collection Time: 02/11/18  4:00 PM   Result Value Ref Range    Uric acid 3.7 2.6 - 7.2 MG/DL   POC G3    Collection Time: 02/11/18  4:29 PM   Result Value Ref Range    Device: ROOM AIR      FIO2 (POC) 21 %    pH (POC) 7.489 (H) 7.35 - 7.45      pCO2 (POC) 28.2 (L) 35.0 - 45.0 MMHG    pO2 (POC) 62 (L) 80 - 100 MMHG    HCO3 (POC) 21.5 (L) 22 - 26 MMOL/L    sO2 (POC) 94 92 - 97 %    Base deficit (POC) 2 mmol/L    Allens test (POC) YES      Site LEFT BRACHIAL      Specimen type (POC) ARTERIAL      Performed by Destiyn Bethea        Assessment/Plan:     Patient Active Problem List   Diagnosis Code    Obesity complicating pregnancy, childbirth, or the puerperium, unspecified as to episode of care or not applicable(649.10) [id number]    Hyperglycemia R73.9    Cough R05    Pregnancy Z34.90       Plan: 36w6d   Uncontrolled asthma with acute exacerbation of SOB--> ABG obtained ASAP ok, called hospitalist Dr. Jose Juan Whaley who was not available to help with pt, rapid response called and came to help stabilize patient, advised to reconsult hospitalist for further management. Called and reconsulted Dr. Jose Juan Whaley who called the nursing supervisor Rei Arellano who advised that the patient be sent to the ED. I voiced concern that the patient is pregnant and should not go to the ED for further evaluation. I advised that pt also has fetal tachycardia and I did not feel it was appropriate for her to go to the ED. She advised me to consult pulmonology Dr. Loreta Sanchez who I have spoken to, has placed orders, reviewed CXR and will see the patient later tonight.    History of untreated asthma--> Not on any medications, has had a chronic cough, became worse over the past few days   Fetal tachycardia--> likely due to the albuterol treatments   GHTN--> Mild range, labs normal, do not think it is pulmonary edema from preeclampsia as her labs are fine   GDM--> will put on diabetic diet, check FSBS fasting and 2 hour PP   No evidence of labor        Signed By:  Lynette Morgan MD     February 11, 2018 3:56 PM

## 2018-02-12 ENCOUNTER — APPOINTMENT (OUTPATIENT)
Dept: ULTRASOUND IMAGING | Age: 33
DRG: 560 | End: 2018-02-12
Attending: OBSTETRICS & GYNECOLOGY
Payer: MEDICAID

## 2018-02-12 LAB
CREAT UR-MCNC: 177 MG/DL (ref 30–125)
GLUCOSE BLD STRIP.AUTO-MCNC: 142 MG/DL (ref 70–110)
GLUCOSE BLD STRIP.AUTO-MCNC: 151 MG/DL (ref 70–110)
GLUCOSE BLD STRIP.AUTO-MCNC: 159 MG/DL (ref 70–110)
GLUCOSE BLD STRIP.AUTO-MCNC: 181 MG/DL (ref 70–110)
GLUCOSE BLD STRIP.AUTO-MCNC: 183 MG/DL (ref 70–110)
LDH SERPL L TO P-CCNC: 181 U/L (ref 81–234)
PROT UR-MCNC: 41 MG/DL
PROT/CREAT UR-RTO: 0.2

## 2018-02-12 PROCEDURE — 77010033678 HC OXYGEN DAILY

## 2018-02-12 PROCEDURE — 74011250637 HC RX REV CODE- 250/637: Performed by: INTERNAL MEDICINE

## 2018-02-12 PROCEDURE — 74011636637 HC RX REV CODE- 636/637: Performed by: INTERNAL MEDICINE

## 2018-02-12 PROCEDURE — 74011250637 HC RX REV CODE- 250/637: Performed by: OBSTETRICS & GYNECOLOGY

## 2018-02-12 PROCEDURE — 65270000029 HC RM PRIVATE

## 2018-02-12 PROCEDURE — 82962 GLUCOSE BLOOD TEST: CPT

## 2018-02-12 PROCEDURE — 87502 INFLUENZA DNA AMP PROBE: CPT | Performed by: INTERNAL MEDICINE

## 2018-02-12 PROCEDURE — 94640 AIRWAY INHALATION TREATMENT: CPT

## 2018-02-12 PROCEDURE — 99218 HC RM OBSERVATION: CPT

## 2018-02-12 PROCEDURE — 74011636637 HC RX REV CODE- 636/637: Performed by: OBSTETRICS & GYNECOLOGY

## 2018-02-12 PROCEDURE — 74011000250 HC RX REV CODE- 250: Performed by: FAMILY MEDICINE

## 2018-02-12 PROCEDURE — 74011000250 HC RX REV CODE- 250: Performed by: INTERNAL MEDICINE

## 2018-02-12 PROCEDURE — 84156 ASSAY OF PROTEIN URINE: CPT | Performed by: OBSTETRICS & GYNECOLOGY

## 2018-02-12 PROCEDURE — 77030032491 HC SLV COMPR SCD KNE XL COVD -B

## 2018-02-12 PROCEDURE — 76815 OB US LIMITED FETUS(S): CPT

## 2018-02-12 RX ORDER — ALBUTEROL SULFATE 0.83 MG/ML
2.5 SOLUTION RESPIRATORY (INHALATION)
Status: DISCONTINUED | OUTPATIENT
Start: 2018-02-12 | End: 2018-02-17 | Stop reason: HOSPADM

## 2018-02-12 RX ORDER — INSULIN LISPRO 100 [IU]/ML
INJECTION, SOLUTION INTRAVENOUS; SUBCUTANEOUS
Status: DISCONTINUED | OUTPATIENT
Start: 2018-02-12 | End: 2018-02-14 | Stop reason: SDUPTHER

## 2018-02-12 RX ORDER — GUAIFENESIN 100 MG/5ML
200 SOLUTION ORAL
Status: DISCONTINUED | OUTPATIENT
Start: 2018-02-12 | End: 2018-02-17 | Stop reason: HOSPADM

## 2018-02-12 RX ADMIN — GUAIFENESIN 200 MG: 100 SOLUTION ORAL at 21:46

## 2018-02-12 RX ADMIN — INSULIN LISPRO 5 UNITS: 100 INJECTION, SOLUTION INTRAVENOUS; SUBCUTANEOUS at 09:45

## 2018-02-12 RX ADMIN — PREDNISONE 40 MG: 20 TABLET ORAL at 08:19

## 2018-02-12 RX ADMIN — INSULIN LISPRO 3 UNITS: 100 INJECTION, SOLUTION INTRAVENOUS; SUBCUTANEOUS at 07:40

## 2018-02-12 RX ADMIN — INSULIN LISPRO 5 UNITS: 100 INJECTION, SOLUTION INTRAVENOUS; SUBCUTANEOUS at 13:47

## 2018-02-12 RX ADMIN — PRENATAL VITAMINS-IRON FUMARATE 27 MG IRON-FOLIC ACID 0.8 MG TABLET 1 TABLET: at 16:36

## 2018-02-12 RX ADMIN — IPRATROPIUM BROMIDE AND ALBUTEROL SULFATE 3 ML: 2.5; .5 SOLUTION RESPIRATORY (INHALATION) at 07:17

## 2018-02-12 RX ADMIN — INSULIN LISPRO 3 UNITS: 100 INJECTION, SOLUTION INTRAVENOUS; SUBCUTANEOUS at 18:14

## 2018-02-12 RX ADMIN — IPRATROPIUM BROMIDE AND ALBUTEROL SULFATE 3 ML: 2.5; .5 SOLUTION RESPIRATORY (INHALATION) at 00:02

## 2018-02-12 RX ADMIN — GUAIFENESIN 200 MG: 100 SOLUTION ORAL at 16:36

## 2018-02-12 RX ADMIN — IPRATROPIUM BROMIDE AND ALBUTEROL SULFATE 3 ML: 2.5; .5 SOLUTION RESPIRATORY (INHALATION) at 04:46

## 2018-02-12 RX ADMIN — DOCUSATE SODIUM 100 MG: 100 CAPSULE, LIQUID FILLED ORAL at 18:21

## 2018-02-12 RX ADMIN — BUDESONIDE 1000 MCG: 0.5 INHALANT RESPIRATORY (INHALATION) at 20:00

## 2018-02-12 RX ADMIN — DOCUSATE SODIUM 100 MG: 100 CAPSULE, LIQUID FILLED ORAL at 08:19

## 2018-02-12 RX ADMIN — MONTELUKAST SODIUM 10 MG: 10 TABLET, COATED ORAL at 21:34

## 2018-02-12 RX ADMIN — BUDESONIDE 1000 MCG: 0.5 INHALANT RESPIRATORY (INHALATION) at 07:17

## 2018-02-12 NOTE — PROGRESS NOTES
Pt sitting HF for ease of breathing unable to monitor FHT due to pt size and position. Pt will not lay back to allow for monitoring  (charted in error on wrong pt 2230 Assisted with epidural replacement.  Pt tolerated procedure well.)

## 2018-02-12 NOTE — PROGRESS NOTES
Hospitalist Progress Note    Patient: Adriane Ferreira MRN: [id number]  CSN: [id number]    YOB: 1985  Age: 34 y.o. Sex: female    DOA: 2/11/2018 LOS:  LOS: 0 days          Influenza negative. She states she feels better, now able to cough and expectorate sputum. Using ics, reinforced. Appetite is good and denies constipation. Assessment/Plan     1. Severe persistent asthma with mild exacerbation - steroids, BD, pulmonary input appreciated. 2. Acute bronchitis improving  3. 36 weeks gestation, IOL per ob / gyn.   4. Obesity morbid  5. DM2 A1c 6.5 - ssi, ADA diet. 6. Anemia microcytic hypochromic  7. Full code. Additional Notes:      Case discussed with:  [x]Patient  []Family  [x]Nursing  []Case Management    Vital signs/Intake and Output:  Visit Vitals    /42    Pulse (!) 132    Temp 99 °F (37.2 °C)    Resp 18    SpO2 93%     Current Shift:     Last three shifts:       Awake alert and oriented. Ncat. perrl  RRR  cta b.l  Abdomen gravid  No edema. dp 2+ b.l  No focal defiicit  No rash    Medications Reviewed      Labs: Results:       Chemistry Recent Labs      02/11/18   1600   GLU  89   NA  140   K  3.8   CL  106   CO2  24   BUN  4*   CREA  0.45*   CA  8.8   AGAP  10   BUCR  9*   AP  98   TP  6.4   ALB  2.7*   GLOB  3.7   AGRAT  0.7*      CBC w/Diff Recent Labs      02/11/18   1600   WBC  6.3   RBC  4.14*   HGB  9.8*   HCT  30.2*   PLT  182   GRANS  86*   LYMPH  10*   EOS  0      Cardiac Enzymes No results for input(s): CPK, CKND1, ROXANNA in the last 72 hours. No lab exists for component: CKRMB, TROIP   Coagulation No results for input(s): PTP, INR, APTT in the last 72 hours. No lab exists for component: INREXT    Lipid Panel No results found for: CHOL, CHOLPOCT, CHOLX, CHLST, CHOLV, 214713, HDL, LDL, LDLC, DLDLP, 085112, VLDLC, VLDL, TGLX, TRIGL, TRIGP, TGLPOCT, CHHD, CHHDX   BNP No results for input(s): BNPP in the last 72 hours.    Liver Enzymes Recent Labs      02/11/18 1600   TP  6.4   ALB  2.7*   AP  98   SGOT  16      Thyroid Studies No results found for: T4, T3U, TSH, TSHEXT     Procedures/imaging: see electronic medical records for all procedures/Xrays and details which were not copied into this note but were reviewed prior to creation of Plan.

## 2018-02-12 NOTE — PROGRESS NOTES
Ante Partum Progress Note    Jesse Finley  37w0d    Patient admitted for acute asthma exacerbation 2/2 URI s/p rapid response for hypoxia, s/p pulmonology consults. states she does not  have  anymore shortness of breath. Just mild wheezing, chest congestion and cough. denies f/c, chest pain. Her pregnancy is  also complicated by GDM diet controlled, and chtn on labetalol, and morbid obesity (BMI 62)      LOS:  HD 2      Vitals: Temp (24hrs), Av °F (37.8 °C), Min:99 °F (37.2 °C), Max:100.9 °F (38.3 °C)   Patient Vitals for the past 24 hrs:   BP   18 0950 120/42   18 0259 (!) 108/35   18 1644 (!) 140/98   18 1601 (!) 140/98   18 1600 146/74                  Gen: No acute distress    Non stress test:  155 bpm, moderate variability, no accels, no decels    Uterine Activity: None     Additional Exam:   Heart: RRR  Lungs: low breath sounds in the lower lobes bilaterally, no wheezes, rubs, or gallops.    Extremities: no calf tenderness    Labs:     Lab Results   Component Value Date/Time    WBC 6.3 2018 04:00 PM    WBC 6.6 2018 12:44 PM    WBC 6.4 2018 12:00 AM    HGB 9.8 (L) 2018 04:00 PM    HGB 9.7 (L) 2018 12:44 PM    HGB 9.5 (L) 2018 12:00 AM    HCT 30.2 (L) 2018 04:00 PM    HCT 30.4 (L) 2018 12:44 PM    HCT 30.7 (L) 2018 12:00 AM    PLATELET 156  04:00 PM    PLATELET 974  12:44 PM    PLATELET 911  12:00 AM       Recent Results (from the past 24 hour(s))   CBC WITH MANUAL DIFF    Collection Time: 18  4:00 PM   Result Value Ref Range    WBC 6.3 4.6 - 13.2 K/uL    RBC 4.14 (L) 4.20 - 5.30 M/uL    HGB 9.8 (L) 12.0 - 16.0 g/dL    HCT 30.2 (L) 35.0 - 45.0 %    MCV 72.9 (L) 74.0 - 97.0 FL    MCH 23.7 (L) 24.0 - 34.0 PG    MCHC 32.5 31.0 - 37.0 g/dL    RDW 15.5 (H) 11.6 - 14.5 %    PLATELET 901 367 - 096 K/uL    MPV 10.4 9.2 - 11.8 FL    NEUTROPHILS 86 (H) 42 - 75 %    BAND NEUTROPHILS 0 0 - 5 % LYMPHOCYTES 10 (L) 20 - 51 %    MONOCYTES 4 2 - 9 %    EOSINOPHILS 0 0 - 5 %    BASOPHILS 0 0 - 3 %    METAMYELOCYTES 0 0 %    MYELOCYTES 0 0 %    PROMYELOCYTES 0 0 %    BLASTS 0 0 %    OTHER CELL 0 0      ABS. NEUTROPHILS 5.4 1.8 - 8.0 K/UL    ABS. LYMPHOCYTES 0.6 (L) 0.8 - 3.5 K/UL    ABS. MONOCYTES 0.3 0 - 1.0 K/UL    DF MANUAL      PLATELET COMMENTS ADEQUATE PLATELETS      RBC COMMENTS ANISOCYTOSIS  1+        DIFFERENTIAL MANUAL DIFFERENTIAL ORDERED     METABOLIC PANEL, COMPREHENSIVE    Collection Time: 02/11/18  4:00 PM   Result Value Ref Range    Sodium 140 136 - 145 mmol/L    Potassium 3.8 3.5 - 5.5 mmol/L    Chloride 106 100 - 108 mmol/L    CO2 24 21 - 32 mmol/L    Anion gap 10 3.0 - 18 mmol/L    Glucose 89 74 - 99 mg/dL    BUN 4 (L) 7.0 - 18 MG/DL    Creatinine 0.45 (L) 0.6 - 1.3 MG/DL    BUN/Creatinine ratio 9 (L) 12 - 20      GFR est AA >60 >60 ml/min/1.73m2    GFR est non-AA >60 >60 ml/min/1.73m2    Calcium 8.8 8.5 - 10.1 MG/DL    Bilirubin, total 0.3 0.2 - 1.0 MG/DL    ALT (SGPT) 10 (L) 13 - 56 U/L    AST (SGOT) 16 15 - 37 U/L    Alk.  phosphatase 98 45 - 117 U/L    Protein, total 6.4 6.4 - 8.2 g/dL    Albumin 2.7 (L) 3.4 - 5.0 g/dL    Globulin 3.7 2.0 - 4.0 g/dL    A-G Ratio 0.7 (L) 0.8 - 1.7     URIC ACID    Collection Time: 02/11/18  4:00 PM   Result Value Ref Range    Uric acid 3.7 2.6 - 7.2 MG/DL   LD    Collection Time: 02/11/18  4:00 PM   Result Value Ref Range     81 - 234 U/L   HEMOGLOBIN A1C WITH EAG    Collection Time: 02/11/18  4:00 PM   Result Value Ref Range    Hemoglobin A1c 6.5 (H) 4.2 - 5.6 %    Est. average glucose 140 mg/dL   POC G3    Collection Time: 02/11/18  4:29 PM   Result Value Ref Range    Device: ROOM AIR      FIO2 (POC) 21 %    pH (POC) 7.489 (H) 7.35 - 7.45      pCO2 (POC) 28.2 (L) 35.0 - 45.0 MMHG    pO2 (POC) 62 (L) 80 - 100 MMHG    HCO3 (POC) 21.5 (L) 22 - 26 MMOL/L    sO2 (POC) 94 92 - 97 %    Base deficit (POC) 2 mmol/L    Allens test (POC) YES      Site LEFT BRACHIAL      Specimen type (POC) ARTERIAL      Performed by [address] (RAPID TEST)    Collection Time: 02/11/18  8:57 PM   Result Value Ref Range    Influenza A Antigen NEGATIVE  NEG      Influenza B Antigen NEGATIVE  NEG     GLUCOSE, POC    Collection Time: 02/11/18  9:28 PM   Result Value Ref Range    Glucose (POC) 106 70 - 110 mg/dL   RUPTURE OF FETAL MEMBRANES, POC    Collection Time: 02/11/18 10:21 PM   Result Value Ref Range    Rupture of fetal membrane Negative Negative    Control line present? Yes     Internal neg. control Negative     Kit Lot No. [id number]     Expiration date 6/1/2019    GLUCOSE, POC    Collection Time: 02/11/18 11:57 PM   Result Value Ref Range    Glucose (POC) 142 (H) 70 - 110 mg/dL   PROTEIN/CREATININE RATIO, URINE    Collection Time: 02/12/18  7:15 AM   Result Value Ref Range    Protein, urine random 41 (H) <11.9 mg/dL    Creatinine, urine 177.00 (H) 30 - 125 mg/dL    Protein/Creat. urine Ratio 0.2     GLUCOSE, POC    Collection Time: 02/12/18  7:19 AM   Result Value Ref Range    Glucose (POC) 159 (H) 70 - 110 mg/dL   GLUCOSE, POC    Collection Time: 02/12/18  9:42 AM   Result Value Ref Range    Glucose (POC) 183 (H) 70 - 110 mg/dL       Assessment:  [id number] @ 37w0d with chtn, GDM, and morbid obesity, admitted for acute asthma exacerbation 2/2 to likely viral bronchitis    Plan:   Viral bronchitis: afebrile, CXR wnl (although limited exam 2/2 patient's body habitus), rapid influenza test neg, influenza PCR pending. Continue conservative management. Gaunifesin for chest congestion  Asthma: sating % RA, s/p pulm consult. Continue prednisone 40mg [id number], Pulmicort 1mg BID, and singular 10mg every day per pulm recommendations  GDM: diet controlled, elevated BS likely 2/2 to amari, s/p diabetic teaching and counseling. Continue SSI.  FS 4x/day  CHTN: on labetalol 200mg BID at home, no currently on any BP meds, BPs 120-140/70-80a, will restart if persistently elevated. Pr/Cr 0.2 (1/11)  OB: fetal status reassuring, keep on the monitr untile reactive, will continue intermittent NST, unable to 2/2 continuous monitoring 2/2 to frequent loss of contact 2/2 patient's body habitus.  If stable, possible BPP, and biometry today or tomorrow am. Plan for IOL this  Friday Feb 16th,       Jessica Coffman MD  8/60/[id number]:47 AM

## 2018-02-12 NOTE — PROGRESS NOTES
SHIFT SUMMARY REPORT [id number]:    1674: TRANSFER - IN REPORT:    Verbal report received from MADDIE Vital(name) on Jesse Finley  being received from Labor and Delivery(unit) for routine progression of care      Report consisted of patients Situation, Background, Assessment and   Recommendations(SBAR). Information from the following report(s) SBAR was reviewed with the receiving nurse. Opportunity for questions and clarification was provided. Assessment completed upon patients arrival to unit and care assumed. 1749: Oriented to room, policies, call bell, TV, phone, bed controls, emergency call lights, expected progression of care. 1804: [id number].    1814: 3 units humalog insulin given per slidibng scale, double check by MADDIE Her RN.    4864: I called RT to come and given patient a nebulizer treatment. 1830: RT here. 1916: Verbal and bedside report given to oncoming RN, Prasanna Dawson, using SBAR, Kardex, and MAR.

## 2018-02-12 NOTE — PROGRESS NOTES
Patient back from ultrasound, with BPP of 8/8. EFW 3584g (93%), fundal placenta,  BRIONNA 7.3cm. Will transfer to Mother/Baby. Continue current management.

## 2018-02-12 NOTE — PROGRESS NOTES
1967 Assumed care of patient awake and alert in bed s/p receiving Nebulizers by Resp. States she feels much better and is asking when she will be discharged. Fasting blood sugar 159. Dr. Us File notified. Patient given sliding scale insulin as ordered and Metformin to be held. Continuing Blood sugar protocol as ordered. Abdomen palpates soft to exam. Denies any bleeding, leakage of fluid, decreased fetal movement or contraction pain. Will attempt to monitor baby after patient eats breakfast.     0920 Patient update given to Dr. Stacey Heck. 0940 Blood sugar 183. Insulin given per sliding scale protocol. Patient placed on monitor for NST    1126 Dr. Stacey Heck in to see patient. Patient started on incentive spirometry. Plan of care discussed with patient. Patient is amenable to plan    1307 SCD placed on patient. 1419 Unable to obtain a reactive NST. BPP ordered    1501 Patient taken downstairs for BPP    1558 Patient returned from Ultrasound. Dr. Stacey Heck aware of BPP and ultrasound results. 1751 TRANSFER - OUT REPORT:    Verbal report given to Bobby on [employer]  being transferred to [facility] for routine progression of care       Report consisted of patients Situation, Background, Assessment and   Recommendations(SBAR). Information from the following report(s) Kardex, Intake/Output, MAR and Recent Results was reviewed with the receiving nurse. Lines:       Opportunity for questions and clarification was provided.       Patient transported with:   Registered Nurse

## 2018-02-12 NOTE — PROGRESS NOTES
1930: Assumed care of patient returning to unit from xray. Patient is requesting to go home. Patient is sitting up in chair and will not allow me to apply EFM or TOCO. Notified Dr. Baltazar Gerber on unit. Will speak with patient  2015: Dr. Baltazar Gerber @ bedside to discuss plan of care: flu swab, intermittent monitoring, consult pulmonology. Patient is agreeable. Requesting to take a shower before being placed back on monitors. 2030: Flu swab collected and sent to lab. Actim Prom negative  2143: Patient c/o SOB. Bilateral wheezing present. Breathing treatments administered per MD order  9457: Patient's temp 100.9; -130s. FHR 180s. Notified Dr. Baltazar Gerber. Will do continuous monitoring, give tylenol,  and notify Dr. Levar Blanco with pulmonology. Dr. Levar Blanco paged. 2340: FHR elevated. Supplemental oxygen given   2347: Dr. Levar Blanco and hospitalist made aware of patient's temperature of 100.9. No new orders received  0240: Notified Dr. Baltazar Gerber that patient is refusing continuous monitoring at this time. Per Dr. Baltazar Gerber patient needs a reassuring 20 min strip every shift and then PRN as patient allows. 4030: Discussed fetal monitoring. Patient declined at this time.

## 2018-02-12 NOTE — CONSULTS
[address] [place] [address] [facility]  Pulmonary, Critical Care, and Sleep Medicine    Initial Patient Consult    Name: Luz Rodriguez MRN: [id number]   : 1985 Hospital: [facility]   Date: 2018        Subjective: This patient has been seen and evaluated at the request of Dr. Mee Mcgrath for Asthma exacerbation. History provided from patient. Patient is a 34 y.o. female with a history of asthma, HTN, and is currently 36 weeks pregnant, who presented to the hospital through L&D with complaints of shortness of breath that developed yesterday. Patient states that she also has had a cough that has persisted over the last few months with clear mucous production. Yesterday the cough and shortness of breath and wheezing became worse, and now with a post nasal drip. Patient states that she has a known history of asthma. Uses albuterol inhalers and sometimes nebulizers as needed for wheezing and shortness of breath. No maintainence inhalers. States that asthma seems to give her more trouble when she is pregnant. Also admits to waking up about four times a month at night with asthma symptoms and shortness of breath. Patient states that her children who she lives with has had respiratory viruses and colds passed to each other and now she is presenting with the same symptoms. Children has not been tested for flu. She also reports that she has had some diaphoresis at home as well as body aches. No high fever noted at home. Temperature on presentation was 100. 0'F, B/P 146/74, O2 Sats 97%, and HR 98. Patient has a history of HTN, gestational diabetes, morbid obesity. Former smoker. States she follows with her PCP, but not really for asthma maintenance and does not have a pulmonologist that she regularly goes to for her asthma.       Past Medical History:   Diagnosis Date    Asthma     Community acquired pneumonia     Hypertension     Obese       Past Surgical History:   Procedure Laterality Date    DILATION AND CURETTAGE        Prior to Admission medications    Medication Sig Start Date End Date Taking? Authorizing Provider   metFORMIN (GLUCOPHAGE) 500 mg tablet Take 1 Tab by mouth two (2) times daily (with meals). Indications: Gestational Diabetes Mellitus 2/6/18  Yes Malina Ding MD   Lancets misc Test blood sugar four times daily: fasting every morning and two hours after breakfast, lunch and dinner. 1/24/18  Yes Radha Lemus CNM   glucose blood VI test strips (ASCENSIA AUTODISC VI, ONE TOUCH ULTRA TEST VI) strip Test blood sugar four times daily: fasting every morning and two hours after breakfast, lunch and dinner. 1/24/18  Yes Radha Lemus CNM   Blood-Glucose Meter monitoring kit Test blood sugar four times daily: fasting every morning and two hours after breakfast, lunch and dinner. 1/24/18  Yes Radha Lemus CNM   ferrous sulfate (IRON) 325 mg (65 mg iron) EC tablet Take 1 Tab by mouth three (3) times daily (with meals). 1/24/18  Yes Radha Lemus CNM   albuterol (PROVENTIL HFA, VENTOLIN HFA, PROAIR HFA) 90 mcg/actuation inhaler 1-2 Puffs. 5/4/17  Yes Historical Provider   labetalol (NORMODYNE) 200 mg tablet Take 1 Tab by mouth two (2) times a day. Indications: hypertension 7/18/17  Yes Malina Ding MD   terconazole (TERAZOL 3) 0.8 % vaginal cream Insert 1 Applicator into vagina nightly. 2/6/18   Malina Ding MD   TRIAMTERENE-HYDROCHLOROTHIAZID PO Take  by mouth.     Aman Campbell MD     No Known Allergies   Social History   Substance Use Topics    Smoking status: Former Smoker     Packs/day: 0.50     Types: Cigarettes    Smokeless tobacco: Never Used      Comment: cigars twice weekly    Alcohol use No      Family History   Problem Relation Age of Onset   [facility] Asthma Mother     Hypertension Mother     Diabetes Mother     Asthma Father      Current Facility-Administered Medications   Medication Dose Route Frequency    albuterol-ipratropium (DUO-NEB) 2.5 MG-0.5 MG/3 ML  3 mL Nebulization Q4H RT    budesonide (PULMICORT) 500 mcg/2 ml nebulizer suspension  1,000 mcg Nebulization BID RT    predniSONE (DELTASONE) tablet 40 mg  40 mg Oral DAILY WITH BREAKFAST    sodium chloride (NS) flush 5-10 mL  5-10 mL IntraVENous Q8H    docusate sodium (COLACE) capsule 100 mg  100 mg Oral BID    [START ON 2018] prenatal vit-iron fumarate-fa tablet 1 Tab  1 Tab Oral DAILY    [START ON 2018] metFORMIN (GLUCOPHAGE) tablet 500 mg  500 mg Oral BID WITH MEALS       Review of Systems:  A comprehensive review of systems was negative except for: Constitutional: positive for malaise  Respiratory: positive for cough, sputum, asthma or wheezing  Genitourinary: positive for pregnancy    Objective:   Vital Signs:    Visit Vitals    BP (!) 140/98    Pulse (!) 119    Temp 100 °F (37.8 °C)    LMP 2017    SpO2 99%               Temp (24hrs), Av °F (37.8 °C), Min:100 °F (37.8 °C), Max:100 °F (37.8 °C)       Intake/Output:   Last shift:         Last 3 shifts:    No intake or output data in the 24 hours ending 18   Physical Exam:   General:  Alert, cooperative, no distress, appears stated age. Head:  Normocephalic, without obvious abnormality, atraumatic. Eyes:  Conjunctivae/corneas clear. PERRL, EOMs intact. Throat: Lips, mucosa, and tongue normal.    Neck: Supple, symmetrical, trachea midline, no JVD. Lungs:    Mild wheezes in upper lobes, diminshed in mid and lower lobes. Heart:  Regular rate and rhythm, S1, S2 normal, no murmur, click, rub or gallop. Abdomen:   Soft, round, non-tender. Extremities: Extremities normal, atraumatic, no cyanosis or edema. Pulses: 2+ and symmetric all extremities.    Skin: Skin color, texture, turgor normal. No rashes or lesions   Neurologic: Grossly nonfocal     Data review:     Recent Results (from the past 24 hour(s))   CBC WITH MANUAL DIFF    Collection Time: 18  4:00 PM   Result Value Ref Range    WBC 6.3 4.6 - 13.2 K/uL    RBC 4.14 (L) 4.20 - 5.30 M/uL    HGB 9.8 (L) 12.0 - 16.0 g/dL    HCT 30.2 (L) 35.0 - 45.0 %    MCV 72.9 (L) 74.0 - 97.0 FL    MCH 23.7 (L) 24.0 - 34.0 PG    MCHC 32.5 31.0 - 37.0 g/dL    RDW 15.5 (H) 11.6 - 14.5 %    PLATELET 909 649 - 031 K/uL    MPV 10.4 9.2 - 11.8 FL    NEUTROPHILS PENDING %    LYMPHOCYTES PENDING %    MONOCYTES PENDING %    EOSINOPHILS PENDING %    BASOPHILS PENDING %    BAND NEUTROPHILS PENDING %    PROMYELOCYTES PENDING %    MYELOCYTES PENDING %    METAMYELOCYTES PENDING %    BLASTS PENDING %    OTHER CELL PENDING     ABS. NEUTROPHILS PENDING K/UL    ABS. LYMPHOCYTES PENDING K/UL    ABS. MONOCYTES PENDING K/UL    RBC COMMENTS PENDING     DF PENDING     DIFFERENTIAL MANUAL DIFFERENTIAL ORDERED     METABOLIC PANEL, COMPREHENSIVE    Collection Time: 02/11/18  4:00 PM   Result Value Ref Range    Sodium 140 136 - 145 mmol/L    Potassium 3.8 3.5 - 5.5 mmol/L    Chloride 106 100 - 108 mmol/L    CO2 24 21 - 32 mmol/L    Anion gap 10 3.0 - 18 mmol/L    Glucose 89 74 - 99 mg/dL    BUN 4 (L) 7.0 - 18 MG/DL    Creatinine 0.45 (L) 0.6 - 1.3 MG/DL    BUN/Creatinine ratio 9 (L) 12 - 20      GFR est AA >60 >60 ml/min/1.73m2    GFR est non-AA >60 >60 ml/min/1.73m2    Calcium 8.8 8.5 - 10.1 MG/DL    Bilirubin, total 0.3 0.2 - 1.0 MG/DL    ALT (SGPT) 10 (L) 13 - 56 U/L    AST (SGOT) 16 15 - 37 U/L    Alk.  phosphatase 98 45 - 117 U/L    Protein, total 6.4 6.4 - 8.2 g/dL    Albumin 2.7 (L) 3.4 - 5.0 g/dL    Globulin 3.7 2.0 - 4.0 g/dL    A-G Ratio 0.7 (L) 0.8 - 1.7     URIC ACID    Collection Time: 02/11/18  4:00 PM   Result Value Ref Range    Uric acid 3.7 2.6 - 7.2 MG/DL   POC G3    Collection Time: 02/11/18  4:29 PM   Result Value Ref Range    Device: ROOM AIR      FIO2 (POC) 21 %    pH (POC) 7.489 (H) 7.35 - 7.45      pCO2 (POC) 28.2 (L) 35.0 - 45.0 MMHG    pO2 (POC) 62 (L) 80 - 100 MMHG    HCO3 (POC) 21.5 (L) 22 - 26 MMOL/L    sO2 (POC) 94 92 - 97 % Base deficit (POC) 2 mmol/L    Allens test (POC) YES      Site LEFT BRACHIAL      Specimen type (POC) ARTERIAL      Performed by [email or web address]        Imaging:  I have personally reviewed the patients radiographs and have reviewed the reports:  18 - Chest Xray ordered by OB is pending    IMPRESSION:   · Asthma Exacerbation 2' to Viral Bronchitis  · Severe Persistant Asthma  · Mild Hypoxia 2' to above  · 36w 6d for Intrauterine Pregnancy, /T6  · Morbid Obesity  · Hx HTN       RECOMMENDATIONS:   · Start Duo-nebs q 4 hrs schedule with albuterol prn   · Pulmicort nebs 1 mg BID  · Start prednisone 40 mg x 1 daily for five days, dicussed with OB service - unable to give solumedrol due to pregnancy. · Start Singular 10 mg now and once daily, please continue on discharge. · Upon discharge please start either Advair 500/50 1 puff BID or Symbicort 160/4.5 2 puffs BID or Breo 200/25 1 puff once daily. Contact case mangement for insurance coverage information regarding inhalers. · Check flu PCR now. · Counseled patient on rinsing mouth after use of daily inhalers to prevent thrush after each use of maintainence inhalers. · Counseled patient to avoid potential triggers of asthma. ·  patient avoid use of tobacco.  · Frequent ICS. · OOB to chair ambulate as tolerated. · Please maintain supplemental oxygen as needed to keep SpO2 94% or greater. · On discharge please ambulate patient to see if she qualifies or oxygen for home use. · Please schedule patient for Pulmonary clinic follow up in 2-4 wks. · Case discussed with Dr. Andreina Jerome, Dr. aBbak Harvey, and Dr. Irais Lam  · Thank for consult will follow with you.       Belen Wang [facility]     Pulmonary, Critical Care Medicine  [facility]

## 2018-02-12 NOTE — PROGRESS NOTES
Found pt standing at side of bed with monitors off, pt states \"I can't stay like this, I toss and turn all night\" Reassured pt that Dr Lisbeth Rivas is coming in for a delivery and she could speak with her at that time.

## 2018-02-12 NOTE — CONSULTS
Hospitalist Consultation Note    NAME:  Johanne Griffin   :   1985   MRN:   [id number]     ATTENDING: Norma Evans MD  PCP:  Quentin Greene MD    Date/Time:  2018 8:35 PM      Recommendations/Plan:       Active Problems:    Shortness of breath (2018)      Morbid obesity ([address] (2018)      Gestational diabetes (2018)      Antepartum fetal tachycardia affecting care of mother (2018)      Hypertension (2018)      History of asthma (2018)         1. Mild asthma exacerbation  2. Acute sinusitis with post nasal drip  3. 36 weeks of gestation, not in active labor  4. Morbid obesity  5. Type 2 DM    1. Provide prednisone as already ordered per pulm; appreciated  2. Agree with duonebs  3. flonase for sinusitis  4. Will avoid abx as there is no evidence of current infection d/t symptoms being present for less than 1 day. Would also like to avoid such during pregnancy. 5. This is likely stimulated by normal sinusitis noted in most pregnancies  6. Can go home with the prednisone taper, flonase, and an albuterol inhaler from medicine standpoint. 7. Will add correctional coverage for hyperglycemia while on prednisone - can take a total of 5 days (if leaving tomorrow, discharge with 4 days of 40 mg of prednisone)  8. Will defer to primary team for dispo. Code Status: full  DVT Prophylaxis: per primary team          Subjective:   REQUESTING PHYSICIAN: Dr. Klarissa Evans: Mariama Bailey is a 34 y.o.  female who I was asked to see for shortness of breath. Pt is at her 36th week of pregnancy and was reportedly coming for a routine Obstetric evaluation. Pt reported that she was short of breath during this clinic visit and Medicine was initially called to assess. Due to patient not being inpatient at the time, it was recommended that a rapid response or code green be called to immediately address any emergent need.  Per the resident note, pt improved after nebs and was never hypoxic. Pt has been coughing for 6 months and her wheezing is not new. Per report, pt had been stabilized. Medicine was called again to assure pt was assessed. Once able to get to patient's bedside, she was ambulatory in her room, had been able to take a shower and was speaking in complete sentences without O2 provision. She did endorse having some wheezing. She states that she has that from time to time due to her known asthma. By this point, the decision had been made to admit the patient to L&D for \"shortness of breath. \" Pt denies being in labor currently. Pulmonary was also consulted and had already written for prednisone. Past Medical History:   Diagnosis Date    Asthma     Community acquired pneumonia     Hypertension     Obese       Past Surgical History:   Procedure Laterality Date    DILATION AND CURETTAGE       Social History   Substance Use Topics    Smoking status: Former Smoker     Packs/day: 0.50     Types: Cigarettes    Smokeless tobacco: Never Used      Comment: cigars twice weekly    Alcohol use No      Family History   Problem Relation Age of Onset    Asthma Mother     Hypertension Mother     Diabetes Mother     Asthma Father        No Known Allergies   Prior to Admission medications    Medication Sig Start Date End Date Taking? Authorizing Provider   metFORMIN (GLUCOPHAGE) 500 mg tablet Take 1 Tab by mouth two (2) times daily (with meals). Indications: Gestational Diabetes Mellitus 2/6/18  Yes Sarina Burrows MD   Lancets misc Test blood sugar four times daily: fasting every morning and two hours after breakfast, lunch and dinner. 1/24/18  Yes Radha Lmeus CNM   glucose blood VI test strips (ASCENSIA AUTODISC VI, ONE TOUCH ULTRA TEST VI) strip Test blood sugar four times daily: fasting every morning and two hours after breakfast, lunch and dinner.  1/24/18  Yes Radha Lemus CNM   Blood-Glucose Meter monitoring kit Test blood sugar four times daily: fasting every morning and two hours after breakfast, lunch and dinner. 1/24/18  Yes Radha Lemus CNM   ferrous sulfate (IRON) 325 mg (65 mg iron) EC tablet Take 1 Tab by mouth three (3) times daily (with meals). 1/24/18  Yes Radha Lemus CNM   albuterol (PROVENTIL HFA, VENTOLIN HFA, PROAIR HFA) 90 mcg/actuation inhaler 1-2 Puffs. 5/4/17  Yes Historical Provider   labetalol (NORMODYNE) 200 mg tablet Take 1 Tab by mouth two (2) times a day. Indications: hypertension 7/18/17  Yes Coleen Laird MD   terconazole (TERAZOL 3) 0.8 % vaginal cream Insert 1 Applicator into vagina nightly. 2/6/18   Coleen Laird MD   TRIAMTERENE-HYDROCHLOROTHIAZID PO Take  by mouth. Aman Campbell MD       REVIEW OF SYSTEMS:     Total of 12 systems reviewed as follows:   I am not able to complete the review of systems because:    The patient is intubated and sedated    The patient has altered mental status due to his acute medical problems    The patient has baseline aphasia from prior stroke(s)    The patient has baseline dementia and is not reliable historian                 POSITIVE= underlined text  Negative = text not underlined  General:  fever, chills, sweats, generalized weakness, weight loss/gain,      loss of appetite   Eyes:    blurred vision, eye pain, loss of vision, double vision  ENT:    rhinorrhea, pharyngitis   Respiratory:   cough, sputum production, SOB, wheezing, TIRADO, pleuritic pain   Cardiology:   chest pain, palpitations, orthopnea, PND, edema, syncope   Gastrointestinal:  abdominal pain , N/V, dysphagia, diarrhea, constipation, bleeding   Genitourinary:  frequency, urgency, dysuria, hematuria, incontinence   Muskuloskeletal :  arthralgia, myalgia   Hematology:  easy bruising, nose or gum bleeding, lymphadenopathy   Dermatological: rash, ulceration, pruritis   Endocrine:   hot flashes or polydipsia   Neurological:  headache, dizziness, confusion, focal weakness, paresthesia,     Speech difficulties, memory loss, gait disturbance  Psychological: Feelings of anxiety, depression, agitation    Objective:   VITALS:    Visit Vitals    BP (!) 140/98    Pulse (!) 119    Temp 100 °F (37.8 °C)    LMP 2017    SpO2 99%     Temp (24hrs), Av °F (37.8 °C), Min:100 °F (37.8 °C), Max:100 °F (37.8 °C)      PHYSICAL EXAM:   General:    Alert, cooperative, normal pregnancy distress, appears stated age. HEENT: Atraumatic, anicteric sclerae, pink conjunctivae     No oral ulcers, mucosa moist, throat clear  Neck:  Supple, symmetrical,  thyroid: non tender  Lungs:   Clear to auscultation bilaterally. Mild expiratory wheezing, no Rhonchi. No rales. Chest wall:  No tenderness  No Accessory muscle use. Heart:   Sinus tachycardia as expected during pregnancy,  No  murmur   No edema  Abdomen:   Soft, non-tender. Not distended. Bowel sounds normal  Extremities: No cyanosis. No clubbing  Skin:     Not pale. Not Jaundiced  No rashes   Psych:  Good insight. Not depressed. Not anxious or agitated. Neurologic: EOMs intact. No facial asymmetry. No aphasia or slurred speech.  Symmetrical strength, Alert and oriented X 4.     _______________________________________________________________________  Care Plan discussed with:    Comments   Patient x    Family  x    RN     Primary Obstetrician x                   Consultant:  x    ____________________________________________________________________  TOTAL TIME:     40 mins    Comments    x Reviewed previous records   >50% of visit spent in counseling and coordination of care x Discussion with patient and/or family and questions answered       Critical Care Provided     Minutes non procedure based  ________________________________________________________________________      Procedures: see electronic medical records for all procedures/Xrays and details which were not copied into this note but were reviewed prior to creation of Plan.    LAB DATA REVIEWED:    Recent Results (from the past 24 hour(s))   CBC WITH MANUAL DIFF    Collection Time: 02/11/18  4:00 PM   Result Value Ref Range    WBC 6.3 4.6 - 13.2 K/uL    RBC 4.14 (L) 4.20 - 5.30 M/uL    HGB 9.8 (L) 12.0 - 16.0 g/dL    HCT 30.2 (L) 35.0 - 45.0 %    MCV 72.9 (L) 74.0 - 97.0 FL    MCH 23.7 (L) 24.0 - 34.0 PG    MCHC 32.5 31.0 - 37.0 g/dL    RDW 15.5 (H) 11.6 - 14.5 %    PLATELET 001 706 - 060 K/uL    MPV 10.4 9.2 - 11.8 FL    NEUTROPHILS PENDING %    LYMPHOCYTES PENDING %    MONOCYTES PENDING %    EOSINOPHILS PENDING %    BASOPHILS PENDING %    BAND NEUTROPHILS PENDING %    PROMYELOCYTES PENDING %    MYELOCYTES PENDING %    METAMYELOCYTES PENDING %    BLASTS PENDING %    OTHER CELL PENDING     ABS. NEUTROPHILS PENDING K/UL    ABS. LYMPHOCYTES PENDING K/UL    ABS. MONOCYTES PENDING K/UL    RBC COMMENTS PENDING     DF PENDING     DIFFERENTIAL MANUAL DIFFERENTIAL ORDERED     METABOLIC PANEL, COMPREHENSIVE    Collection Time: 02/11/18  4:00 PM   Result Value Ref Range    Sodium 140 136 - 145 mmol/L    Potassium 3.8 3.5 - 5.5 mmol/L    Chloride 106 100 - 108 mmol/L    CO2 24 21 - 32 mmol/L    Anion gap 10 3.0 - 18 mmol/L    Glucose 89 74 - 99 mg/dL    BUN 4 (L) 7.0 - 18 MG/DL    Creatinine 0.45 (L) 0.6 - 1.3 MG/DL    BUN/Creatinine ratio 9 (L) 12 - 20      GFR est AA >60 >60 ml/min/1.73m2    GFR est non-AA >60 >60 ml/min/1.73m2    Calcium 8.8 8.5 - 10.1 MG/DL    Bilirubin, total 0.3 0.2 - 1.0 MG/DL    ALT (SGPT) 10 (L) 13 - 56 U/L    AST (SGOT) 16 15 - 37 U/L    Alk.  phosphatase 98 45 - 117 U/L    Protein, total 6.4 6.4 - 8.2 g/dL    Albumin 2.7 (L) 3.4 - 5.0 g/dL    Globulin 3.7 2.0 - 4.0 g/dL    A-G Ratio 0.7 (L) 0.8 - 1.7     URIC ACID    Collection Time: 02/11/18  4:00 PM   Result Value Ref Range    Uric acid 3.7 2.6 - 7.2 MG/DL   POC G3    Collection Time: 02/11/18  4:29 PM   Result Value Ref Range    Device: ROOM AIR      FIO2 (POC) 21 %    pH (POC) 7.489 (H) 7.35 - 7.45      pCO2 (POC) 28.2 (L) 35.0 - 45.0 MMHG    pO2 (POC) 62 (L) 80 - 100 MMHG    HCO3 (POC) 21.5 (L) 22 - 26 MMOL/L    sO2 (POC) 94 92 - 97 %    Base deficit (POC) 2 mmol/L    Allens test (POC) YES      Site LEFT BRACHIAL      Specimen type (POC) ARTERIAL      Performed by Mic Lopez        _____________________________  Hospitalist:   Terrance Garcia MD   Internal Medicine  Hospitalist Division

## 2018-02-12 NOTE — DIABETES MGMT
GLYCEMIC CONTROL PLAN OF CARE    Assessment:  Pt is 34 yr old female admitted on 2/11/8 for treatment and evaluation of shortness of breath and cough for the past several months with clear mucus production. Pt is 36 weeks and 7 days pregnant with her 7th child, a son, Seven. Pt with past medical history significant for morbid obesity, HTN. Recommendations:  Diabetic education. Continue current diabetes medications. Monitor for need of basal insulin now that pt is on deltasone 40 mg with breakfast for the next 5 days. Recommend change diet to 6 small meals a day to aid with BG management. Will continue to monitor inpatient for intervention. Most recent blood glucose values:  Lab Results   Component Value Date/Time    GLU 89 02/11/2018 04:00 PM    GLUCPOC 183 (H) 02/12/2018 09:42 AM    GLUCPOC 159 (H) 02/12/2018 07:19 AM    GLUCPOC 142 (H) 02/11/2018 11:57 PM     Current A1C:  6.5%-2/11/18 estimated average blood glucose of 140 mg/dl over the past 2-3 months    Current hospital diabetes medications:  Correctional lispro with meals  Sliding scale   For blood sugar (mg/dl)of   Less than 120= 0 units   120-140=  2 units   141-160= 3 units   161-180 = 4 units   181-200 = 5 units   201-220 = 6 units   221-240 = 7 units   241-260 = 8 units   261-280 = 9 units   281-300 = 10 units   301 and above = 12 units and Call physician   Initiate Hypoglycemic protocol if blood glucose is <65mg/dl   Fast Acting - Administer Immediately - or within 15 minutes of start of meal, if mealtime coverage.        Diet:   Gestational diabetic 2000 kcal    Goals:  Pt BG will be within target range by _2/16/18____    Education:  _x__  Refer to Diabetes Education Record             ___  Education not indicated at this time    Molly [address] [place] [address], [facility]  Pager: [phone]

## 2018-02-12 NOTE — PROGRESS NOTES
Respiratory Care Assessment for Bronchial hygiene or Lung Expansion Therapy  Patient  Zahida Bonilla     34 y.o.   female     2/11/2018  11:35 PM  Patient Active Problem List   Diagnosis Code    Obesity complicating pregnancy, childbirth, or the puerperium, unspecified as to episode of care or not applicable(649.10) [id number]    Hyperglycemia R73.9    Cough R05    Pregnancy Z34.90    Shortness of breath R06.02    Morbid obesity ([facility] [address] E66.01    Gestational diabetes O24.419    Antepartum fetal tachycardia affecting care of mother O45.36    Hypertension I10    History of asthma Z87.09       ABG:  Date:2/11/2018  Lab Results   Component Value Date/Time    PHI 7.489 (H) 02/11/2018 04:29 PM    PCO2I 28.2 (L) 02/11/2018 04:29 PM    PO2I 62 (L) 02/11/2018 04:29 PM    HCO3I 21.5 (L) 02/11/2018 04:29 PM    FIO2I 21 02/11/2018 04:29 PM       Chest X-ray:  Date:2/11/2018    Results from Hospital Encounter encounter on 03/20/17   XR CHEST PORT   Narrative Portable chest x-ray, semierect AP view, time 2027 hours on 3/20/2017:        INDICATION:    Chest pain. History of asthma and hypertension. COMPARISON STUDY: Chest x-ray on 5/26/2014. FINDINGS:    Cardiac silhouette appears to be borderline. Pulmonary vascularity is minimally  prominent but not clearly cephalized. Lower lungs is are hazy, mainly due to  patient's body habitus. There is no definable pulmonary infiltrates or  atelectasis. No evidence of pneumothorax or pleural effusion. Impression IMPRESSION:    Borderline cardiac size with minimal pulmonary vascular prominence, without  pulmonary edema. No other cardiopulmonary diagnostic finding or change.         Lab Test:  Date:2/11/2018  WBC:   Lab Results   Component Value Date/Time    WBC 6.3 02/11/2018 04:00 PM   HGB: Lab Results   Component Value Date/Time    HGB 9.8 (L) 02/11/2018 04:00 PM    PLTS: Lab Results   Component Value Date/Time    PLATELET 621 96/47/8596 04:00 PM SaO2%/flow:   SpO2 Readings from Last 1 Encounters:   02/11/18 99%       Vital Signs:   Patient Vitals for the past 8 hrs:   Temp Pulse BP SpO2   02/11/18 1644 100 °F (37.8 °C) (!) 119 (!) 140/98 99 %   02/11/18 1601 100 °F (37.8 °C) (!) 119 (!) 140/98 97 %   02/11/18 1600 - (!) 123 146/74 -         RA/O2 flow/device: NC        First Inital Assesment:     []Yes []No   Reevaluation/Reassessment:    []Yes []No     CHART REVIEW   Points 0 X 1 X 2 X 3 X 4 X Points   Pulmonary History Smoking History (1) none  Recent Smoking History <1 PPD  Recent Smoking History >1 PPD  Pulmonary Disease or Impairment  Severe Pulmonary Disease  3   Surgical History No Surgery  General Surgery  Lower Abdominal  Thoracic or Upper Abdominal  Thoracic & Pulmonary Disease  0   CXR Clear or not indicated  Chronic changes or CXR Pending  Infiltrate, atelectasis or pleural effusions  Infiltrates in more than 1lobe  Infiltrates +atelectasis  +/or pleural effusions  0     PATIENT ASSESSMENT    0 X 1 X 2 X 3 X 4 X Points   Respiratory Pattern Regular pattern RR 12-20  Increased RR 21-27   Mild Dyspnea at rest, irregular pattern RR 28-32  Moderate Dyspnea at rest, Use of accessory muscles, RR 33-36  Severe Dyspnea, Use of accessory muscles RR >36  0   Mental Status Alert Oriented cooperative  Confused, Follows commands  Lethargic, Does not follow commands  Obtunded  Unresponsive  0   Breath Sounds Clear  Decreased Unilaterally  Decreased Bilaterally  Mild Scattered wheezing or Crackles in bases  Severe Wheezing or rhonchi  4   Cough Strong dry NPC  Strong Productive  Weak NPC  Weak productive or weak with rhonchi  No cough or may require suctioning  0   Level of Activity Ambulatory  Ambulatory with assistance  Temporarily Non-ambulatory  Non-Ambulatory, able to position self  Unable to position self, confined to bed  0   Total Points/Score:   7     Specific Intervention Chart    Bronchial Hygiene/Secretion Clearance:    []EZPAP []Rotation bed with vibration    []CPT with percussor []CPT via vest   []Oscillastiang positive pressure expiratory device      Lung Expansion:    []Incentive Spirometer w/RT visits [x]Incentive Spirometer w/nursing    []EZPAP      *Suctioning:    []Nasal Tracheal []Tracheal     *suctioning will be ordered and done PRN with an associated frequency such as QID/PRN based on score       Other:    Care Plan   Level # Score Modality Frequency Comment   Level 1 >17      Level 2 14-17      Level 3 10-13      Level 4 1-9 IS  Q2W/A      BRONCHIAL HYGIENE SCORING AND FREQUENCY GUIDELINES   Frequency Indications/Findings Level #   Q4 ATC Copious secretions, SOB, unable to sleep 1   QID & PRN at night Moderate amounts of secretions 2   TID or Q6 wa Small amounts of secretions and poor cough: recent history of secretions 3   BID or Q8 wa Unable to deep breathe and cough effectively 4        Comments:      Respiratory Therapist: Denice Hamman, RT

## 2018-02-12 NOTE — DIABETES MGMT
Diabetes Patient/Family Education Record    Factors That  May Influence Patients Ability  to Learn or  Comply with Recommendations   []   Language barrier    []   Cultural needs   []   Motivation    []   Cognitive limitation    []   Physical   [x]   Education    []   Physiological factors   []   Hearing/vision/speaking impairment   []   Rastafari beliefs    []   Financial factors   []  Other:   []  No factors identified at this time.      Person Instructed:   [x]   Patient   []   Family   []  Other     Preference for Learning:   [x]   Verbal   [x]   Written   []  Demonstration     Level of Comprehension & Competence:    []  Good                                      [x] Fair                                     []  Poor                             [x]  Needs Reinforcement   [x]  Teachback completed    Education Component:   [x]  Medication management,     [x]  Nutritional management    []  Exercise   [x]  Signs, symptoms, and treatment of hyperglycemia and hypoglycemia   [x] Prevention, recognition and treatment of hyperglycemia and hypoglycemia   [x]  Importance of blood glucose monitoring and how to obtain a blood glucose meter    []  Instruction on use of the blood glucose meter   [x]  Discuss the importance of HbA1C monitoring    []  Sick day guidelines   []  Proper use and disposal of lancets, needles, syringes or insulin pens (if appropriate)   [x]  Potential long-term complications (retinopathy, kidney disease, neuropathy, foot care)   [x] Information about whom to contact in case of emergency or for more information    [x]  Goal:  Patient/family will demonstrate understanding of Diabetes Self Management Skills by: (date) _2/19/18______  Plan for post-discharge education or self-management support:    [x] Outpatient class schedule provided            [] Patient Declined    [] Scheduled for outpatient classes (date) _______     Shara Villegas MS, [address]  Pager: [phone]

## 2018-02-13 ENCOUNTER — APPOINTMENT (OUTPATIENT)
Dept: ULTRASOUND IMAGING | Age: 33
DRG: 560 | End: 2018-02-13
Attending: OBSTETRICS & GYNECOLOGY
Payer: MEDICAID

## 2018-02-13 PROBLEM — J45.901 ASTHMA ATTACK: Status: ACTIVE | Noted: 2018-02-13

## 2018-02-13 LAB
FLUAV RNA SPEC QL NAA+PROBE: POSITIVE
FLUBV RNA SPEC QL NAA+PROBE: NEGATIVE
GLUCOSE BLD STRIP.AUTO-MCNC: 119 MG/DL (ref 70–110)
GLUCOSE BLD STRIP.AUTO-MCNC: 121 MG/DL (ref 70–110)
GLUCOSE BLD STRIP.AUTO-MCNC: 142 MG/DL (ref 70–110)
GLUCOSE BLD STRIP.AUTO-MCNC: 155 MG/DL (ref 70–110)
GLUCOSE BLD STRIP.AUTO-MCNC: 170 MG/DL (ref 70–110)
GLUCOSE BLD STRIP.AUTO-MCNC: 192 MG/DL (ref 70–110)

## 2018-02-13 PROCEDURE — 74011250637 HC RX REV CODE- 250/637: Performed by: OBSTETRICS & GYNECOLOGY

## 2018-02-13 PROCEDURE — 74011000250 HC RX REV CODE- 250: Performed by: INTERNAL MEDICINE

## 2018-02-13 PROCEDURE — 74011636637 HC RX REV CODE- 636/637: Performed by: INTERNAL MEDICINE

## 2018-02-13 PROCEDURE — 82962 GLUCOSE BLOOD TEST: CPT

## 2018-02-13 PROCEDURE — 76818 FETAL BIOPHYS PROFILE W/NST: CPT

## 2018-02-13 PROCEDURE — 74011250637 HC RX REV CODE- 250/637: Performed by: INTERNAL MEDICINE

## 2018-02-13 PROCEDURE — 74011636637 HC RX REV CODE- 636/637: Performed by: OBSTETRICS & GYNECOLOGY

## 2018-02-13 PROCEDURE — 65270000029 HC RM PRIVATE

## 2018-02-13 RX ORDER — INSULIN LISPRO 100 [IU]/ML
INJECTION, SOLUTION INTRAVENOUS; SUBCUTANEOUS
Status: DISCONTINUED | OUTPATIENT
Start: 2018-02-13 | End: 2018-02-14

## 2018-02-13 RX ORDER — INSULIN GLARGINE 100 [IU]/ML
5 INJECTION, SOLUTION SUBCUTANEOUS
Status: DISCONTINUED | OUTPATIENT
Start: 2018-02-13 | End: 2018-02-15

## 2018-02-13 RX ORDER — INSULIN LISPRO 100 [IU]/ML
INJECTION, SOLUTION INTRAVENOUS; SUBCUTANEOUS
Status: DISCONTINUED | OUTPATIENT
Start: 2018-02-13 | End: 2018-02-13

## 2018-02-13 RX ADMIN — ALUMINUM HYDROXIDE, MAGNESIUM HYDROXIDE, AND SIMETHICONE 30 ML: 200; 200; 20 SUSPENSION ORAL at 22:40

## 2018-02-13 RX ADMIN — INSULIN LISPRO 5 UNITS: 100 INJECTION, SOLUTION INTRAVENOUS; SUBCUTANEOUS at 14:24

## 2018-02-13 RX ADMIN — DOCUSATE SODIUM 100 MG: 100 CAPSULE, LIQUID FILLED ORAL at 09:00

## 2018-02-13 RX ADMIN — ALBUTEROL SULFATE 2.5 MG: 2.5 SOLUTION RESPIRATORY (INHALATION) at 07:21

## 2018-02-13 RX ADMIN — PRENATAL VITAMINS-IRON FUMARATE 27 MG IRON-FOLIC ACID 0.8 MG TABLET 1 TABLET: at 09:00

## 2018-02-13 RX ADMIN — INSULIN LISPRO 4 UNITS: 100 INJECTION, SOLUTION INTRAVENOUS; SUBCUTANEOUS at 18:37

## 2018-02-13 RX ADMIN — INSULIN GLARGINE 5 UNITS: 100 INJECTION, SOLUTION SUBCUTANEOUS at 22:26

## 2018-02-13 RX ADMIN — BUDESONIDE 1000 MCG: 0.5 INHALANT RESPIRATORY (INHALATION) at 08:02

## 2018-02-13 RX ADMIN — GUAIFENESIN 200 MG: 100 SOLUTION ORAL at 22:09

## 2018-02-13 RX ADMIN — GUAIFENESIN 200 MG: 100 SOLUTION ORAL at 15:07

## 2018-02-13 RX ADMIN — ALUMINUM HYDROXIDE, MAGNESIUM HYDROXIDE, AND SIMETHICONE 30 ML: 200; 200; 20 SUSPENSION ORAL at 17:55

## 2018-02-13 RX ADMIN — ALUMINUM HYDROXIDE, MAGNESIUM HYDROXIDE, AND SIMETHICONE 30 ML: 200; 200; 20 SUSPENSION ORAL at 13:08

## 2018-02-13 RX ADMIN — ALBUTEROL SULFATE 2.5 MG: 2.5 SOLUTION RESPIRATORY (INHALATION) at 02:29

## 2018-02-13 RX ADMIN — MONTELUKAST SODIUM 10 MG: 10 TABLET, COATED ORAL at 22:10

## 2018-02-13 RX ADMIN — GUAIFENESIN 200 MG: 100 SOLUTION ORAL at 10:23

## 2018-02-13 RX ADMIN — DOCUSATE SODIUM 100 MG: 100 CAPSULE, LIQUID FILLED ORAL at 18:37

## 2018-02-13 RX ADMIN — INSULIN LISPRO 3 UNITS: 100 INJECTION, SOLUTION INTRAVENOUS; SUBCUTANEOUS at 11:25

## 2018-02-13 RX ADMIN — BUDESONIDE 1000 MCG: 0.5 INHALANT RESPIRATORY (INHALATION) at 20:28

## 2018-02-13 RX ADMIN — INSULIN LISPRO 2 UNITS: 100 INJECTION, SOLUTION INTRAVENOUS; SUBCUTANEOUS at 07:16

## 2018-02-13 RX ADMIN — GUAIFENESIN 200 MG: 100 SOLUTION ORAL at 18:37

## 2018-02-13 RX ADMIN — INSULIN LISPRO 2 UNITS: 100 INJECTION, SOLUTION INTRAVENOUS; SUBCUTANEOUS at 16:37

## 2018-02-13 RX ADMIN — GUAIFENESIN 200 MG: 100 SOLUTION ORAL at 06:06

## 2018-02-13 RX ADMIN — PREDNISONE 40 MG: 20 TABLET ORAL at 07:48

## 2018-02-13 RX ADMIN — ALBUTEROL SULFATE 2.5 MG: 2.5 SOLUTION RESPIRATORY (INHALATION) at 18:37

## 2018-02-13 RX ADMIN — ALBUTEROL SULFATE 2.5 MG: 2.5 SOLUTION RESPIRATORY (INHALATION) at 13:08

## 2018-02-13 NOTE — PROGRESS NOTES
Discussed insulin management with hospitalist and will start lantus and increase preprandial insulin scale. Continue current glucose checks Reviewed POC with patient on the phone. Reviewed that her BS are not adequately controlled and that we were increasing her insulin. Reviewed that we checked her preeclampsia labs and most of her BPs have been normal or mildly elevated. All of her questions were answered.

## 2018-02-13 NOTE — ROUTINE PROCESS
Bedside and Verbal shift change report given to Chris Fletcher RN (oncoming nurse) by Sixto Nagy RN (offgoing nurse). Report included the following information Procedure Summary, MAR and Recent Results.      [address] assumed care

## 2018-02-13 NOTE — PROGRESS NOTES
Hospitalist Progress Note    Patient: Eva Vanegas MRN: [id number]  CSN: [id number]    YOB: 1985  Age: 34 y.o. Sex: female    DOA: 2/11/2018 LOS:  LOS: 0 days          She feels better. Coughing more but mucous is now breaking up in her chest and she is expectorating. Her room was changed and I personally retrieved ics from bag in closet. She denies constipation. Looking forward to Friday. Assessment/Plan     1. Severe persistent asthma with mild exacerbation - steroids, BD, pulmonary input appreciated. 2. Acute bronchitis improving  3. 36 weeks gestation, IOL per ob / gyn.   4. Obesity morbid  5. DM2 A1c 6.5 - ssi, ADA diet. 6. Anemia microcytic hypochromic  7. Full code. Additional Notes:      Case discussed with:  [x]Patient  []Family  [x]Nursing  []Case Management    Vital signs/Intake and Output:  Visit Vitals    /74 (BP 1 Location: Left arm, BP Patient Position: At rest)    Pulse 100    Temp 98.3 °F (36.8 °C)    Resp 18    SpO2 99%    Breastfeeding No     Current Shift:     Last three shifts:       Awake alert and oriented. Sitting up in chair talking on cell phone. Ncat. perrl  RRR  cta b.l. No wheeze. Abdomen gravid  No edema. dp 2+ b.l  No focal defiicit  No rash    Medications Reviewed      Labs: Results:       Chemistry Recent Labs      02/11/18   1600   GLU  89   NA  140   K  3.8   CL  106   CO2  24   BUN  4*   CREA  0.45*   CA  8.8   AGAP  10   BUCR  9*   AP  98   TP  6.4   ALB  2.7*   GLOB  3.7   AGRAT  0.7*      CBC w/Diff Recent Labs      02/11/18   1600   WBC  6.3   RBC  4.14*   HGB  9.8*   HCT  30.2*   PLT  182   GRANS  86*   LYMPH  10*   EOS  0      Cardiac Enzymes No results for input(s): CPK, CKND1, ROXANNA in the last 72 hours. No lab exists for component: CKRMB, TROIP   Coagulation No results for input(s): PTP, INR, APTT in the last 72 hours.     No lab exists for component: INREXT, INREXT    Lipid Panel No results found for: CHOL, CHOLPOCT, CHOLX, CHLST, CHOLV, [id number], HDL, LDL, LDLC, DLDLP, 651566, VLDLC, VLDL, TGLX, TRIGL, TRIGP, TGLPOCT, CHHD, CHHDX   BNP No results for input(s): BNPP in the last 72 hours. Liver Enzymes Recent Labs      02/11/18   1600   TP  6.4   ALB  2.7*   AP  98   SGOT  16      Thyroid Studies No results found for: T4, T3U, TSH, TSHEXT, TSHEXT     Procedures/imaging: see electronic medical records for all procedures/Xrays and details which were not copied into this note but were reviewed prior to creation of Plan.

## 2018-02-13 NOTE — PROGRESS NOTES
conducted an initial consultation and Spiritual Assessment for Lisbeth Sheldon, who is a 34 y.o.,female. Patients Primary Language is: [place]. According to the patients EMR Yazidism Affiliation is: Lit De Oliveira. The reason the Patient came to the hospital is:   Patient Active Problem List    Diagnosis Date Noted    Shortness of breath 02/11/2018    Morbid obesity ([address] 02/11/2018    Gestational diabetes 02/11/2018    Antepartum fetal tachycardia affecting care of mother 02/11/2018    Hypertension 02/11/2018    History of asthma 02/11/2018    Pregnancy 02/01/2018    Cough 05/06/2014    Obesity complicating pregnancy, childbirth, or the puerperium, unspecified as to episode of care or not applicable(649.10) 91/47/2345    Hyperglycemia 09/01/2011        The  provided the following Interventions:  Initiated a relationship of care and support. Explored issues of carlie, belief, spirituality and Nondenominational/ritual needs while hospitalized. Listened empathically. Provided chaplaincy education. Provided information about Spiritual Care Services. Offered prayer and assurance of continued prayers on patient's behalf. Chart reviewed. The following outcomes where achieved:  Patient shared limited information about both their medical narrative and spiritual journey/beliefs.  confirmed Patient's Yazidism Affiliation. Patient processed feeling about current hospitalization. Patient expressed gratitude for 's visit. Assessment:  Patient does not have any Nondenominational/cultural needs that will affect patients preferences in health care. There are no spiritual or Nondenominational issues which require intervention at this time. Plan:  Chaplains will continue to follow and will provide pastoral care on an as needed/requested basis.  recommends bedside caregivers page  on duty if patient shows signs of acute spiritual or emotional distress.     Erin Riley [address] [place] [address]  [phone]

## 2018-02-13 NOTE — DIABETES MGMT
GLYCEMIC CONTROL PLAN OF CARE    Pt BG above target but being controlled with lispro ssi. Went by to see pt and she is doing well with her 6 small meals a day. Pt had no questions at time of visit, 2/13  and is excited to have her son. Pt received 16 units of lispro and 5 units lantus  2/13 and 40 mg deltasone with breakfast    Recent Glucose Results:   Lab Results   Component Value Date/Time    GLUCPOC 192 (H) 02/13/2018 01:56 PM    GLUCPOC 155 (H) 02/13/2018 11:17 AM    GLUCPOC 121 (H) 02/13/2018 07:11 AM     Will continue to monitor inpatient for intervention. Goal: Pt BG will be within target by 2/16/18.     Eddie Nuñez MS, 66 [address], [address]  Pager: [phone]

## 2018-02-13 NOTE — PROGRESS NOTES
Received patient in bed awake alert and oriented denies pain or discomfort or bleeding reports  Decreased fetal movement@ this time per patient, abdomen palpates semi-soft TOCO and EFM applied    0930 Call placed to Dr Lynn Coles of non reactive strip new orders received for BPP    0931 Call placed to ultrasound informing of BPP stated it would be a while due to machine is tied up but it would be done.

## 2018-02-13 NOTE — ROUTINE PROCESS
Patient Rounds    0717-Patient resting, no complaints, no assistance needed  0816-Patient resting, no complaints, no assistance needed  0920-Patient resting, no complaints, no assistance needed  1006-Patient in shower, changed linens, no complaints, no assistance needed  1025-Patient resting, no complaints, no assistance needed  1125-Patient eating lunch, no complaints, no assistance needed  1230-Patient on the phone, no complaints, no assistance needed  1330-Patient on the phone, no complaints, no assistance needed  1433-Patient sitting in recliner, no complaints, no assistance needed

## 2018-02-13 NOTE — PROGRESS NOTES
SHIFT SUMMARY NOTES [id number]:    3292: Verbal and bedside report received from offgoing RN, Osvaldo Marcum, using SBAR, Kardex, and MAR.    4974: Jasokagtr=879.    2563: Given 2 units Humalog insulin per sliding scale, double check by Becky Bullock RNC.    1855: Albuterol nebulizer tx given. 1831: Scheduled prednisone given. 0802: Pulmicort tx started. 1486Tono Kaplan RN, here from Labor and Delivery to do NST.    0902: Remains on NST. Prenatal vitamins and colace given. Patient declined Flonase. She also declines flu vaccine. 1837: NST completed. Initial shift assessment completed. 1013: Finishing up shower. \"I feel better! \". 1023: Robitussin given. 1117: AC prbaosstk=853.    1125: Patient getting biophysical profile. 3 units humalog insulin given per sliding scale, double check by Becky Bullock RNC. 1230: Sitting in chair, finished lunch. No requests. 1308: Requested mylanta, also requested albuterol tx.    1317: Hospitalist here to see patient. 1340: Dr. Naga Coppola here to see patient. 1456: Accucheck after lton=287.    1424: 5 units humalog insulin given per sliding scale, double check by CIERRA Orta    1507: Requested Robitussin. Resting in bed, wants to nap. 1519: Diabetic educator here to see patient. 1626: AC hcypvykdp=892.    1637: 2 units humalog insulin given per sliding scale, double check by CIERRA Orta.    2492: Patient has questions for MD. I called Dr. Naga Coppola and she says she will call patient back. 1720: Insulin orders clarified with Dr. Naga Coppola. Dr. Naga Coppola called patient back and spoke to her. 1755: C/O heartburn and given Mylanta. Reviewed times and availability of medications. Friends visiting. 1832: Accucheck after awwc=017.    1837: 4 units humalog insulin given per sliding scale, double check by CIERRA Orta.  Patient also given scheduled colace, robitussin and albuterol tx per her request.    1915: Verbal and bedside report given to oncoming LUZMA AHUJA Charlo Akin, using SBAR, Kardex, and MAR.

## 2018-02-13 NOTE — PROGRESS NOTES
Ante Partum Progress Note    Dorota Weaver  37w1d    Patient admitted for acute exacerbation of severe persistant asthma states she does have SOB but it is much improved, she can lay down after her breathing treatments sometimes. No ctxs, VB or LOF.    LOS:  3      Vitals: Temp (24hrs), Av.2 °F (36.8 °C), Min:98 °F (36.7 °C), Max:98.4 °F (36.9 °C)   Patient Vitals for the past 24 hrs:   BP   18 0717 111/74   18 0200 114/73   18 1956 127/68   18 1749 125/86                  Gen:  NAD         Non stress test:  135, mod variability, one accel    Uterine Activity: None     Additional Exam: BPP 8    Labs:     Lab Results   Component Value Date/Time    WBC 6.3 2018 04:00 PM    WBC 6.6 2018 12:44 PM    WBC 6.4 2018 12:00 AM    HGB 9.8 (L) 2018 04:00 PM    HGB 9.7 (L) 2018 12:44 PM    HGB 9.5 (L) 2018 12:00 AM    HCT 30.2 (L) 2018 04:00 PM    HCT 30.4 (L) 2018 12:44 PM    HCT 30.7 (L) 2018 12:00 AM    PLATELET 268 3516 04:00 PM    PLATELET 525  12:44 PM    PLATELET 160 3894 12:00 AM       Recent Results (from the past 24 hour(s))   GLUCOSE, POC    Collection Time: 18  1:45 PM   Result Value Ref Range    Glucose (POC) 181 (H) 70 - 110 mg/dL   GLUCOSE, POC    Collection Time: 18  6:04 PM   Result Value Ref Range    Glucose (POC) 151 (H) 70 - 110 mg/dL   GLUCOSE, POC    Collection Time: 18  7:11 AM   Result Value Ref Range    Glucose (POC) 121 (H) 70 - 110 mg/dL   GLUCOSE, POC    Collection Time: 18 11:17 AM   Result Value Ref Range    Glucose (POC) 155 (H) 70 - 110 mg/dL       Assessment/Plan: 37w1d    Viral bronchitis: still afebrile, CXR wnl (although limited exam 2/2 patient's body habitus), rapid influenza test neg, influenza PCR pending. Continue conservative management. Gaunifesin for chest congestion   Severe persistent asthma: s/p pulm consult, appreciate hospitalist's help.  Continue prednisone 40mg fzt2iotv, Pulmicort 1mg BID, and singular 10mg every day per pulm recommendations   A2GDM: was on metformin BID, BS elevated BS likely 2/2 to amari, s/p diabetic teaching and counseling. Continue SSI. Will do FSBS fasting, AC and 2 hour PP to try to keep up with her BS   CHTN: on labetalol 200mg BID at home, no currently on any BP meds, BPs 120-140/70-80a, will restart if persistently elevated.  Pr/Cr 0.2 (1/11)   OB: NST not reactive today, BPP 8/8, continue expectant mgt, plan for IOL once she is feeling better            Wilder Santa MD  8/88/32738:05 PM

## 2018-02-13 NOTE — PROGRESS NOTES
[facility]  Pulmonary, Critical Care, and Sleep Medicine    Name: Amrik Zapien MRN: [id number]   : 1985 Hospital: [facility]   Date: 2018        IMPRESSION:and Recommendations:   Impression:/recommendations:  · Asthma Exacerbation 2' to Viral Bronchitis- improving. Will continue bronchodilators and steroids as ordered. · Severe Persistant Asthma-will need maintenance inhalers and outpt pulmonary follow up  · Mild Hypoxia 2' to above-will follow. Encourage bronchial hygiene  · 36w 6d for Intrauterine Pregnancy,-per Ob/Gyn     Patient Active Problem List   Diagnosis Code    Obesity complicating pregnancy, childbirth, or the puerperium, unspecified as to episode of care or not applicable(649.10) [id number]    Hyperglycemia R73.9    Cough R05    Pregnancy Z34.90    Shortness of breath R06.02    Morbid obesity ([address] E66.01    Gestational diabetes O24.419    Antepartum fetal tachycardia affecting care of mother O45.36    Hypertension I10    History of asthma Z87.09        Subjective/Interval History:   18   Feels better  Now coughing some mucus- still congested  Denies SOB  Denies fever/chills  Denies chest pain  Being monitored in mother baby unit. ROS:Pertinent items are noted in HPI. Objective:   Vital Signs:    Visit Vitals    /74 (BP 1 Location: Left arm, BP Patient Position: At rest)    Pulse 100    Temp 98.3 °F (36.8 °C)    Resp 18    LMP 2017    SpO2 99%    Breastfeeding No       O2 Device: Room air   O2 Flow Rate (L/min): 12 l/min   Temp (24hrs), Av.2 °F (36.8 °C), Min:98 °F (36.7 °C), Max:98.4 °F (36.9 °C)       Intake/Output:   Last shift:         Last 3 shifts:    No intake or output data in the 24 hours ending 18 1116     Physical Exam:    General: in no apparent distress, non-toxic, alert, oriented times 3 and afebrile   HEENT: Normal   Neck: No abnormally enlarged lymph nodes.    Chest: decreased excursion   Lungs: end expiratory wheeze and rhonchi   Heart: Regular rate and rhythm or S1S2 present   Abdomen: gravid    Neuro: alert   Skin: Skin color, texture, turgor normal. No rashes or lesions        DATA:  Labs:  Recent Labs      02/11/18   1600   WBC  6.3   HGB  9.8*   HCT  30.2*   PLT  182     Recent Labs      02/11/18   1600   NA  140   K  3.8   CL  106   CO2  24   GLU  89   BUN  4*   CREA  0.45*   CA  8.8   ALB  2.7*   SGOT  16   ALT  10*     No results for input(s): PH, PCO2, PO2, HCO3, FIO2 in the last 72 hours. High complexity decision making was performed during the evaluation of this patient at high risk for decompensation with multiple organ involvement     Above mentioned total time spent on reviewing the case/medical record/data/notes/EMR/patient examination/documentation/coordinating care with nurse/consultants, exclusive of procedures with complex decision making performed and > 50% time spent in face to face evaluation.     Daly Winchester MD

## 2018-02-13 NOTE — PHYSICIAN ADVISORY
Letter of Admission Status Determination: Upgrade to Inpatient     Rosa Rehman was originally hospitalized as observation status on 2/11/2018 for management of asthma exacerbation. Ongoing hospitalization is warranted for this patient with third trimester pregnancy who presented with asthma exacerbation with hypoxemia, persistent tachycardia on day 2, fetal tachycardia, requiring pulmonary consultation, bronchodilator therapy, steroids, further evaluation, and close clinical monitoring. It is our recommendation that this patient's hospitalization status be upgraded to INPATIENT status.      The final decision regarding the patient's hospitalization status depends on the attending physician's judgement.       Belkis Sims MD, [place], [address] [facility]  Physician [place] Dixon.  [phone]    February 13, 2018   8:49 AM

## 2018-02-14 ENCOUNTER — APPOINTMENT (OUTPATIENT)
Dept: ULTRASOUND IMAGING | Age: 33
DRG: 560 | End: 2018-02-14
Attending: OBSTETRICS & GYNECOLOGY
Payer: MEDICAID

## 2018-02-14 LAB
GLUCOSE BLD STRIP.AUTO-MCNC: 114 MG/DL (ref 70–110)
GLUCOSE BLD STRIP.AUTO-MCNC: 121 MG/DL (ref 70–110)
GLUCOSE BLD STRIP.AUTO-MCNC: 128 MG/DL (ref 70–110)
GLUCOSE BLD STRIP.AUTO-MCNC: 155 MG/DL (ref 70–110)
GLUCOSE BLD STRIP.AUTO-MCNC: 181 MG/DL (ref 70–110)

## 2018-02-14 PROCEDURE — 74011250637 HC RX REV CODE- 250/637: Performed by: INTERNAL MEDICINE

## 2018-02-14 PROCEDURE — 74011250637 HC RX REV CODE- 250/637: Performed by: OBSTETRICS & GYNECOLOGY

## 2018-02-14 PROCEDURE — 74011636637 HC RX REV CODE- 636/637: Performed by: INTERNAL MEDICINE

## 2018-02-14 PROCEDURE — 76818 FETAL BIOPHYS PROFILE W/NST: CPT

## 2018-02-14 PROCEDURE — 74011636637 HC RX REV CODE- 636/637: Performed by: OBSTETRICS & GYNECOLOGY

## 2018-02-14 PROCEDURE — 65270000029 HC RM PRIVATE

## 2018-02-14 PROCEDURE — 82962 GLUCOSE BLOOD TEST: CPT

## 2018-02-14 PROCEDURE — 74011000250 HC RX REV CODE- 250: Performed by: INTERNAL MEDICINE

## 2018-02-14 RX ORDER — ACETAMINOPHEN 325 MG/1
650 TABLET ORAL
Status: DISCONTINUED | OUTPATIENT
Start: 2018-02-14 | End: 2018-02-16

## 2018-02-14 RX ORDER — INSULIN LISPRO 100 [IU]/ML
INJECTION, SOLUTION INTRAVENOUS; SUBCUTANEOUS
Status: DISCONTINUED | OUTPATIENT
Start: 2018-02-14 | End: 2018-02-14

## 2018-02-14 RX ORDER — INSULIN LISPRO 100 [IU]/ML
INJECTION, SOLUTION INTRAVENOUS; SUBCUTANEOUS
Status: DISCONTINUED | OUTPATIENT
Start: 2018-02-14 | End: 2018-02-16

## 2018-02-14 RX ADMIN — ALBUTEROL SULFATE 2.5 MG: 2.5 SOLUTION RESPIRATORY (INHALATION) at 18:19

## 2018-02-14 RX ADMIN — DOCUSATE SODIUM 100 MG: 100 CAPSULE, LIQUID FILLED ORAL at 18:19

## 2018-02-14 RX ADMIN — GUAIFENESIN 200 MG: 100 SOLUTION ORAL at 20:10

## 2018-02-14 RX ADMIN — ALBUTEROL SULFATE 2.5 MG: 2.5 SOLUTION RESPIRATORY (INHALATION) at 00:12

## 2018-02-14 RX ADMIN — DOCUSATE SODIUM 100 MG: 100 CAPSULE, LIQUID FILLED ORAL at 08:37

## 2018-02-14 RX ADMIN — ALBUTEROL SULFATE 2.5 MG: 2.5 SOLUTION RESPIRATORY (INHALATION) at 13:45

## 2018-02-14 RX ADMIN — GUAIFENESIN 200 MG: 100 SOLUTION ORAL at 13:45

## 2018-02-14 RX ADMIN — INSULIN LISPRO 3 UNITS: 100 INJECTION, SOLUTION INTRAVENOUS; SUBCUTANEOUS at 16:27

## 2018-02-14 RX ADMIN — MONTELUKAST SODIUM 10 MG: 10 TABLET, COATED ORAL at 22:01

## 2018-02-14 RX ADMIN — PREDNISONE 40 MG: 20 TABLET ORAL at 09:02

## 2018-02-14 RX ADMIN — ACETAMINOPHEN 650 MG: 325 TABLET ORAL at 09:20

## 2018-02-14 RX ADMIN — INSULIN LISPRO 2 UNITS: 100 INJECTION, SOLUTION INTRAVENOUS; SUBCUTANEOUS at 08:45

## 2018-02-14 RX ADMIN — GUAIFENESIN 200 MG: 100 SOLUTION ORAL at 06:25

## 2018-02-14 RX ADMIN — PRENATAL VITAMINS-IRON FUMARATE 27 MG IRON-FOLIC ACID 0.8 MG TABLET 1 TABLET: at 08:38

## 2018-02-14 RX ADMIN — ACETAMINOPHEN 650 MG: 325 TABLET ORAL at 16:44

## 2018-02-14 RX ADMIN — GUAIFENESIN 200 MG: 100 SOLUTION ORAL at 16:44

## 2018-02-14 RX ADMIN — BUDESONIDE 1000 MCG: 0.5 INHALANT RESPIRATORY (INHALATION) at 20:10

## 2018-02-14 RX ADMIN — ALUMINUM HYDROXIDE, MAGNESIUM HYDROXIDE, AND SIMETHICONE 30 ML: 200; 200; 20 SUSPENSION ORAL at 16:43

## 2018-02-14 RX ADMIN — ALUMINUM HYDROXIDE, MAGNESIUM HYDROXIDE, AND SIMETHICONE 30 ML: 200; 200; 20 SUSPENSION ORAL at 13:47

## 2018-02-14 RX ADMIN — ALUMINUM HYDROXIDE, MAGNESIUM HYDROXIDE, AND SIMETHICONE 30 ML: 200; 200; 20 SUSPENSION ORAL at 22:01

## 2018-02-14 RX ADMIN — INSULIN GLARGINE 5 UNITS: 100 INJECTION, SOLUTION SUBCUTANEOUS at 22:03

## 2018-02-14 RX ADMIN — GUAIFENESIN 200 MG: 100 SOLUTION ORAL at 02:16

## 2018-02-14 RX ADMIN — INSULIN LISPRO 3 UNITS: 100 INJECTION, SOLUTION INTRAVENOUS; SUBCUTANEOUS at 13:28

## 2018-02-14 RX ADMIN — ALUMINUM HYDROXIDE, MAGNESIUM HYDROXIDE, AND SIMETHICONE 30 ML: 200; 200; 20 SUSPENSION ORAL at 09:17

## 2018-02-14 RX ADMIN — FLUTICASONE PROPIONATE 2 SPRAY: 50 SPRAY, METERED NASAL at 08:39

## 2018-02-14 RX ADMIN — BUDESONIDE 1000 MCG: 0.5 INHALANT RESPIRATORY (INHALATION) at 08:39

## 2018-02-14 RX ADMIN — GUAIFENESIN 200 MG: 100 SOLUTION ORAL at 08:37

## 2018-02-14 RX ADMIN — ALBUTEROL SULFATE 2.5 MG: 2.5 SOLUTION RESPIRATORY (INHALATION) at 06:22

## 2018-02-14 NOTE — PROGRESS NOTES
Bedside and Verbal shift change report given to Bhanu Chnug RN (oncoming nurse) by Gillian Saunders RN (offgoing nurse). Report included the following information SBAR, Kardex, Procedure Summary, Intake/Output, MAR and Recent ResultsAssumed care of patient, plan of care discussed with patient, pain assessed. 0800 patient lying in bed on side, lungs still coarse, patient complaining of side pain. Patient states she is not a diabetic, that the steroids are making her blood sugar high. Patient non-compliant, with gestational diabetes, education provided on healthy eating.  Patient states she would be fine if they would just take her baby out.   0900 pt assessment, patient up to chair, linens changed  1130 patient up in shower  1330 ON FHR MONITOR  1430  Talking on phone  [phone] watching tv  1630 reassessment  1730 watching TV  1825 breathing treatment

## 2018-02-14 NOTE — ROUTINE PROCESS
Patient Rounds    0726-Patient resting, no complaints, no assistance needed  0846-Patient taking breathing treatment, no complaints, no assistance needed  0940-Patient on the phone, no complaints, no assistance needed  1030-RN in room with patient   1116-Patient resting, no complaints, no assistance needed  1240-Patient resting, no complaints, no assistance needed  1343-RN in room  1421-Patient resting, no complaints, no assistance needed

## 2018-02-14 NOTE — PROGRESS NOTES
Ante Partum Progress Note    Jesse Finley  37w2d    Patient admitted for acute asthma excerbation 2/2 viral bronchitis. states she does not  have  contractions, vaginal bleeding  and vaginal leaking of fluid . She denies f/c, cp, sob, or wheezing. Still c/o mild chest congestion.     LOS:  HD4      Vitals: Temp (24hrs), Av.2 °F (36.8 °C), Min:97.6 °F (36.4 °C), Max:98.6 °F (37 °C)   Patient Vitals for the past 24 hrs:   BP   18 0726 123/76   18 0218 122/72   18 1950 121/69   18 1433 159/88                  Gen: No acute distress, eating lunch        Non stress test:  Not yet done today, RN to do it now    Uterine Activity:    Additional Exam: Patient without distress  Heart: RRR  Lungs: CTAB  Abdomen: morbid obese gravid, soft, NTND  Cervical exam: deferred (pt with no obstetrical complaints)    Labs:     Lab Results   Component Value Date/Time    WBC 6.3 2018 04:00 PM    WBC 6.6 2018 12:44 PM    WBC 6.4 2018 12:00 AM    HGB 9.8 (L) 2018 04:00 PM    HGB 9.7 (L) 2018 12:44 PM    HGB 9.5 (L) 2018 12:00 AM    HCT 30.2 (L) 2018 04:00 PM    HCT 30.4 (L) 2018 12:44 PM    HCT 30.7 (L) 2018 12:00 AM    PLATELET 987  04:00 PM    PLATELET 239  12:44 PM    PLATELET 800  12:00 AM       Recent Results (from the past 24 hour(s))   GLUCOSE, POC    Collection Time: 18  1:56 PM   Result Value Ref Range    Glucose (POC) 192 (H) 70 - 110 mg/dL   GLUCOSE, POC    Collection Time: 18  4:26 PM   Result Value Ref Range    Glucose (POC) 142 (H) 70 - 110 mg/dL   GLUCOSE, POC    Collection Time: 18  6:30 PM   Result Value Ref Range    Glucose (POC) 170 (H) 70 - 110 mg/dL   GLUCOSE, POC    Collection Time: 18 10:19 PM   Result Value Ref Range    Glucose (POC) 119 (H) 70 - 110 mg/dL   GLUCOSE, POC    Collection Time: 18  6:21 AM   Result Value Ref Range    Glucose (POC) 114 (H) 70 - 110 mg/dL   GLUCOSE, POC Collection Time: 02/14/18  8:45 AM   Result Value Ref Range    Glucose (POC) 128 (H) 70 - 110 mg/dL       Assessment:  [id number] @ 37w0d with chtn, GDM, and morbid obesity, admitted for acute asthma exacerbation 2/2 to likely viral bronchitis     Plan:   Viral bronchitis: afebrile, CXR wnl (although limited exam 2/2 patient's body habitus), rapid influenza test neg, influenza PCR positive for influenza A. Will alert infection control. Droplet precautions initiated. Continue conservative management. Gaunifesin for chest congestion  Severe persistent Asthma: sating 95-97% RA, s/p pulm consult. Continue prednisone 40mg [id number], Pulmicort 1mg BID, and singular 10mg every day   GDM: elevated BS likely 2/2 to amari, s/p diabetic teaching and counseling. BS  Fasting >95, -170s, Continue lanuts 5u qhs. With pre/post prandial SSI. FS 7x/day  CHTN: on labetalol 200mg BID at home, no currently on any BP meds, BPs 110-150/70-80a, will restart if persistently elevated. Pr/Cr 0.2 (1/11)  OB: fetal status reassuring, keep on the monitr untile reactive, will continue intermittent NST, unable to 2/2 continuous monitoring 2/2 to frequent loss of contact 2/2 patient's body habitus.  If stable, possible BPP, and biometry today or tomorrow am. Plan for IOL this  Friday Feb 16th    Virginia Nguyen MD  6/16/981028:41 PM

## 2018-02-14 NOTE — ROUTINE PROCESS
Bedside shift change report given to ADALID Shaw (oncoming nurse) by Yao Novoa (offgoing nurse). Report included the following information SBAR, MAR and Recent Results. 1950- Patient assessment completed, no distress noted, patient is coughing with wheezing sound. Treatment given, will continue to monitor respiratory. 65- Dr Trae Singer call and informed of patient BS (119), instructed to given insulin (lantus) for control of BS over night. 5193- Patient BS was (114), not insulin given base on scale.

## 2018-02-14 NOTE — CONSULTS
Infectious Disease Consultation Note    Requested by: Dr. Nathaniel Gallegos    Reason: influenza A, pregnant female    Current abx Prior abx         Lines:       Assessment :    34 y.o. female with a history of gestational diabetes (hgbA1C 6.5 on 2/11/18), morbid obesity, asthma, HTN, and is currently 36 weeks pregnant, who presented to the hospital through L&D with complaints of shortness of breath for one day. Now with respiratory swab pcr positive for influenza A. Clinical presentation c/w acute influenza A bronchitis, asthma exacerbation in a pregnant female. Currently patient seems to be clinically improving. resolved fevers, resolved sore throat. Patient's illness began >72 hours ago. At this, time risk of giving tamiflu outweigh the benefit. Recommendations:    1. Continue droplet isolation till 2/18/18  2. Hold off on tamiflu  3. F/u clinically    Advance Care planning: full code: discussed  with patient/surrogate decision maker:   Ivy Romanian: [phone]      Thank you for consultation request. Above plan was discussed in details with patient,  and dr Nathaniel Gallegos. Please call me if any further questions or concerns. Will continue to participate in the care of this patient. HPI:    34 y.o. female with a history of gestational diabetes (hgbA1C 6.5 on 2/11/18), morbid obesity, asthma, HTN, and is currently 36 weeks pregnant, who presented to the hospital through L&D with complaints of shortness of breath for one day. Patient states that she also has had a cough that has persisted over the last few months with clear mucous production. On 2/11,  the cough and shortness of breath and wheezing became worse. She has a known history of asthma. Uses albuterol inhalers and sometimes nebulizers as needed for wheezing and shortness of breath. No maintainence inhalers.   Patient states that her children whom she lives with has had respiratory viruses and colds passed to each other and now she is presenting with the same symptoms. Children has not been tested for flu. She also reports that she has had some diaphoresis at home as well as body aches. No high fever noted at home. Temperature on presentation was 100. 0'F, B/P 146/74, O2 Sats 97%, and HR 98. She was admitted to the hospital. Pulmonary was consulted. It was felt that patient has asthma exacerbation, viral bronchitis. Rapid flu antigen test was sent and it was negative. She had flu PCR sent on 2/11 which returned positive on 2/13/18. I have been consulted for further recommendations. Apparently there are plans to induce her on 2/16/18. Patient states that she feels much better. sorethroat resolved. Has some phlegm at the back of her throat. No increased sob. No chest discomfort. Patient denies headaches, visual disturbances, sore throat, runny nose, earaches, cp, sob, chills, cough, abdominal pain, diarrhea, burning micturition, pain or weakness in extremities. No known h/o MRSA colonization or infection in the past.      Past Medical History:   Diagnosis Date    Asthma     Community acquired pneumonia     Hypertension     Obese        Past Surgical History:   Procedure Laterality Date    DILATION AND CURETTAGE         home Medication List    Details   metFORMIN (GLUCOPHAGE) 500 mg tablet Take 1 Tab by mouth two (2) times daily (with meals). Indications: Gestational Diabetes Mellitus  Qty: 61 Tab, Refills: 1    Associated Diagnoses: Hyperglycemia due to type 1 diabetes mellitus (HCC)      Lancets misc Test blood sugar four times daily: fasting every morning and two hours after breakfast, lunch and dinner. Qty: 100 Each, Refills: 1    Associated Diagnoses: Diet controlled gestational diabetes mellitus (GDM) in third trimester      glucose blood VI test strips (ASCENSIA AUTODISC VI, ONE TOUCH ULTRA TEST VI) strip Test blood sugar four times daily: fasting every morning and two hours after breakfast, lunch and dinner.   Qty: 100 Strip, Refills: 1 Associated Diagnoses: Diet controlled gestational diabetes mellitus (GDM) in third trimester      Blood-Glucose Meter monitoring kit Test blood sugar four times daily: fasting every morning and two hours after breakfast, lunch and dinner. Qty: 1 Kit, Refills: 0    Associated Diagnoses: Diet controlled gestational diabetes mellitus (GDM) in third trimester      ferrous sulfate (IRON) 325 mg (65 mg iron) EC tablet Take 1 Tab by mouth three (3) times daily (with meals). Qty: 90 Tab, Refills: 2    Associated Diagnoses: Iron deficiency anemia during pregnancy      albuterol (PROVENTIL HFA, VENTOLIN HFA, PROAIR HFA) 90 mcg/actuation inhaler 1-2 Puffs. labetalol (NORMODYNE) 200 mg tablet Take 1 Tab by mouth two (2) times a day. Indications: hypertension  Qty: 60 Tab, Refills: 3    Associated Diagnoses: Essential hypertension      terconazole (TERAZOL 3) 0.8 % vaginal cream Insert 1 Applicator into vagina nightly. Qty: 20 g, Refills: 1    Associated Diagnoses: Yeast vaginitis      TRIAMTERENE-HYDROCHLOROTHIAZID PO Take  by mouth.              Current Facility-Administered Medications   Medication Dose Route Frequency    acetaminophen (TYLENOL) tablet 650 mg  650 mg Oral Q6H PRN    insulin lispro (HUMALOG) injection   SubCUTAneous TIDAC    influenza vaccine 2017-18 (3 yrs+)(PF) (FLUZONE QUAD/FLUARIX QUAD) injection 0.5 mL  0.5 mL IntraMUSCular PRIOR TO DISCHARGE    insulin glargine (LANTUS) injection 5 Units  5 Units SubCUTAneous QHS    albuterol (PROVENTIL VENTOLIN) nebulizer solution 2.5 mg  2.5 mg Nebulization Q6H PRN    guaiFENesin (ROBITUSSIN) 100 mg/5 mL oral liquid 200 mg  200 mg Oral Q4H PRN    budesonide (PULMICORT) 500 mcg/2 ml nebulizer suspension  1,000 mcg Nebulization BID RT    predniSONE (DELTASONE) tablet 40 mg  40 mg Oral DAILY WITH BREAKFAST    diphenhydrAMINE (BENADRYL) capsule 25 mg  25 mg Oral Q4H PRN    diphenhydrAMINE (BENADRYL) capsule 50 mg  50 mg Oral Q4H PRN    docusate sodium (COLACE) capsule 100 mg  100 mg Oral BID    alum-mag hydroxide-simeth (MYLANTA) oral suspension 30 mL  30 mL Oral Q4H PRN    prenatal vit-iron fumarate-fa tablet 1 Tab  1 Tab Oral DAILY    fluticasone (FLONASE) 50 mcg/actuation nasal spray 2 Spray  2 Spray Both Nostrils DAILY    glucose chewable tablet 16 g  4 Tab Oral PRN    glucagon (GLUCAGEN) injection 1 mg  1 mg IntraMUSCular PRN    dextrose (D50W) injection syrg 12.5-25 g  25-50 mL IntraVENous PRN    montelukast (SINGULAIR) tablet 10 mg  10 mg Oral QHS       Allergies: Review of patient's allergies indicates no known allergies. Family History   Problem Relation Age of Onset    Asthma Mother     Hypertension Mother     Diabetes Mother     Asthma Father      Social History     Social History    Marital status: SINGLE     Spouse name: N/A    Number of children: N/A    Years of education: N/A     Occupational History    Not on file. Social History Main Topics    Smoking status: Former Smoker     Packs/day: 0.50     Types: Cigarettes    Smokeless tobacco: Never Used      Comment: cigars twice weekly    Alcohol use 1.0 oz/week     2 Standard drinks or equivalent per week    Drug use: No    Sexual activity: Yes     Partners: Female     Birth control/ protection: None     Other Topics Concern    Not on file     Social History Narrative     History   Smoking Status    Former Smoker    Packs/day: 0.50    Types: Cigarettes   Smokeless Tobacco    Never Used     Comment: cigars twice weekly        Temp (24hrs), Av.2 °F (36.8 °C), Min:97.6 °F (36.4 °C), Max:98.6 °F (37 °C)    Visit Vitals    /76 (BP 1 Location: Left arm, BP Patient Position: At rest)    Pulse 97    Temp 98.1 °F (36.7 °C)    Resp 18    LMP 2017    SpO2 95%    Breastfeeding No       ROS: 12 point ROS obtained in details.  Pertinent positives as mentioned in HPI,   otherwise negative    Physical Exam:    Physical Exam:   General:  Alert, cooperative, no distress, appears stated age. Head:  Normocephalic, without obvious abnormality, atraumatic. Eyes:  Conjunctivae/corneas clear. PERRL, EOMs intact. Throat: Lips, mucosa, and tongue normal.    Neck: Supple, symmetrical, trachea midline, no JVD. Lungs:    clear to auscultation, no rales/rhonchi/wheezing   Heart:  Regular rate and rhythm, S1, S2 normal, no murmur, click, rub or gallop. Abdomen:   Soft, appropriate for gestational age, non-tender. Extremities: Extremities normal, atraumatic, no cyanosis or edema. Pulses: 2+ and symmetric all extremities. Skin: Skin color, texture, turgor normal. No rashes or lesions   Neurologic: Grossly nonfocal         Labs: Results:   Chemistry Recent Labs      02/11/18   1600   GLU  89   NA  140   K  3.8   CL  106   CO2  24   BUN  4*   CREA  0.45*   CA  8.8   AGAP  10   BUCR  9*   AP  98   TP  6.4   ALB  2.7*   GLOB  3.7   AGRAT  0.7*      CBC w/Diff Recent Labs      02/11/18   1600   WBC  6.3   RBC  4.14*   HGB  9.8*   HCT  30.2*   PLT  182   GRANS  86*   LYMPH  10*   EOS  0      Microbiology No results for input(s): CULT in the last 72 hours.        RADIOLOGY:    All available imaging studies/reports in [facility] for this admission were reviewed    Dr. You Kendrick, Infectious Disease Specialist  [phone]  February 14, 2018  1:32 PM

## 2018-02-14 NOTE — DIABETES MGMT
GLYCEMIC CONTROL PLAN OF CARE    Pt with flu and hyperglycemia. Recommend per Dr. Jef Huddleston place pt on preprandial very insulin resistant scale lispro and 5 units lantus hs. Adjust basal if pt is going to deliver. Discussed importance of diet and carbohydrate servings 3 hours apart emphasizing optimal BG and overall health. Recent Glucose Results:   Lab Results   Component Value Date/Time    GLUCPOC 128 (H) 02/14/2018 08:45 AM    GLUCPOC 114 (H) 02/14/2018 06:21 AM    GLUCPOC 119 (H) 02/13/2018 10:19 PM     Will continue to monitor inpatient for intervention.     Sunita Goodwin MS, MILLIE Roth  Pager: [phone]

## 2018-02-14 NOTE — PROGRESS NOTES
Problem: Falls - Risk of  Goal: *Absence of Falls  Document Neil Fall Risk and appropriate interventions in the flowsheet.    Fall Risk Interventions:

## 2018-02-14 NOTE — PROGRESS NOTES
Hospitalist Progress Note    Patient: Estevan Colin MRN: [id number]  CSN: [id number]    YOB: 1985  Age: 34 y.o. Sex: female    DOA: 2/11/2018 LOS:  LOS: 2 days          accucheck 114 - 128 since lantus started yesterday evening. Overall feels better with improving upper respiratory sx. Expresses understanding regarding recommendation not to take tamiflu. Assessment/Plan     1. Severe persistent asthma with mild exacerbation - steroids, BD, pulmonary input appreciated. 2. Acute bronchitis improving  3. 36 weeks gestation, IOL per ob / gyn.   4. Obesity morbid  5. DM2 A1c 6.5 - ssi, ADA diet, lantus  6. Anemia microcytic hypochromic  7. Influenza A + by PCR, nasal swab negative. Per ID recs, continue droplet isolation till 2/18/18. tamiflu not recommended. 8. Full code. Additional Notes:      Case discussed with:  [x]Patient  []Family  [x]Nursing  []Case Management    Vital signs/Intake and Output:  Visit Vitals    /76 (BP 1 Location: Left arm, BP Patient Position: At rest)    Pulse 97    Temp 98.1 °F (36.7 °C)    Resp 18    SpO2 95%    Breastfeeding No     Current Shift:     Last three shifts:       Awake alert and oriented. Sitting up in chair nad  Ncat. perrl  RRR  cta b.l. No wheeze. Abdomen gravid  No edema. dp 2+ b.l  No focal defiicit  No rash    Medications Reviewed      Labs: Results:       Chemistry Recent Labs      02/11/18   1600   GLU  89   NA  140   K  3.8   CL  106   CO2  24   BUN  4*   CREA  0.45*   CA  8.8   AGAP  10   BUCR  9*   AP  98   TP  6.4   ALB  2.7*   GLOB  3.7   AGRAT  0.7*      CBC w/Diff Recent Labs      02/11/18   1600   WBC  6.3   RBC  4.14*   HGB  9.8*   HCT  30.2*   PLT  182   GRANS  86*   LYMPH  10*   EOS  0      Cardiac Enzymes No results for input(s): CPK, CKND1, ROXANNA in the last 72 hours. No lab exists for component: CKRMB, TROIP   Coagulation No results for input(s): PTP, INR, APTT in the last 72 hours.     No lab exists for component: INREXT, INREXT    Lipid Panel No results found for: CHOL, CHOLPOCT, CHOLX, CHLST, CHOLV, 847115, HDL, LDL, LDLC, DLDLP, 298765, VLDLC, VLDL, TGLX, TRIGL, TRIGP, TGLPOCT, CHHD, CHHDX   BNP No results for input(s): BNPP in the last 72 hours. Liver Enzymes Recent Labs      02/11/18   1600   TP  6.4   ALB  2.7*   AP  98   SGOT  16      Thyroid Studies No results found for: T4, T3U, TSH, TSHEXT, TSHEXT     Procedures/imaging: see electronic medical records for all procedures/Xrays and details which were not copied into this note but were reviewed prior to creation of Plan.

## 2018-02-15 ENCOUNTER — ANESTHESIA (OUTPATIENT)
Dept: LABOR AND DELIVERY | Age: 33
DRG: 560 | End: 2018-02-15
Payer: MEDICAID

## 2018-02-15 ENCOUNTER — ANESTHESIA EVENT (OUTPATIENT)
Dept: LABOR AND DELIVERY | Age: 33
DRG: 560 | End: 2018-02-15
Payer: MEDICAID

## 2018-02-15 VITALS — OXYGEN SATURATION: 95 % | HEART RATE: 102 BPM | SYSTOLIC BLOOD PRESSURE: 115 MMHG | DIASTOLIC BLOOD PRESSURE: 72 MMHG

## 2018-02-15 LAB
ABO + RH BLD: NORMAL
BASOPHILS # BLD: 0 K/UL (ref 0–0.1)
BASOPHILS NFR BLD: 0 % (ref 0–2)
BLOOD GROUP ANTIBODIES SERPL: NORMAL
CHLAMYDIA, EXTERNAL: NORMAL
DIFFERENTIAL METHOD BLD: ABNORMAL
EOSINOPHIL # BLD: 0 K/UL (ref 0–0.4)
EOSINOPHIL NFR BLD: 0 % (ref 0–5)
ERYTHROCYTE [DISTWIDTH] IN BLOOD BY AUTOMATED COUNT: 15.7 % (ref 11.6–14.5)
GLUCOSE BLD STRIP.AUTO-MCNC: 102 MG/DL (ref 70–110)
GLUCOSE BLD STRIP.AUTO-MCNC: 112 MG/DL (ref 70–110)
GLUCOSE BLD STRIP.AUTO-MCNC: 118 MG/DL (ref 70–110)
GLUCOSE BLD STRIP.AUTO-MCNC: 90 MG/DL (ref 70–110)
HCT VFR BLD AUTO: 31.6 % (ref 35–45)
HGB BLD-MCNC: 9.8 G/DL (ref 12–16)
LYMPHOCYTES # BLD: 1.1 K/UL (ref 0.9–3.6)
LYMPHOCYTES NFR BLD: 20 % (ref 21–52)
MCH RBC QN AUTO: 23.1 PG (ref 24–34)
MCHC RBC AUTO-ENTMCNC: 31 G/DL (ref 31–37)
MCV RBC AUTO: 74.4 FL (ref 74–97)
MONOCYTES # BLD: 0.2 K/UL (ref 0.05–1.2)
MONOCYTES NFR BLD: 4 % (ref 3–10)
N. GONORRHEA, EXTERNAL: NORMAL
NEUTS SEG # BLD: 4.2 K/UL (ref 1.8–8)
NEUTS SEG NFR BLD: 76 % (ref 40–73)
PLATELET # BLD AUTO: 210 K/UL (ref 135–420)
PMV BLD AUTO: 10.3 FL (ref 9.2–11.8)
RBC # BLD AUTO: 4.25 M/UL (ref 4.2–5.3)
SPECIMEN EXP DATE BLD: NORMAL
WBC # BLD AUTO: 5.5 K/UL (ref 4.6–13.2)

## 2018-02-15 PROCEDURE — 85025 COMPLETE CBC W/AUTO DIFF WBC: CPT | Performed by: OBSTETRICS & GYNECOLOGY

## 2018-02-15 PROCEDURE — 74011250636 HC RX REV CODE- 250/636

## 2018-02-15 PROCEDURE — 74011250636 HC RX REV CODE- 250/636: Performed by: OBSTETRICS & GYNECOLOGY

## 2018-02-15 PROCEDURE — 74011000250 HC RX REV CODE- 250: Performed by: INTERNAL MEDICINE

## 2018-02-15 PROCEDURE — 77030014125 HC TY EPDRL BBMI -B: Performed by: NURSE ANESTHETIST, CERTIFIED REGISTERED

## 2018-02-15 PROCEDURE — 77030005538 HC CATH URETH FOL44 BARD -B

## 2018-02-15 PROCEDURE — 74011636637 HC RX REV CODE- 636/637: Performed by: INTERNAL MEDICINE

## 2018-02-15 PROCEDURE — 74011250637 HC RX REV CODE- 250/637: Performed by: OBSTETRICS & GYNECOLOGY

## 2018-02-15 PROCEDURE — 82962 GLUCOSE BLOOD TEST: CPT

## 2018-02-15 PROCEDURE — 86900 BLOOD TYPING SEROLOGIC ABO: CPT | Performed by: OBSTETRICS & GYNECOLOGY

## 2018-02-15 PROCEDURE — 65270000029 HC RM PRIVATE

## 2018-02-15 RX ORDER — OXYTOCIN IN 5 % DEXTROSE 30/500 ML
2 PLASTIC BAG, INJECTION (ML) INTRAVENOUS
Status: CANCELLED | OUTPATIENT
Start: 2018-02-15

## 2018-02-15 RX ORDER — FENTANYL CITRATE 50 UG/ML
INJECTION, SOLUTION INTRAMUSCULAR; INTRAVENOUS
Status: COMPLETED
Start: 2018-02-15 | End: 2018-02-15

## 2018-02-15 RX ORDER — INSULIN GLARGINE 100 [IU]/ML
2 INJECTION, SOLUTION SUBCUTANEOUS
Status: DISCONTINUED | OUTPATIENT
Start: 2018-02-15 | End: 2018-02-16

## 2018-02-15 RX ORDER — SODIUM CHLORIDE, SODIUM LACTATE, POTASSIUM CHLORIDE, CALCIUM CHLORIDE 600; 310; 30; 20 MG/100ML; MG/100ML; MG/100ML; MG/100ML
125 INJECTION, SOLUTION INTRAVENOUS CONTINUOUS
Status: DISCONTINUED | OUTPATIENT
Start: 2018-02-15 | End: 2018-02-16

## 2018-02-15 RX ORDER — LIDOCAINE HYDROCHLORIDE 20 MG/ML
INJECTION, SOLUTION EPIDURAL; INFILTRATION; INTRACAUDAL; PERINEURAL
Status: DISCONTINUED
Start: 2018-02-15 | End: 2018-02-16

## 2018-02-15 RX ORDER — SODIUM CHLORIDE 0.9 % (FLUSH) 0.9 %
5-10 SYRINGE (ML) INJECTION EVERY 8 HOURS
Status: DISCONTINUED | OUTPATIENT
Start: 2018-02-16 | End: 2018-02-16 | Stop reason: HOSPADM

## 2018-02-15 RX ORDER — ROPIVACAINE HYDROCHLORIDE 2 MG/ML
INJECTION, SOLUTION EPIDURAL; INFILTRATION; PERINEURAL
Status: COMPLETED
Start: 2018-02-15 | End: 2018-02-15

## 2018-02-15 RX ORDER — FENTANYL CITRATE 50 UG/ML
INJECTION, SOLUTION INTRAMUSCULAR; INTRAVENOUS AS NEEDED
Status: DISCONTINUED | OUTPATIENT
Start: 2018-02-15 | End: 2018-02-16 | Stop reason: HOSPADM

## 2018-02-15 RX ORDER — ROPIVACAINE HYDROCHLORIDE 2 MG/ML
INJECTION, SOLUTION EPIDURAL; INFILTRATION; PERINEURAL AS NEEDED
Status: DISCONTINUED | OUTPATIENT
Start: 2018-02-15 | End: 2018-02-16 | Stop reason: HOSPADM

## 2018-02-15 RX ORDER — NALBUPHINE HYDROCHLORIDE 10 MG/ML
5 INJECTION, SOLUTION INTRAMUSCULAR; INTRAVENOUS; SUBCUTANEOUS
Status: DISCONTINUED | OUTPATIENT
Start: 2018-02-15 | End: 2018-02-16 | Stop reason: HOSPADM

## 2018-02-15 RX ORDER — FENTANYL CITRATE 50 UG/ML
100 INJECTION, SOLUTION INTRAMUSCULAR; INTRAVENOUS ONCE
Status: DISCONTINUED | OUTPATIENT
Start: 2018-02-16 | End: 2018-02-16 | Stop reason: HOSPADM

## 2018-02-15 RX ORDER — SODIUM CHLORIDE 0.9 % (FLUSH) 0.9 %
5-10 SYRINGE (ML) INJECTION AS NEEDED
Status: DISCONTINUED | OUTPATIENT
Start: 2018-02-15 | End: 2018-02-16 | Stop reason: HOSPADM

## 2018-02-15 RX ORDER — OXYTOCIN/RINGER'S LACTATE 20/1000 ML
PLASTIC BAG, INJECTION (ML) INTRAVENOUS
Status: COMPLETED
Start: 2018-02-15 | End: 2018-02-15

## 2018-02-15 RX ORDER — OXYTOCIN/RINGER'S LACTATE 20/1000 ML
.5-2 PLASTIC BAG, INJECTION (ML) INTRAVENOUS
Status: DISCONTINUED | OUTPATIENT
Start: 2018-02-15 | End: 2018-02-16

## 2018-02-15 RX ORDER — LIDOCAINE HYDROCHLORIDE AND EPINEPHRINE 15; 5 MG/ML; UG/ML
INJECTION, SOLUTION EPIDURAL AS NEEDED
Status: DISCONTINUED | OUTPATIENT
Start: 2018-02-15 | End: 2018-02-16 | Stop reason: HOSPADM

## 2018-02-15 RX ORDER — ROPIVACAINE HYDROCHLORIDE 2 MG/ML
5 INJECTION, SOLUTION EPIDURAL; INFILTRATION; PERINEURAL
Status: DISCONTINUED | OUTPATIENT
Start: 2018-02-16 | End: 2018-02-16 | Stop reason: HOSPADM

## 2018-02-15 RX ORDER — LIDOCAINE HYDROCHLORIDE 10 MG/ML
INJECTION, SOLUTION EPIDURAL; INFILTRATION; INTRACAUDAL; PERINEURAL
Status: DISCONTINUED
Start: 2018-02-15 | End: 2018-02-16

## 2018-02-15 RX ORDER — PREDNISONE 20 MG/1
40 TABLET ORAL
Status: COMPLETED | OUTPATIENT
Start: 2018-02-15 | End: 2018-02-15

## 2018-02-15 RX ADMIN — ALBUTEROL SULFATE 2.5 MG: 2.5 SOLUTION RESPIRATORY (INHALATION) at 06:00

## 2018-02-15 RX ADMIN — Medication 2 MILLI-UNITS/MIN: at 17:51

## 2018-02-15 RX ADMIN — GUAIFENESIN 200 MG: 100 SOLUTION ORAL at 12:09

## 2018-02-15 RX ADMIN — GUAIFENESIN 200 MG: 100 SOLUTION ORAL at 05:59

## 2018-02-15 RX ADMIN — LIDOCAINE HYDROCHLORIDE AND EPINEPHRINE 2 ML: 15; 5 INJECTION, SOLUTION EPIDURAL at 23:52

## 2018-02-15 RX ADMIN — PREDNISONE 40 MG: 20 TABLET ORAL at 09:47

## 2018-02-15 RX ADMIN — ACETAMINOPHEN 650 MG: 325 TABLET ORAL at 06:33

## 2018-02-15 RX ADMIN — BUDESONIDE 1000 MCG: 0.5 INHALANT RESPIRATORY (INHALATION) at 20:26

## 2018-02-15 RX ADMIN — ALUMINUM HYDROXIDE, MAGNESIUM HYDROXIDE, AND SIMETHICONE 30 ML: 200; 200; 20 SUSPENSION ORAL at 14:18

## 2018-02-15 RX ADMIN — DOCUSATE SODIUM 100 MG: 100 CAPSULE, LIQUID FILLED ORAL at 20:21

## 2018-02-15 RX ADMIN — PRENATAL VITAMINS-IRON FUMARATE 27 MG IRON-FOLIC ACID 0.8 MG TABLET 1 TABLET: at 09:47

## 2018-02-15 RX ADMIN — SODIUM CHLORIDE, SODIUM LACTATE, POTASSIUM CHLORIDE, AND CALCIUM CHLORIDE 125 ML/HR: 600; 310; 30; 20 INJECTION, SOLUTION INTRAVENOUS at 16:55

## 2018-02-15 RX ADMIN — LIDOCAINE HYDROCHLORIDE AND EPINEPHRINE 3 ML: 15; 5 INJECTION, SOLUTION EPIDURAL at 23:49

## 2018-02-15 RX ADMIN — DOCUSATE SODIUM 100 MG: 100 CAPSULE, LIQUID FILLED ORAL at 08:29

## 2018-02-15 RX ADMIN — GUAIFENESIN 200 MG: 100 SOLUTION ORAL at 18:41

## 2018-02-15 RX ADMIN — ROPIVACAINE HYDROCHLORIDE 5 ML: 2 INJECTION, SOLUTION EPIDURAL; INFILTRATION; PERINEURAL at 23:57

## 2018-02-15 RX ADMIN — FENTANYL CITRATE 100 MCG: 50 INJECTION, SOLUTION INTRAMUSCULAR; INTRAVENOUS at 23:54

## 2018-02-15 RX ADMIN — ALBUTEROL SULFATE 2.5 MG: 2.5 SOLUTION RESPIRATORY (INHALATION) at 18:21

## 2018-02-15 RX ADMIN — BUDESONIDE 1000 MCG: 0.5 INHALANT RESPIRATORY (INHALATION) at 08:29

## 2018-02-15 RX ADMIN — ALBUTEROL SULFATE 2.5 MG: 2.5 SOLUTION RESPIRATORY (INHALATION) at 00:01

## 2018-02-15 RX ADMIN — GUAIFENESIN 200 MG: 100 SOLUTION ORAL at 00:01

## 2018-02-15 RX ADMIN — FLUTICASONE PROPIONATE 2 SPRAY: 50 SPRAY, METERED NASAL at 06:33

## 2018-02-15 RX ADMIN — ALBUTEROL SULFATE 2.5 MG: 2.5 SOLUTION RESPIRATORY (INHALATION) at 12:09

## 2018-02-15 NOTE — PROGRESS NOTES
Ante Partum Progress Note    Hector Yung  37w3d    Patient admitted for influenza A with acute asthma exacerbation, uncontrolled GDM after steroid administration, morbid obesity, CHTN. States she is feeling better, can lay back better now, has incentive spirometer at her bedside. LOS:  5      Vitals: Temp (24hrs), Av.9 °F (36.6 °C), Min:97.4 °F (36.3 °C), Max:98.2 °F (36.8 °C)   Patient Vitals for the past 24 hrs:   BP   02/15/18 1443 134/84   02/15/18 0726 150/90   02/15/18 0207 131/78   18 1938 129/70                  Gen:  NAD        Non stress test:  150 with mod V, decel to 90 for approx 5 minutes with good recovery    Uterine Activity: None       Labs:     Lab Results   Component Value Date/Time    WBC 6.3 2018 04:00 PM    WBC 6.6 2018 12:44 PM    WBC 6.4 2018 12:00 AM    HGB 9.8 (L) 2018 04:00 PM    HGB 9.7 (L) 2018 12:44 PM    HGB 9.5 (L) 2018 12:00 AM    HCT 30.2 (L) 2018 04:00 PM    HCT 30.4 (L) 2018 12:44 PM    HCT 30.7 (L) 2018 12:00 AM    PLATELET 093  04:00 PM    PLATELET 965  12:44 PM    PLATELET 446  12:00 AM       Recent Results (from the past 24 hour(s))   GLUCOSE, POC    Collection Time: 18  4:24 PM   Result Value Ref Range    Glucose (POC) 155 (H) 70 - 110 mg/dL   GLUCOSE, POC    Collection Time: 18 10:03 PM   Result Value Ref Range    Glucose (POC) 121 (H) 70 - 110 mg/dL   GLUCOSE, POC    Collection Time: 02/15/18  6:17 AM   Result Value Ref Range    Glucose (POC) 102 70 - 110 mg/dL   GLUCOSE, POC    Collection Time: 02/15/18 11:27 AM   Result Value Ref Range    Glucose (POC) 118 (H) 70 - 110 mg/dL       Assessment/Plan: 37w3d   Influenza A with acute asthma exacerbation: afebrile, CXR wnl (although limited exam 2/2 patient's body habitus), Much improved. Droplet precautions initiated. Continue conservative management.  Gaunifesin for chest congestion  Severe persistent Asthma: sating 95-97% RA, s/p pulm consult. Continue prednisone 40mg [id number], Pulmicort 1mg BID, and singular 10mg every day   GDM: elevated BS likely 2/2 to amari, s/p diabetic teaching and counseling. BS  reviewed, continue lanuts 5u qhs. With pre/post prandial SSI. FS 7x/day  CHTN: on labetalol 200mg BID at home, no currently on any BP meds, BPs 110-150/70-80a, will restart if persistently elevated. Pr/Cr 0.2 (1/11)  OB: decel noted, will bring to L&D to start induction, R/B/A of induction with vaginal delivery or C/S discussed including infection, bleeding, blood transfusion, injury to internal organs, baby, operative vaginal delivery and shoulder dystocia with increased morbidity and mortality. All of her questions answered.     Pollo Bearden MD  7/58/05148:38 PM

## 2018-02-15 NOTE — PROGRESS NOTES
Labor Progress Note  Patient over from mother/baby. Patient Vitals for the past 8 hrs:   BP Temp Pulse Resp SpO2   02/15/18 1443 134/84 98.2 °F (36.8 °C) 97 18 97 %       Physical Exam:  Cervical Exam:  3 cm dilated    50% effaced    -2 station    Presenting Part: cephalic  Cervical Position: mid position  Consistency: Soft  Connelly Score: 7  Membranes:  Artificial Rupture of Membranes;  Amniotic Fluid: large amount of clear fluid      Assessment/Plan:  AROM, start pit, getting on the monitor now    Signed By:  Teresa Chiu MD     February 15, 2018 4:42 PM

## 2018-02-15 NOTE — PROGRESS NOTES
moved over to [email or web address] for Induction of labor @ 37 weeks reports posotove fetal movement.      [address]  Dr Lieutenant Win @ bedside for SVE tolerated well     verbal bedside shift report done with Bernarda Novoa RN report done from [facility], [facility] and [facility]

## 2018-02-15 NOTE — ROUTINE PROCESS
Bedside shift change report given to ADALID Reyes (oncoming nurse) by Arlet Page (offgoing nurse). Report included the following information SBAR, MAR and Recent Results.      1920- Patient assessment completed, no distress noted, will continue to monitor vital.

## 2018-02-15 NOTE — DIABETES MGMT
GLYCEMIC CONTROL PLAN OF CARE    Pt BG lower today and pt has not needed any correctional lispro. Pt reports she is adhering to her diet. Encouraged compliance for benefit of baby and herself. Pt expressed she was very excited to give birth and cannot wait to meet him. Pt lantus to be lowered for tomorrow's planned delivery to 2 units. Continue correctional lispro with meals. Recent Glucose Results:   Lab Results   Component Value Date/Time    GLUCPOC 118 (H) 02/15/2018 11:27 AM    GLUCPOC 102 02/15/2018 06:17 AM    GLUCPOC 121 (H) 02/14/2018 10:03 PM     Will continue to monitor inpatient for intervention.     Rosario Judd MS, [address] [place] [address], CDE  Pager: [phone]

## 2018-02-15 NOTE — PROGRESS NOTES
Hospitalist Progress Note    Patient: Cecelia Ortega MRN: [id number]  CSN: [id number]    YOB: 1985  Age: 34 y.o. Sex: female    DOA: 2/11/2018 LOS:  LOS: 3 days          accucheck 102 - 118 since diet adjusted yesterday. She has been noted to be snacking yesterday. Her cough and breathing are slowly improving. She does wheeze upon awakening in the morning. She is not constipated, taking stool softener. Ready for IOL tomorrow. Assessment/Plan     1. Severe persistent asthma with mild exacerbation - steroids, BD, pulmonary input appreciated. 2. Acute bronchitis improving  3. 36 weeks gestation, IOL per ob / gyn.   4. Obesity morbid  5. DM2 A1c 6.5 - ssi, ADA diet, lantus dose reduced given npo p MN status. 6. Anemia microcytic hypochromic  7. Influenza A + by PCR, nasal swab negative. Per ID recs, continue droplet isolation till 2/18/18. tamiflu not recommended. 8. Full code. Additional Notes:      Case discussed with:  [x]Patient  []Family  [x]Nursing  []Case Management    Vital signs/Intake and Output:  Visit Vitals    /84 (BP 1 Location: Right arm, BP Patient Position: At rest)    Pulse 97    Temp 98.2 °F (36.8 °C)    Resp 18    SpO2 97%    Breastfeeding No     Current Shift:     Last three shifts:       Awake alert and oriented. Sitting up in chair nad, talking on cell phone. Ncat. perrl  RRR  cta b.l. No wheeze. Abdomen gravid  No edema. dp 2+ b.l  No focal defiicit  No rash    Medications Reviewed      Labs: Results:       Chemistry No results for input(s): GLU, NA, K, CL, CO2, BUN, CREA, CA, AGAP, BUCR, TBIL, GPT, AP, TP, ALB, GLOB, AGRAT in the last 72 hours. CBC w/Diff No results for input(s): WBC, RBC, HGB, HCT, PLT, GRANS, LYMPH, EOS, HGBEXT, HCTEXT, PLTEXT, HGBEXT, HCTEXT, PLTEXT in the last 72 hours. Cardiac Enzymes No results for input(s): CPK, CKND1, ROXANNA in the last 72 hours.     No lab exists for component: CKRMB, TROIP   Coagulation No results for input(s): PTP, INR, APTT in the last 72 hours. No lab exists for component: INREXT, INREXT    Lipid Panel No results found for: CHOL, CHOLPOCT, CHOLX, CHLST, CHOLV, 052909, HDL, LDL, LDLC, DLDLP, 321488, VLDLC, VLDL, TGLX, TRIGL, TRIGP, TGLPOCT, CHHD, CHHDX   BNP No results for input(s): BNPP in the last 72 hours. Liver Enzymes No results for input(s): TP, ALB, TBIL, AP, SGOT, GPT in the last 72 hours. No lab exists for component: DBIL   Thyroid Studies No results found for: T4, T3U, TSH, TSHEXT, TSHEXT     Procedures/imaging: see electronic medical records for all procedures/Xrays and details which were not copied into this note but were reviewed prior to creation of Plan.

## 2018-02-15 NOTE — ROUTINE PROCESS
Patient Rounds    0726-Patient resting, no complaints, no assistance needed  0830-Patient sleeping, no complaints, no assistance needed  0922-Patient sleeping, no complaints, no assistance needed   1025-Patient in shower, no complaints, no assistance needed  1033-Patient eating lunch, no complaints, no assistance needed   1135-Patient resting, no complaints, no assistance needed  1248-Patient resting, no complaints, no assistance needed  1344-Patient resting, no complaints, no assistance needed  1443-Patient resting, no complaints, no assistance needed

## 2018-02-16 LAB
ANION GAP SERPL CALC-SCNC: 9 MMOL/L (ref 3–18)
BASOPHILS # BLD: 0 K/UL (ref 0–0.1)
BASOPHILS NFR BLD: 0 % (ref 0–2)
BUN SERPL-MCNC: 7 MG/DL (ref 7–18)
BUN/CREAT SERPL: 16 (ref 12–20)
CALCIUM SERPL-MCNC: 8.3 MG/DL (ref 8.5–10.1)
CHLORIDE SERPL-SCNC: 105 MMOL/L (ref 100–108)
CO2 SERPL-SCNC: 26 MMOL/L (ref 21–32)
CREAT SERPL-MCNC: 0.45 MG/DL (ref 0.6–1.3)
DIFFERENTIAL METHOD BLD: ABNORMAL
EOSINOPHIL # BLD: 0 K/UL (ref 0–0.4)
EOSINOPHIL NFR BLD: 0 % (ref 0–5)
ERYTHROCYTE [DISTWIDTH] IN BLOOD BY AUTOMATED COUNT: 15.6 % (ref 11.6–14.5)
GLUCOSE BLD STRIP.AUTO-MCNC: 103 MG/DL (ref 70–110)
GLUCOSE BLD STRIP.AUTO-MCNC: 114 MG/DL (ref 70–110)
GLUCOSE SERPL-MCNC: 105 MG/DL (ref 74–99)
HCT VFR BLD AUTO: 29.8 % (ref 35–45)
HGB BLD-MCNC: 9.5 G/DL (ref 12–16)
INR PPP: 1 (ref 0.8–1.2)
LYMPHOCYTES # BLD: 2.2 K/UL (ref 0.9–3.6)
LYMPHOCYTES NFR BLD: 27 % (ref 21–52)
MAGNESIUM SERPL-MCNC: 1.9 MG/DL (ref 1.6–2.6)
MCH RBC QN AUTO: 23.8 PG (ref 24–34)
MCHC RBC AUTO-ENTMCNC: 31.9 G/DL (ref 31–37)
MCV RBC AUTO: 74.5 FL (ref 74–97)
MONOCYTES # BLD: 0.5 K/UL (ref 0.05–1.2)
MONOCYTES NFR BLD: 6 % (ref 3–10)
NEUTS SEG # BLD: 5.6 K/UL (ref 1.8–8)
NEUTS SEG NFR BLD: 67 % (ref 40–73)
PLATELET # BLD AUTO: 174 K/UL (ref 135–420)
PMV BLD AUTO: 10.4 FL (ref 9.2–11.8)
POTASSIUM SERPL-SCNC: 3.6 MMOL/L (ref 3.5–5.5)
PROTHROMBIN TIME: 12.8 SEC (ref 11.5–15.2)
RBC # BLD AUTO: 4 M/UL (ref 4.2–5.3)
SODIUM SERPL-SCNC: 140 MMOL/L (ref 136–145)
WBC # BLD AUTO: 8.2 K/UL (ref 4.6–13.2)

## 2018-02-16 PROCEDURE — 85610 PROTHROMBIN TIME: CPT | Performed by: HOSPITALIST

## 2018-02-16 PROCEDURE — 74011250637 HC RX REV CODE- 250/637: Performed by: INTERNAL MEDICINE

## 2018-02-16 PROCEDURE — 82962 GLUCOSE BLOOD TEST: CPT

## 2018-02-16 PROCEDURE — 74011636637 HC RX REV CODE- 636/637: Performed by: HOSPITALIST

## 2018-02-16 PROCEDURE — 74011250637 HC RX REV CODE- 250/637: Performed by: OBSTETRICS & GYNECOLOGY

## 2018-02-16 PROCEDURE — 74011250637 HC RX REV CODE- 250/637

## 2018-02-16 PROCEDURE — 76060000078 HC EPIDURAL ANESTHESIA

## 2018-02-16 PROCEDURE — 3E033VJ INTRODUCTION OF OTHER HORMONE INTO PERIPHERAL VEIN, PERCUTANEOUS APPROACH: ICD-10-PCS | Performed by: OBSTETRICS & GYNECOLOGY

## 2018-02-16 PROCEDURE — 65270000029 HC RM PRIVATE

## 2018-02-16 PROCEDURE — 80048 BASIC METABOLIC PNL TOTAL CA: CPT | Performed by: HOSPITALIST

## 2018-02-16 PROCEDURE — 85025 COMPLETE CBC W/AUTO DIFF WBC: CPT | Performed by: HOSPITALIST

## 2018-02-16 PROCEDURE — 75410000003 HC RECOV DEL/VAG/CSECN EA 0.5 HR

## 2018-02-16 PROCEDURE — 75410000000 HC DELIVERY VAGINAL/SINGLE

## 2018-02-16 PROCEDURE — 75410000002 HC LABOR FEE PER 1 HR

## 2018-02-16 PROCEDURE — 74011250637 HC RX REV CODE- 250/637: Performed by: FAMILY MEDICINE

## 2018-02-16 PROCEDURE — 10907ZC DRAINAGE OF AMNIOTIC FLUID, THERAPEUTIC FROM PRODUCTS OF CONCEPTION, VIA NATURAL OR ARTIFICIAL OPENING: ICD-10-PCS | Performed by: OBSTETRICS & GYNECOLOGY

## 2018-02-16 PROCEDURE — 83735 ASSAY OF MAGNESIUM: CPT | Performed by: HOSPITALIST

## 2018-02-16 PROCEDURE — 74011000250 HC RX REV CODE- 250: Performed by: INTERNAL MEDICINE

## 2018-02-16 PROCEDURE — 36415 COLL VENOUS BLD VENIPUNCTURE: CPT | Performed by: HOSPITALIST

## 2018-02-16 PROCEDURE — 74011000250 HC RX REV CODE- 250

## 2018-02-16 RX ORDER — LANOLIN ALCOHOL/MO/W.PET/CERES
1 CREAM (GRAM) TOPICAL
Status: DISCONTINUED | OUTPATIENT
Start: 2018-02-16 | End: 2018-02-17 | Stop reason: HOSPADM

## 2018-02-16 RX ORDER — FLUTICASONE PROPIONATE 110 UG/1
1 AEROSOL, METERED RESPIRATORY (INHALATION) EVERY 12 HOURS
Qty: 1 INHALER | Refills: 1 | Status: SHIPPED | OUTPATIENT
Start: 2018-02-16 | End: 2020-02-26

## 2018-02-16 RX ORDER — IBUPROFEN 400 MG/1
800 TABLET ORAL
Status: DISCONTINUED | OUTPATIENT
Start: 2018-02-16 | End: 2018-02-17 | Stop reason: HOSPADM

## 2018-02-16 RX ORDER — OXYCODONE AND ACETAMINOPHEN 5; 325 MG/1; MG/1
2 TABLET ORAL
Status: DISCONTINUED | OUTPATIENT
Start: 2018-02-16 | End: 2018-02-17 | Stop reason: HOSPADM

## 2018-02-16 RX ORDER — METFORMIN HYDROCHLORIDE 500 MG/1
500 TABLET ORAL 2 TIMES DAILY WITH MEALS
Status: DISCONTINUED | OUTPATIENT
Start: 2018-02-16 | End: 2018-02-17 | Stop reason: HOSPADM

## 2018-02-16 RX ORDER — PROMETHAZINE HYDROCHLORIDE 25 MG/ML
25 INJECTION, SOLUTION INTRAMUSCULAR; INTRAVENOUS
Status: DISCONTINUED | OUTPATIENT
Start: 2018-02-16 | End: 2018-02-17 | Stop reason: HOSPADM

## 2018-02-16 RX ORDER — MISOPROSTOL 200 UG/1
TABLET ORAL
Status: COMPLETED
Start: 2018-02-16 | End: 2018-02-16

## 2018-02-16 RX ORDER — DEXTROSE, SODIUM CHLORIDE, SODIUM LACTATE, POTASSIUM CHLORIDE, AND CALCIUM CHLORIDE 5; .6; .31; .03; .02 G/100ML; G/100ML; G/100ML; G/100ML; G/100ML
75 INJECTION, SOLUTION INTRAVENOUS CONTINUOUS
Status: DISCONTINUED | OUTPATIENT
Start: 2018-02-16 | End: 2018-02-16

## 2018-02-16 RX ORDER — MONTELUKAST SODIUM 10 MG/1
10 TABLET ORAL
Qty: 30 TAB | Refills: 1 | Status: SHIPPED | OUTPATIENT
Start: 2018-02-16 | End: 2020-01-27 | Stop reason: ALTCHOICE

## 2018-02-16 RX ORDER — AMOXICILLIN 250 MG
1 CAPSULE ORAL
Status: DISCONTINUED | OUTPATIENT
Start: 2018-02-16 | End: 2018-02-17 | Stop reason: HOSPADM

## 2018-02-16 RX ORDER — ZOLPIDEM TARTRATE 5 MG/1
5 TABLET ORAL
Status: DISCONTINUED | OUTPATIENT
Start: 2018-02-16 | End: 2018-02-17 | Stop reason: HOSPADM

## 2018-02-16 RX ORDER — DOCUSATE SODIUM 100 MG/1
100 CAPSULE, LIQUID FILLED ORAL DAILY
Status: DISCONTINUED | OUTPATIENT
Start: 2018-02-17 | End: 2018-02-17 | Stop reason: HOSPADM

## 2018-02-16 RX ORDER — HYDROCORTISONE 25 MG/G
CREAM TOPICAL AS NEEDED
Status: DISCONTINUED | OUTPATIENT
Start: 2018-02-16 | End: 2018-02-17 | Stop reason: HOSPADM

## 2018-02-16 RX ORDER — ACETAMINOPHEN 325 MG/1
650 TABLET ORAL
Status: DISCONTINUED | OUTPATIENT
Start: 2018-02-16 | End: 2018-02-17 | Stop reason: HOSPADM

## 2018-02-16 RX ADMIN — METFORMIN HYDROCHLORIDE 500 MG: 500 TABLET, FILM COATED ORAL at 17:58

## 2018-02-16 RX ADMIN — INSULIN GLARGINE 2 UNITS: 100 INJECTION, SOLUTION SUBCUTANEOUS at 01:41

## 2018-02-16 RX ADMIN — FLUTICASONE FUROATE 1 PUFF: 100 POWDER RESPIRATORY (INHALATION) at 13:17

## 2018-02-16 RX ADMIN — OXYCODONE HYDROCHLORIDE AND ACETAMINOPHEN 2 TABLET: 5; 325 TABLET ORAL at 13:16

## 2018-02-16 RX ADMIN — ALBUTEROL SULFATE 2.5 MG: 2.5 SOLUTION RESPIRATORY (INHALATION) at 08:54

## 2018-02-16 RX ADMIN — IBUPROFEN 800 MG: 400 TABLET ORAL at 11:22

## 2018-02-16 RX ADMIN — OXYCODONE HYDROCHLORIDE AND ACETAMINOPHEN 2 TABLET: 5; 325 TABLET ORAL at 07:06

## 2018-02-16 RX ADMIN — MONTELUKAST SODIUM 10 MG: 10 TABLET, COATED ORAL at 22:48

## 2018-02-16 RX ADMIN — PRENATAL VITAMINS-IRON FUMARATE 27 MG IRON-FOLIC ACID 0.8 MG TABLET 1 TABLET: at 09:08

## 2018-02-16 RX ADMIN — DOCUSATE SODIUM 100 MG: 100 CAPSULE, LIQUID FILLED ORAL at 11:22

## 2018-02-16 RX ADMIN — Medication 325 MG: at 17:58

## 2018-02-16 RX ADMIN — OXYCODONE HYDROCHLORIDE AND ACETAMINOPHEN 2 TABLET: 5; 325 TABLET ORAL at 23:26

## 2018-02-16 RX ADMIN — MISOPROSTOL 800 MCG: 200 TABLET ORAL at 05:35

## 2018-02-16 RX ADMIN — IBUPROFEN 800 MG: 400 TABLET ORAL at 23:26

## 2018-02-16 RX ADMIN — Medication 10 ML/HR: at 00:03

## 2018-02-16 RX ADMIN — GUAIFENESIN 200 MG: 100 SOLUTION ORAL at 23:26

## 2018-02-16 RX ADMIN — MONTELUKAST SODIUM 10 MG: 10 TABLET, COATED ORAL at 01:41

## 2018-02-16 NOTE — DIABETES MGMT
Diabetes Patient/Family Education Record    Factors That  May Influence Patients Ability  to Learn or  Comply with Recommendations   []   Language barrier    []   Cultural needs   [x]   Motivation    []   Cognitive limitation    []   Physical   [x]   Education    []   Physiological factors   []   Hearing/vision/speaking impairment   []   Tenriism beliefs    []   Financial factors   []  Other:   []  No factors identified at this time.      Person Instructed:   [x]   Patient   []   Family   []  Other     Preference for Learning:   [x]   Verbal   []   Written   []  Demonstration     Level of Comprehension & Competence:    []  Good                                      [] Fair                                     [x]  Poor                             [x]  Needs Reinforcement   [x]  Teachback completed    Education Component:   [x]  Medication management,     [x]  Nutritional management    []  Exercise   []  Signs, symptoms, and treatment of hyperglycemia and hypoglycemia   [] Prevention, recognition and treatment of hyperglycemia and hypoglycemia   []  Importance of blood glucose monitoring and how to obtain a blood glucose meter    []  Instruction on use of the blood glucose meter   []  Discuss the importance of HbA1C monitoring    []  Sick day guidelines   []  Proper use and disposal of lancets, needles, syringes or insulin pens (if appropriate)   [x]  Potential long-term complications (retinopathy, kidney disease, neuropathy, foot care)   [x] Information about whom to contact in case of emergency or for more information    [x]  Goal:  Patient/family will demonstrate understanding of Diabetes Self Management Skills by: (date) _2/23/18______  Plan for post-discharge education or self-management support:    [x] Outpatient class schedule provided            [] Patient Declined    [] Scheduled for outpatient classes (date) _______       Sosa Ferrell MS, RD, CDE  Pager: [phone]

## 2018-02-16 NOTE — ROUTINE PROCESS
TRANSFER - IN REPORT:    Verbal report received from Jann Trevino RN(name) on [employer]  being received from L&D(unit) for routine progression of care   Report consisted of patients Situation, Background, Assessment and   Recommendations(SBAR). Information from the following report(s) SBAR was reviewed with the receiving nurse. Opportunity for questions and clarification was provided. Assessment completed upon patients arrival to unit and care assumed.

## 2018-02-16 NOTE — ANESTHESIA PROCEDURE NOTES
Epidural Block    Start time: 2/15/2018 11:34 PM  End time: 2/15/2018 11:55 PM  Performed by: Sal Dempsey by: Yessi Connors     Pre-Procedure  Indication: labor epidural    Preanesthetic Checklist: patient identified, risks and benefits discussed, anesthesia consent, site marked, patient being monitored, timeout performed and anesthesia consent    Timeout Time: 23:33        Epidural:   Patient position:  Seated  Prep region:  Lumbar  Prep: Betadine and Patient draped    Location:  L3-4    Needle and Epidural Catheter:   Needle Type:  Tuohy  Needle Gauge:  18 G  Injection Technique:  Loss of resistance using air  Attempts:  3  Catheter Size:  20 G  Catheter at Skin Depth (cm):  15  Depth in Epidural Space (cm):  9.5  Events: no blood with aspiration, no cerebrospinal fluid with aspiration, no paresthesia and negative aspiration test    Test Dose:  Lidocaine 1.5% w/ epi and negative    Assessment:   Catheter Secured:  Tegaderm and tape  Insertion:  Uncomplicated  Patient tolerance:  Patient tolerated the procedure well with no immediate complications

## 2018-02-16 NOTE — PROGRESS NOTES
Holland Johnson care of patient asleep in bed, awakened when I came in room with breakfast tray. S/P delivery at 0515. See flowsheet for charting. PIV to L hand to heplock. NO s/s of infiltration noted. Fasting blood sugar this am was 109.     0827 Patient update given to Dr. Dejah Sandy    9247 Patient continues to refuse Pulmicort nebulizer but asked for Albuterol neb. Patient has faint wheezing in bases. Hospitalist over to see patient. States she is cleared by him to go home when able    1055 TRANSFER - OUT REPORT:    Verbal report given to [facility] on [employer]  being transferred to [facility] for routine progression of care       Report consisted of patients Situation, Background, Assessment and   Recommendations(SBAR). Information from the following report(s) Kardex, Intake/Output and Recent Results was reviewed with the receiving nurse. Lines:   Peripheral IV 02/15/18 Left Hand (Active)   Site Assessment Clean, dry, & intact 2/16/2018  7:53 AM   Phlebitis Assessment 0 2/16/2018  7:53 AM   Infiltration Assessment 0 2/16/2018  7:53 AM   Dressing Status Clean, dry, & intact 2/16/2018  7:53 AM   Dressing Type Transparent 2/16/2018  7:53 AM   Hub Color/Line Status Pink 2/16/2018  7:53 AM        Opportunity for questions and clarification was provided.       Patient transported with:   Registered Nurse

## 2018-02-16 NOTE — DIABETES MGMT
GLYCEMIC CONTROL PLAN OF CARE    Pt had Marley Bryan this morning and was resting in room at time of visit. Encouraged pt to f/u with PCP regarding diabetes emphasizing pt risk is high for T2DM. Pt less than interested in education and wanted to go back to resting. Pt did say she wanted \"real food\". Encouraged appropriate carbohydrate servings postpartum and for pt to use information provided at initial visit. Continue home DM medications, 500 mg metformin two times daily with meals. Recent Glucose Results:   Lab Results   Component Value Date/Time     (H) 02/16/2018 06:15 AM    GLUCPOC 103 02/16/2018 06:39 AM    GLUCPOC 114 (H) 02/16/2018 01:33 AM    GLUCPOC 90 02/15/2018 10:35 PM     Will continue to monitor inpatient for intervention.     Mayra Mckeon MS, [address] [place] [address], [facility] [place]  Pager: [phone]

## 2018-02-16 NOTE — PROGRESS NOTES
Bedside shift change report given to Susy Ramirez RN (oncoming nurse) by Alem Block RN (offgoing nurse). Report included the following information SBAR, Kardex and MAR.

## 2018-02-16 NOTE — ROUTINE PROCESS
Patient Rounds    1058-Patient transferred onto floor, no complaints, no assistance needed  1247-Patient on the phone, no complaints, no assistance needed  1314-Patient resting, no complaints, no assistance needed  1440-Patient sleeping, no complaints, no assistance needed 2.12

## 2018-02-16 NOTE — PROGRESS NOTES
1915: Assumed care of patient resting in bed. EFM and TOCO in place. VS are stable. Will continue to monitor   2005: Patient c/o increasing vaginal pressure. SVE: 4/-2. Gi care performed. Bed pads changed. EFM and TOCO adjusted. 2055: Aldan adjusted  2130: Aldan adjusted  2223: Dr. Karla Forbes @ bedside. SVE 4/50/-2. IUPC placed  4403: CRNA @ bedside for epidural  2333: Time out performed  2349: Test dose given  2357: Patient assisted to supine position. IUPC accidentally removed. 0020: SVE: 5/-2  0155: Turned to left side  0222: SVE: 5-6/-1  0329: Late decels noted. Patient turned to left side and monitors readjusted. IV fluid bolus started. C/o pressure to vagina and feeling like she needs to have a bowel movement. SVE performed: 5-6/-1  0400: Patient c/o of increasing pain not relieved by epidural. Patient sitting straight up, pushing button. Notified CRNA  0425: SVE: 6 cm. Daniel Zaidi CRNA @ bedside to assess epidural.  7391: catheter out sitting for epidural  0456: Patient continues to experience pain unrelieved by epidural. Notified CRNA. States patient may have stadol. 0505: Dr. Karla Forbes @ bedside for SVE: 8 cm.   0515: Patient delivered a viable male infant weighing 9 lb 1 oz.  Apgars 8 and 9

## 2018-02-16 NOTE — PROGRESS NOTES
Labor Progress Note  Patient seen, fetal heart rate and contraction pattern evaluated, patient examined.   Patient Vitals for the past 8 hrs:   BP Temp Pulse Resp SpO2   02/15/18 2146 133/60 - 83 - -   02/15/18 2131 126/53 - 93 - -   02/15/18 2116 129/47 - 88 - -   02/15/18 2101 132/76 98 °F (36.7 °C) 88 18 -   02/15/18 2046 128/69 - 80 - -   02/15/18 2031 154/65 - 87 - -   02/15/18 2016 136/60 - 71 - -   02/15/18 2001 152/76 - 81 - -   02/15/18 1959 - - - - 97 %   02/15/18 1954 - - - - 96 %   02/15/18 1949 - - - - 96 %   02/15/18 1946 137/76 - 90 - -   02/15/18 1944 - - - - 96 %   02/15/18 1939 - - - - 95 %   02/15/18 1934 - - - - 96 %   02/15/18 1931 133/68 - 81 - -   02/15/18 1929 - - - - 96 %   02/15/18 1928 - - - - 96 %   02/15/18 1916 148/84 99 °F (37.2 °C) 82 23 -   02/15/18 1901 139/57 - 80 - -   02/15/18 1640 - 99.2 °F (37.3 °C) - - -   02/15/18 1443 134/84 98.2 °F (36.8 °C) 97 18 97 %       Physical Exam:  Cervical Exam:  4 cm dilated    50% effaced    -2 station    Presenting Part: cephalic, IUPC placed    Uterine Activity: Frequency: Every 2-3 minutes  Fetal Heart Rate: Baseline: 145 per minute  Variability: moderate  Accelerations: yes  Decelerations: none    Assessment/Plan:  Reassuring fetal status, Labor  Continue expectant management, Continue plan for vaginal delivery  Pit 10  Signed By:  Silvia Montiel MD     February 15, 2018 10:28 PM

## 2018-02-16 NOTE — ANESTHESIA PROCEDURE NOTES
Epidural Block    Start time: 2/16/2018 4:22 AM  End time: 2/16/2018 4:38 AM  Performed by: Barron Marker  Authorized by: Barron Marker     Pre-Procedure  Indication: labor epidural    Preanesthetic Checklist: patient identified, risks and benefits discussed, anesthesia consent, site marked, patient being monitored, timeout performed and anesthesia consent      Epidural:   Patient position:  Seated  Prep region:  Lumbar  Prep: Betadine and Patient draped    Location:  L3-4    Needle and Epidural Catheter:   Needle Type:  Tuohy  Needle Gauge:  18 G  Injection Technique:  Loss of resistance using air  Catheter Size:  20 G  Catheter at Skin Depth (cm):  18  Depth in Epidural Space (cm):  11  Events: no blood with aspiration, no cerebrospinal fluid with aspiration, no paresthesia and negative aspiration test    Test Dose:  Lidocaine 1.5% w/ epi and negative    Assessment:   Catheter Secured:  Tegaderm and tape  Insertion:  Uncomplicated  Patient tolerance:  Patient tolerated the procedure well with no immediate complications

## 2018-02-16 NOTE — ANESTHESIA PREPROCEDURE EVALUATION
Anesthetic History               Review of Systems / Medical History  Patient summary reviewed, nursing notes reviewed and pertinent labs reviewed    Pulmonary            Asthma : poorly controlled       Neuro/Psych              Cardiovascular    Hypertension                   GI/Hepatic/Renal                Endo/Other    Diabetes: using insulin    Morbid obesity     Other Findings              Physical Exam    Airway  Mallampati: III  TM Distance: 4 - 6 cm  Neck ROM: normal range of motion   Mouth opening: Normal     Cardiovascular  Regular rate and rhythm,  S1 and S2 normal,  no murmur, click, rub, or gallop             Dental    Dentition: Lower dentition intact and Upper dentition intact     Pulmonary  Breath sounds clear to auscultation               Abdominal  GI exam deferred       Other Findings            Anesthetic Plan    ASA: 3  Anesthesia type: epidural            Anesthetic plan and risks discussed with: Patient

## 2018-02-16 NOTE — PROGRESS NOTES
[facility] Hospitalist Group  Progress Note    Patient: Sal Sunshine Age: 34 y.o. : 1985 MR#: [id number] SSN: [id number]  Date: 2018     Subjective:     Denies F/C, N/V, CP, SOB, pain c/o. Declines Pulmicort tx. Case d/w nursing. Is s/p SVDelivery this AM.     Assessment/Plan:   1. Severe persistent asthma with mild exacerbation - improved. No SOB c/o. Still wheezing a little on exam. Will maintain Albuterol prn, add Flovent. No systemic oral steroids. Flovent not contraindicated for breastfeeding. 2. Acute bronchitis - no acute. Resolved. 3. Flu A - no acute. Feels well. 4. Morbid obesity - outpt f/u, diet/exercise counseling. 5. DM - resume outpt meds. 6. Clear for d/c home from my perspective. Will not actively follow, please call with any questions. 7. Maintain postpartum care. Additional Notes:      Case discussed with:  [x]Patient  []Family  [x]Nursing  []Case Management  DVT Prophylaxis:  []Lovenox  []Hep SQ  []SCDs  []Coumadin   []On Heparin gtt    Objective:   VS:   Visit Vitals    /80    Pulse 80    Temp 98.7 °F (37.1 °C)    Resp 18    LMP 2017    SpO2 97%    Breastfeeding Unknown      Tmax/24hrs: Temp (24hrs), Av.6 °F (37 °C), Min:98 °F (36.7 °C), Max:99.2 °F (37.3 °C)    Intake/Output Summary (Last 24 hours) at 18 0957  Last data filed at 18 0500   Gross per 24 hour   Intake                0 ml   Output              600 ml   Net             -600 ml       General:  Awake, alert, NAD. Cardiovascular:  RRR. Pulmonary:  CTA B with scattered mild exp wheeze. GI:  Soft, NT/ND, NABS. Extremities:  No CT or edema.     Additional:      Labs:    Recent Results (from the past 24 hour(s))   GLUCOSE, POC    Collection Time: 02/15/18 11:27 AM   Result Value Ref Range    Glucose (POC) 118 (H) 70 - 110 mg/dL   CBC WITH AUTOMATED DIFF    Collection Time: 02/15/18  3:55 PM   Result Value Ref Range    WBC 5.5 4.6 - 13.2 K/uL    RBC 4. 25 4.20 - 5.30 M/uL    HGB 9.8 (L) 12.0 - 16.0 g/dL    HCT 31.6 (L) 35.0 - 45.0 %    MCV 74.4 74.0 - 97.0 FL    MCH 23.1 (L) 24.0 - 34.0 PG    MCHC 31.0 31.0 - 37.0 g/dL    RDW 15.7 (H) 11.6 - 14.5 %    PLATELET 293 822 - 886 K/uL    MPV 10.3 9.2 - 11.8 FL    NEUTROPHILS 76 (H) 40 - 73 %    LYMPHOCYTES 20 (L) 21 - 52 %    MONOCYTES 4 3 - 10 %    EOSINOPHILS 0 0 - 5 %    BASOPHILS 0 0 - 2 %    ABS. NEUTROPHILS 4.2 1.8 - 8.0 K/UL    ABS. LYMPHOCYTES 1.1 0.9 - 3.6 K/UL    ABS. MONOCYTES 0.2 0.05 - 1.2 K/UL    ABS. EOSINOPHILS 0.0 0.0 - 0.4 K/UL    ABS. BASOPHILS 0.0 0.0 - 0.1 K/UL    DF AUTOMATED     TYPE & SCREEN    Collection Time: 02/15/18  3:55 PM   Result Value Ref Range    Crossmatch Expiration 02/18/2018     ABO/Rh(D) O POSITIVE     Antibody screen NEG    GLUCOSE, POC    Collection Time: 02/15/18  6:14 PM   Result Value Ref Range    Glucose (POC) 112 (H) 70 - 110 mg/dL   GLUCOSE, POC    Collection Time: 02/15/18 10:35 PM   Result Value Ref Range    Glucose (POC) 90 70 - 110 mg/dL   GLUCOSE, POC    Collection Time: 02/16/18  1:33 AM   Result Value Ref Range    Glucose (POC) 114 (H) 70 - 110 mg/dL   CBC WITH AUTOMATED DIFF    Collection Time: 02/16/18  6:15 AM   Result Value Ref Range    WBC 8.2 4.6 - 13.2 K/uL    RBC 4.00 (L) 4.20 - 5.30 M/uL    HGB 9.5 (L) 12.0 - 16.0 g/dL    HCT 29.8 (L) 35.0 - 45.0 %    MCV 74.5 74.0 - 97.0 FL    MCH 23.8 (L) 24.0 - 34.0 PG    MCHC 31.9 31.0 - 37.0 g/dL    RDW 15.6 (H) 11.6 - 14.5 %    PLATELET 449 945 - 990 K/uL    MPV 10.4 9.2 - 11.8 FL    NEUTROPHILS 67 40 - 73 %    LYMPHOCYTES 27 21 - 52 %    MONOCYTES 6 3 - 10 %    EOSINOPHILS 0 0 - 5 %    BASOPHILS 0 0 - 2 %    ABS. NEUTROPHILS 5.6 1.8 - 8.0 K/UL    ABS. LYMPHOCYTES 2.2 0.9 - 3.6 K/UL    ABS. MONOCYTES 0.5 0.05 - 1.2 K/UL    ABS. EOSINOPHILS 0.0 0.0 - 0.4 K/UL    ABS.  BASOPHILS 0.0 0.0 - 0.1 K/UL    DF AUTOMATED     MAGNESIUM    Collection Time: 02/16/18  6:15 AM   Result Value Ref Range    Magnesium 1.9 1.6 - 2.6 mg/dL   METABOLIC PANEL, BASIC    Collection Time: 02/16/18  6:15 AM   Result Value Ref Range    Sodium 140 136 - 145 mmol/L    Potassium 3.6 3.5 - 5.5 mmol/L    Chloride 105 100 - 108 mmol/L    CO2 26 21 - 32 mmol/L    Anion gap 9 3.0 - 18 mmol/L    Glucose 105 (H) 74 - 99 mg/dL    BUN 7 7.0 - 18 MG/DL    Creatinine 0.45 (L) 0.6 - 1.3 MG/DL    BUN/Creatinine ratio 16 12 - 20      GFR est AA >60 >60 ml/min/1.73m2    GFR est non-AA >60 >60 ml/min/1.73m2    Calcium 8.3 (L) 8.5 - 10.1 MG/DL   PROTHROMBIN TIME + INR    Collection Time: 02/16/18  6:15 AM   Result Value Ref Range    Prothrombin time 12.8 11.5 - 15.2 sec    INR 1.0 0.8 - 1.2     GLUCOSE, POC    Collection Time: 02/16/18  6:39 AM   Result Value Ref Range    Glucose (POC) 103 70 - 110 mg/dL       Signed By: Vaishali Lowe MD     February 16, 2018 9:57 AM

## 2018-02-16 NOTE — L&D DELIVERY NOTE
Delivery Note    Obstetrician:  Leola Parada MD    Pre-Delivery Diagnosis: Term pregnancy, Induced labor, Pregnancy complicated by: influenza A, acute asthma exacerbation, gestational diabetes, CHTN    Post-Delivery Diagnosis: Living  infant(s) or Male    Procedure: Spontaneous vaginal delivery    Epidural: YES    Monitor:  Fetal Heart Tones - External and Uterine Contractions - External    Estimated Blood Loss: 400    Laceration(s):  none    Presentation: Cephalic    Fetal Description: hammer    Birth weight:  9 lbs 1 oz    Apgar - One Minute: 8    Apgar - Five Minutes: 9    Umbilical Cord: 3 vessels present and Cord blood sent to lab for type, Rh, and Svitlana' test    Specimens: placenta           Complications:  none      Signed By:  Leola Parada MD     2018 5:38 AM

## 2018-02-17 VITALS
RESPIRATION RATE: 21 BRPM | HEART RATE: 71 BPM | OXYGEN SATURATION: 93 % | SYSTOLIC BLOOD PRESSURE: 120 MMHG | TEMPERATURE: 97.4 F | DIASTOLIC BLOOD PRESSURE: 82 MMHG

## 2018-02-17 LAB
GLUCOSE BLD STRIP.AUTO-MCNC: 84 MG/DL (ref 70–110)
HCT VFR BLD AUTO: 33 % (ref 35–45)
HGB BLD-MCNC: 10.3 G/DL (ref 12–16)

## 2018-02-17 PROCEDURE — 85018 HEMOGLOBIN: CPT | Performed by: OBSTETRICS & GYNECOLOGY

## 2018-02-17 PROCEDURE — 74011000250 HC RX REV CODE- 250: Performed by: INTERNAL MEDICINE

## 2018-02-17 PROCEDURE — 85014 HEMATOCRIT: CPT | Performed by: OBSTETRICS & GYNECOLOGY

## 2018-02-17 PROCEDURE — 36415 COLL VENOUS BLD VENIPUNCTURE: CPT | Performed by: OBSTETRICS & GYNECOLOGY

## 2018-02-17 PROCEDURE — 82962 GLUCOSE BLOOD TEST: CPT

## 2018-02-17 PROCEDURE — 77030013140 HC MSK NEB VYRM -A

## 2018-02-17 PROCEDURE — 74011250637 HC RX REV CODE- 250/637: Performed by: OBSTETRICS & GYNECOLOGY

## 2018-02-17 RX ORDER — IBUPROFEN 800 MG/1
800 TABLET ORAL
Qty: 30 TAB | Refills: 2 | Status: SHIPPED | OUTPATIENT
Start: 2018-02-17 | End: 2020-05-19

## 2018-02-17 RX ORDER — LANOLIN ALCOHOL/MO/W.PET/CERES
325 CREAM (GRAM) TOPICAL
Qty: 90 TAB | Refills: 3 | Status: SHIPPED | OUTPATIENT
Start: 2018-02-17 | End: 2020-01-27 | Stop reason: ALTCHOICE

## 2018-02-17 RX ORDER — OXYCODONE AND ACETAMINOPHEN 5; 325 MG/1; MG/1
2 TABLET ORAL
Qty: 20 TAB | Refills: 0 | Status: SHIPPED | OUTPATIENT
Start: 2018-02-17 | End: 2020-01-27 | Stop reason: ALTCHOICE

## 2018-02-17 RX ORDER — DOCUSATE SODIUM 100 MG/1
100 CAPSULE, LIQUID FILLED ORAL DAILY
Qty: 30 CAP | Refills: 2 | Status: SHIPPED | OUTPATIENT
Start: 2018-02-18 | End: 2018-05-19

## 2018-02-17 RX ADMIN — GUAIFENESIN 200 MG: 100 SOLUTION ORAL at 06:30

## 2018-02-17 RX ADMIN — FLUTICASONE FUROATE 1 PUFF: 100 POWDER RESPIRATORY (INHALATION) at 08:48

## 2018-02-17 RX ADMIN — DOCUSATE SODIUM 100 MG: 100 CAPSULE, LIQUID FILLED ORAL at 08:47

## 2018-02-17 RX ADMIN — Medication 325 MG: at 08:47

## 2018-02-17 RX ADMIN — Medication 325 MG: at 11:44

## 2018-02-17 RX ADMIN — OXYCODONE HYDROCHLORIDE AND ACETAMINOPHEN 2 TABLET: 5; 325 TABLET ORAL at 06:26

## 2018-02-17 RX ADMIN — PRENATAL VITAMINS-IRON FUMARATE 27 MG IRON-FOLIC ACID 0.8 MG TABLET 1 TABLET: at 08:48

## 2018-02-17 RX ADMIN — IBUPROFEN 800 MG: 400 TABLET ORAL at 11:44

## 2018-02-17 RX ADMIN — ALBUTEROL SULFATE 2.5 MG: 2.5 SOLUTION RESPIRATORY (INHALATION) at 06:30

## 2018-02-17 RX ADMIN — Medication 325 MG: at 16:27

## 2018-02-17 NOTE — PROGRESS NOTES
1915 Bedside and Verbal shift change report given to DORITA England Rn (oncoming nurse) by Imani Bethea Rn (offgoing nurse). Report included the following information SBAR, Kardex and MAR.     1954 Shift assessment complete. Pt sitting in chair. No complaints at this time    2248 Talking on phone denies needs    2327  Pain meds given. See MAR    0020  Pain reassess. Pt. Sleeping    0150 Watching tv. Reassessment done.     0410 Mom sleeping no signs of distress    0630 Pain meds,  And albuterol given

## 2018-02-17 NOTE — DISCHARGE SUMMARY
Delivery Summary    Patient: Mary Jo Fiore MRN: [id number]  SSN: [id number]    YOB: 1985  Age: 34 y.o. Sex: female       Information for the patient's :  Amy Mandel [[id number]]       Labor Events:    Labor: Yes   Rupture Date: 2/15/2018   Rupture Time: 4:40 PM   Rupture Type AROM   Amniotic Fluid Volume: Large    Amniotic Fluid Description: Clear None   Induction: Oxytocin;AROM       Augmentation: Oxytocin;AROM   Labor Events: None     Cervical Ripenin2018 5:14 AM None     Delivery Events:  Episiotomy: None   Laceration(s): None     Repaired: None    Number of Repair Packets:     Suture Type and Size:       Estimated Blood Loss (ml):  ml       Delivery Date: 2018    Delivery Time: 5:15 AM  Delivery Type: Vaginal, Spontaneous Delivery  Sex:  Male     Gestational Age: 37w1d   Delivery Clinician:     Living Status: Living   Delivery Location: L&D            APGARS  One minute Five minutes Ten minutes   Skin color: 0   1        Heart rate: 2   2        Grimace: 2   2        Muscle tone: 2   2        Breathin   2        Totals: 8   9            Presentation: Vertex    Position:   Occiput Anterior  Resuscitation Method:  Suctioning-bulb; Tactile Stimulation     Meconium Stained: None      Cord Vessels: 3 Vessels      Cord Events:    Cord Blood Sent?:  Yes    Blood Gases Sent?:  No    Placenta:  Date/Time:   5:31 AM  Removal: Spontaneous      Appearance: Normal      Measurements:  Birth Weight: 9 lb 0.8 oz (4.106 kg)      Birth Length: 53.3 cm      Head Circumference: 38 cm      Chest Circumference: 35 cm     Abdominal Girth: 33 cm    Other Providers:   FAUSTO MARMOLEJO;ZAIN WAY;ARISTIDES WADE;;;;KEVYN FISHER;;;;ONUR DEWITT, Obstetrician;Primary Nurse;Primary  Nurse;Nicu Nurse;Neonatologist;Anesthesiologist;Crna;Nurse Practitioner;Midwife;Nursery Nurse;Scrub Tech           Group B Strep:   Lab Results   Component Value Date/Time    Gilles External Neg 02/10/2018     Information for the patient's :  Taylor Daniel [[id number]]     Lab Results   Component Value Date/Time    ABO/Rh(D) A POSITIVE 2018 05:15 AM    GISELA IgG NEG 2018 05:15 AM     No results found for: APH, APCO2, APO2, AHCO3, ABEC, ABDC, O2ST, EPHV, PCO2V, PO2V, HCO3V, EBEV, EBDV, SITE, RSCOM

## 2018-02-17 NOTE — DISCHARGE SUMMARY
Obstetrical Discharge Summary     Name: Almetta Scheuermann MRN: [id number]  SSN: [id number]    YOB: 1985  Age: 34 y.o. Sex: female      Allergies: Review of patient's allergies indicates no known allergies. Admit Date: 2018    Discharge Date: 2018     Admitting Physician: Yasmani Lechuga MD     Attending Physician:  Yasmani Lechuga MD     * Admission Diagnoses: shortness of breath;37 wks pregnancy; Shortness of breath; Sh*    * Discharge Diagnoses:   Information for the patient's :  Elaina Ravi [[id number]]   Delivery of a 9 lb 0.8 oz (4.106 kg) male infant via Vaginal, Spontaneous Delivery on 2018 at 5:15 AM  by . Apgars were 8 and 9. Additional Diagnoses:   Hospital Problems as of 2018  Date Reviewed: 2018          Codes Class Noted - Resolved POA    Asthma attack ICD-10-CM: J45.901  ICD-9-CM: 493.92  2018 - Present Unknown        Shortness of breath ICD-10-CM: R06.02  ICD-9-CM: 786.05  2018 - Present Unknown        Morbid obesity ([facility] [address] ICD-10-CM: E66.01  ICD-9-CM: 278.01  2018 - Present Unknown        Gestational diabetes ICD-10-CM: O24.419  ICD-9-CM: 648.80  2018 - Present Unknown        Antepartum fetal tachycardia affecting care of mother ICD-10-CM: I71.5996  ICD-9-CM: 659.73  2018 - Present Unknown        Hypertension ICD-10-CM: I10  ICD-9-CM: 401.9  2018 - Present Unknown        History of asthma ICD-10-CM: Z87.09  ICD-9-CM: V12.69  2018 - Present Unknown        Pregnancy ICD-10-CM: Z34.90  ICD-9-CM: V22.2  2018 - Present Unknown             Lab Results   Component Value Date/Time    ABO/Rh(D) O POSITIVE 02/15/2018 03:55 PM    Rubella, External Immune 2017    GrBStrep, External Neg 02/10/2018    ABO,Rh O positive 2012      Immunization History   Administered Date(s) Administered    Tdap 2016       * Procedures: .            * Discharge Condition: [facility] Course: Postpartum course was complicated by hypertension, which added 2 days to the patient's length of stay. * Disposition: Home    Discharge Medications:   Current Discharge Medication List      START taking these medications    Details   docusate sodium (COLACE) 100 mg capsule Take 1 Cap by mouth daily for 90 days. Indications: constipation  Qty: 30 Cap, Refills: 2    Associated Diagnoses: Constipation, unspecified constipation type      ferrous sulfate 325 mg (65 mg iron) tablet Take 1 Tab by mouth three (3) times daily (with meals). Indications: Iron Deficiency Anemia  Qty: 90 Tab, Refills: 3    Associated Diagnoses: Other iron deficiency anemia      ibuprofen (MOTRIN) 800 mg tablet Take 1 Tab by mouth every eight (8) hours as needed. Indications: Pain  Qty: 30 Tab, Refills: 2    Associated Diagnoses: Postpartum pain      oxyCODONE-acetaminophen (PERCOCET) 5-325 mg per tablet Take 2 Tabs by mouth every four (4) hours as needed. Max Daily Amount: 12 Tabs. Indications: Pain  Qty: 20 Tab, Refills: 0    Associated Diagnoses: Postpartum pain      montelukast (SINGULAIR) 10 mg tablet Take 1 Tab by mouth nightly. Qty: 30 Tab, Refills: 1      fluticasone (FLOVENT HFA) 110 mcg/actuation inhaler Take 1 Puff by inhalation every twelve (12) hours. Qty: 1 Inhaler, Refills: 1         CONTINUE these medications which have NOT CHANGED    Details   metFORMIN (GLUCOPHAGE) 500 mg tablet Take 1 Tab by mouth two (2) times daily (with meals). Indications: Gestational Diabetes Mellitus  Qty: 61 Tab, Refills: 1    Associated Diagnoses: Hyperglycemia due to type 1 diabetes mellitus (HCC)      Lancets misc Test blood sugar four times daily: fasting every morning and two hours after breakfast, lunch and dinner.   Qty: 100 Each, Refills: 1    Associated Diagnoses: Diet controlled gestational diabetes mellitus (GDM) in third trimester      glucose blood VI test strips (ASCENSIA AUTODISC VI, ONE TOUCH ULTRA TEST VI) strip Test blood sugar four times daily: fasting every morning and two hours after breakfast, lunch and dinner. Qty: 100 Strip, Refills: 1    Associated Diagnoses: Diet controlled gestational diabetes mellitus (GDM) in third trimester      Blood-Glucose Meter monitoring kit Test blood sugar four times daily: fasting every morning and two hours after breakfast, lunch and dinner. Qty: 1 Kit, Refills: 0    Associated Diagnoses: Diet controlled gestational diabetes mellitus (GDM) in third trimester      ferrous sulfate (IRON) 325 mg (65 mg iron) EC tablet Take 1 Tab by mouth three (3) times daily (with meals). Qty: 90 Tab, Refills: 2    Associated Diagnoses: Iron deficiency anemia during pregnancy      albuterol (PROVENTIL HFA, VENTOLIN HFA, PROAIR HFA) 90 mcg/actuation inhaler 1-2 Puffs. labetalol (NORMODYNE) 200 mg tablet Take 1 Tab by mouth two (2) times a day. Indications: hypertension  Qty: 60 Tab, Refills: 3    Associated Diagnoses: Essential hypertension      terconazole (TERAZOL 3) 0.8 % vaginal cream Insert 1 Applicator into vagina nightly. Qty: 20 g, Refills: 1    Associated Diagnoses: Yeast vaginitis         STOP taking these medications       TRIAMTERENE-HYDROCHLOROTHIAZID PO Comments:   Reason for Stopping:               * Follow-up Care/Patient Instructions:   Activity: Activity as tolerated, No heavy lifting, pushing, pulling with the implant side for 2 months and No sex for 6 weeks  Diet: Regular Diet  Wound Care: None needed    Follow-up Information     Follow up With Details Comments Contact Info    Cece Galeano MD   [address] [place] [address]  [phone]             Signed By:  Дмитрий Ko MD     February 17, 2018

## 2018-02-17 NOTE — ROUTINE PROCESS
Bedside and Verbal shift change report given to JOHN Bonilla RN (oncoming nurse) by Valorie Oconnell. SEYMOUR England (offgoing nurse). Report included the following information Kardex, Procedure Summary, Intake/Output, MAR and Recent Results. 0800 Metformin declined. Blood sugar 84  0845. Resting in bed in bed . Family present  1000. Declines needs at this time. 1100 resting in bed. Visitors present. 1144. Medicated with 2 motrin for c/o abd cramping. 0484 31 29 02. Discharge instructions and prescriptions given. Verbalized understanding  13 showered and preparing for discharge. 1700 discharged home with baby in stable condition.

## 2018-02-17 NOTE — DISCHARGE INSTRUCTIONS
Learning About Asthma Triggers  What are triggers? When you have asthma, certain things can make your symptoms worse. These are called triggers. They include:  · Cigarette smoke or air pollution. · Things you are allergic to, such as:  ¨ Pollen, mold, or dust mites. ¨ Pet hair, skin, or saliva. · Illnesses, like colds, flu, or pneumonia. · Exercise. · Dry, cold air. How do triggers affect asthma? Triggers can make it harder for your lungs to work as they should and can lead to sudden difficulty breathing and other symptoms. When you are around a trigger, an asthma attack is more likely. If your symptoms are severe, you may need emergency treatment or have to go to the hospital for treatment. If you know what your triggers are and can avoid them, you may be able to prevent asthma attacks, reduce how often you have them, and make them less severe. What can you do to avoid triggers? The first thing is to know your triggers. When you are having symptoms, note the things around you that might be causing them. Then look for patterns in what may be triggering your symptoms. Record your triggers on a piece of paper or in an asthma diary. When you have your list of possible triggers, work with your doctor to find ways to avoid them. You also can check how well your lungs are working by measuring your peak expiratory flow (PEF) throughout the day. Your PEF may drop when you are near things that trigger symptoms. Here are some ways to avoid a few common triggers. · Do not smoke or allow others to smoke around you. If you need help quitting, talk to your doctor about stop-smoking programs and medicines. These can increase your chances of quitting for good. · If there is a lot of pollution, pollen, or dust outside, stay at home and keep your windows closed. Use an air conditioner or air filter in your home. Check your local weather report or newspaper for air quality and pollen reports.   · Get a flu shot every year. Talk to your doctor about getting a pneumococcal shot. Wash your hands often to prevent infections. · Avoid exercising outdoors in cold weather. If you are outdoors in cold weather, wear a scarf around your face and breathe through your nose. How can you manage an asthma attack? · If you have an asthma action plan, follow the plan. In general:  ¨ Use your quick-relief inhaler as directed by your doctor. If your symptoms do not get better after you use your medicine, have someone take you to the emergency room. Call an ambulance if needed. ¨ If your doctor has given you other inhaled medicines or steroid pills, take them as directed. Where can you learn more? Go to [employer]  Enter M564 in the search box to learn more about \"Learning About Asthma Triggers. \"   © 5843-9361 [employer], [employer]orporated. Care instructions adapted under license by [employer] (which disclaims liability or warranty for this information). This care instruction is for use with your licensed healthcare professional. If you have questions about a medical condition or this instruction, always ask your healthcare professional. [place] [address] any warranty or liability for your use of this information. Content Version: 19.6.143762;  Last Revised: February 23, 2012

## 2018-02-26 ENCOUNTER — TELEPHONE (OUTPATIENT)
Dept: OBGYN CLINIC | Age: 33
End: 2018-02-26

## 2018-02-26 NOTE — TELEPHONE ENCOUNTER
Pt called because she said that the baby's ring has still not fallen off and she said that it has started to but it seems like it is \"stuck\".

## 2018-02-26 NOTE — TELEPHONE ENCOUNTER
Return call made to patient and I advised her that she should bring the baby to the hospital to see Dr. Angelia Platt (per Dr Sherry Jefferson), to have the plastibell removed.

## 2018-08-13 ENCOUNTER — HOSPITAL ENCOUNTER (EMERGENCY)
Age: 33
Discharge: HOME | End: 2018-08-13
Attending: EMERGENCY MEDICINE
Payer: MEDICAID

## 2018-08-13 VITALS
RESPIRATION RATE: 16 BRPM | WEIGHT: 293 LBS | DIASTOLIC BLOOD PRESSURE: 98 MMHG | HEART RATE: 79 BPM | SYSTOLIC BLOOD PRESSURE: 159 MMHG | OXYGEN SATURATION: 98 % | TEMPERATURE: 97.6 F | BODY MASS INDEX: 45.78 KG/M2

## 2018-08-13 DIAGNOSIS — Z51.89 VISIT FOR WOUND CHECK: Primary | ICD-10-CM

## 2018-08-13 PROCEDURE — 99282 EMERGENCY DEPT VISIT SF MDM: CPT

## 2018-08-13 NOTE — ED PROVIDER NOTES
EMERGENCY DEPARTMENT HISTORY AND PHYSICAL EXAM    11:48 AM      Date: 8/13/2018  Patient Name: Aparna Guzman    History of Presenting Illness     Chief Complaint   Patient presents with    Nail Problem         History Provided By: Patient    Chief Complaint: Broken finger nail  Duration:  PTA  Timing:  Acute  Location: R little finger  Quality: Not reported  Severity: Moderate  Modifying Factors: Movement of nail exacerbates pain  Associated Symptoms: Swelling      Additional History (Context): Aparna Guzman is a 34 y.o. female with obesity, asthma, PNA, and HTN who presents with an acute broken nail on her R little finger PTA. Pt reports that she just got her nails done with acrylics, but suddenly it was broken from the bottom and now her nail is \"pulled up. \" Pt feels moderate pain when she moves her nail. There is pus coming from underneath her nail and some swelling. Pt has NKDA, is not diabetic, and has no known kidney diseases. No other symptoms or concerns were expressed. PCP: Bj Carrion MD    Current Outpatient Prescriptions   Medication Sig Dispense Refill    labetalol (NORMODYNE) 200 mg tablet TAKE ONE TABLET BY MOUTH TWICE DAILY 60 Tab 2    ferrous sulfate 325 mg (65 mg iron) tablet Take 1 Tab by mouth three (3) times daily (with meals). Indications: Iron Deficiency Anemia 90 Tab 3    ibuprofen (MOTRIN) 800 mg tablet Take 1 Tab by mouth every eight (8) hours as needed. Indications: Pain 30 Tab 2    oxyCODONE-acetaminophen (PERCOCET) 5-325 mg per tablet Take 2 Tabs by mouth every four (4) hours as needed. Max Daily Amount: 12 Tabs. Indications: Pain 20 Tab 0    montelukast (SINGULAIR) 10 mg tablet Take 1 Tab by mouth nightly. 30 Tab 1    fluticasone (FLOVENT HFA) 110 mcg/actuation inhaler Take 1 Puff by inhalation every twelve (12) hours. 1 Inhaler 1    metFORMIN (GLUCOPHAGE) 500 mg tablet Take 1 Tab by mouth two (2) times daily (with meals).  Indications: Gestational Diabetes Mellitus 60 Tab 1  terconazole (TERAZOL 3) 0.8 % vaginal cream Insert 1 Applicator into vagina nightly. 20 g 1    Lancets misc Test blood sugar four times daily: fasting every morning and two hours after breakfast, lunch and dinner. 100 Each 1    glucose blood VI test strips (ASCENSIA AUTODISC VI, ONE TOUCH ULTRA TEST VI) strip Test blood sugar four times daily: fasting every morning and two hours after breakfast, lunch and dinner. 100 Strip 1    Blood-Glucose Meter monitoring kit Test blood sugar four times daily: fasting every morning and two hours after breakfast, lunch and dinner. 1 Kit 0    ferrous sulfate (IRON) 325 mg (65 mg iron) EC tablet Take 1 Tab by mouth three (3) times daily (with meals). 90 Tab 2    albuterol (PROVENTIL HFA, VENTOLIN HFA, PROAIR HFA) 90 mcg/actuation inhaler 1-2 Puffs. Past History     Past Medical History:  Past Medical History:   Diagnosis Date    Asthma     Community acquired pneumonia     Hypertension     Obese        Past Surgical History:  Past Surgical History:   Procedure Laterality Date    DILATION AND CURETTAGE         Family History:  Family History   Problem Relation Age of Onset   Wilfredo Hughes Asthma Mother     Hypertension Mother     Diabetes Mother     Asthma Father        Social History:  Social History   Substance Use Topics    Smoking status: Former Smoker     Packs/day: 0.50     Types: Cigarettes    Smokeless tobacco: Never Used      Comment: cigars twice weekly    Alcohol use 1.0 oz/week     2 Standard drinks or equivalent per week       Allergies:  No Known Allergies      Review of Systems       Review of Systems   Cardiovascular:        Swelling secondary to wound   Skin: Positive for wound (R little finger nail broken). All other systems reviewed and are negative.       Physical Exam     Visit Vitals    BP (!) 159/98 (BP 1 Location: Left arm, BP Patient Position: At rest)    Pulse 79    Temp 97.6 °F (36.4 °C)    Resp 16    Wt 140.6 kg (310 lb)    SpO2 98%  BMI 45.78 kg/m2         Physical Exam   Constitutional: She is oriented to person, place, and time. She appears well-developed. HENT:   Head: Normocephalic and atraumatic. Eyes: Pupils are equal, round, and reactive to light. Neck: No JVD present. No tracheal deviation present. No thyromegaly present. Cardiovascular: Normal rate, regular rhythm and normal heart sounds. Exam reveals no gallop and no friction rub. No murmur heard. Pulmonary/Chest: Effort normal and breath sounds normal. No stridor. No respiratory distress. She has no wheezes. She has no rales. She exhibits no tenderness. Abdominal: Soft. She exhibits no distension and no mass. There is no tenderness. There is no rebound and no guarding. Musculoskeletal: She exhibits no edema or tenderness. Lymphadenopathy:     She has no cervical adenopathy. Neurological: She is alert and oriented to person, place, and time. Skin: Skin is warm and dry. No rash noted. No erythema. No pallor. Right pinky finger's proximal nail likely torn; ?mucoopurulent discharge. Natural nail still adherent to acrylic nail. Psychiatric: She has a normal mood and affect. Her behavior is normal. Thought content normal.   Nursing note and vitals reviewed. Diagnostic Study Results     Labs -  No results found for this or any previous visit (from the past 12 hour(s)). Radiologic Studies -   No orders to display         Medical Decision Making   I am the first provider for this patient. I reviewed the vital signs, available nursing notes, past medical history, past surgical history, family history and social history. Vital Signs-Reviewed the patient's vital signs. Pulse Oximetry Analysis -  98% on room air, WNL    Cardiac Monitor:  Rate:  79 bpm  Rhythm:  Normal Sinus Rhythm  Provider Notes (Medical Decision Making): acetone required to remove false nail. None in [facility], pharmacy, nail color remover does not contain acetone.   Also asked housekeeping w/o success. Shruthi went to [employer] to make purchase. Pt updated as to status of search. Upon return from store, pt no longer in RW. I spoke to her on the phone. She had gone to her car to smoke a cigarette. Told her we had the acetone now. Pt said she'd return. Have wait and re-called pt and left VM on her phone. Pt has eloped. I checked outside, the RW, and [facility] before considering her absence. Possibly she fell asleep inside vehicle. Diagnosis     Clinical Impression:   1. Visit for wound check        Disposition: eloped    Follow-up Information     None           Patient's Medications   Start Taking    No medications on file   Continue Taking    ALBUTEROL (PROVENTIL HFA, VENTOLIN HFA, PROAIR HFA) 90 MCG/ACTUATION INHALER    1-2 Puffs. BLOOD-GLUCOSE METER MONITORING KIT    Test blood sugar four times daily: fasting every morning and two hours after breakfast, lunch and dinner. FERROUS SULFATE (IRON) 325 MG (65 MG IRON) EC TABLET    Take 1 Tab by mouth three (3) times daily (with meals). FERROUS SULFATE 325 MG (65 MG IRON) TABLET    Take 1 Tab by mouth three (3) times daily (with meals). Indications: Iron Deficiency Anemia    FLUTICASONE (FLOVENT HFA) 110 MCG/ACTUATION INHALER    Take 1 Puff by inhalation every twelve (12) hours. GLUCOSE BLOOD VI TEST STRIPS (ASCENSIA AUTODISC VI, ONE TOUCH ULTRA TEST VI) STRIP    Test blood sugar four times daily: fasting every morning and two hours after breakfast, lunch and dinner. IBUPROFEN (MOTRIN) 800 MG TABLET    Take 1 Tab by mouth every eight (8) hours as needed. Indications: Pain    LABETALOL (NORMODYNE) 200 MG TABLET    TAKE ONE TABLET BY MOUTH TWICE DAILY    LANCETS MISC    Test blood sugar four times daily: fasting every morning and two hours after breakfast, lunch and dinner. METFORMIN (GLUCOPHAGE) 500 MG TABLET    Take 1 Tab by mouth two (2) times daily (with meals).  Indications: Gestational Diabetes Mellitus MONTELUKAST (SINGULAIR) 10 MG TABLET    Take 1 Tab by mouth nightly. OXYCODONE-ACETAMINOPHEN (PERCOCET) 5-325 MG PER TABLET    Take 2 Tabs by mouth every four (4) hours as needed. Max Daily Amount: 12 Tabs. Indications: Pain    TERCONAZOLE (TERAZOL 3) 0.8 % VAGINAL CREAM    Insert 1 Applicator into vagina nightly. These Medications have changed    No medications on file   Stop Taking    No medications on file     _______________________________    Attestations:  Geraldo Bautista Se acting as a scribe for and in the presence of Donaldo Gray      August 13, 2018 at 11:48 AM       Provider Attestation:      I personally performed the services described in the documentation, reviewed the documentation, as recorded by the scribe in my presence, and it accurately and completely records my words and actions.  August 13, 2018 at 11:48 AM - GRICELDA Gray  _______________________________

## 2018-08-13 NOTE — ED TRIAGE NOTES
Patient arrived from home c.o infected nail. Patient has fake nail on finger currently and states small drainage.

## 2019-11-08 ENCOUNTER — OFFICE VISIT (OUTPATIENT)
Dept: SURGERY | Age: 34
End: 2019-11-08

## 2019-11-08 VITALS
HEIGHT: 67 IN | WEIGHT: 293 LBS | TEMPERATURE: 97.2 F | RESPIRATION RATE: 20 BRPM | SYSTOLIC BLOOD PRESSURE: 156 MMHG | DIASTOLIC BLOOD PRESSURE: 76 MMHG | BODY MASS INDEX: 45.99 KG/M2 | HEART RATE: 84 BPM

## 2019-11-08 DIAGNOSIS — J45.909 UNCOMPLICATED ASTHMA, UNSPECIFIED ASTHMA SEVERITY, UNSPECIFIED WHETHER PERSISTENT: ICD-10-CM

## 2019-11-08 DIAGNOSIS — I10 ESSENTIAL HYPERTENSION, BENIGN: ICD-10-CM

## 2019-11-08 DIAGNOSIS — E66.01 MORBID OBESITY WITH BMI OF 60.0-69.9, ADULT (HCC): Primary | ICD-10-CM

## 2019-11-08 DIAGNOSIS — G47.33 OBSTRUCTIVE SLEEP APNEA (ADULT) (PEDIATRIC): ICD-10-CM

## 2019-11-08 DIAGNOSIS — Z01.818 PRE-OP EXAM: ICD-10-CM

## 2019-11-08 DIAGNOSIS — K21.9 GASTROESOPHAGEAL REFLUX DISEASE WITHOUT ESOPHAGITIS: ICD-10-CM

## 2019-11-08 DIAGNOSIS — N39.3 FEMALE STRESS INCONTINENCE: ICD-10-CM

## 2019-11-08 RX ORDER — OMEPRAZOLE 20 MG/1
20 CAPSULE, DELAYED RELEASE ORAL DAILY
COMMUNITY
Start: 2019-11-07 | End: 2020-05-19

## 2019-11-08 RX ORDER — TRIAMTERENE/HYDROCHLOROTHIAZID 37.5-25 MG
1 TABLET ORAL DAILY
COMMUNITY
Start: 2019-11-07 | End: 2020-05-19

## 2019-11-08 NOTE — PROGRESS NOTES
Pt ID confirmed    Weight Loss Metrics 11/8/2019 11/8/2019 8/13/2018 2/7/2018 2/6/2018 1/31/2018 1/22/2018   Pre op / Initial Wt 428 - - - - - -   Today's Wt - 428 lb 310 lb 424 lb 424 lb 418 lb 422 lb   BMI - 67.03 kg/m2 45.78 kg/m2 62.61 kg/m2 62.61 kg/m2 61.73 kg/m2 62.32 kg/m2   Ideal Body Wt 140 - - - - - -   Excess Body Wt 288 - - - - - -   Goal Wt 198 - - - - - -   Wt loss to date 0 - - - - - -   % Wt Loss 0 - - - - - -   80% .4 - - - - - -       Body mass index is 67.03 kg/m².

## 2019-11-08 NOTE — PROGRESS NOTES
tial Consultation for Bariatric Surgery Template     Abril Mills is a 59-year-old black female who presents for discussion of surgical options available for definitive management of her super, super obesity. Onset obesity: Childhood. Weight 8018: 250 pounds and a 5 foot 7 inch frame. Current/maximum weight: 428 pounds on a 5 foot 7 inch frame with a body mass index of 67. Pattern/progression of weight gain: Slowly progressive interrupted by dietary weight loss followed by regain of lost weight as well as additional weight thus exhibiting the yo-yo effect after current maximum weight of 428 pounds. Maximum medical weight loss attempts: Multiple unsupervised and supervised weight loss trials of maximal loss occurring in 2017 losing 30 pounds over 3 months. Comorbidities: Hypertension, gastroesophageal reflux disease, reactive airway disease, stress urinary incontinence, clinical obstructive sleep apnea, weight related arthropathy-knees, ankles. Current weight: 428. BMI: 67.  I did body weight: 140. Excess body weight: 288. Estimated postsurgical weight loss: 230. Medical weight: 198. Allergies: No known drug allergies. Current medications: See medication list.  Past medical history:  1. Super, super obesity with a body mass index of 67 and obesity related comorbidities of hypertension, gastroesophageal reflux disease, stress urinary incontinence, reactive airway disease, clinical obstructive sleep apnea, weight related arthropathy-knees, ankles. Past surgical history:  1.  D&C 2009. Social history:  Quit smoking October 2018 with a prior history of 1 black and tan on a weekly basis. Alcohol: The fifth of liquor twice weekly. Family history: Mother 65-clinically severely obese with hypertension and adult onset diabetes mellitus. Father 65-unknown health.      Siblings-health unknown    No Known Allergies    Current Outpatient Medications   Medication Sig Dispense Refill    omeprazole (PRILOSEC) 20 mg capsule       triamterene-hydroCHLOROthiazide (MAXZIDE) 37.5-25 mg per tablet       ibuprofen (MOTRIN) 800 mg tablet Take 1 Tab by mouth every eight (8) hours as needed. Indications: Pain 30 Tab 2    fluticasone (FLOVENT HFA) 110 mcg/actuation inhaler Take 1 Puff by inhalation every twelve (12) hours. 1 Inhaler 1    albuterol (PROVENTIL HFA, VENTOLIN HFA, PROAIR HFA) 90 mcg/actuation inhaler 1-2 Puffs.  labetalol (NORMODYNE) 200 mg tablet TAKE ONE TABLET BY MOUTH TWICE DAILY 60 Tab 2    ferrous sulfate 325 mg (65 mg iron) tablet Take 1 Tab by mouth three (3) times daily (with meals). Indications: Iron Deficiency Anemia 90 Tab 3    oxyCODONE-acetaminophen (PERCOCET) 5-325 mg per tablet Take 2 Tabs by mouth every four (4) hours as needed. Max Daily Amount: 12 Tabs. Indications: Pain 20 Tab 0    montelukast (SINGULAIR) 10 mg tablet Take 1 Tab by mouth nightly. 30 Tab 1    metFORMIN (GLUCOPHAGE) 500 mg tablet Take 1 Tab by mouth two (2) times daily (with meals). Indications: Gestational Diabetes Mellitus 60 Tab 1    terconazole (TERAZOL 3) 0.8 % vaginal cream Insert 1 Applicator into vagina nightly. 20 g 1    Lancets misc Test blood sugar four times daily: fasting every morning and two hours after breakfast, lunch and dinner. 100 Each 1    glucose blood VI test strips (ASCENSIA AUTODISC VI, ONE TOUCH ULTRA TEST VI) strip Test blood sugar four times daily: fasting every morning and two hours after breakfast, lunch and dinner. 100 Strip 1    Blood-Glucose Meter monitoring kit Test blood sugar four times daily: fasting every morning and two hours after breakfast, lunch and dinner. 1 Kit 0    ferrous sulfate (IRON) 325 mg (65 mg iron) EC tablet Take 1 Tab by mouth three (3) times daily (with meals).  80 Tab 2       Past Medical History:   Diagnosis Date    Asthma     Community acquired pneumonia     Diabetes ([facility] [place] [address]     gestational    Hypertension     Obese        Past Surgical History:   Procedure Laterality Date    DILATION AND CURETTAGE      HX GYN      d and c       Social History     Tobacco Use    Smoking status: Former Smoker     Packs/day: 0.50     Types: Cigarettes     Last attempt to quit: 10/15/2019     Years since quittin.0    Smokeless tobacco: Never Used    Tobacco comment: cigars twice weekly   Substance Use Topics    Alcohol use: Yes     Alcohol/week: 1.7 standard drinks     Types: 2 Standard drinks or equivalent per week     Frequency: 2-4 times a month    Drug use: No       Family History   Problem Relation Age of Onset    Asthma Mother     Hypertension Mother     Diabetes Mother     Asthma Father        Family Status   Relation Name Status    Mother  Alive    Father         Review of Systems:      General: Denies fevers, chills, night sweats, fatigue, weight loss, or weight gain. HEENT: Denies changes in auditory or visual acuity, recurrent pharyngitis, epistaxis, chronic rhinorrhea, vertigo    Respiratory: Denies increasing shortness of breath, productive cough, hemoptysis    Cardiac: Denies known history of cardiac disease, heart murmur, palpitations    GI: Denies dysphagia, recurrent emesis, hematemesis, changes in bowel habits, hematochezia, melena    : Denies hematuria frequency urgency dysuria    Musculoskeletal: Denies fractures, dislocations    Neurologic: Denies history of CVA, paralysis paresthesias, recurrent cephalgia, seizures    Endocrine: Denies polyuria, polydipsia, polyphagia, heat and cold intolerance    Lymph/heme: Denies a history of malignancy, anemia, bruising, blood transfusions    Integumentary: Negative for dermatitis         Physical Exam    Visit Vitals  /76   Pulse 84   Temp 97.2 °F (36.2 °C)   Resp 20   Ht 5' 7\" (1.702 m)   Wt (!) 194.1 kg (428 lb)   BMI 67.03 kg/m²       Pre op weight: 428  EBW: 288  Wt loss to date: 0     General: Clinically severely obese in no acute distress, nontoxic in appearance.   Head: Normocephalic, atraumatic  Mouth: Clear, no overt lesions, oral mucosa is pink and moist.  Neck: Supple, no masses, no adenopathy or carotid bruits, trachea midline  Resp: Clear to auscultation bilaterally, no wheezing, rhonchi, or rales, excursions normal and symmetrical.  Cardio: Regular rate and rhythm, no murmurs, clicks, gallops, or rubs. Abdomen: Obese, soft, nontender, nondistended, normoactive bowel sounds, no hernias. Extremities: Warm, well perfused, no tenderness or swelling, normal gait/station, without edema or varicosities  Neuro: Sensation and strength grossly intact and symmetrical.  Psych: Alert and oriented to person, place, and time. Impression:    50-year-old black female with a body mass index of 67 and obesity related comorbidities hypertension, gastroesophageal reflux disease, reactive airway disease, stricture incontinence, clinical obstructive sleep apnea, and weight related arthropathy-knees, ankles who would benefit from bariatric surgery. We have had an extensive discussion with regard to the risks, benefits and likely outcomes of the operation. We've discussed the restrictive and malabsorptive nature of the gastric bypass and compared and contrasted with the sleeve gastrectomy. The patient understands the likelihood of losing approximately 80% of their excess weight in 12 to 18 months. The patient also understands the risks including but not limited to bleeding, infection, need for reoperation, ulcers, leaks and strictures, bowel obstruction secondary to adhesions and internal hernias, DVT, PE, heart attack, stroke, and death. Patient also understands risks of inadequate weight loss, excess weight loss, vitamin insufficiency, protein malnutrition, excess skin, and loss of hair.   We have reviewed the components of a successful postoperative course including requirement for a high protein, low carbohydrate diet, 60 oz a day of zero calorie liquids, daily vitamin supplementation, daily exercise, regular follow-up, and participation in support groups.  At this time we will enroll the patient in our bariatric program, undertake routine laboratory evaluation, chest X-ray, EKG, possible UGI and evaluation by  nutritionist as well as psychologist and pending their satisfactory completion of the preop evaluation, plan to pursue laparoscopic potentially open gastric bypass to achieve definitive durable weight loss on a personal level with expected resolution of obesity related comorbidities

## 2019-11-13 ENCOUNTER — HOSPITAL ENCOUNTER (OUTPATIENT)
Dept: ULTRASOUND IMAGING | Age: 34
Discharge: HOME | End: 2019-11-13
Attending: SURGERY
Payer: COMMERCIAL

## 2019-11-13 DIAGNOSIS — E66.01 MORBID OBESITY WITH BMI OF 60.0-69.9, ADULT (HCC): ICD-10-CM

## 2019-11-13 DIAGNOSIS — Z01.818 PRE-OP EXAM: ICD-10-CM

## 2019-11-13 PROCEDURE — 76705 ECHO EXAM OF ABDOMEN: CPT

## 2019-11-14 ENCOUNTER — DOCUMENTATION ONLY (OUTPATIENT)
Dept: BARIATRICS/WEIGHT MGMT | Age: 34
End: 2019-11-14

## 2019-11-14 ENCOUNTER — HOSPITAL ENCOUNTER (OUTPATIENT)
Dept: BARIATRICS/WEIGHT MGMT | Age: 34
Discharge: HOME | End: 2019-11-14

## 2019-11-14 NOTE — PROGRESS NOTES
[address] [facility] [address] [facility], [address]    Patient's Name: Marta Seay   Age: 28 y.o. YOB: 1985   Sex: female    Date:   11/14/2019    Insurance:            Session: 1 of 6  Revision:   Surgeon:  Dr. Oconnor Number    Height: 5 f 7 Weight:    424      Lbs. BMI: 66.5   Pounds Lost since last month: 4               Pounds Gained since last month: 0    Starting Weight: 428   Previous Months Weight: 428  Overall Pounds Lost: 4 Overall Pounds Gained: 0      Do you smoke? None    Alcohol intake:  Number of drinks at a time:  5-6  Number of times a week: a month    Class Guidelines    Guidelines are reviewed with patient at the start of every class. 1. Patient understands that weight loss trial classes must be consecutive. Patient understands if they miss a class, it is their responsibility to contact me to reschedule class. I will reach out to patient after their first no show. 2.  Patient understands the expectations that weight maintenance/weight loss is expected during the classes. Failure to demonstrate changes may result in one extra month of weight loss trial, followed by going back to see the surgeon. 3. Patient is also instructed to be doing their labs, blood work, psych visit, support group and any other test that the surgeon has used while they are working on their weight loss trial.    Other Pertinent Information:     Changes Made Since Last Class: She states she has an elliptical she has been trying out. Dietary Instruction    During today's class we continued to focus on the key diet principles. Patient was instructed to follow a low carbohydrate diet, focusing on meat and vegetables. Patient was instructed to stop liquid calories and aim for 64 ounces of water per day. In class, I also gave patient a power point on surviving the holidays.   Some of the tips included survival tips for parties, including bringing their own low carbohydrate dish to a potluck dinner and surveying the buffet line before they start filling up their plate. Patient was given cooking alternatives, including using Splenda for sugar, substituting applesauce for oil in recipes, and using low fat plain yogurt instead of sour cream in dips. Patient was also encouraged to be mindful of calories in alcohol. Patient's diet habits include: 4 meals per day. She is doing cereal for breakfast.  She is doing burger and fries for lunch. Dinner is fried chicken with macaroni and cheese. We talked about lower carbohydrate options. I have also recommended using a protein shake as a replacement. She is drinking 48 ounces of water and 12 ounces of sweet tea per day. Physical Activity/Exercise    Patient is currently doing elliptical for 1 hour daily for activity. Today's power point on surviving the holidays also included tips on exercising. This included being creative during the holiday, walking stairs, mall walking, getting resistance bands. Patient was encouraged not to be afraid to excuse themselves from the table to go for a walk after they eat. Comments:     Behavior Modification    Reinforced behavior changes to make. Patient was encouraged to keep their emotions in check. Try to HALT and focus on whether they are eating out of hunger or if they are eating out of emotions. Other eating behaviors included surveying the buffet line before starting to fill up their plate. Patient was given a check off list and encouraged to monitor some of their eating behaviors, such as eating slowly, chewing their food thoroughly, and taking 20-30 minutes to eat a meal.    Goals that patient set for next month include:  1. Drink more water. 2. Exercise more.       Molly Hdez [address] RD  11/14/2019

## 2019-12-12 ENCOUNTER — DOCUMENTATION ONLY (OUTPATIENT)
Dept: BARIATRICS/WEIGHT MGMT | Age: 34
End: 2019-12-12

## 2019-12-12 ENCOUNTER — HOSPITAL ENCOUNTER (OUTPATIENT)
Dept: BARIATRICS/WEIGHT MGMT | Age: 34
Discharge: HOME | End: 2019-12-12

## 2019-12-12 NOTE — PROGRESS NOTES
[address] [place] [facility] [address] [facility], [address]    Patient's Name: Yassine Walsh   Age: 28 y.o. YOB: 1985   Sex: female    Date:   12/12/2019    Insurance:            Session: 2 of 6  Revision:   Surgeon:  Dr. Afsaneh Salamanca    Height: 5 f 7 Weight:    425      Lbs. BMI: 66.7   Pounds Lost since last month: 0               Pounds Gained since last month: 1    Starting Weight: 428   Previous Months Weight: 424  Overall Pounds Lost: 3 Overall Pounds Gained: 0      Do you smoke? None    Alcohol intake:  Number of drinks at a time:  3  Number of times a week: 3    Class Guidelines    Guidelines are reviewed with patient at the start of every class. 1. Patient understands that weight loss trial classes must be consecutive. Patient understands if they miss a class, it is their responsibility to contact me to reschedule class. I will reach out to patient after their first no show. 2.  Patient understands the expectations that weight maintenance/weight loss is expected during the classes. Failure to demonstrate changes may result in one extra month of weight loss trial, followed by going back to see the surgeon. 3. Patient is also instructed to be doing their labs, blood work, psych visit, support group and any other test that the surgeon has used while they are working on their weight loss trial.    Other Pertinent Information:     Changes Made Since Last Class: Less drinking    Eating Habits and Behaviors      Today we reviewed key diet principles. We talked about protein drinks and ones that would be okay. Patient was encouraged to start getting into a routine now and drinking a shake. Patient may use for a meal replacement or a snack. Patient was also encouraged to stop liquid calories. We talked about fluid choices that would be okay. We also spent a lot of time talking about carbohydrates.   Patient was encouraged to start cutting their carbohydrates out and keep them less than 100 grams per day. Patient was given examples of carbohydrates that are in food. We also talked about the power of protein and the importance of getting more protein in per day than carbohydrates. I also reviewed with patient the vitamins that they will need to take post op. Patient will hear this information again at pre op class prior to surgery, but I felt it was important to prepare them now. Patient will be taking 2 multivitamin complete per day, 100 mg of Vitamin B1, 5000 IU of Vitamin D3, 1000 mcg Vitamin B12, 1500 mg of calcium citrate. We also spent some time talking about the post op diet protocol. Patient is aware they will be on a liquid diet before surgery and then a week of liquids after surgery followed by 5 weeks of soft protein. Patient's current diet habits include: 3 meals per day. Patient is skipping breakfast.  Lunch is at home, but she didn't indicate what she is eating. Santo Netters is shrimp or a salad. She is eating fast food 1-3 x a week. She is drinking 40 ounces of water per day. Physical Activity/Exercise    Comments:     Currently for exercise, patient is elliptical 30 minutes-60 minutes per day. We talked about activities for patient to do, including walking, swimming, or chair exercises. Goals have been set. Behavior Modification       Comments:  Emphasized the importance of eating slowly, not eating and drinking meals at the same time. At the end of today's weight loss trial class, we opened it up for a discussion. This gave patients an opportunity to ask questions or concerns they may have about post op protocol.         Goals that patient set for next month include:      Molly Curran [address] RD  12/12/2019

## 2020-01-13 ENCOUNTER — HOSPITAL ENCOUNTER (OUTPATIENT)
Dept: BARIATRICS/WEIGHT MGMT | Age: 35
Discharge: HOME | End: 2020-01-13

## 2020-01-13 ENCOUNTER — DOCUMENTATION ONLY (OUTPATIENT)
Dept: BARIATRICS/WEIGHT MGMT | Age: 35
End: 2020-01-13

## 2020-01-13 NOTE — PROGRESS NOTES
[address] [facility] [address], [address]    Patient's Name: Klaus Ocampo   Age: 27 y.o. YOB: 1985   Sex: female    Date:   1/13/2020        Session: 3 of  6  Revision:     Surgeon:  Dr. Erik Jean Baptiste    Height: 5 f 7   Weight:    426      Lbs. BMI:    Pounds Lost since last month: 1                 Pounds Gained since last month: 0    Starting Weight: 428     Previous Months Weight: 425  Overall Pounds Lost: 2   Overall Pounds Gained: 0    Do you smoke? NOne    Alcohol intake:  Number of drinks at a time:  1  Number of times a week: 1    Class Guidelines    Guidelines are reviewed with patient at the start of every class. 1. Patient understands that weight loss trial classes must be consecutive. Patient understands if they miss a class, it is their responsibility to contact me to reschedule class. I will reach out to patient after their first no show. 2.  Patient understands the expectations that weight maintenance/weight loss is expected during the classes. Failure to demonstrate changes may result in one extra month of weight loss trial, followed by going back to see the surgeon. 3. Patient is also instructed to be doing their labs, blood work, psych visit, support group and any other test that the surgeon has used while they are working on their weight loss trial.    Other Pertinent Information:     Changes Made Since Last Class: Less soda. Less bread. More water. Eating Habits and Behaviors      Today we reviewed key diet principles. We talked about snack ideas that would focus more on protein. We also talked about the benefits of filling up on protein first and keeping the daily carbohydrate intake to less than 100 grams per day. Patient was instructed to increase fluid intake to 64 ounces per day and stop all carbonation, caffeine, and sugary drinks.   During class, we talked about the importance of getting on a routine of eating 3 meals a day, eating within one hour of waking up, and not going longer than 4 hours without fueling the body again. I also talked with patient about some meal ideas. Patient's current diet habits include: 4 meals per day. Patient is eating cereal for breakfast.  Lunch is a sandwich. Dinner is a salad. She is snacking on fruit. She is drinking 64 ounces of water per day and 12 ounces of soda, which I have instructed her to discontinue. Physical Activity/Exercise    Comments:     Currently for exercise, patient is doing the elliptical for 30 minutes daily. We talked about activities for patient to do, including walking, swimming, or chair exercises. Behavior Modification       Comments:   During class, I reviewed a power point with patients called, \"Assessing Your Readiness to Change. \"  During this power point, patient was asked to self-evaluate themselves. At the end, we tallied the scores to determine how ready they are to make changes for the surgery. For the New Year's, I had patient set New Year's resolutions, including a food-related goal, exercise-related goal, and behavior goal.  Patient was encouraged to track the goals on a daily basis using the check off list I provided. Goals should be SMART, specific, measurable, attainable, realistic, and time-orientated. Patient's Goals are:  1. Behavior-Related Goal: Trying not to eat after 8 pm.   2. Food-related goal: Trying to eat less bread. 3. Exercise-related goal: Trying to walk more.       Molly Thomas 87 RD  1/13/2020

## 2020-01-27 ENCOUNTER — OFFICE VISIT (OUTPATIENT)
Dept: SURGERY | Age: 35
End: 2020-01-27

## 2020-01-27 VITALS
WEIGHT: 293 LBS | HEIGHT: 67 IN | BODY MASS INDEX: 45.99 KG/M2 | TEMPERATURE: 98.7 F | DIASTOLIC BLOOD PRESSURE: 84 MMHG | HEART RATE: 88 BPM | SYSTOLIC BLOOD PRESSURE: 140 MMHG | RESPIRATION RATE: 18 BRPM | OXYGEN SATURATION: 97 %

## 2020-01-27 DIAGNOSIS — E66.01 MORBID OBESITY WITH BMI OF 60.0-69.9, ADULT (HCC): Primary | ICD-10-CM

## 2020-01-27 DIAGNOSIS — I10 ESSENTIAL HYPERTENSION, BENIGN: ICD-10-CM

## 2020-01-27 NOTE — PROGRESS NOTES
After Visit Summary   9/22/2017    Cooper Severson    MRN: [id number]           Patient Information     Date Of Birth          2001        Visit Information        Provider Department      9/22/2017 8:30 AM Paul Amezcua MD [facility]        Today's Diagnoses     Cellulitis of right leg    -  1      Care Instructions    Assessment: No obvious  hernias noted either left or right groin.  Do feel a lymph node in the right groin that is tender.  Laying transversely not coming straight out like a femoral hernia and discussed ct scan with radiology at [facility] and see the lymph node(s) no hernia.  Now has some discomfort on th el groin same spot and feel a lymph node(s) there also   Testicles are normal.    Both knees have some loss of skin and patient is doing a good job ob bandaging it.  Does not look infected at this time, but could have been.     Plan to do will start on keflex since has had a cephalosporin in the past and no problems.  Follow up with me to make sure it is all healing well.     Risks of surgery include damage to nerves, bleeding, infection, damage to  Vessels, recurrence.  Although mesh is a better long term repair if it gets infected it must be removed.   If the patient has any bacterial infection the week before and is seen by their doctor and started on antibiotics, I can probably still do the surgery if they are vastly improved by the time of surgery, but if the infection starts closer to the surgery date it will be better to cancel and reschedule to a later date.  A cough will also be hard on the repair and uncomfortable post operative.  If the patient is a smoker I did discuss increase risk of recurrence and more pain with the cough.  If the patient is willing to quit smoking would encourage to do so and start at least a week before surgery.  However, if patient is not going to quit then must understand that his repair is more likely to fail.    Risks of  Bariatric Preoperative Progress Note    Subjective:     Patito Castañeda is a 27 y.o. female who presents today for followup of their candidacy for bariatric surgery. Since last seen, Patito Castañeda has been working through bariatric program towards Laparoscopic gastric bypass. The patient is down 9 lbs since initial visit with Dr. Mora Aragon. Past Medical History:   Diagnosis Date    Asthma     Community acquired pneumonia     Diabetes ([address]     gestational    Hypertension     Obese     Umbilical hernia        Past Surgical History:   Procedure Laterality Date    DILATION AND CURETTAGE      HX GYN      d and c       Current Outpatient Medications   Medication Sig Dispense Refill    omeprazole (PRILOSEC) 20 mg capsule       triamterene-hydroCHLOROthiazide (MAXZIDE) 37.5-25 mg per tablet       ibuprofen (MOTRIN) 800 mg tablet Take 1 Tab by mouth every eight (8) hours as needed. Indications: Pain 30 Tab 2    fluticasone (FLOVENT HFA) 110 mcg/actuation inhaler Take 1 Puff by inhalation every twelve (12) hours. 1 Inhaler 1    albuterol (PROVENTIL HFA, VENTOLIN HFA, PROAIR HFA) 90 mcg/actuation inhaler 1-2 Puffs. No Known Allergies    ROS:  Review of Systems:  Positives in [place]    Constitutional:  Unexpected weight gain/loss, night sweats,fatigue/maliase/lethargy, change in sleep, fever, rash  Eyes:  Visual changes, eye pain, floaters  ENT:  Rhinorrhea, epistaxis, sinus pain, change in hearing, gingival bleeding, sore throat, dysphagia, dysphonia  CV:  Chest pain, shortness of breath, difficulty breathing, orthopnea, palpitations, loss of consciousness, claudication  Resp: Cough, wheeze, haemoptysis, sob, exercise intolerence  GI:  Abdominal pain, dysphagia, reflux, bloating, cramping. Obstipation, haematemesis, brbpr, hematochezia,dark tarry stools. Nausea, vomitting, diarrhea, constipation.     : Change in frequency of urination, dysuria, hematuria, change in force of Stream  Neuro: Paresthesias, numbness, weakness, changes in balance, changes in speech, loss of control of bowel or bladder, headaches. Psych:Changes in depression, anxiety, sleep patterns, change in energy Levels  Endocrine: Temperature intolerance, dry skin, hair loss, fatigue,  Episodes of hypoglycemia, changes in libido  Heme: Anemia, pupura, petechia  Skin: Rashes, itchiness, excessive bruising  Musculoskeletal: weakness, arthropathy    Remainder of ROS as per HPI. Objective:     Physical Exam:  Visit Vitals  /84   Pulse 88   Temp 98.7 °F (37.1 °C) (Oral)   Resp 18   Ht 5' 7\" (1.702 m)   Wt (!) 190.1 kg (419 lb)   LMP 01/14/2020 (Approximate)   SpO2 97%   BMI 65.62 kg/m²         General: AAOX3, pleasant and cooperative to exam. Appropriately groomed. NAD. Non-toxic in appearance. Appears stated age. HENT: NC/AT. PERRLA. Extraocular motions are intact. Sclera anicteric, Conjunctiva Clear. Nares clear. Oropharynx pink, moist without exudate or erythema. Uvula Midline. Neck:  Supple, trachea is midline. No JVD, Lymphadenopathy. No bruits. Chest: Good equal bilateral expansion  Lungs: Clear to auscultation bilaterally without e/o crackles, wheezes or rhales. Heart: RRR, S1 and S2 noted. No c/r/m/g/vpmi. Abdomen: obese, soft and non-tender without distension. Good bowel sounds. No vis/palp masses or pulsations. No organo-splenomegaly. No hernias to my exam. No e/o acute abdomen or peritoneal signs. Pelvis: Stable. :  Deferred  Rectal: Deferred  Extremities: Positive pulses in all 4 extremities. Baseline range of motion in all 4 extremities. Strength, sensation and reflexes intact, appropriate and equal in b/l upper and lower extremities. No C/C/E  Neuro: CN II-XII grossly intact without focal deficit. Ambulatory. Skin: Clean, warm and dry.         Studies to date:    Labs: Pending Plan to complete end of February due to nicotine study     EKG: Plan to complete next month    Nutritional evaluation: 3 of 6 complete surgery discussed including, but not limited to bleeding, infection, recurrence, damage to nerves and what is in the hernia sac.  Risks of anesthesia also discussed.    Discussed massaging hernia back in and using ice if becomes more painful.  If not able to reduce then go to emergency room.  Also discussed hernia belt to use until able to get in for surgery.    HERNIORRAPHY DISCHARGE INSTRUCTIONS  DR. PAPA WOOTEN  1. You may resume your regular diet when you feel you are ready to. DO NOT drink alcoholic beverages for  24 hours of while you are taking prescription medication.  2. Limit your activities for the first 48 hours. Gradually, increase them as tolerated. You may use stairs.  I encourage you to walk as tolerated.   3. You will have some discomfort at the incision sites. This is expected. This should improve over the next  2-3 days. Ice and pain medication will help with this pain. Use prescribed pain medication as instructed.  4. Bruising and mild swelling is normal after surgery. For males it is common to have bruising going into the  penis and scrotum. The area below and around the incision(s) will be hard and elevated. This is normal. I call  it the healing ridge. This will resolve slowly over the next several months. If you feel the pain is increasing  and cannot explain it by increasing activity please call us at [phone]  5. The dressing will often have some blood on it. You may shower 24 hours after surgery. Clean gently over  incision site. If clear plastic covering or steri-strip comes off and there is still some bleeding or drainage  then cover with gauze or band-aid. If no bleeding there is no reason to cover site. The abdominal binder  may be removed after 24 hours after surgery. You may continue to wear it however for comfort. I suggest  you wear an old hilda shirt under the abdominal binder for a more comfortable wear.  6. Avoid Aspirin for the first 72 hours after the procedure. This  Psychiatric evaluation:pending scheduled next month    Support Group: attended January 2020    Additional evaluations: abdominal ultrasound complete  CXR-Pending will complete next month    Assessment:   Romelia Hernadez is a 27 y.o. female who is progressing through the bariatric preoperative evaluation. At this time, they are yet an appropriate candidate for weight loss surgery. Ms. Alysia Grady has a reminder for a \"due or due soon\" health maintenance. I have asked that she contact her primary care provider for follow-up on this health maintenance. Plan:   -complete remainder of preop evaluation including Labs, EKG, CXR, Psych eval, 3 nutrition visits  -Patient voices understanding that weight gain during weight loss trial may result in cancellation of weight loss surgery.   -Follow up in five weeks with labs, ekg, psych eval complete     A total of 15 minutes was spent with the patient, with >50% of time spent on counseling and coordination of care.     KITTY Frankel-BC medication may increase the tendency to bleed.  7. Use the following medications (in addition to your normal meds) as shown:  Name of Medicine Dose Frequency Reason  a. Percocet 5 mg 1-2 every 6 hours as needed for severe pain. This contains 325 mg of Tylenol (acetaminophen)  per tablet. For example, you may take 1 Percocet and 1 Tylenol, or 2 Percocet and no Tylenol,  or 2 Tylenol and no Percocet every 6 hours.  b. Tylenol (acetaminophen) 500 mg every 6 hours as needed for mild pain. Do not take more than 1000 mg  every 6 hours. (see above)  c. Motrin (ibuprophen) 200-600 mg every 6 hours as needed for mild to moderate pain. Take with food.  d. _________________________________________________________________________  8. [facility] at [phone] if:    Your discomfort is not relieved by your pain medication    You have signs of infection such as temperature above 100.5 degrees orally, chills, or  increasing daily discomfort.    Incision site is becoming more red and/or there is purulent drainage.    You have questions or concerns  9. Please call [phone] to schedule a follow up appointment in about 2 weeks(s)  10. When taking narcotics (pain medication more than Tylenol (acetaminophen) and Motrin (ibuprophen) it is  important to keep your stools soft to avoid constipation and pain with straining. This is best done by drinking  fluids (nonalcoholic and non-caffeinated) and taking a stool softener i.e. Metamucil or milk of magnesia.  You may be able to use non-narcotics for pain relief especially by the 3rd post- operative day. [id number] mg  every 6 hours and/or Motrin (ibuprophen) 200-800 mg every 6 hours. Please do not take more than 4 grams  of Tylenol (acetaminophen) per day. Remember your Percocet does have Tylenol (acetaminophen) already  in it. If you have a history of stomach ulcers or stomach problems than do not take the Motrin (ibuprophen).  Please take Motrin (ibuprophen)  "with food to help protect the stomach.  11. Do not drive or operate heavy machinery for 24 hours after surgery or when taking narcotics. You may  resume driving when feel that you can safely avoid an accident and are not taking narcotics. This is usually  5 to 7 days after surgery. You should not be alone for 24 hours after surgery.    Have milk of magnesia available at home so that when you take the pain medications you take 1-2 teaspoons a day,  to help reduce problems with constipation.                Follow-ups after your visit        Who to contact     If you have questions or need follow up information about today's clinic visit or your schedule please contact [facility] directly at [phone].  Normal or non-critical lab and imaging results will be communicated to you by [employer], letter or phone within 4 business days after the clinic has received the results. If you do not hear from us within 7 days, please contact the clinic through [employer] or phone. If you have a critical or abnormal lab result, we will notify you by phone as soon as possible.  Submit refill requests through [employer] or call your pharmacy and they will forward the refill request to us. Please allow 3 business days for your refill to be completed.          Additional Information About Your Visit        [employer] Information     [employer] lets you send messages to your doctor, view your test results, renew your prescriptions, schedule appointments and more. To sign up, go to [email or web address], contact your [facility] clinic or call [phone] during business hours.            Care EveryWhere ID     This is your Care EveryWhere ID. This could be used by other organizations to access your [facility] medical records  Opted out of Care Everywhere exchange        Your Vitals Were     Pulse Height BMI (Body Mass Index)             75 1.753 m (5' 9\") 26.29 kg/m2          Blood Pressure from Last 3 Encounters:   09/22/17 141/77 "   09/21/17 125/58   07/11/16 126/68    Weight from Last 3 Encounters:   09/22/17 80.7 kg (178 lb) (89 %)*   09/21/17 80.6 kg (177 lb 12.8 oz) (89 %)*   07/11/16 74.3 kg (163 lb 12.8 oz) (87 %)*     * Growth percentiles are based on [facility] 2-20 Years data.              Today, you had the following     No orders found for display         Today's Medication Changes          These changes are accurate as of: 9/22/17  9:40 AM.  If you have any questions, ask your nurse or doctor.               Start taking these medicines.        Dose/Directions    cephALEXin 500 MG capsule   Commonly known as:  KEFLEX   Used for:  Cellulitis of right leg   Started by:  Paul Amezcua MD        Dose:  500 mg   Take 1 capsule (500 mg) by mouth 4 times daily   Quantity:  40 capsule   Refills:  0            Where to get your medicines      These medications were sent to [facility] - [place], [place] - [address] [facility] [address]  [address], [facility] [address]     Phone:  [phone]     cephALEXin 500 MG capsule                Primary Care Provider Office Phone # Fax #    Eric Toscano [phone] [id number] [facility] [address]        Equal Access to Services     [facility]: Hadii aad ku hadasho Soomaali, waaxda luqadaha, qaybta kaalmada adeegyada, waxay idiin hayrah new . So [facility] [phone].    ATENCIÓN: Si habla español, tiene a yanes disposición servicios gratuitos de asistencia lingüística. Llame al [phone].    We comply with applicable federal civil rights laws and [place] laws. We do not discriminate on the basis of race, color, national origin, age, disability sex, sexual orientation or gender identity.            Thank you!     Thank you for choosing [facility]  for your care. Our goal is always to provide you with excellent care. Hearing back from our patients is one way we can continue to improve our services. Please  take a few minutes to complete the written survey that you may receive in the mail after your visit with us. Thank you!             Your Updated Medication List - Protect others around you: Learn how to safely use, store and throw away your medicines at www.disposemymeds.org.          This list is accurate as of: 9/22/17  9:40 AM.  Always use your most recent med list.                   Brand Name Dispense Instructions for use Diagnosis    benzoyl peroxide 10 % Crea     60 g    Externally apply topically 2 times daily    Acne vulgaris       cephALEXin 500 MG capsule    KEFLEX    40 capsule    Take 1 capsule (500 mg) by mouth 4 times daily    Cellulitis of right leg       * clindamycin 1 % solution    CLEOCIN T    60 mL    Apply topically 2 times daily    Acne vulgaris       * clindamycin 1 % solution    CLEOCIN T    60 mL    Apply topically 2 times daily    Acne vulgaris       NO ACTIVE MEDICATIONS      .        * Notice:  This list has 2 medication(s) that are the same as other medications prescribed for you. Read the directions carefully, and ask your doctor or other care provider to review them with you.

## 2020-01-27 NOTE — PROGRESS NOTES
Chief Complaint   Patient presents with    Morbid Obesity     mid trial     Pt ID confirmed    Weight Loss Metrics 1/27/2020 1/27/2020 11/8/2019 11/8/2019 8/13/2018 2/7/2018 2/6/2018   Pre op / Initial Wt 419 - 428 - - - -   Today's Wt - 419 lb - 428 lb 310 lb 424 lb 424 lb   BMI - 65.62 kg/m2 - 67.03 kg/m2 45.78 kg/m2 62.61 kg/m2 62.61 kg/m2   Ideal Body Wt 140 - 140 - - - -   Excess Body Wt 279 - 288 - - - -   Goal Wt - - 198 - - - -   Wt loss to date 0 - 0 - - - -   % Wt Loss 0 - 0 - - - -   80% .2 - 230.4 - - - -       Body mass index is 65.62 kg/m².

## 2020-02-11 ENCOUNTER — DOCUMENTATION ONLY (OUTPATIENT)
Dept: BARIATRICS/WEIGHT MGMT | Age: 35
End: 2020-02-11

## 2020-02-11 NOTE — PROGRESS NOTES
2/11/20:  Patient did not show for her nutrition class. She was left a voicemail to reschedule.     Yeni Red MS RD

## 2020-02-26 ENCOUNTER — HOSPITAL ENCOUNTER (OUTPATIENT)
Dept: BARIATRICS/WEIGHT MGMT | Age: 35
Discharge: HOME | End: 2020-02-26

## 2020-02-26 ENCOUNTER — DOCUMENTATION ONLY (OUTPATIENT)
Dept: BARIATRICS/WEIGHT MGMT | Age: 35
End: 2020-02-26

## 2020-02-26 ENCOUNTER — HOSPITAL ENCOUNTER (EMERGENCY)
Age: 35
Discharge: HOME | End: 2020-02-26
Attending: EMERGENCY MEDICINE
Payer: COMMERCIAL

## 2020-02-26 ENCOUNTER — APPOINTMENT (OUTPATIENT)
Dept: GENERAL RADIOLOGY | Age: 35
End: 2020-02-26
Attending: PHYSICIAN ASSISTANT
Payer: COMMERCIAL

## 2020-02-26 VITALS
OXYGEN SATURATION: 100 % | HEIGHT: 67 IN | SYSTOLIC BLOOD PRESSURE: 143 MMHG | RESPIRATION RATE: 20 BRPM | WEIGHT: 293 LBS | TEMPERATURE: 98.4 F | DIASTOLIC BLOOD PRESSURE: 88 MMHG | HEART RATE: 80 BPM | BODY MASS INDEX: 45.99 KG/M2

## 2020-02-26 DIAGNOSIS — R07.89 ATYPICAL CHEST PAIN: Primary | ICD-10-CM

## 2020-02-26 DIAGNOSIS — R73.9 HYPERGLYCEMIA: ICD-10-CM

## 2020-02-26 LAB
ALBUMIN SERPL-MCNC: 3.1 G/DL (ref 3.4–5)
ALBUMIN/GLOB SERPL: 0.9 {RATIO} (ref 0.8–1.7)
ALP SERPL-CCNC: 113 U/L (ref 45–117)
ALT SERPL-CCNC: 59 U/L (ref 13–56)
ANION GAP SERPL CALC-SCNC: 8 MMOL/L (ref 3–18)
APPEARANCE UR: CLEAR
AST SERPL-CCNC: 38 U/L (ref 10–38)
ATRIAL RATE: 84 BPM
BASOPHILS # BLD: 0 K/UL (ref 0–0.1)
BASOPHILS NFR BLD: 0 % (ref 0–2)
BILIRUB SERPL-MCNC: 0.3 MG/DL (ref 0.2–1)
BILIRUB UR QL: NEGATIVE
BNP SERPL-MCNC: 20 PG/ML (ref 0–450)
BUN SERPL-MCNC: 10 MG/DL (ref 7–18)
BUN/CREAT SERPL: 16 (ref 12–20)
CALCIUM SERPL-MCNC: 8.6 MG/DL (ref 8.5–10.1)
CALCULATED P AXIS, ECG09: 67 DEGREES
CALCULATED R AXIS, ECG10: 57 DEGREES
CALCULATED T AXIS, ECG11: 58 DEGREES
CHLORIDE SERPL-SCNC: 103 MMOL/L (ref 100–111)
CO2 SERPL-SCNC: 27 MMOL/L (ref 21–32)
COLOR UR: YELLOW
CREAT SERPL-MCNC: 0.62 MG/DL (ref 0.6–1.3)
DIAGNOSIS, 93000: NORMAL
DIFFERENTIAL METHOD BLD: NORMAL
EOSINOPHIL # BLD: 0.1 K/UL (ref 0–0.4)
EOSINOPHIL NFR BLD: 2 % (ref 0–5)
ERYTHROCYTE [DISTWIDTH] IN BLOOD BY AUTOMATED COUNT: 14.4 % (ref 11.6–14.5)
GLOBULIN SER CALC-MCNC: 3.5 G/DL (ref 2–4)
GLUCOSE SERPL-MCNC: 355 MG/DL (ref 74–99)
GLUCOSE UR STRIP.AUTO-MCNC: >1000 MG/DL
HCG UR QL: NEGATIVE
HCT VFR BLD AUTO: 38.8 % (ref 35–45)
HGB BLD-MCNC: 12.7 G/DL (ref 12–16)
HGB UR QL STRIP: NEGATIVE
KETONES UR QL STRIP.AUTO: NEGATIVE MG/DL
LEUKOCYTE ESTERASE UR QL STRIP.AUTO: NEGATIVE
LYMPHOCYTES # BLD: 2.3 K/UL (ref 0.9–3.6)
LYMPHOCYTES NFR BLD: 42 % (ref 21–52)
MCH RBC QN AUTO: 25.9 PG (ref 24–34)
MCHC RBC AUTO-ENTMCNC: 32.7 G/DL (ref 31–37)
MCV RBC AUTO: 79.2 FL (ref 74–97)
MONOCYTES # BLD: 0.3 K/UL (ref 0.05–1.2)
MONOCYTES NFR BLD: 5 % (ref 3–10)
NEUTS SEG # BLD: 2.8 K/UL (ref 1.8–8)
NEUTS SEG NFR BLD: 51 % (ref 40–73)
NITRITE UR QL STRIP.AUTO: NEGATIVE
P-R INTERVAL, ECG05: 154 MS
PH UR STRIP: 7 [PH] (ref 5–8)
PLATELET # BLD AUTO: 194 K/UL (ref 135–420)
PMV BLD AUTO: 11.6 FL (ref 9.2–11.8)
POTASSIUM SERPL-SCNC: 4.2 MMOL/L (ref 3.5–5.5)
PROT SERPL-MCNC: 6.6 G/DL (ref 6.4–8.2)
PROT UR STRIP-MCNC: NEGATIVE MG/DL
Q-T INTERVAL, ECG07: 374 MS
QRS DURATION, ECG06: 74 MS
QTC CALCULATION (BEZET), ECG08: 441 MS
RBC # BLD AUTO: 4.9 M/UL (ref 4.2–5.3)
SODIUM SERPL-SCNC: 138 MMOL/L (ref 136–145)
SP GR UR REFRACTOMETRY: >1.03 (ref 1–1.03)
TROPONIN I SERPL-MCNC: <0.02 NG/ML (ref 0–0.04)
UROBILINOGEN UR QL STRIP.AUTO: 1 EU/DL (ref 0.2–1)
VENTRICULAR RATE, ECG03: 84 BPM
WBC # BLD AUTO: 5.5 K/UL (ref 4.6–13.2)

## 2020-02-26 PROCEDURE — 81003 URINALYSIS AUTO W/O SCOPE: CPT

## 2020-02-26 PROCEDURE — 99284 EMERGENCY DEPT VISIT MOD MDM: CPT

## 2020-02-26 PROCEDURE — 71045 X-RAY EXAM CHEST 1 VIEW: CPT

## 2020-02-26 PROCEDURE — 80053 COMPREHEN METABOLIC PANEL: CPT

## 2020-02-26 PROCEDURE — 81025 URINE PREGNANCY TEST: CPT

## 2020-02-26 PROCEDURE — 93005 ELECTROCARDIOGRAM TRACING: CPT

## 2020-02-26 PROCEDURE — 84484 ASSAY OF TROPONIN QUANT: CPT

## 2020-02-26 PROCEDURE — 85025 COMPLETE CBC W/AUTO DIFF WBC: CPT

## 2020-02-26 PROCEDURE — 83880 ASSAY OF NATRIURETIC PEPTIDE: CPT

## 2020-02-26 NOTE — PROGRESS NOTES
[address] Loss [place] [address] [facility], [address]    Patient's Name: Everett Cabezas   Age: 27 y.o. YOB: 1985   Sex: female    Date:   2/26/2020              Session: 4 of 6  Revision:     Surgeon:  Dr. Ge Abdalla    Height: 5 f 7   Weight:    417      Lbs. BMI: 65.4   Pounds Lost since last month: 9                 Pounds Gained since last month: 0    Starting Weight: 428     Previous Months Weight: 426  Overall Pounds Lost: 11   Overall Pounds Gained: 0    Do you smoke? Patient states she quit smoking November 8, 2019    Alcohol intake:  Number of drinks at a time:  2  Number of times a week: 1    Class Guidelines    Guidelines are reviewed with patient at the start of every class. 1. Patient understands that weight loss trial classes must be consecutive. Patient understands if they miss a class, it is their responsibility to contact me to reschedule class. I will reach out to patient after their first no show. 2.  Patient understands the expectations that weight maintenance/weight loss is expected during the classes. Failure to demonstrate changes may result in one extra month of weight loss trial, followed by going back to see the surgeon. 3. Patient is also instructed to be doing their labs, blood work, psych visit, support group and any other test that the surgeon has used while they are working on their weight loss trial.   Patient understands that they CAN NOT gain any weight during the weight loss trial.  Gaining weight will result in extra classes    Other Pertinent Information:     Changes Made Since Last Class: Less soda. More exercise. Small portions. Eating Habits and Behaviors      Today we started off class talking about the Key Diet Principles. Patient was encouraged to start drinking 64 ounces of fluid per day.   Patient was encouraged to start cutting out soda, caffeine, carbonation, sweet tea, fruit juice, and fruit smoothies. Patient is currently drinking 64+ ounces of fluid, which consists of water, maybe a coffee. Patient states she is not drinking soda or sweet tea. Patient was also instructed to fill up on meat, fish, vegetables, eggs, cheese, and some fruit. We also talked about protein drinks and patient was encouraged to start trying these, using them either for a meal replacement or a substitute for a current meal, which may be higher in carbohydrates. We talked about ways to lower carbohydrates and start trying substitutes, such as zucchini noodles and cauliflower rice. Patient's current diet habits include: Patient states she doesn't always eat in the morning, but if she does, she may have a bowl of cinnamon toast crunch cereal or a banana in the morning or russell, egg sandwich. Lunch is ham or turkey sandwich. She states she may have chips or fries with this. Lunch-Dinner:  Patient states she is not doing a lot of snacking between lunch and dinner. Dinner:  Alayna [place] and cheese with chicken-  Fried or baked. She states she may have some broccoli or cabbage with this. She states she is not a big sweet eater and will often just drink fluid. She states she may just eat what is leftover from her food. Patient states she typically eats out 1-2 x a week. Patient states she is eating bread, rice, or pasta 1-2 x a day. Patient states she may have a small bag of chips a week. Patient states she will hardly ever eat ice cream, cookies, cake. Physical Activity/Exercise    Comments:     Currently for exercise, patient is doing the elliptical 30 minutes a day. We talked about activities for patient to do, including walking, swimming, or chair exercises. I also talked with patient about doing some strength training, which helps the metabolism, as well. Goals have been set. Behavior Modification       Comments:   I also gave a power point on Behavior Changes and Weight Loss.   Some of the suggestions in the power point included food journaling. Patient was also given some strategies to follow, such as cooking just enough for the meal and not putting serving bowls on the table. Patient was also encouraged to restrict where they are eating to 1-2 locations to avoid mindless eating throughout the day. Patient was given a check off list and encouraged to set 3 goals for the month. One goal that patient has set for next month is:  1. Continue to cut out carbohydrates. 2. More fruits and vegetables. 3.  Start drinking protein shakes    Assessment:  Patient has started making changes and since the last class, she has lost 11 pounds. While that is good, she still has some diet changes that I would like her to work on, i.e., stopping the sugary cereals for breakfast, stopping the fried food, and the macaroni and cheese. We talked about food alternatives for her to work on. I will follow up with her in 1 month.       Molly Andre Gorge 87 RD  2/26/2020

## 2020-02-26 NOTE — ED PROVIDER NOTES
EMERGENCY DEPARTMENT HISTORY AND PHYSICAL EXAM    Date: 2/26/2020  Patient Name: Yazan Mathis    History of Presenting Illness     Chief Complaint   Patient presents with    Chest Pain         History Provided By: Patient          Additional History (Context): Yazan Mathis is a 27 y.o. female with No significant past medical history   who presents with central chest pain that started yesterday. Patient states pain is located in center of chest, does not radiate. Not worse with exertion. States pain is dull, has improved since onset. Denies cough today but has had cold earlier this week. Denies recent travel, immobilization, hx of cancer, bleeding disorders, smoking, OCP use. Denies shortness of breath. No injury or trauma prior to symptoms. No fever/chills, nausea, vomiting, diarrhea, abdominal pain, urinary or vaginal complaints. PCP: Madison Leyden, MD    Current Outpatient Medications   Medication Sig Dispense Refill    omeprazole (PRILOSEC) 20 mg capsule       triamterene-hydroCHLOROthiazide (MAXZIDE) 37.5-25 mg per tablet       ibuprofen (MOTRIN) 800 mg tablet Take 1 Tab by mouth every eight (8) hours as needed. Indications: Pain 30 Tab 2    albuterol (PROVENTIL HFA, VENTOLIN HFA, PROAIR HFA) 90 mcg/actuation inhaler 1-2 Puffs.          Past History     Past Medical History:  Past Medical History:   Diagnosis Date    Asthma     Community acquired pneumonia     Diabetes ([address]     gestational    Hypertension     Obese     Umbilical hernia        Past Surgical History:  Past Surgical History:   Procedure Laterality Date    DILATION AND CURETTAGE      HX GYN      d and c       Family History:  Family History   Problem Relation Age of Onset   [place] Asthma Mother     Hypertension Mother     Diabetes Mother     Asthma Father        Social History:  Social History     Tobacco Use    Smoking status: Former Smoker     Packs/day: 0.50     Types: Cigarettes     Last attempt to quit: 10/15/2019 Years since quittin.3    Smokeless tobacco: Never Used    Tobacco comment: cigars twice weekly   Substance Use Topics    Alcohol use: Not Currently     Alcohol/week: 1.7 standard drinks     Types: 2 Standard drinks or equivalent per week     Frequency: 2-4 times a month     Comment: holidays/special occassion    Drug use: No       Allergies:  No Known Allergies      Review of Systems       Review of Systems   Constitutional: Negative for chills and fever. HENT: Negative for nasal congestion, sore throat, rhinorrhea  Eyes: Negative. Respiratory: Negative for cough and negative for shortness of breath. Cardiovascular: pos for chest pain and palpitations. Gastrointestinal: Negative for abdominal pain, constipation, diarrhea, nausea and vomiting. Genitourinary: Negative. Negative for difficulty urinating and flank pain. Musculoskeletal: Negative for back pain. Negative for gait problem and neck pain. Skin: Negative. Allergic/Immunologic: Negative. Neurological: Negative for dizziness, weakness, numbness and headaches. Psychiatric/Behavioral: Negative. All other systems reviewed and are negative. All Other Systems Negative  Physical Exam     Vitals:    20 1138 20 1201   BP: 143/88    Pulse: 80    Resp: 20    Temp: 98.4 °F (36.9 °C)    SpO2: 100% 100%   Weight: (!) 189.1 kg (417 lb)    Height: 5' 7\" (1.702 m)      Physical Exam  Vitals signs and nursing note reviewed. Constitutional:       General: She is not in acute distress. Appearance: She is well-developed. She is not diaphoretic. Comments: NAD, well hydrated, non toxic     HENT:      Head: Normocephalic and atraumatic. Nose: Nose normal.      Mouth/Throat:      Pharynx: No oropharyngeal exudate. Eyes:      Conjunctiva/sclera: Conjunctivae normal.      Pupils: Pupils are equal, round, and reactive to light. Neck:      Musculoskeletal: Normal range of motion and neck supple.    Cardiovascular: Rate and Rhythm: Normal rate and regular rhythm. Heart sounds: Normal heart sounds. No murmur. Pulmonary:      Effort: Pulmonary effort is normal. No respiratory distress. Breath sounds: Normal breath sounds. No wheezing or rales. Chest:      Chest wall: No tenderness. Abdominal:      General: There is no distension. Palpations: Abdomen is soft. Tenderness: There is no abdominal tenderness. There is no guarding. Musculoskeletal: Normal range of motion. Lymphadenopathy:      Cervical: No cervical adenopathy. Skin:     General: Skin is warm. Findings: No rash. Neurological:      Mental Status: She is alert and oriented to person, place, and time. Cranial Nerves: No cranial nerve deficit.       Coordination: Coordination normal.   Psychiatric:         Behavior: Behavior normal.           Diagnostic Study Results     Labs -     Recent Results (from the past 12 hour(s))   EKG, 12 LEAD, INITIAL    Collection Time: 02/26/20 11:58 AM   Result Value Ref Range    Ventricular Rate 84 BPM    Atrial Rate 84 BPM    P-R Interval 154 ms    QRS Duration 74 ms    Q-T Interval 374 ms    QTC Calculation (Bezet) 441 ms    Calculated P Axis 67 degrees    Calculated R Axis 57 degrees    Calculated T Axis 58 degrees    Diagnosis       Normal sinus rhythm  Normal ECG  When compared with ECG of 20-MAR-2017 16:22,  Non-specific change in ST segment in Inferior leads  Nonspecific T wave abnormality no longer evident in Anterolateral leads  Confirmed by Segun Reynolds MD, --- ([id number]) on 2/26/2020 4:19:45 PM     CBC WITH AUTOMATED DIFF    Collection Time: 02/26/20  1:08 PM   Result Value Ref Range    WBC 5.5 4.6 - 13.2 K/uL    RBC 4.90 4.20 - 5.30 M/uL    HGB 12.7 12.0 - 16.0 g/dL    HCT 38.8 35.0 - 45.0 %    MCV 79.2 74.0 - 97.0 FL    MCH 25.9 24.0 - 34.0 PG    MCHC 32.7 31.0 - 37.0 g/dL    RDW 14.4 11.6 - 14.5 %    PLATELET 828 632 - 040 K/uL    MPV 11.6 9.2 - 11.8 FL    NEUTROPHILS 51 40 - 73 % LYMPHOCYTES 42 21 - 52 %    MONOCYTES 5 3 - 10 %    EOSINOPHILS 2 0 - 5 %    BASOPHILS 0 0 - 2 %    ABS. NEUTROPHILS 2.8 1.8 - 8.0 K/UL    ABS. LYMPHOCYTES 2.3 0.9 - 3.6 K/UL    ABS. MONOCYTES 0.3 0.05 - 1.2 K/UL    ABS. EOSINOPHILS 0.1 0.0 - 0.4 K/UL    ABS. BASOPHILS 0.0 0.0 - 0.1 K/UL    DF AUTOMATED     METABOLIC PANEL, COMPREHENSIVE    Collection Time: 02/26/20  1:08 PM   Result Value Ref Range    Sodium 138 136 - 145 mmol/L    Potassium 4.2 3.5 - 5.5 mmol/L    Chloride 103 100 - 111 mmol/L    CO2 27 21 - 32 mmol/L    Anion gap 8 3.0 - 18 mmol/L    Glucose 355 (H) 74 - 99 mg/dL    BUN 10 7.0 - 18 MG/DL    Creatinine 0.62 0.6 - 1.3 MG/DL    BUN/Creatinine ratio 16 12 - 20      GFR est AA >60 >60 ml/min/1.73m2    GFR est non-AA >60 >60 ml/min/1.73m2    Calcium 8.6 8.5 - 10.1 MG/DL    Bilirubin, total 0.3 0.2 - 1.0 MG/DL    ALT (SGPT) 59 (H) 13 - 56 U/L    AST (SGOT) 38 10 - 38 U/L    Alk.  phosphatase 113 45 - 117 U/L    Protein, total 6.6 6.4 - 8.2 g/dL    Albumin 3.1 (L) 3.4 - 5.0 g/dL    Globulin 3.5 2.0 - 4.0 g/dL    A-G Ratio 0.9 0.8 - 1.7     TROPONIN I    Collection Time: 02/26/20  1:08 PM   Result Value Ref Range    Troponin-I, QT <0.02 0.0 - 0.045 NG/ML   NT-PRO BNP    Collection Time: 02/26/20  1:08 PM   Result Value Ref Range    NT pro-BNP 20 0 - 450 PG/ML   URINALYSIS W/ RFLX MICROSCOPIC    Collection Time: 02/26/20  1:08 PM   Result Value Ref Range    Color YELLOW      Appearance CLEAR      Specific gravity >1.030 (H) 1.005 - 1.030    pH (UA) 7.0 5.0 - 8.0      Protein NEGATIVE  NEG mg/dL    Glucose >1,000 (A) NEG mg/dL    Ketone NEGATIVE  NEG mg/dL    Bilirubin NEGATIVE  NEG      Blood NEGATIVE  NEG      Urobilinogen 1.0 0.2 - 1.0 EU/dL    Nitrites NEGATIVE  NEG      Leukocyte Esterase NEGATIVE  NEG     HCG URINE, QL    Collection Time: 02/26/20  1:08 PM   Result Value Ref Range    HCG urine, QL NEGATIVE  NEG         Radiologic Studies -   XR CHEST PORT   Final Result   IMPRESSION:      No acute pulmonic disease. CT Results  (Last 48 hours)    None        CXR Results  (Last 48 hours)               02/26/20 1225  XR CHEST PORT Final result    Impression:  IMPRESSION:       No acute pulmonic disease. Narrative:  EXAM: CHEST  CPT CODE: [id number]       CLINICAL INDICATION/HISTORY: Chest pain, shortness of breath. History of   asthma. COMPARISON: 11 February 2018. TECHNIQUE: Single AP portable view of chest at 1213. FINDINGS: There are clear lungs and sharp pleural margins. The   cardiomediastinal silhouette is normal.  The bones and soft tissues are   unremarkable. There is little significant interval change. Medical Decision Making   I am the first provider for this patient. I reviewed the vital signs, available nursing notes, past medical history, past surgical history, family history and social history. Vital Signs-Reviewed the patient's vital signs. Records Reviewed: Nursing Notes, Old Medical Records, Previous Radiology Studies and Previous Laboratory Studies    Procedures:  Procedures    Provider Notes (Medical Decision Making):     Pt presents ambulatory in [facility], well-hydrated, non-toxic in appearance, and afebrile with normal vitals. EKG shows sinus rhythm  at a rate of 84  without ectopy or repolarization abnormalities. No ST elevation or depression. Troponin wnl x 1. CXR appears unremarkable. HEART score is 1, indicating low risk. Perc neg, indicating low risk for PE. Pt reassessed and pain has improved, not worsened and pt has no new complaints. Labs reviewed- glucose elevated, will fu with PCP. Not in DKA, no indication for emergent treatment. Pt wants to drink water vs IV hydration. Discussed proper way to take medications. Discussed treatment plan, return precautions, symptomatic relief, and expected time to improvement. All questions answered. Patient is stable for discharge and outpatient management.          MED RECONCILIATION:  No current facility-administered medications for this encounter. Current Outpatient Medications   Medication Sig    omeprazole (PRILOSEC) 20 mg capsule     triamterene-hydroCHLOROthiazide (MAXZIDE) 37.5-25 mg per tablet     ibuprofen (MOTRIN) 800 mg tablet Take 1 Tab by mouth every eight (8) hours as needed. Indications: Pain    albuterol (PROVENTIL HFA, VENTOLIN HFA, PROAIR HFA) 90 mcg/actuation inhaler 1-2 Puffs. Disposition:  home    DISCHARGE NOTE:     Pt has been reexamined. Patient has no new complaints, changes, or physical findings. Care plan outlined and precautions discussed. Discussed proper way to take medications. Discussed treatment plan, return precautions, symptomatic relief, and expected time to improvement. All questions answered. Patient is stable for discharge and outpatient management. Patient is ready to go home. Follow-up Information     Follow up With Specialties Details [facility] [place] [id number] EMERGENCY DEPT Emergency Medicine   [address] [facility] [address] Kristal Whaley MD Internal Medicine   [address] [place] [address] [address] [facility]  [phone]            Discharge Medication List as of 2/26/2020  2:19 PM                Diagnosis     Clinical Impression:   1. Atypical chest pain    2.  Hyperglycemia

## 2020-02-26 NOTE — ED NOTES
Crystal Moore PA-C reviewed discharge instructions with the patient. The patient verbalized understanding. Patient armband removed and shredded.

## 2020-02-26 NOTE — DISCHARGE INSTRUCTIONS
Patient Education        Chest Pain: Care Instructions  Your Care Instructions    There are many things that can cause chest pain. Some are not serious and will get better on their own in a few days. But some kinds of chest pain need more testing and treatment. Your doctor may have recommended a follow-up visit in the next 8 to 12 hours. If you are not getting better, you may need more tests or treatment. Even though your doctor has released you, you still need to watch for any problems. The doctor carefully checked you, but sometimes problems can develop later. If you have new symptoms or if your symptoms do not get better, get medical care right away. If you have worse or different chest pain or pressure that lasts more than 5 minutes or you passed out (lost consciousness), call 911 or seek other emergency help right away. A medical visit is only one step in your treatment. Even if you feel better, you still need to do what your doctor recommends, such as going to all suggested follow-up appointments and taking medicines exactly as directed. This will help you recover and help prevent future problems. How can you care for yourself at home? · Rest until you feel better. · Take your medicine exactly as prescribed. Call your doctor if you think you are having a problem with your medicine. · Do not drive after taking a prescription pain medicine. When should you call for help? Call 911 if:    · You passed out (lost consciousness).     · You have severe difficulty breathing.     · You have symptoms of a heart attack. These may include:  ? Chest pain or pressure, or a strange feeling in your chest.  ? Sweating. ? Shortness of breath. ? Nausea or vomiting. ? Pain, pressure, or a strange feeling in your back, neck, jaw, or upper belly or in one or both shoulders or arms. ? Lightheadedness or sudden weakness. ? A fast or irregular heartbeat.   After you call 911, the  may tell you to chew 1 adult-strength or 2 to 4 low-dose aspirin. Wait for an ambulance. Do not try to drive yourself.    Call your doctor today if:    · You have any trouble breathing.     · Your chest pain gets worse.     · You are dizzy or lightheaded, or you feel like you may faint.     · You are not getting better as expected.     · You are having new or different chest pain. Where can you learn more? Go to http://sue-michael.info/. Enter A120 in the search box to learn more about \"Chest Pain: Care Instructions. \"  Current as of: June 26, 2019  Content Version: 12.2  © 3298-2217 [employer]. Care instructions adapted under license by [employer] (which disclaims liability or warranty for this information). If you have questions about a medical condition or this instruction, always ask your healthcare professional. [place] [address] any warranty or liability for your use of this information. Patient Education        Learning About High Blood Sugar  What is high blood sugar? Your body turns the food you eat into glucose (sugar), which it uses for energy. But if your body isn't able to use the sugar right away, it can build up in your blood and lead to high blood sugar. When the amount of sugar in your blood stays too high for too much of the time, you may have diabetes. Diabetes is a disease that can cause serious health problems. The good news is that lifestyle changes may help you get your blood sugar back to normal and avoid or delay diabetes. What causes high blood sugar? Sugar (glucose) can build up in your blood if you:  · Are overweight. · Have a family history of diabetes. · Take certain medicines, such as steroids. What are the symptoms? Having high blood sugar may not cause any symptoms at all. Or it may make you feel very thirsty or very hungry. You may also urinate more often than usual, have blurry vision, or lose weight without trying.   How is high blood sugar treated? You can take steps to lower your blood sugar level if you understand what makes it get higher. Your doctor may want you to learn how to test your blood sugar level at home. Then you can see how illness, stress, or different kinds of food or medicine raise or lower your blood sugar level. Other tests may be needed to see if you have diabetes. How can you prevent high blood sugar? · Watch your weight. If you're overweight, losing just a small amount of weight may help. Reducing fat around your waist is most important. · Limit the amount of calories, sweets, and unhealthy fat you eat. Ask your doctor if a dietitian can help you. A registered dietitian can help you create meal plans that fit your lifestyle. · Get at least 30 minutes of exercise on most days of the week. Exercise helps control your blood sugar. It also helps you maintain a healthy weight. Walking is a good choice. You also may want to do other activities, such as running, swimming, cycling, or playing tennis or team sports. · If your doctor prescribed medicines, take them exactly as prescribed. Call your doctor if you think you are having a problem with your medicine. You will get more details on the specific medicines your doctor prescribes. Follow-up care is a key part of your treatment and safety. Be sure to make and go to all appointments, and call your doctor if you are having problems. It's also a good idea to know your test results and keep a list of the medicines you take. Where can you learn more? Go to http://sue-michael.info/. Enter O108 in the search box to learn more about \"Learning About High Blood Sugar. \"  Current as of: April 16, 2019  Content Version: 12.2  © 0566-6071 [employer]. Care instructions adapted under license by [employer] (which disclaims liability or warranty for this information).  If you have questions about a medical condition or this instruction, always ask your healthcare professional. [place] [address] any warranty or liability for your use of this information.

## 2020-02-28 ENCOUNTER — TELEPHONE (OUTPATIENT)
Dept: SURGERY | Age: 35
End: 2020-02-28

## 2020-02-28 ENCOUNTER — HOSPITAL ENCOUNTER (OUTPATIENT)
Dept: PREADMISSION TESTING | Age: 35
Discharge: HOME | End: 2020-02-28
Payer: COMMERCIAL

## 2020-02-28 DIAGNOSIS — Z01.818 PRE-OP EXAM: ICD-10-CM

## 2020-02-28 DIAGNOSIS — E66.01 MORBID OBESITY WITH BMI OF 60.0-69.9, ADULT (HCC): ICD-10-CM

## 2020-02-28 LAB
25(OH)D3 SERPL-MCNC: 9.9 NG/ML (ref 30–100)
ALBUMIN SERPL-MCNC: 3.3 G/DL (ref 3.4–5)
ALBUMIN/GLOB SERPL: 0.9 {RATIO} (ref 0.8–1.7)
ALP SERPL-CCNC: 103 U/L (ref 45–117)
ALT SERPL-CCNC: 68 U/L (ref 13–56)
ANION GAP SERPL CALC-SCNC: 5 MMOL/L (ref 3–18)
AST SERPL-CCNC: 49 U/L (ref 10–38)
BASOPHILS # BLD: 0 K/UL (ref 0–0.1)
BASOPHILS NFR BLD: 0 % (ref 0–2)
BILIRUB SERPL-MCNC: 0.6 MG/DL (ref 0.2–1)
BUN SERPL-MCNC: 8 MG/DL (ref 7–18)
BUN/CREAT SERPL: 9 (ref 12–20)
CALCIUM SERPL-MCNC: 8.7 MG/DL (ref 8.5–10.1)
CHLORIDE SERPL-SCNC: 100 MMOL/L (ref 100–111)
CHOLEST SERPL-MCNC: 149 MG/DL
CO2 SERPL-SCNC: 29 MMOL/L (ref 21–32)
CREAT SERPL-MCNC: 0.92 MG/DL (ref 0.6–1.3)
DIFFERENTIAL METHOD BLD: NORMAL
EOSINOPHIL # BLD: 0.1 K/UL (ref 0–0.4)
EOSINOPHIL NFR BLD: 2 % (ref 0–5)
ERYTHROCYTE [DISTWIDTH] IN BLOOD BY AUTOMATED COUNT: 13.8 % (ref 11.6–14.5)
FOLATE SERPL-MCNC: 11.1 NG/ML (ref 3.1–17.5)
GLOBULIN SER CALC-MCNC: 3.6 G/DL (ref 2–4)
GLUCOSE SERPL-MCNC: 383 MG/DL (ref 74–99)
HCT VFR BLD AUTO: 39.7 % (ref 35–45)
HDLC SERPL-MCNC: 42 MG/DL (ref 40–60)
HDLC SERPL: 3.5 {RATIO} (ref 0–5)
HGB BLD-MCNC: 13.4 G/DL (ref 12–16)
IRON SERPL-MCNC: 73 UG/DL (ref 50–175)
LDLC SERPL CALC-MCNC: 74 MG/DL (ref 0–100)
LIPID PROFILE,FLP: ABNORMAL
LYMPHOCYTES # BLD: 1.9 K/UL (ref 0.9–3.6)
LYMPHOCYTES NFR BLD: 40 % (ref 21–52)
MCH RBC QN AUTO: 26.3 PG (ref 24–34)
MCHC RBC AUTO-ENTMCNC: 33.8 G/DL (ref 31–37)
MCV RBC AUTO: 77.8 FL (ref 74–97)
MONOCYTES # BLD: 0.2 K/UL (ref 0.05–1.2)
MONOCYTES NFR BLD: 4 % (ref 3–10)
NEUTS SEG # BLD: 2.5 K/UL (ref 1.8–8)
NEUTS SEG NFR BLD: 54 % (ref 40–73)
PLATELET # BLD AUTO: 207 K/UL (ref 135–420)
PMV BLD AUTO: 11.5 FL (ref 9.2–11.8)
POTASSIUM SERPL-SCNC: 4.4 MMOL/L (ref 3.5–5.5)
PROT SERPL-MCNC: 6.9 G/DL (ref 6.4–8.2)
RBC # BLD AUTO: 5.1 M/UL (ref 4.2–5.3)
SODIUM SERPL-SCNC: 134 MMOL/L (ref 136–145)
TRIGL SERPL-MCNC: 165 MG/DL (ref ?–150)
TSH SERPL DL<=0.05 MIU/L-ACNC: 0.64 UIU/ML (ref 0.36–3.74)
VIT B12 SERPL-MCNC: 641 PG/ML (ref 211–911)
VLDLC SERPL CALC-MCNC: 33 MG/DL
WBC # BLD AUTO: 4.7 K/UL (ref 4.6–13.2)

## 2020-02-28 PROCEDURE — 84443 ASSAY THYROID STIM HORMONE: CPT

## 2020-02-28 PROCEDURE — 83540 ASSAY OF IRON: CPT

## 2020-02-28 PROCEDURE — 82607 VITAMIN B-12: CPT

## 2020-02-28 PROCEDURE — 36415 COLL VENOUS BLD VENIPUNCTURE: CPT

## 2020-02-28 PROCEDURE — 86677 HELICOBACTER PYLORI ANTIBODY: CPT

## 2020-02-28 PROCEDURE — 82306 VITAMIN D 25 HYDROXY: CPT

## 2020-02-28 PROCEDURE — 84425 ASSAY OF VITAMIN B-1: CPT

## 2020-02-28 PROCEDURE — 85025 COMPLETE CBC W/AUTO DIFF WBC: CPT

## 2020-02-28 PROCEDURE — 80061 LIPID PANEL: CPT

## 2020-02-28 PROCEDURE — 80053 COMPREHEN METABOLIC PANEL: CPT

## 2020-02-28 PROCEDURE — 80323 ALKALOIDS NOS: CPT

## 2020-03-02 ENCOUNTER — OFFICE VISIT (OUTPATIENT)
Dept: SURGERY | Age: 35
End: 2020-03-02

## 2020-03-02 VITALS
SYSTOLIC BLOOD PRESSURE: 137 MMHG | BODY MASS INDEX: 45.99 KG/M2 | HEIGHT: 67 IN | RESPIRATION RATE: 17 BRPM | WEIGHT: 293 LBS | HEART RATE: 86 BPM | OXYGEN SATURATION: 98 % | DIASTOLIC BLOOD PRESSURE: 86 MMHG | TEMPERATURE: 98.1 F

## 2020-03-02 DIAGNOSIS — I10 ESSENTIAL HYPERTENSION, BENIGN: ICD-10-CM

## 2020-03-02 DIAGNOSIS — E55.9 VITAMIN D DEFICIENCY: ICD-10-CM

## 2020-03-02 DIAGNOSIS — K21.9 GASTROESOPHAGEAL REFLUX DISEASE WITHOUT ESOPHAGITIS: ICD-10-CM

## 2020-03-02 DIAGNOSIS — E66.01 MORBID OBESITY WITH BMI OF 60.0-69.9, ADULT (HCC): Primary | ICD-10-CM

## 2020-03-02 DIAGNOSIS — E11.9 CONTROLLED TYPE 2 DIABETES MELLITUS WITHOUT COMPLICATION, WITHOUT LONG-TERM CURRENT USE OF INSULIN (HCC): ICD-10-CM

## 2020-03-02 LAB
H PYLORI IGA SER-ACNC: <9 UNITS (ref 0–8.9)
H PYLORI IGG SER IA-ACNC: 0.22 INDEX VALUE (ref 0–0.79)
VIT B1 BLD-SCNC: 119 NMOL/L (ref 66.5–200)

## 2020-03-02 RX ORDER — ERGOCALCIFEROL 1.25 MG/1
50000 CAPSULE ORAL
Qty: 16 CAP | Refills: 0 | Status: SHIPPED | OUTPATIENT
Start: 2020-03-02 | End: 2020-05-19

## 2020-03-02 NOTE — PROGRESS NOTES
Bariatric Preoperative Progress Note    Subjective:     Stefan Culver is a 27 y.o. female who presents today for followup of their candidacy for bariatric surgery. Since last seen, Stefan Culver has been working through bariatric program towards LGBP. Past Medical History:   Diagnosis Date    Asthma     Community acquired pneumonia     Diabetes ([address]     gestational    Hypertension     Obese     Umbilical hernia        Past Surgical History:   Procedure Laterality Date    DILATION AND CURETTAGE      HX GYN      d and c       Current Outpatient Medications   Medication Sig Dispense Refill    omeprazole (PRILOSEC) 20 mg capsule       triamterene-hydroCHLOROthiazide (MAXZIDE) 37.5-25 mg per tablet       ibuprofen (MOTRIN) 800 mg tablet Take 1 Tab by mouth every eight (8) hours as needed. Indications: Pain 30 Tab 2    albuterol (PROVENTIL HFA, VENTOLIN HFA, PROAIR HFA) 90 mcg/actuation inhaler 1-2 Puffs. No Known Allergies    ROS:  Review of Systems:  Positives in [place]    Constitutional:  Unexpected weight gain/loss, night sweats,fatigue/maliase/lethargy, change in sleep, fever, rash  Eyes:  Visual changes, eye pain, floaters  ENT:  Rhinorrhea, epistaxis, sinus pain, change in hearing, gingival bleeding, sore throat, dysphagia, dysphonia  CV:  Chest pain, shortness of breath, difficulty breathing, orthopnea, palpitations, loss of consciousness, claudication  Resp: Cough, wheeze, haemoptysis, sob, exercise intolerence  GI:  Abdominal pain, dysphagia, reflux, bloating, cramping. Obstipation, haematemesis, brbpr, hematochezia,dark tarry stools. Nausea, vomitting, diarrhea, constipation. : Change in frequency of urination, dysuria, hematuria, change in force of Stream  Neuro: Paresthesias, numbness, weakness, changes in balance, changes in speech, loss of control of bowel or bladder, headaches.   Psych:Changes in depression, anxiety, sleep patterns, change in energy Levels  Endocrine: Temperature intolerance, dry skin, hair loss, fatigue,  Episodes of hypoglycemia, hyperglycemia, changes in libido  Heme: Anemia, pupura, petechia  Skin: Rashes, itchiness, excessive bruising  Musculoskeletal: weakness, arthropathy    Remainder of ROS as per HPI. Objective:     Physical Exam:  Visit Vitals  /86   Pulse 86   Temp 98.1 °F (36.7 °C)   Resp 17   Ht 5' 7\" (1.702 m)   Wt (!) 189.1 kg (417 lb)   SpO2 98%   BMI 65.31 kg/m²         General: AAOX3, pleasant and cooperative to exam. Appropriately groomed. NAD. Non-toxic in appearance. Appears stated age. HENT: NC/AT. PERRLA. Extraocular motions are intact. Sclera anicteric, Conjunctiva Clear. Nares clear. Oropharynx pink, moist without exudate or erythema. Uvula Midline. Neck:  Supple, trachea is midline. No JVD, Lymphadenopathy. No bruits. Chest: Good equal bilateral expansion  Lungs: Clear to auscultation bilaterally without e/o crackles, wheezes or rhales. Heart: RRR, S1 and S2 noted. No c/r/m/g/vpmi. Abdomen: obese, soft and non-tender without distension. Good bowel sounds. No vis/palp masses or pulsations. No organo-splenomegaly. No hernias to my exam. No e/o acute abdomen or peritoneal signs. Pelvis: Stable. :  Deferred  Rectal: Deferred  Extremities: Positive pulses in all 4 extremities. Baseline range of motion in all 4 extremities. Strength, sensation and reflexes intact, appropriate and equal in b/l upper and lower extremities. No C/C/E  Neuro: CN II-XII grossly intact without focal deficit. Ambulatory. Skin: Clean, warm and dry.         Studies to date:    Labs: significant for vitamin d deficiency, hyperglycemia-glucose 355, elevated triglycerides     EKG:   Normal sinus rhythm   Normal ECG   When compared with ECG of 20-MAR-2017 16:22,   Non-specific change in ST segment in Inferior leads   Nonspecific T wave abnormality no longer evident in Anterolateral leads   Confirmed by Thaddeus Rodriguez MD, --- ([id number]) on 2/26/2020 4:19:45 PM    Nutritional evaluation: 4 of 4 complete. Scheduled with Eloise Castro 3/20/20 to evaluate food changes as recommended. Psychiatric evaluation: pending scheduled 3/19/2020    Support Group: Attended Jan 2020    Additional evaluations: Abdominal Ultrasound-complete  CXR-complete and normal         Assessment:   Bridget Ferrell is a 27 y.o. female who is progressing through the bariatric preoperative evaluation. At this time, they are not yet an appropriate candidate for weight loss surgery. Ms. Ankur Sánchez has a reminder for a \"due or due soon\" health maintenance. I have asked that she contact her primary care provider for follow-up on this health maintenance. Plan:   -complete remainder of preop evaluation including nutrition evaluation and psych clearance. She is scheduled to see pcp Dr. Ana Jaquez for elevated glucose and diabetes management.   -Patient voices understanding that weight gain during weight loss trial may result in cancellation of weight loss surgery.   -Follow up once has completed entirety of weight loss workup to determine next steps.       KITTY Ventura-BC

## 2020-03-02 NOTE — PROGRESS NOTES
Dave Rose is a 27 y.o. female  Chief Complaint   Patient presents with    Follow-up     weight management/mid trial         Weight Loss Metrics 3/2/2020 3/2/2020 2/26/2020 1/27/2020 1/27/2020 11/8/2019 11/8/2019   Pre op / Initial Wt 428 - - 419 - 428 -   Today's Wt - 417 lb 417 lb - 419 lb - 428 lb   BMI - 65.31 kg/m2 65.31 kg/m2 - 65.62 kg/m2 - 67.03 kg/m2   Ideal Body Wt 140 - - 140 - 140 -   Excess Body Wt 288 - - 279 - 288 -   Goal Wt 198 - - - - 198 -   Wt loss to date 11 - - 0 - 0 -   % Wt Loss 0.05 - - 0 - 0 -   80% .4 - - 223.2 - 230.4 -       Body mass index is 65.31 kg/m². Is someone accompanying this pt? no    Is the patient using any DME equipment during OV? no        Vitals:    03/02/20 1046   BP: 137/86   Pulse: 86   Resp: 17   Temp: 98.1 °F (36.7 °C)   SpO2: 98%   Weight: (!) 417 lb (189.1 kg)   Height: 5' 7\" (1.702 m)        Depression Screening:  3 most recent PHQ Screens 3/2/2020   Little interest or pleasure in doing things Not at all   Feeling down, depressed, irritable, or hopeless Not at all   Total Score PHQ 2 0       Learning Assessment:  Learning Assessment 1/22/2018   PRIMARY LEARNER Patient   HIGHEST LEVEL OF EDUCATION - PRIMARY LEARNER  DID NOT GRADUATE HIGH SCHOOL   BARRIERS PRIMARY LEARNER NONE   CO-LEARNER CAREGIVER No   PRIMARY LANGUAGE ENGLISH    NEED No   LEARNER PREFERENCE PRIMARY OTHER (COMMENT)   LEARNING SPECIAL TOPICS no   ANSWERED BY self   RELATIONSHIP SELF   ASSESSMENT COMMENT no           Health Maintenance reviewed and discussed and ordered per Provider. Coordination of Care:  1. Have you been to the ER, urgent care clinic since your last visit? Hospitalized since your last visit? no    2.  Have you seen or consulted any other health care providers outside of the [address] [place] [address] since your last visit? no

## 2020-03-02 NOTE — PROGRESS NOTES
Vitamin D 50,000 international units weekly for 4 months. Patient advised to start this medication today with weekly doses.

## 2020-03-04 ENCOUNTER — TELEPHONE (OUTPATIENT)
Dept: SURGERY | Age: 35
End: 2020-03-04

## 2020-03-04 LAB
COTININE SERPL-MCNC: <1 NG/ML
NICOTINE SERPL-MCNC: <1 NG/ML

## 2020-03-04 NOTE — TELEPHONE ENCOUNTER
Called patient regarding lab results.  Encouraged patient to increase protein in her diet and discussed various protein solutions.      ----- Message from Mario London DO sent at 2/28/2020  4:48 PM EST -----  Needs to increase protein

## 2020-04-24 ENCOUNTER — HOSPITAL ENCOUNTER (OUTPATIENT)
Dept: BARIATRICS/WEIGHT MGMT | Age: 35
Discharge: HOME | End: 2020-04-24

## 2020-04-29 ENCOUNTER — DOCUMENTATION ONLY (OUTPATIENT)
Dept: BARIATRICS/WEIGHT MGMT | Age: 35
End: 2020-04-29

## 2020-04-29 ENCOUNTER — HOSPITAL ENCOUNTER (OUTPATIENT)
Dept: BARIATRICS/WEIGHT MGMT | Age: 35
Discharge: HOME | End: 2020-04-29

## 2020-04-29 ENCOUNTER — DOCUMENTATION ONLY (OUTPATIENT)
Dept: OTHER | Age: 35
End: 2020-04-29

## 2020-04-29 NOTE — PROGRESS NOTES
[address] [address] [facility], [address]    Patient's Name: Robin Bruce   Age: 27 y.o. YOB: 1985   Sex: female    Date:   3/26/20    Insurance:            Session: 5 of 6  Dr. Dario Balbuena    Height: 5 f 7 Weight:    417      Lbs. BMI:    Pounds Lost since last month: 0               Pounds Gained since last month: 0      Starting Weight: 428   Previous Months Weight: 428  Overall Pounds Lost: 11 Overall Pounds Gained: 0      Do you smoke? None    Alcohol intake:  Number of drinks at a time:  None  Number of times a week: None    Class Guidelines    Guidelines are reviewed with patient at the start of every class. 1. Patient understands that weight loss trial classes must be consecutive. Patient understands if they miss a class, it is their responsibility to contact me to reschedule class. I will reach out to patient after their first no show. 2.  Patient understands the expectations that weight maintenance/weight loss is expected during the classes. Failure to demonstrate changes may result in one extra month of weight loss trial, followed by going back to see the surgeon. Patient understands that they CAN NOT gain any weight during the weight loss trial.  Gaining weight will result in extra classes. 3. Patient is also instructed to be doing their labs, blood work, psych visit, support group and any other test that the surgeon has used while they are working on their weight loss trial.  4.  Patient was instructed to bring their blue binder to every class and appointment. Other Pertinent Information:     Changes Made Since Last Class: None since last month    Eating Habits and Behaviors    Today in class, we reviewed the key diet principles. I have talked to patient about pushing the fluid and working towards 64 ounces per day. Patient was given ideas of liquids that would be okay.   Patient was encouraged to cut out liquid calories, such as soda and sweet tea. We talked about the reasons that sugar sweetened beverages can promote weight gain. Sugar is highly palatable. Excessive consumption of sugar can trigger an exaggerated release of dopamine, which can promote a compulsive drive to consume more sugar sweetened beverages. Also, satiety is not reached with liquid calories the same way it does with solid calories. In class, we also talked about focusing on protein and low carbohydrates. Patient was encouraged during the weight loss trial to keep their carbohydrate less than 75 grams per day and their protein level at 60-80 grams per day. We talked about meal choices and snack ideas. Patient was given a packet on carbohydrate substitutions and recipe exchanges. This will allow them to still have some of the foods they enjoy, but a lower carbohydrate alternative. Patient's current diet habits include: Patient states she is eating 3 meals per day. She is focusing on protein at meals and trying to be more mindful of her carbohydrates. She states she is trying to make better snack choices. She is getting 48 ounces of fluid per day and is encouraged to increase that to 64 ounces. Physical Activity/Exercise    Comments: We talked about exercise. Patient was given reasons of why exercise is so important and how that can help with their long-term success. I have encouraged patient to get a support system to help with the activity. Currently for activity, patient is doing some walking. Behavior Modification       Comments:  During today's lesson, I also spent some time talking about behavior changes. I talked to patient about the importance of taking vitamins post op and we reviewed the vitamins that patients will be taking post op. Patient will hear this again at pre op class before surgery.   Patient had the opportunity to ask questions about these vitamins that will be lifelong. Goals that patient wants to work on includes:  1. Continue to cut out the sweets.   2.       Shady Cueto MS RD

## 2020-04-29 NOTE — PROGRESS NOTES
[address] [place] [address] [facility], [address]    Patient's Name: Brodie Wolfe   Age: 27 y.o. YOB: 1985   Sex: female    Date:   4/29/2020    Insurance:              Session: 6 of 6  Revision:     Surgeon:  Dr. Aisha Palomino    Height: 5 f 7   Weight:    414      Lbs. BMI:    Pounds Lost since last month: 3                 Pounds Gained since last month: 0    Starting Weight: 428     Previous Months Weight: 417  Overall Pounds Lost: 14   Overall Pounds Gained: 0    Do you smoke? None    Alcohol intake:  Number of drinks at a time:  None  Number of times a week: None    Class Guidelines    Guidelines are reviewed with patient at the start of every class. 1. Patient understands that weight loss trial classes must be consecutive. Patient understands if they miss a class, it is their responsibility to contact me to reschedule class. I will reach out to patient after their first no show. 2.  Patient understands the expectations that weight maintenance/weight loss is expected during the classes. Failure to demonstrate changes may result in one extra month of weight loss trial, followed by going back to see the surgeon. Patient understands that they CAN NOT gain any weight during the weight loss trial.  Gaining weight will result in extra classes. 3. Patient is also instructed to be doing their labs, blood work, psych visit, support group and any other test that the surgeon has used while they are working on their weight loss trial.  4.  Patient was instructed to bring their blue binder to every class and appointment. Other Pertinent Information:     Changes Made Since Last Class:     Eating Habits and Behaviors      We started off class today by reviewing key diet principles. Patient was given a very specific list of foods that they can eat, which included meat, fish, vegetables, eggs, cheese, fats, soy, and berries.   Patient was also given a list of foods that should be avoided. These included sweets (candy, soda, baked goods, ice cream), and starches, including pasta, rice, crackers, chips, oatmeal, bread. We talked about appropriate protein-based snacks, including deli meat, low fat cheese, yogurt, hummus, small handful of almonds. I also gave a power point on ways to help with the metabolism. Some of the food-related ways included: Healthy snacking, eating adequate protein, and getting adequate water. Mild dehydration may slow down one's weight loss. Patient's current diet habits include: 3 meals a day. Focusing more on protein and vegetables. She states she has cut back on snacking and has worked towards 64 ounces of fluid per day. She states he has been cutting out fried food and using her air fryer more. Physical Activity/Exercise    Comments:     Currently for exercise, patient is walking and is working towards 64 ounces per day. We talked about activities for patient to do, including walking, swimming, or chair exercises. I also talked with patient about doing some strength training, which helps the metabolism, as well. Behavior Modification       Comments: In the power point, Mastering Your Metabolism, I also gave behaviors that can help with one's metabolism. These include not skipping meals and being sure to feed and fuel that body rather than going large gaps and putting the body in starvation mode. One goal for next month includes:  1.   Continue to get daily activity in.      Molly Munoz Gorge 87 RD  4/29/2020

## 2020-04-29 NOTE — PROGRESS NOTES
Nutrition Evaluation    Patient's Name: Perlita Wilson   Age: 27 y.o. YOB: 1985   Sex: female    Height: 5 f 7 Weight: 414 BMI:  64.9  Starting Weight:  428        Smoking Status:  None  Alcohol Intake:  Number of Drinks at a Time: None  Number of Times a Week: None    Changes made during classes include:  Less starches  More water  More fruits and vegetables  Increased activity        Two things that patient learned during this weight loss trial:  Importance of preparing for surgery. The role of carbohydrates. Summary:  I feel that Perlita Wilson has demonstrated appropriate diet changes and is ready to move forward with surgery. Patient has been briefed on the importance of the protein drinks, vitamins, and the transition of the diet stages. Patient understands that the long-term diet will focus on protein and vegetables. Patient understand the effects of carbohydrates after surgery and what reactive hypoglycemia is. Patient is aware that they will be attending pre-op class 2 weeks before surgery and will get more detailed information on the post-op diet guidelines. Patient will see me again at 6 weeks post-op. At this 6 week visit, RD will assess how patient is tolerating soft protein and advance to vegetables, if tolerating soft protein without difficulty. Patient will also see RD again at 9 months post-op. This visit will assess patient's compliance with current protocol, including diet, vitamins, protein shakes, and exercise. Post-op diet guidelines will be reinforced. RD is available for questions and to meet with patient outside of the 6 week and 9 month post-op visit. We spent a lot of time talking about the vitamins. Patient understands the importance of being compliant with the diet protocol and the complications and risks that can occur if they are non-compliant with the nutritional protocol. Patient has attended at least one support group.     Candidate for surgery: Yes  Re-evaluation Date:     Procedure:  Gastric Bypass       Molly Gloria Gorge [address] RD  4/29/2020

## 2020-05-07 ENCOUNTER — OFFICE VISIT (OUTPATIENT)
Dept: SURGERY | Age: 35
End: 2020-05-07

## 2020-05-07 VITALS
HEART RATE: 86 BPM | SYSTOLIC BLOOD PRESSURE: 124 MMHG | TEMPERATURE: 97 F | WEIGHT: 293 LBS | DIASTOLIC BLOOD PRESSURE: 90 MMHG | RESPIRATION RATE: 18 BRPM | BODY MASS INDEX: 45.99 KG/M2 | HEIGHT: 67 IN

## 2020-05-07 DIAGNOSIS — E66.01 MORBID OBESITY WITH BMI OF 60.0-69.9, ADULT (HCC): Primary | ICD-10-CM

## 2020-05-07 NOTE — H&P (VIEW-ONLY)
Preop History and Physical Exam: 
 
Pablo Barahona is a 27 y.o. black female that she evaluated in this office on 8 November 2019 for discussion of the surgical treatment of her super, super obesity. 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 The patient after discussing the surgical options available for definitive weight loss elected to pursue laparoscopic initially open Li-en-Y gastric bypass to achieve definitive durable weight loss on a personal level expected resolution of her obesity related comorbidities. The patient now returns after completion of all multidisciplinary bariatric surgery programmatic requirements for review of the diagnostic evaluation and surgical scheduling noting new medical history of initiation of pharmaceutical therapy for treatment of her adult-onset diabetes mellitus. The patient otherwise does not note any new medical or surgical history. Patient currently weighs 415 pounds on a 5 foot 7 inch frame with a body mass index of 65. Ideal body weight 140 pounds, excess body weight 275 pounds, estimated postsurgical weight loss 220 pounds, postsurgical goal weight 195 pounds. Past Medical History:  
Diagnosis Date  Asthma  Community acquired pneumonia  Diabetes ([address] gestational  
 Hypertension  Obese  Umbilical hernia Past Surgical History:  
Procedure Laterality Date  DILATION AND CURETTAGE    
 HX GYN    
 d and c  
 
 
Current Outpatient Medications Medication Sig Dispense Refill  ergocalciferol (ERGOCALCIFEROL) 1,250 mcg (50,000 unit) capsule Take 1 Cap by mouth every seven (7) days. Indications: low vitamin D levels 16 Cap 0  
 omeprazole (PRILOSEC) 20 mg capsule  triamterene-hydroCHLOROthiazide (MAXZIDE) 37.5-25 mg per tablet  ibuprofen (MOTRIN) 800 mg tablet Take 1 Tab by mouth every eight (8) hours as needed. Indications: Pain 30 Tab 2  
 albuterol (PROVENTIL HFA, VENTOLIN HFA, PROAIR HFA) 90 mcg/actuation inhaler 1-2 Puffs. No Known Allergies Social History Tobacco Use  Smoking status: Former Smoker Packs/day: 0.50 Types: Cigarettes Last attempt to quit: 2019 Years since quittin.4  Smokeless tobacco: Never Used  Tobacco comment: cigars twice weekly Substance Use Topics  Alcohol use: Not Currently Alcohol/week: 1.7 standard drinks Types: 2 Standard drinks or equivalent per week Frequency: 2-4 times a month Comment: holidays/special occassion  Drug use: No  
 
 
Family History Problem Relation Age of Onset  Asthma Mother  Hypertension Mother  Diabetes Mother  Asthma Father Review of Systems:  Positive in BOLD 
 
CONST: Fever, weight loss, fatigue or chills GI: Nausea, vomiting, abdominal pain, change in bowel habits, hematochezia, melena, and GERD INTEG: Dermatitis, abnormal moles HEENT: Recent changes in vision, vertigo, epistaxis, dysphagia and hoarseness CV: Chest pain, palpitations, HTN, edema and varicosities RESP: Cough, shortness of breath, wheezing, hemoptysis, snoring and reactive airway disease : Hematuria, dysuria, frequency, urgency, nocturia and stress urinary incontinence MS: Weakness, joint pain and arthritis ENDO: Diabetes, thyroid disease, polyuria, polydipsia, polyphagia, poor wound healing, heat intolerance, cold intolerance LYMPH/HEME: Anemia, bruising and history of blood transfusions NEURO: Dizziness, headache, fainting, seizures and stroke PSYCH: Anxiety and depression Physical Exam 
 
Visit Vitals /90 Pulse 86 Temp 97 °F (36.1 °C) (Oral) Resp 18 Ht 5' 7\" (1.702 m) Wt (!) 188.3 kg (415 lb 3.2 oz) BMI 65.03 kg/m² General: Super, super 27 y.o.) female in no acute distress Head: Normocephalic, atraumatic Mouth: Clear, no overt lesions, oral mucosa pink and moist 
Neck: Supple, no masses, no adenopathy or carotid bruits, trachea midline Resp: Clear to auscultation bilaterally, now wheeze, rhonchi, or rales, excursions normal and symmetrical 
Cardio: Regular rate and rhythm, no murmurs, clicks, gallops, or rubs. No edema or varicosities. Abdomen: Obese, soft, nontender, nondistended, normoactive bowel sounds, no hernias, no hepatosplenomegaly, Extremities: Warm, well perfused, no tenderness or swelling, normal gait/station Neuro: Sensation and strength grossly intact and symmetrical 
Psych: Alert and oriented to person, place, and time. November 8, 2019, February 26, 2020 CBC, CMP, cholesterol panel, TSH, urinalysis, vitamin B1, by B12, folate, iron, vitamin D, H. pylori serology, nicotine profile: Glucose 355, urinary glucose greater than 1000 mg/dL, triglycerides 165, vitamin D 9.9 with remainder laboratory profile within normal limits. The patient after receipt of her laboratory profile was begun on pharmaceutical therapy for her adult onset diabetes mellitus as well as vitamin D supplementation Pap smear: Completed results pending November 8, 2019 chest x-ray: No active cardiopulmonary disease November 8, 2019 right upper quadrant ultrasonographic evaluation: Hepatic steatosis without evidence of cholelithiasis April 29, 2020 bariatric nutrition consultation/Britany: Concurring with pursuit of surgery March 19, 2020 psychology consultation/Anjali: Concurring with pursuit of surgery February 20, 2020 EKG: Normal sinus rhythm with rate of 84 normal EKG Impression: 
 
Brodie Wolfe is a 27 y.o. black female with a body mass index of 65 and obese related comorbidities of hypertension, adult-onset diabetes mellitus, hypertriglyceridemia, gastroesophageal reflux disease, reactive airway disease, stricture incontinence, clinical obstructive sleep apnea, weight related arthropathyknees, ankles who would benefit from bariatric surgery. We've discussed the restrictive and malabsorptive nature of the gastric bypass and compared and contrasted with the sleeve gastrectomy. The patient understands the likelihood of losing approximately 80% of their excess weight in 12 to 18 months. She also understands the risks including but not limited to bleeding, infection, need for reoperation, anastomotic ulcers, leaks and strictures, bowel obstruction secondary to adhesions and internal hernias, DVT, PE, heart attack, stroke, and death. Patient also understands risks of inadequate weight loss, excess weight loss, vitamin insufficiency, protein malnutrition, excess skin, and loss of hair. We have reviewed the components of a successful postoperative course including requirement for a high protein, low carbohydrate diet, 60 oz a day of zero calorie liquids, daily vitamin supplementation, daily exercise, regular follow-up, and participation in support groups. We have reviewed the preoperative liver shrinking clear liquid diet, as well as reviewed any medication changes required while on the clear liquid diet. In addition, the patient understands that all medications to be taken during the first 8 weeks postoperatively can be taken whole as long as the medication is approximately the size of a Kayla 325 mg aspirin tablet in size. The patient further understands that it is his/her responsibility to review these and verify with their primary care doctor and pharmacist that all medications are of the appropriate size. We will schedule the patient for laparoscopic gastric bypass gastric bypass in the near future.

## 2020-05-07 NOTE — PROGRESS NOTES
Preop History and Physical Exam:    Betty Estrada is a 27 y.o. black female that she evaluated in this office on 8 November 2019 for discussion of the surgical treatment of her super, super obesity. 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 The patient after discussing the surgical options available for definitive weight loss elected to pursue laparoscopic initially open Li-en-Y gastric bypass to achieve definitive durable weight loss on a personal level expected resolution of her obesity related comorbidities. The patient now returns after completion of all multidisciplinary bariatric surgery programmatic requirements for review of the diagnostic evaluation and surgical scheduling noting new medical history of initiation of pharmaceutical therapy for treatment of her adult-onset diabetes mellitus. The patient otherwise does not note any new medical or surgical history. Patient currently weighs 415 pounds on a 5 foot 7 inch frame with a body mass index of 65. Ideal body weight 140 pounds, excess body weight 275 pounds, estimated postsurgical weight loss 220 pounds, postsurgical goal weight 195 pounds. Past Medical History:   Diagnosis Date    Asthma     Community acquired pneumonia     Diabetes ([address]     gestational    Hypertension     Obese     Umbilical hernia        Past Surgical History:   Procedure Laterality Date    DILATION AND CURETTAGE      HX GYN      d and c       Current Outpatient Medications   Medication Sig Dispense Refill    ergocalciferol (ERGOCALCIFEROL) 1,250 mcg (50,000 unit) capsule Take 1 Cap by mouth every seven (7) days.  Indications: low vitamin D levels 16 Cap 0    omeprazole (PRILOSEC) 20 mg capsule       triamterene-hydroCHLOROthiazide (MAXZIDE) 37.5-25 mg per tablet       ibuprofen (MOTRIN) 800 mg tablet Take 1 Tab by mouth every eight (8) hours as needed. Indications: Pain 30 Tab 2    albuterol (PROVENTIL HFA, VENTOLIN HFA, PROAIR HFA) 90 mcg/actuation inhaler 1-2 Puffs.          No Known Allergies    Social History     Tobacco Use    Smoking status: Former Smoker     Packs/day: 0.50     Types: Cigarettes     Last attempt to quit: 2019     Years since quittin.4    Smokeless tobacco: Never Used    Tobacco comment: cigars twice weekly   Substance Use Topics    Alcohol use: Not Currently     Alcohol/week: 1.7 standard drinks     Types: 2 Standard drinks or equivalent per week     Frequency: 2-4 times a month     Comment: holidays/special occassion    Drug use: No       Family History   Problem Relation Age of Onset    Asthma Mother     Hypertension Mother     Diabetes Mother     Asthma Father        Review of Systems:  Positive in BOLD    CONST: Fever, weight loss, fatigue or chills  GI: Nausea, vomiting, abdominal pain, change in bowel habits, hematochezia, melena, and GERD   INTEG: Dermatitis, abnormal moles  HEENT: Recent changes in vision, vertigo, epistaxis, dysphagia and hoarseness  CV: Chest pain, palpitations, HTN, edema and varicosities  RESP: Cough, shortness of breath, wheezing, hemoptysis, snoring and reactive airway disease  : Hematuria, dysuria, frequency, urgency, nocturia and stress urinary incontinence   MS: Weakness, joint pain and arthritis  ENDO: Diabetes, thyroid disease, polyuria, polydipsia, polyphagia, poor wound healing, heat intolerance, cold intolerance  LYMPH/HEME: Anemia, bruising and history of blood transfusions  NEURO: Dizziness, headache, fainting, seizures and stroke  PSYCH: Anxiety and depression    Physical Exam    Visit Vitals  /90   Pulse 86   Temp 97 °F (36.1 °C) (Oral)   Resp 18   Ht 5' 7\" (1.702 m)   Wt (!) 188.3 kg (415 lb 3.2 oz)   BMI 65.03 kg/m²         General: Super, super 27 y.o.) female in no acute distress  Head: Normocephalic, atraumatic  Mouth: Clear, no overt lesions, oral mucosa pink and moist  Neck: Supple, no masses, no adenopathy or carotid bruits, trachea midline  Resp: Clear to auscultation bilaterally, now wheeze, rhonchi, or rales, excursions normal and symmetrical  Cardio: Regular rate and rhythm, no murmurs, clicks, gallops, or rubs. No edema or varicosities. Abdomen: Obese, soft, nontender, nondistended, normoactive bowel sounds, no hernias, no hepatosplenomegaly,    Extremities: Warm, well perfused, no tenderness or swelling, normal gait/station  Neuro: Sensation and strength grossly intact and symmetrical  Psych: Alert and oriented to person, place, and time. November 8, 2019, February 26, 2020 CBC, CMP, cholesterol panel, TSH, urinalysis, vitamin B1, by B12, folate, iron, vitamin D, H. pylori serology, nicotine profile: Glucose 355, urinary glucose greater than 1000 mg/dL, triglycerides 165, vitamin D 9.9 with remainder laboratory profile within normal limits.   The patient after receipt of her laboratory profile was begun on pharmaceutical therapy for her adult onset diabetes mellitus as well as vitamin D supplementation  Pap smear: Completed results pending  November 8, 2019 chest x-ray: No active cardiopulmonary disease  November 8, 2019 right upper quadrant ultrasonographic evaluation: Hepatic steatosis without evidence of cholelithiasis  April 29, 2020 bariatric nutrition consultation/Britany: Concurring with pursuit of surgery  March 19, 2020 psychology consultation/Anjali: Concurring with pursuit of surgery  February 20, 2020 EKG: Normal sinus rhythm with rate of 84 normal EKG    Impression:    Robin Bruce is a 27 y.o. black female with a body mass index of 65 and obese related comorbidities of hypertension, adult-onset diabetes mellitus, hypertriglyceridemia, gastroesophageal reflux disease, reactive airway disease, stricture incontinence, clinical obstructive sleep apnea, weight related arthropathy-knees, ankles who would benefit from bariatric surgery. We've discussed the restrictive and malabsorptive nature of the gastric bypass and compared and contrasted with the sleeve gastrectomy. The patient understands the likelihood of losing approximately 80% of their excess weight in 12 to 18 months. She also understands the risks including but not limited to bleeding, infection, need for reoperation, anastomotic ulcers, leaks and strictures, bowel obstruction secondary to adhesions and internal hernias, DVT, PE, heart attack, stroke, and death. Patient also understands risks of inadequate weight loss, excess weight loss, vitamin insufficiency, protein malnutrition, excess skin, and loss of hair. We have reviewed the components of a successful postoperative course including requirement for a high protein, low carbohydrate diet, 60 oz a day of zero calorie liquids, daily vitamin supplementation, daily exercise, regular follow-up, and participation in support groups. We have reviewed the preoperative liver shrinking clear liquid diet, as well as reviewed any medication changes required while on the clear liquid diet. In addition, the patient understands that all medications to be taken during the first 8 weeks postoperatively can be taken whole as long as the medication is approximately the size of a Kayla 325 mg aspirin tablet in size. The patient further understands that it is his/her responsibility to review these and verify with their primary care doctor and pharmacist that all medications are of the appropriate size. We will schedule the patient for laparoscopic gastric bypass gastric bypass in the near future.

## 2020-05-07 NOTE — PROGRESS NOTES
Chief Complaint   Patient presents with    Pre-op Exam     pt presents today to discuss surgical options     Pt ID confirmed    Weight Loss Metrics 5/7/2020 5/7/2020 3/2/2020 3/2/2020 2/26/2020 1/27/2020 1/27/2020   Pre op / Initial Wt 415.2 - 428 - - 419 -   Today's Wt - 415 lb 3.2 oz - 417 lb 417 lb - 419 lb   BMI - 65.03 kg/m2 - 65.31 kg/m2 65.31 kg/m2 - 65.62 kg/m2   Ideal Body Wt 140 - 140 - - 140 -   Excess Body Wt 275.2 - 288 - - 279 -   Goal Wt - - 198 - - - -   Wt loss to date 0 - 11 - - 0 -   % Wt Loss 0 - 0.05 - - 0 -   80% .16 - 230.4 - - 223.2 -       Body mass index is 65.03 kg/m².

## 2020-05-08 ENCOUNTER — HOSPITAL ENCOUNTER (OUTPATIENT)
Dept: PREADMISSION TESTING | Age: 35
Discharge: HOME | End: 2020-05-08
Payer: COMMERCIAL

## 2020-05-08 DIAGNOSIS — E66.01 MORBID OBESITY WITH BMI OF 60.0-69.9, ADULT (HCC): ICD-10-CM

## 2020-05-08 LAB
EST. AVERAGE GLUCOSE BLD GHB EST-MCNC: 212 MG/DL
HBA1C MFR BLD: 9 % (ref 4.2–5.6)

## 2020-05-08 PROCEDURE — 36415 COLL VENOUS BLD VENIPUNCTURE: CPT

## 2020-05-08 PROCEDURE — 83036 HEMOGLOBIN GLYCOSYLATED A1C: CPT

## 2020-05-12 ENCOUNTER — DOCUMENTATION ONLY (OUTPATIENT)
Dept: BARIATRICS/WEIGHT MGMT | Age: 35
End: 2020-05-12

## 2020-05-12 NOTE — PROGRESS NOTES
CLINICAL NUTRITION PRE-OPERATIVE EDUCATION    Patient's Name: Thomas Gould   Age: 27 y.o. YOB: 1985   Sex: female    Education & Materials Provided:   - Soft Protein Diet Shopping List  -  Supplemental Resource Guide: MVI, B12, Calcium Citrate, Vitamin D, Vitamin B1,   and iron recommendations  - Protein Supplement Information  - Fluid Requirements/ No Straws  - No Caffeine or Carbonation   - No alcohol              - No Snacks or No Concentrated Sweets     - Exercising   - [facility]. Support System after surgery includes: x     - Key Diet Principles            - Addressed Current Habits/Changes to Make   - Patient has been educated on the liquid diet to begin 1 week prior to surgery. Patient understands the transition of the diet. Attendance of support group: Yes    Summary:  Patient has completed 6 visits with the Registered Dietitian. During these nutrition visits, we focused on dietary changes, behavior changes, and the importance of establishing an exercise routine. The diet protocol that patient was prescribed emphasized on low carbohydrates (less than 100 grams per day prior to surgery and 60-80 grams of protein per day). At today's session, patient was educated on the post-op diet protocol. Patient understands the importance of keeping total fat and sugar less than 3 grams per serving. Patient is aware of the transition of the diet stages and is aware that they will be on clear liquids for 7days, followed by soft protein for 5 weeks. Patient understands the body needs ~ 60-70 grams of protein per day. During the liquid phase, patient will need 60 grams of protein from shakes. Once eating soft protein, patient will only need 1 shake per day. Patient is aware of the importance of the vitamins and protein drinks. We spent a lot of time talking about the vitamins.   Patient understands the importance of being compliant with the diet protocol and the complications and risks that can occur if they are non-compliant with the nutritional protocol and non-compliant with the vitamins. Patient has also been educated on the pre-op liquid diet for 1 week. Patient understands that failure to comply in pre-op liquid diet could result in surgery being canceled. Patient's 6 week post-op nutrition appointment has been scheduled. At this 6 week visit, RD will assess how patient is tolerating soft protein and advance to vegetables, if tolerating soft protein without difficulty. Patient will also see RD again at 9 months post-op. This visit will assess patient's compliance with current protocol, including diet, vitamins, protein shakes, and exercise. Post-op diet guidelines will be reinforced. RD is available for questions and to meet with patient outside of the 6 week and 9 month post-op visit. Ok to proceed.      Candidate for surgery: Yes  Re-evaluation Date:     Thelda Cabot, [address]  5/12/2020

## 2020-05-13 ENCOUNTER — HOSPITAL ENCOUNTER (OUTPATIENT)
Dept: LAB | Age: 35
Discharge: HOME | End: 2020-05-13
Payer: COMMERCIAL

## 2020-05-13 PROCEDURE — 36415 COLL VENOUS BLD VENIPUNCTURE: CPT

## 2020-05-13 PROCEDURE — 87635 SARS-COV-2 COVID-19 AMP PRB: CPT

## 2020-05-13 RX ORDER — GLIPIZIDE 5 MG/1
5 TABLET ORAL 2 TIMES DAILY
COMMUNITY
End: 2020-05-19

## 2020-05-13 RX ORDER — METFORMIN HYDROCHLORIDE 500 MG/1
500 TABLET ORAL 2 TIMES DAILY WITH MEALS
COMMUNITY
End: 2020-05-19

## 2020-05-14 ENCOUNTER — DOCUMENTATION ONLY (OUTPATIENT)
Dept: MEDSURG UNIT | Age: 35
End: 2020-05-14

## 2020-05-14 RX ORDER — ENOXAPARIN SODIUM 100 MG/ML
40 INJECTION SUBCUTANEOUS DAILY
Qty: 7 SYRINGE | Refills: 0 | OUTPATIENT
Start: 2020-05-14 | End: 2020-05-21

## 2020-05-15 ENCOUNTER — ANESTHESIA EVENT (OUTPATIENT)
Dept: SURGERY | Age: 35
End: 2020-05-15
Payer: COMMERCIAL

## 2020-05-16 LAB — SARS-COV-2, COV2NT: NOT DETECTED

## 2020-05-18 ENCOUNTER — HOSPITAL ENCOUNTER (OUTPATIENT)
Age: 35
Setting detail: OBSERVATION
Discharge: HOME | End: 2020-05-19
Attending: SURGERY | Admitting: SURGERY
Payer: COMMERCIAL

## 2020-05-18 ENCOUNTER — ANESTHESIA (OUTPATIENT)
Dept: SURGERY | Age: 35
End: 2020-05-18
Payer: COMMERCIAL

## 2020-05-18 DIAGNOSIS — L76.82 INCISIONAL PAIN: ICD-10-CM

## 2020-05-18 DIAGNOSIS — G89.18 POST-OP PAIN: Primary | ICD-10-CM

## 2020-05-18 PROBLEM — E66.01 MORBID OBESITY WITH BMI OF 60.0-69.9, ADULT (HCC): Status: ACTIVE | Noted: 2020-05-18

## 2020-05-18 LAB
GLUCOSE BLD STRIP.AUTO-MCNC: 141 MG/DL (ref 70–110)
GLUCOSE BLD STRIP.AUTO-MCNC: 181 MG/DL (ref 70–110)
GLUCOSE BLD STRIP.AUTO-MCNC: 189 MG/DL (ref 70–110)
HCG UR QL: NEGATIVE

## 2020-05-18 PROCEDURE — 77030009968 HC RELD STPLR ENDOSC J&J -D: Performed by: SURGERY

## 2020-05-18 PROCEDURE — 74011250636 HC RX REV CODE- 250/636: Performed by: NURSE ANESTHETIST, CERTIFIED REGISTERED

## 2020-05-18 PROCEDURE — 77030027876 HC STPLR ENDOSC FLX PWR J&J -G1: Performed by: SURGERY

## 2020-05-18 PROCEDURE — 77030002996 HC SUT SLK J&J -A: Performed by: SURGERY

## 2020-05-18 PROCEDURE — 74011250636 HC RX REV CODE- 250/636: Performed by: ANESTHESIOLOGY

## 2020-05-18 PROCEDURE — 99218 HC RM OBSERVATION: CPT

## 2020-05-18 PROCEDURE — 77030013079 HC BLNKT BAIR HGGR 3M -A: Performed by: ANESTHESIOLOGY

## 2020-05-18 PROCEDURE — 65270000029 HC RM PRIVATE

## 2020-05-18 PROCEDURE — 77030037892: Performed by: SURGERY

## 2020-05-18 PROCEDURE — 77030008603 HC TRCR ENDOSC EPATH J&J -C: Performed by: SURGERY

## 2020-05-18 PROCEDURE — 74011000250 HC RX REV CODE- 250: Performed by: NURSE ANESTHETIST, CERTIFIED REGISTERED

## 2020-05-18 PROCEDURE — 76010000132 HC OR TIME 2.5 TO 3 HR: Performed by: SURGERY

## 2020-05-18 PROCEDURE — 77030008683 HC TU ET CUF COVD -A: Performed by: ANESTHESIOLOGY

## 2020-05-18 PROCEDURE — 74011250637 HC RX REV CODE- 250/637: Performed by: SURGERY

## 2020-05-18 PROCEDURE — 77030019605: Performed by: SURGERY

## 2020-05-18 PROCEDURE — 81025 URINE PREGNANCY TEST: CPT

## 2020-05-18 PROCEDURE — 82962 GLUCOSE BLOOD TEST: CPT

## 2020-05-18 PROCEDURE — 74011250636 HC RX REV CODE- 250/636: Performed by: SURGERY

## 2020-05-18 PROCEDURE — 77030018846 HC SOL IRR STRL H20 ICUM -A: Performed by: SURGERY

## 2020-05-18 PROCEDURE — 77030008437 HC REINF STRP REINF SEMGD WLGO -C: Performed by: SURGERY

## 2020-05-18 PROCEDURE — 77030027138 HC INCENT SPIROMETER -A

## 2020-05-18 PROCEDURE — 77030031139 HC SUT VCRL2 J&J -A: Performed by: SURGERY

## 2020-05-18 PROCEDURE — 76060000036 HC ANESTHESIA 2.5 TO 3 HR: Performed by: SURGERY

## 2020-05-18 PROCEDURE — 74011000250 HC RX REV CODE- 250: Performed by: SURGERY

## 2020-05-18 PROCEDURE — 74011250636 HC RX REV CODE- 250/636

## 2020-05-18 PROCEDURE — 76210000016 HC OR PH I REC 1 TO 1.5 HR: Performed by: SURGERY

## 2020-05-18 PROCEDURE — 77030040830 HC CATH URETH FOL MDII -A: Performed by: SURGERY

## 2020-05-18 PROCEDURE — 74011000272 HC RX REV CODE- 272: Performed by: SURGERY

## 2020-05-18 PROCEDURE — 77030040922 HC BLNKT HYPOTHRM STRY -A: Performed by: SURGERY

## 2020-05-18 PROCEDURE — 77030010031 HC SCIS ENDOSC MPLR J&J -C: Performed by: SURGERY

## 2020-05-18 PROCEDURE — 77030002933 HC SUT MCRYL J&J -A: Performed by: SURGERY

## 2020-05-18 PROCEDURE — 77030008598 HC TRCR ENDOSC BLDLS J&J -B: Performed by: SURGERY

## 2020-05-18 PROCEDURE — 74011000250 HC RX REV CODE- 250: Performed by: ANESTHESIOLOGY

## 2020-05-18 PROCEDURE — 77030026438 HC STYL ET INTUB CARD -A: Performed by: ANESTHESIOLOGY

## 2020-05-18 PROCEDURE — 77030033639 HC SHR ENDO COAG HARM 36 J&J -E: Performed by: SURGERY

## 2020-05-18 PROCEDURE — 77030040361 HC SLV COMPR DVT MDII -B: Performed by: SURGERY

## 2020-05-18 PROCEDURE — 77030008756 HC TU IRR SUC STRY -B: Performed by: SURGERY

## 2020-05-18 PROCEDURE — C9290 INJ, BUPIVACAINE LIPOSOME: HCPCS | Performed by: SURGERY

## 2020-05-18 PROCEDURE — 88307 TISSUE EXAM BY PATHOLOGIST: CPT

## 2020-05-18 PROCEDURE — 77030009932 HC PRB FT CTRL J&J -B: Performed by: SURGERY

## 2020-05-18 PROCEDURE — 74011636637 HC RX REV CODE- 636/637: Performed by: NURSE ANESTHETIST, CERTIFIED REGISTERED

## 2020-05-18 PROCEDURE — 77030036732 HC RELD STPLR VASC J&J -F: Performed by: SURGERY

## 2020-05-18 PROCEDURE — 88313 SPECIAL STAINS GROUP 2: CPT

## 2020-05-18 RX ORDER — ACETAMINOPHEN 325 MG/1
650 TABLET ORAL EVERY 6 HOURS
Status: DISCONTINUED | OUTPATIENT
Start: 2020-05-18 | End: 2020-05-19 | Stop reason: HOSPADM

## 2020-05-18 RX ORDER — FENTANYL CITRATE 50 UG/ML
INJECTION, SOLUTION INTRAMUSCULAR; INTRAVENOUS AS NEEDED
Status: DISCONTINUED | OUTPATIENT
Start: 2020-05-18 | End: 2020-05-18 | Stop reason: HOSPADM

## 2020-05-18 RX ORDER — MIDAZOLAM HYDROCHLORIDE 1 MG/ML
INJECTION, SOLUTION INTRAMUSCULAR; INTRAVENOUS AS NEEDED
Status: DISCONTINUED | OUTPATIENT
Start: 2020-05-18 | End: 2020-05-18 | Stop reason: HOSPADM

## 2020-05-18 RX ORDER — PHENYLEPHRINE HCL IN 0.9% NACL 1 MG/10 ML
SYRINGE (ML) INTRAVENOUS AS NEEDED
Status: DISCONTINUED | OUTPATIENT
Start: 2020-05-18 | End: 2020-05-18 | Stop reason: HOSPADM

## 2020-05-18 RX ORDER — PROPOFOL 10 MG/ML
INJECTION, EMULSION INTRAVENOUS AS NEEDED
Status: DISCONTINUED | OUTPATIENT
Start: 2020-05-18 | End: 2020-05-18 | Stop reason: HOSPADM

## 2020-05-18 RX ORDER — ENOXAPARIN SODIUM 100 MG/ML
40 INJECTION SUBCUTANEOUS DAILY
Status: DISCONTINUED | OUTPATIENT
Start: 2020-05-19 | End: 2020-05-19 | Stop reason: HOSPADM

## 2020-05-18 RX ORDER — SODIUM CHLORIDE 0.9 % (FLUSH) 0.9 %
5-40 SYRINGE (ML) INJECTION EVERY 8 HOURS
Status: DISCONTINUED | OUTPATIENT
Start: 2020-05-18 | End: 2020-05-18 | Stop reason: HOSPADM

## 2020-05-18 RX ORDER — HYDROMORPHONE HYDROCHLORIDE 2 MG/ML
0.5 INJECTION, SOLUTION INTRAMUSCULAR; INTRAVENOUS; SUBCUTANEOUS AS NEEDED
Status: COMPLETED | OUTPATIENT
Start: 2020-05-18 | End: 2020-05-18

## 2020-05-18 RX ORDER — ONDANSETRON 2 MG/ML
4 INJECTION INTRAMUSCULAR; INTRAVENOUS ONCE
Status: COMPLETED | OUTPATIENT
Start: 2020-05-18 | End: 2020-05-18

## 2020-05-18 RX ORDER — INSULIN LISPRO 100 [IU]/ML
INJECTION, SOLUTION INTRAVENOUS; SUBCUTANEOUS ONCE
Status: DISCONTINUED | OUTPATIENT
Start: 2020-05-18 | End: 2020-05-18 | Stop reason: HOSPADM

## 2020-05-18 RX ORDER — SODIUM CHLORIDE 0.9 % (FLUSH) 0.9 %
5-40 SYRINGE (ML) INJECTION AS NEEDED
Status: DISCONTINUED | OUTPATIENT
Start: 2020-05-18 | End: 2020-05-18 | Stop reason: HOSPADM

## 2020-05-18 RX ORDER — INSULIN LISPRO 100 [IU]/ML
INJECTION, SOLUTION INTRAVENOUS; SUBCUTANEOUS ONCE
Status: COMPLETED | OUTPATIENT
Start: 2020-05-18 | End: 2020-05-18

## 2020-05-18 RX ORDER — SUCCINYLCHOLINE CHLORIDE 20 MG/ML
INJECTION INTRAMUSCULAR; INTRAVENOUS AS NEEDED
Status: DISCONTINUED | OUTPATIENT
Start: 2020-05-18 | End: 2020-05-18 | Stop reason: HOSPADM

## 2020-05-18 RX ORDER — ONDANSETRON 2 MG/ML
4 INJECTION INTRAMUSCULAR; INTRAVENOUS
Status: DISCONTINUED | OUTPATIENT
Start: 2020-05-18 | End: 2020-05-19 | Stop reason: HOSPADM

## 2020-05-18 RX ORDER — MAGNESIUM SULFATE 100 %
4 CRYSTALS MISCELLANEOUS AS NEEDED
Status: DISCONTINUED | OUTPATIENT
Start: 2020-05-18 | End: 2020-05-18 | Stop reason: HOSPADM

## 2020-05-18 RX ORDER — DEXMEDETOMIDINE HYDROCHLORIDE 100 UG/ML
INJECTION, SOLUTION INTRAVENOUS AS NEEDED
Status: DISCONTINUED | OUTPATIENT
Start: 2020-05-18 | End: 2020-05-18 | Stop reason: HOSPADM

## 2020-05-18 RX ORDER — ROCURONIUM BROMIDE 10 MG/ML
INJECTION, SOLUTION INTRAVENOUS AS NEEDED
Status: DISCONTINUED | OUTPATIENT
Start: 2020-05-18 | End: 2020-05-18 | Stop reason: HOSPADM

## 2020-05-18 RX ORDER — DEXTROSE MONOHYDRATE 100 MG/ML
125-250 INJECTION, SOLUTION INTRAVENOUS AS NEEDED
Status: DISCONTINUED | OUTPATIENT
Start: 2020-05-18 | End: 2020-05-19 | Stop reason: HOSPADM

## 2020-05-18 RX ORDER — NALOXONE HYDROCHLORIDE 0.4 MG/ML
0.4 INJECTION, SOLUTION INTRAMUSCULAR; INTRAVENOUS; SUBCUTANEOUS AS NEEDED
Status: DISCONTINUED | OUTPATIENT
Start: 2020-05-18 | End: 2020-05-19 | Stop reason: HOSPADM

## 2020-05-18 RX ORDER — PROMETHAZINE HYDROCHLORIDE 25 MG/ML
25 INJECTION, SOLUTION INTRAMUSCULAR; INTRAVENOUS AS NEEDED
Status: DISCONTINUED | OUTPATIENT
Start: 2020-05-18 | End: 2020-05-19 | Stop reason: HOSPADM

## 2020-05-18 RX ORDER — KETOROLAC TROMETHAMINE 15 MG/ML
INJECTION, SOLUTION INTRAMUSCULAR; INTRAVENOUS AS NEEDED
Status: DISCONTINUED | OUTPATIENT
Start: 2020-05-18 | End: 2020-05-18 | Stop reason: HOSPADM

## 2020-05-18 RX ORDER — WATER 1 ML/ML
IRRIGANT IRRIGATION AS NEEDED
Status: DISCONTINUED | OUTPATIENT
Start: 2020-05-18 | End: 2020-05-18 | Stop reason: HOSPADM

## 2020-05-18 RX ORDER — DEXTROSE 50 % IN WATER (D50W) INTRAVENOUS SYRINGE
25-50 AS NEEDED
Status: DISCONTINUED | OUTPATIENT
Start: 2020-05-18 | End: 2020-05-18 | Stop reason: HOSPADM

## 2020-05-18 RX ORDER — FAMOTIDINE 20 MG/1
20 TABLET, FILM COATED ORAL ONCE
Status: DISCONTINUED | OUTPATIENT
Start: 2020-05-18 | End: 2020-05-18

## 2020-05-18 RX ORDER — HYOSCYAMINE SULFATE 0.12 MG/1
0.12 TABLET SUBLINGUAL
Status: DISCONTINUED | OUTPATIENT
Start: 2020-05-18 | End: 2020-05-19 | Stop reason: HOSPADM

## 2020-05-18 RX ORDER — SODIUM CHLORIDE, SODIUM LACTATE, POTASSIUM CHLORIDE, CALCIUM CHLORIDE 600; 310; 30; 20 MG/100ML; MG/100ML; MG/100ML; MG/100ML
75 INJECTION, SOLUTION INTRAVENOUS CONTINUOUS
Status: DISCONTINUED | OUTPATIENT
Start: 2020-05-18 | End: 2020-05-18 | Stop reason: HOSPADM

## 2020-05-18 RX ORDER — HYDROMORPHONE HYDROCHLORIDE 1 MG/ML
1 INJECTION, SOLUTION INTRAMUSCULAR; INTRAVENOUS; SUBCUTANEOUS
Status: DISCONTINUED | OUTPATIENT
Start: 2020-05-18 | End: 2020-05-19 | Stop reason: HOSPADM

## 2020-05-18 RX ORDER — LIDOCAINE HYDROCHLORIDE 20 MG/ML
INJECTION, SOLUTION EPIDURAL; INFILTRATION; INTRACAUDAL; PERINEURAL AS NEEDED
Status: DISCONTINUED | OUTPATIENT
Start: 2020-05-18 | End: 2020-05-18 | Stop reason: HOSPADM

## 2020-05-18 RX ORDER — GLYCOPYRROLATE 0.2 MG/ML
INJECTION INTRAMUSCULAR; INTRAVENOUS AS NEEDED
Status: DISCONTINUED | OUTPATIENT
Start: 2020-05-18 | End: 2020-05-18 | Stop reason: HOSPADM

## 2020-05-18 RX ORDER — ALBUTEROL SULFATE 0.83 MG/ML
2.5 SOLUTION RESPIRATORY (INHALATION)
Status: DISCONTINUED | OUTPATIENT
Start: 2020-05-18 | End: 2020-05-19 | Stop reason: HOSPADM

## 2020-05-18 RX ORDER — ENOXAPARIN SODIUM 100 MG/ML
40 INJECTION SUBCUTANEOUS ONCE
Status: COMPLETED | OUTPATIENT
Start: 2020-05-18 | End: 2020-05-18

## 2020-05-18 RX ORDER — SODIUM CHLORIDE, SODIUM LACTATE, POTASSIUM CHLORIDE, CALCIUM CHLORIDE 600; 310; 30; 20 MG/100ML; MG/100ML; MG/100ML; MG/100ML
50 INJECTION, SOLUTION INTRAVENOUS CONTINUOUS
Status: DISCONTINUED | OUTPATIENT
Start: 2020-05-18 | End: 2020-05-18 | Stop reason: HOSPADM

## 2020-05-18 RX ORDER — DIPHENHYDRAMINE HYDROCHLORIDE 50 MG/ML
25 INJECTION, SOLUTION INTRAMUSCULAR; INTRAVENOUS
Status: DISCONTINUED | OUTPATIENT
Start: 2020-05-18 | End: 2020-05-19 | Stop reason: HOSPADM

## 2020-05-18 RX ORDER — SODIUM CHLORIDE 0.9 G/100ML
IRRIGANT IRRIGATION AS NEEDED
Status: DISCONTINUED | OUTPATIENT
Start: 2020-05-18 | End: 2020-05-18 | Stop reason: HOSPADM

## 2020-05-18 RX ORDER — KETOROLAC TROMETHAMINE 15 MG/ML
15 INJECTION, SOLUTION INTRAMUSCULAR; INTRAVENOUS
Status: DISCONTINUED | OUTPATIENT
Start: 2020-05-18 | End: 2020-05-19 | Stop reason: HOSPADM

## 2020-05-18 RX ORDER — SODIUM CHLORIDE, SODIUM LACTATE, POTASSIUM CHLORIDE, CALCIUM CHLORIDE 600; 310; 30; 20 MG/100ML; MG/100ML; MG/100ML; MG/100ML
150 INJECTION, SOLUTION INTRAVENOUS CONTINUOUS
Status: DISCONTINUED | OUTPATIENT
Start: 2020-05-18 | End: 2020-05-19 | Stop reason: HOSPADM

## 2020-05-18 RX ORDER — INSULIN LISPRO 100 [IU]/ML
INJECTION, SOLUTION INTRAVENOUS; SUBCUTANEOUS EVERY 6 HOURS
Status: DISCONTINUED | OUTPATIENT
Start: 2020-05-18 | End: 2020-05-19

## 2020-05-18 RX ORDER — CHLORHEXIDINE GLUCONATE 4 G/100ML
SOLUTION TOPICAL AS NEEDED
Status: DISCONTINUED | OUTPATIENT
Start: 2020-05-18 | End: 2020-05-18 | Stop reason: HOSPADM

## 2020-05-18 RX ORDER — NEOSTIGMINE METHYLSULFATE 1 MG/ML
INJECTION, SOLUTION INTRAVENOUS AS NEEDED
Status: DISCONTINUED | OUTPATIENT
Start: 2020-05-18 | End: 2020-05-18 | Stop reason: HOSPADM

## 2020-05-18 RX ORDER — ONDANSETRON 2 MG/ML
INJECTION INTRAMUSCULAR; INTRAVENOUS AS NEEDED
Status: DISCONTINUED | OUTPATIENT
Start: 2020-05-18 | End: 2020-05-18 | Stop reason: HOSPADM

## 2020-05-18 RX ORDER — SODIUM CHLORIDE, SODIUM LACTATE, POTASSIUM CHLORIDE, CALCIUM CHLORIDE 600; 310; 30; 20 MG/100ML; MG/100ML; MG/100ML; MG/100ML
150 INJECTION, SOLUTION INTRAVENOUS CONTINUOUS
Status: DISCONTINUED | OUTPATIENT
Start: 2020-05-18 | End: 2020-05-18 | Stop reason: HOSPADM

## 2020-05-18 RX ORDER — OXYCODONE HYDROCHLORIDE 5 MG/1
5 TABLET ORAL
Status: DISCONTINUED | OUTPATIENT
Start: 2020-05-18 | End: 2020-05-19 | Stop reason: HOSPADM

## 2020-05-18 RX ORDER — DEXTROSE 50 % IN WATER (D50W) INTRAVENOUS SYRINGE
25-50 AS NEEDED
Status: DISCONTINUED | OUTPATIENT
Start: 2020-05-18 | End: 2020-05-18 | Stop reason: CLARIF

## 2020-05-18 RX ORDER — ACETAMINOPHEN 650 MG/1
650 SUPPOSITORY RECTAL ONCE
Status: COMPLETED | OUTPATIENT
Start: 2020-05-18 | End: 2020-05-18

## 2020-05-18 RX ADMIN — ROCURONIUM BROMIDE 45 MG: 10 INJECTION, SOLUTION INTRAVENOUS at 13:20

## 2020-05-18 RX ADMIN — GLYCOPYRROLATE 0.4 MG: 0.2 INJECTION INTRAMUSCULAR; INTRAVENOUS at 15:33

## 2020-05-18 RX ADMIN — KETOROLAC TROMETHAMINE 15 MG: 15 INJECTION, SOLUTION INTRAMUSCULAR; INTRAVENOUS at 21:10

## 2020-05-18 RX ADMIN — DEXMEDETOMIDINE HYDROCHLORIDE 10 MCG: 100 INJECTION, SOLUTION, CONCENTRATE INTRAVENOUS at 15:08

## 2020-05-18 RX ADMIN — FENTANYL CITRATE 50 MCG: 50 INJECTION, SOLUTION INTRAMUSCULAR; INTRAVENOUS at 13:11

## 2020-05-18 RX ADMIN — INSULIN LISPRO 3 UNITS: 100 INJECTION, SOLUTION INTRAVENOUS; SUBCUTANEOUS at 16:24

## 2020-05-18 RX ADMIN — HYDROMORPHONE HYDROCHLORIDE 0.5 MG: 2 INJECTION, SOLUTION INTRAMUSCULAR; INTRAVENOUS; SUBCUTANEOUS at 16:16

## 2020-05-18 RX ADMIN — FENTANYL CITRATE 50 MCG: 50 INJECTION, SOLUTION INTRAMUSCULAR; INTRAVENOUS at 15:37

## 2020-05-18 RX ADMIN — GLYCOPYRROLATE 0.2 MG: 0.2 INJECTION INTRAMUSCULAR; INTRAVENOUS at 13:35

## 2020-05-18 RX ADMIN — ENOXAPARIN SODIUM 40 MG: 40 INJECTION SUBCUTANEOUS at 12:08

## 2020-05-18 RX ADMIN — DEXMEDETOMIDINE HYDROCHLORIDE 4 MCG: 100 INJECTION, SOLUTION, CONCENTRATE INTRAVENOUS at 14:14

## 2020-05-18 RX ADMIN — DEXMEDETOMIDINE HYDROCHLORIDE 6 MCG: 100 INJECTION, SOLUTION, CONCENTRATE INTRAVENOUS at 15:37

## 2020-05-18 RX ADMIN — ONDANSETRON 4 MG: 2 INJECTION INTRAMUSCULAR; INTRAVENOUS at 14:21

## 2020-05-18 RX ADMIN — ROCURONIUM BROMIDE 5 MG: 10 INJECTION, SOLUTION INTRAVENOUS at 13:14

## 2020-05-18 RX ADMIN — SODIUM CHLORIDE, SODIUM LACTATE, POTASSIUM CHLORIDE, AND CALCIUM CHLORIDE: 600; 310; 30; 20 INJECTION, SOLUTION INTRAVENOUS at 15:01

## 2020-05-18 RX ADMIN — KETOROLAC TROMETHAMINE 15 MG: 15 INJECTION, SOLUTION INTRAMUSCULAR; INTRAVENOUS at 15:34

## 2020-05-18 RX ADMIN — Medication 100 MCG: at 14:07

## 2020-05-18 RX ADMIN — HYDROMORPHONE HYDROCHLORIDE 0.5 MG: 2 INJECTION, SOLUTION INTRAMUSCULAR; INTRAVENOUS; SUBCUTANEOUS at 16:25

## 2020-05-18 RX ADMIN — FAMOTIDINE 20 MG: 10 INJECTION, SOLUTION INTRAVENOUS at 20:08

## 2020-05-18 RX ADMIN — SODIUM CHLORIDE, SODIUM LACTATE, POTASSIUM CHLORIDE, AND CALCIUM CHLORIDE 50 ML/HR: 600; 310; 30; 20 INJECTION, SOLUTION INTRAVENOUS at 12:08

## 2020-05-18 RX ADMIN — Medication 3 MG: at 15:33

## 2020-05-18 RX ADMIN — DEXMEDETOMIDINE HYDROCHLORIDE 10 MCG: 100 INJECTION, SOLUTION, CONCENTRATE INTRAVENOUS at 13:22

## 2020-05-18 RX ADMIN — ROCURONIUM BROMIDE 20 MG: 10 INJECTION, SOLUTION INTRAVENOUS at 13:54

## 2020-05-18 RX ADMIN — SUCCINYLCHOLINE CHLORIDE 180 MG: 20 INJECTION, SOLUTION INTRAMUSCULAR; INTRAVENOUS at 13:41

## 2020-05-18 RX ADMIN — ONDANSETRON 4 MG: 2 INJECTION, SOLUTION INTRAMUSCULAR; INTRAVENOUS at 20:07

## 2020-05-18 RX ADMIN — DEXMEDETOMIDINE HYDROCHLORIDE 10 MCG: 100 INJECTION, SOLUTION, CONCENTRATE INTRAVENOUS at 13:11

## 2020-05-18 RX ADMIN — PROPOFOL 200 MG: 10 INJECTION, EMULSION INTRAVENOUS at 13:41

## 2020-05-18 RX ADMIN — MIDAZOLAM HYDROCHLORIDE 2 MG: 2 INJECTION, SOLUTION INTRAMUSCULAR; INTRAVENOUS at 13:11

## 2020-05-18 RX ADMIN — ACETAMINOPHEN 650 MG: 325 TABLET ORAL at 18:00

## 2020-05-18 RX ADMIN — ONDANSETRON 4 MG: 2 INJECTION INTRAMUSCULAR; INTRAVENOUS at 16:16

## 2020-05-18 RX ADMIN — SODIUM CHLORIDE, SODIUM LACTATE, POTASSIUM CHLORIDE, AND CALCIUM CHLORIDE 150 ML/HR: 600; 310; 30; 20 INJECTION, SOLUTION INTRAVENOUS at 18:00

## 2020-05-18 RX ADMIN — HYOSCYAMINE SULFATE 0.12 MG: 0.12 TABLET ORAL at 18:27

## 2020-05-18 RX ADMIN — FENTANYL CITRATE 100 MCG: 50 INJECTION, SOLUTION INTRAMUSCULAR; INTRAVENOUS at 15:58

## 2020-05-18 RX ADMIN — FAMOTIDINE 20 MG: 10 INJECTION, SOLUTION INTRAVENOUS at 12:08

## 2020-05-18 RX ADMIN — Medication 100 MCG: at 14:00

## 2020-05-18 RX ADMIN — ROCURONIUM BROMIDE 10 MG: 10 INJECTION, SOLUTION INTRAVENOUS at 14:42

## 2020-05-18 RX ADMIN — HYDROMORPHONE HYDROCHLORIDE 0.5 MG: 2 INJECTION, SOLUTION INTRAMUSCULAR; INTRAVENOUS; SUBCUTANEOUS at 16:40

## 2020-05-18 RX ADMIN — FENTANYL CITRATE 50 MCG: 50 INJECTION, SOLUTION INTRAMUSCULAR; INTRAVENOUS at 13:41

## 2020-05-18 RX ADMIN — LIDOCAINE HYDROCHLORIDE 100 MG: 20 INJECTION, SOLUTION EPIDURAL; INFILTRATION; INTRACAUDAL; PERINEURAL at 13:41

## 2020-05-18 RX ADMIN — DEXMEDETOMIDINE HYDROCHLORIDE 6 MCG: 100 INJECTION, SOLUTION, CONCENTRATE INTRAVENOUS at 13:44

## 2020-05-18 RX ADMIN — CEFAZOLIN SODIUM IN SODIUM CHLORIDE 0.9% IV SOLN 3 GM/100ML 3 G: 3-0.9/1 SOLUTION at 13:11

## 2020-05-18 RX ADMIN — HYDROMORPHONE HYDROCHLORIDE 0.5 MG: 2 INJECTION, SOLUTION INTRAMUSCULAR; INTRAVENOUS; SUBCUTANEOUS at 16:30

## 2020-05-18 NOTE — PERIOP NOTES
TRANSFER - OUT REPORT:    Verbal report given to Kat LOPEZ RN(name) on [facility]  being transferred to 2 surgical (unit) for routine post - op       Report consisted of patients Situation, Background, Assessment and   Recommendations(SBAR). Information from the following report(s) SBAR, Kardex, OR Summary, Procedure Summary, Intake/Output, MAR, Recent Results and Med Rec Status was reviewed with the receiving nurse. Lines:   Peripheral IV 05/18/20 Anterior;Proximal;Right Forearm (Active)   Site Assessment Clean, dry, & intact 5/18/2020  3:59 PM   Phlebitis Assessment 0 5/18/2020  3:59 PM   Infiltration Assessment 0 5/18/2020  3:59 PM   Dressing Status Clean, dry, & intact 5/18/2020  3:59 PM   Dressing Type Tape;Transparent 5/18/2020  3:59 PM   Hub Color/Line Status Pink; Infusing 5/18/2020  3:59 PM        Opportunity for questions and clarification was provided.       Patient transported with:   [facility]

## 2020-05-18 NOTE — ANESTHESIA POSTPROCEDURE EVALUATION
Procedure(s):  LAPAROSCOPIC CHARI-EN-Y GASTRIC BYPASS/ LIVER WEDGE BIOPSY/HIATAL HERNIA REPAIR. general    Anesthesia Post Evaluation      Multimodal analgesia: multimodal analgesia used between 6 hours prior to anesthesia start to PACU discharge  Patient location during evaluation: bedside  Patient participation: complete - patient participated  Level of consciousness: awake  Pain management: adequate  Airway patency: patent  Anesthetic complications: no  Cardiovascular status: stable  Respiratory status: acceptable  Hydration status: acceptable  Post anesthesia nausea and vomiting:  controlled      Vitals Value Taken Time   /71 5/18/2020  4:09 PM   Temp     Pulse 72 5/18/2020  4:12 PM   Resp 16 5/18/2020  4:12 PM   SpO2 95 % 5/18/2020  4:12 PM   Vitals shown include unvalidated device data.

## 2020-05-18 NOTE — ROUTINE PROCESS
End of Shift Note Bedside and verbal shift change report given to Dallas Seay RN (On coming nurse) by Lang Crigler, RN (Off going nurse). Report included the following information:  
   --Procedure Summary 
   --MAR, 
   --Recent Results --Med Rec Status SBAR Recommendations:  
 
Issues for Provider to address Activity This Shift 
 
 [] Bed Rest Order 
 [] Refused 
 [] Dangled  
 [] TDWB Ambulating: 
   [] Bathroom [] BSC [] Room/Hallway Up in Chair for meals []Yes [] No  
Voiding       [] Yes  [] No 
Reed          [] Yes  [] No 
Incontinent [] Yes  [] No 
 
DUE TO VOID 2020 POUR        [] Yes [] No 
Purewick    [] Yes [] No 
New Onset [] Yes [] No Straight Cath   []Yes  [] No 
Condom Cath  [] Yes [] No 
MD Called      [] Yes  [] No  
Blood Sugars Managed []Yes [] No   
Bowels Moved [] Yes [] No 
 
Incontinent     [] Yes [] No Passed Gas []Yes [] No 
 
New Onset  []Yes [] No 
  
 
 MD Called []Yes  [] No 
  
CHG Bath Done Before Surgery After Surgery  
  
[] Yes  [] No 
[] Yes  [] No   
  
Drain Removed [] Yes  [] No [] N/A Dressing Changed [] Yes   [] No [] N/A Nausea/Vomiting [] Yes   [] No    
Ice Packs Changed [] Yes   [] No  [] N/A Incentive Spirometer  [] Yes  [] No     
SCD Pumps On Ankle Pumping  [] Yes   [] No  
  
[] Yes   [] No    
  
Telemetry Monitoring [] Yes   [] No   Rhythm

## 2020-05-18 NOTE — ANESTHESIA PREPROCEDURE EVALUATION
Anesthetic History               Review of Systems / Medical History  Patient summary reviewed, nursing notes reviewed and pertinent labs reviewed    Pulmonary            Asthma : poorly controlled       Neuro/Psych              Cardiovascular    Hypertension              Exercise tolerance: >4 METS     GI/Hepatic/Renal                Endo/Other    Diabetes: using insulin    Morbid obesity     Other Findings              Physical Exam    Airway  Mallampati: III  TM Distance: 4 - 6 cm  Neck ROM: normal range of motion   Mouth opening: Normal     Cardiovascular  Regular rate and rhythm,  S1 and S2 normal,  no murmur, click, rub, or gallop  Rhythm: regular  Rate: normal         Dental    Dentition: Lower dentition intact and Upper dentition intact     Pulmonary  Breath sounds clear to auscultation               Abdominal  GI exam deferred       Other Findings            Anesthetic Plan    ASA: 3  Anesthesia type: general          Induction: Intravenous  Anesthetic plan and risks discussed with: Patient

## 2020-05-18 NOTE — INTERVAL H&P NOTE
Update History & Physical 
 
The Patient's History and Physical of 5/7/2020 The proposed procedure was reviewed with the patient and I examined the patient. There was no change in the plan. The surgical site was confirmed by the patient and me. Plan:  The risk, benefits, expected outcome, and alternative to the recommended procedure have been discussed with the patient. Patient understands and wants to proceed with the procedure.  
 
Electronically signed by Mary Jo Hernandez DO on 5/18/2020 at 12:56 PM

## 2020-05-18 NOTE — BRIEF OP NOTE
Brief Postoperative Note    Patient: Sajan Saavedra  YOB: 1985  MRN: [id number]    Date of Procedure: 5/18/2020     Pre-Op Diagnosis:1. Morbid obesity ([address] [E66.01]  Gastroesophageal reflux disease, esophagitis presence not specified [K21.9]  Type 2 diabetes mellitus without complication, unspecified whether long term insulin use ([address] [E11.9]  2. Hepatic Steatosis    Post-Op Diagnosis: LAITH     Procedure(s):  LAPAROSCOPIC CHARI-EN-Y GASTRIC BYPASS/ LIVER WEDGE BIOPSY/HIATAL HERNIA REPAIR    Surgeon(s):  Bernarda Razo DO    Surgical Assistant: None    Anesthesia: General     Estimated Blood Loss (mL): less than 50     Complications: None    Specimens:   ID Type Source Tests Collected by Time Destination   1 : liver biopsy Preservative Liver  Bernarda Razo DO 5/18/2020 1522 Pathology        Implants: * No implants in log *    Drains: * No LDAs found *    Findings: LAITH    Electronically Signed by Jp Paz DO on 5/18/2020 at 4:05 PM

## 2020-05-19 VITALS
TEMPERATURE: 97.1 F | RESPIRATION RATE: 18 BRPM | BODY MASS INDEX: 44.41 KG/M2 | SYSTOLIC BLOOD PRESSURE: 127 MMHG | DIASTOLIC BLOOD PRESSURE: 84 MMHG | WEIGHT: 293 LBS | HEART RATE: 84 BPM | OXYGEN SATURATION: 96 % | HEIGHT: 68 IN

## 2020-05-19 LAB
ANION GAP SERPL CALC-SCNC: 5 MMOL/L (ref 3–18)
BUN SERPL-MCNC: 8 MG/DL (ref 7–18)
BUN/CREAT SERPL: 15 (ref 12–20)
CALCIUM SERPL-MCNC: 8.3 MG/DL (ref 8.5–10.1)
CHLORIDE SERPL-SCNC: 105 MMOL/L (ref 100–111)
CO2 SERPL-SCNC: 28 MMOL/L (ref 21–32)
CREAT SERPL-MCNC: 0.52 MG/DL (ref 0.6–1.3)
ERYTHROCYTE [DISTWIDTH] IN BLOOD BY AUTOMATED COUNT: 14.1 % (ref 11.6–14.5)
GLUCOSE BLD STRIP.AUTO-MCNC: 138 MG/DL (ref 70–110)
GLUCOSE BLD STRIP.AUTO-MCNC: 151 MG/DL (ref 70–110)
GLUCOSE BLD STRIP.AUTO-MCNC: 157 MG/DL (ref 70–110)
GLUCOSE SERPL-MCNC: 118 MG/DL (ref 74–99)
HCT VFR BLD AUTO: 33.9 % (ref 35–45)
HGB BLD-MCNC: 10.8 G/DL (ref 12–16)
MAGNESIUM SERPL-MCNC: 2 MG/DL (ref 1.6–2.6)
MCH RBC QN AUTO: 25.6 PG (ref 24–34)
MCHC RBC AUTO-ENTMCNC: 31.9 G/DL (ref 31–37)
MCV RBC AUTO: 80.3 FL (ref 74–97)
PLATELET # BLD AUTO: 249 K/UL (ref 135–420)
PMV BLD AUTO: 10.2 FL (ref 9.2–11.8)
POTASSIUM SERPL-SCNC: 3.9 MMOL/L (ref 3.5–5.5)
RBC # BLD AUTO: 4.22 M/UL (ref 4.2–5.3)
SODIUM SERPL-SCNC: 138 MMOL/L (ref 136–145)
WBC # BLD AUTO: 7.7 K/UL (ref 4.6–13.2)

## 2020-05-19 PROCEDURE — 36415 COLL VENOUS BLD VENIPUNCTURE: CPT

## 2020-05-19 PROCEDURE — 85027 COMPLETE CBC AUTOMATED: CPT

## 2020-05-19 PROCEDURE — 99218 HC RM OBSERVATION: CPT

## 2020-05-19 PROCEDURE — 74011250637 HC RX REV CODE- 250/637: Performed by: SURGERY

## 2020-05-19 PROCEDURE — 74011000250 HC RX REV CODE- 250: Performed by: ANESTHESIOLOGY

## 2020-05-19 PROCEDURE — 74011250636 HC RX REV CODE- 250/636: Performed by: ANESTHESIOLOGY

## 2020-05-19 PROCEDURE — C9113 INJ PANTOPRAZOLE SODIUM, VIA: HCPCS | Performed by: SURGERY

## 2020-05-19 PROCEDURE — 74011636637 HC RX REV CODE- 636/637: Performed by: SURGERY

## 2020-05-19 PROCEDURE — 82962 GLUCOSE BLOOD TEST: CPT

## 2020-05-19 PROCEDURE — 51798 US URINE CAPACITY MEASURE: CPT

## 2020-05-19 PROCEDURE — 74011000250 HC RX REV CODE- 250: Performed by: SURGERY

## 2020-05-19 PROCEDURE — 80048 BASIC METABOLIC PNL TOTAL CA: CPT

## 2020-05-19 PROCEDURE — 83735 ASSAY OF MAGNESIUM: CPT

## 2020-05-19 PROCEDURE — 74011250636 HC RX REV CODE- 250/636: Performed by: SURGERY

## 2020-05-19 RX ORDER — INSULIN LISPRO 100 [IU]/ML
INJECTION, SOLUTION INTRAVENOUS; SUBCUTANEOUS
Status: DISCONTINUED | OUTPATIENT
Start: 2020-05-19 | End: 2020-05-19 | Stop reason: HOSPADM

## 2020-05-19 RX ORDER — ENOXAPARIN SODIUM 100 MG/ML
40 INJECTION SUBCUTANEOUS DAILY
Qty: 7 SYRINGE | Refills: 0 | Status: SHIPPED | OUTPATIENT
Start: 2020-05-20 | End: 2020-06-05 | Stop reason: ALTCHOICE

## 2020-05-19 RX ORDER — ONDANSETRON 4 MG/1
4 TABLET, ORALLY DISINTEGRATING ORAL
Qty: 10 TAB | Refills: 0 | OUTPATIENT
Start: 2020-05-19 | End: 2020-05-19 | Stop reason: SDUPTHER

## 2020-05-19 RX ORDER — ONDANSETRON 4 MG/1
4 TABLET, ORALLY DISINTEGRATING ORAL
Qty: 10 TAB | Refills: 0 | Status: SHIPPED | OUTPATIENT
Start: 2020-05-19 | End: 2021-01-23

## 2020-05-19 RX ORDER — OXYCODONE HYDROCHLORIDE 5 MG/1
5 TABLET ORAL
Qty: 18 TAB | Refills: 0 | Status: SHIPPED | OUTPATIENT
Start: 2020-05-19 | End: 2020-05-22

## 2020-05-19 RX ADMIN — HYOSCYAMINE SULFATE 0.12 MG: 0.12 TABLET ORAL at 07:40

## 2020-05-19 RX ADMIN — ONDANSETRON 4 MG: 2 INJECTION, SOLUTION INTRAMUSCULAR; INTRAVENOUS at 06:51

## 2020-05-19 RX ADMIN — ACETAMINOPHEN 650 MG: 325 TABLET ORAL at 00:17

## 2020-05-19 RX ADMIN — SODIUM CHLORIDE, SODIUM LACTATE, POTASSIUM CHLORIDE, AND CALCIUM CHLORIDE 150 ML/HR: 600; 310; 30; 20 INJECTION, SOLUTION INTRAVENOUS at 06:27

## 2020-05-19 RX ADMIN — SODIUM CHLORIDE 40 MG: 9 INJECTION, SOLUTION INTRAMUSCULAR; INTRAVENOUS; SUBCUTANEOUS at 08:21

## 2020-05-19 RX ADMIN — INSULIN LISPRO 3 UNITS: 100 INJECTION, SOLUTION INTRAVENOUS; SUBCUTANEOUS at 00:21

## 2020-05-19 RX ADMIN — KETOROLAC TROMETHAMINE 15 MG: 15 INJECTION, SOLUTION INTRAMUSCULAR; INTRAVENOUS at 07:40

## 2020-05-19 RX ADMIN — HYDROMORPHONE HYDROCHLORIDE 1 MG: 1 INJECTION, SOLUTION INTRAMUSCULAR; INTRAVENOUS; SUBCUTANEOUS at 04:12

## 2020-05-19 RX ADMIN — HYDROMORPHONE HYDROCHLORIDE 1 MG: 1 INJECTION, SOLUTION INTRAMUSCULAR; INTRAVENOUS; SUBCUTANEOUS at 00:18

## 2020-05-19 RX ADMIN — ENOXAPARIN SODIUM 40 MG: 40 INJECTION SUBCUTANEOUS at 08:21

## 2020-05-19 RX ADMIN — ACETAMINOPHEN 650 MG: 325 TABLET ORAL at 06:14

## 2020-05-19 RX ADMIN — FAMOTIDINE 20 MG: 10 INJECTION, SOLUTION INTRAVENOUS at 08:21

## 2020-05-19 NOTE — DISCHARGE INSTRUCTIONS
Hydration  Hydration is your NUMBER ONE priority. Dehydration is the most common reason for readmission to the hospital. Dehydration occurs when  your body does not get enough fluid to keep it functioning at its best. Your body also requires fluid  to burn its stored fat calories for energy. Carry a bottle of water with you all day, even when you are away from home; remind yourself to  drink even if you dont feel thirsty. Drinking 64 ounces of fluid is your daily goal. You can tell if  youre getting enough fluid if youre making clear, light-colored urine five to 10 times per day. Signs of dehydration can be thirst, headache, hard stools or dizziness upon sitting or standing up. You should contact your surgeons office if you are unable to drink enough fluid to stay hydrated. --   [address] after Surgery   No lifting over 15 pounds for four weeks.  No driving while taking the pain medication (about seven to 10 days).  No tub baths, swimming or hot tubs until incisions are healed (about two weeks).  You may shower. Clean incisions daily /gently with soap and check incisions for signs of infection:  -- Redness around incision. -- Swelling at site. -- Drainage with an foul odor (pus). -- Increase tenderness around incision.  Take your temperature and resting pulse in the morning and evening. Record on tracking form  given to you. Call if your temperature is greater than 101 or your pulse rate is greater than 115.  Please contact your surgeon if you are having excessive abdominal pain (that lasts longer than  four hours and does not improve with prescribed pain medication), vomiting or shortness of breath.  Get up and move -- do not sit in one place for more than an hour.  You need to WALK (EXERCISE) for 30 minutes per day. -- Walking around your house does not count. -- Bike, treadmill and elliptical are OK. -- NO weight lifting or sit ups.    If constipated take an adult dose of Miralax (available over the counter). Contact the doctors  office if Miralax doesnt help.  You may swallow pills starting the day after surgery as long as they fit inside this [place]:   Continue to use your incentive spirometer (breathing machine) for the next couple of weeks to  help prevent pneumonia. [address]  Temperature/Heart Rate Log  Take your temperature and heart rate (pulse) twice a day for 14 days. Take both in the morning and  evening at about the same time each day (when you wake up and before you go to bed when you  are relaxed). Please contact your doctors office if your:   Temperature is higher than 101 degrees.  Heart rate (pulse) is higher than 115 beats per minute  (normal heart rate is 60 to 100 beats per minute). How to Take Your Heart Rate (Pulse)   Turn your left hand so that your palm is face-up.  With the index and middle fingers of your right  hand, draw a line from the base of your thumb to  just below the crease in your wrist. Your fingers  should nestle just to the left of the large tendon that  pops up when you bend your wrist toward you.  Dont press too hard, that will make the pulse go  away. Use gentle pressure.  Wait. It can take several seconds -- and several micro-adjustments in the placement of your two  fingers on your wrist -- to find your pulse. Just keep moving your fingers down or up your wrist  in small increments (and pausing for a few seconds) until you find it. How to Take Your Pulse Rate   Find a watch with a second hand and place it on your right wrist or on the table next  to your left hand.  After finding your pulse, count the number of beats for 20 seconds.  Multiply by three to get your heart rate, or beats per minute  (or just count for 60 seconds for a math-free option).  Normal, resting heart rate is about 60 to 100 beats per minute.   -- 46 --  PATIENT GUIDE TO BARIATRIC [address] [place] [address] Rd  Lovenox Self Injection Guide  Prepare  Step 1: Wash and dry your hands thoroughly. Step 2: Sit or lie in a comfortable position and choose an area  on the right or left side of the abdomen at least two inches away  from the belly button. Step 3: Clean the injection site with an alcohol swab and let dry. Inject  Step 4: Remove the needle cap by pulling it straight off the syringe and  discard it in a sharps . Step 5: With your other hand, pinch an inch of the cleansed area to  make a fold in the skin. Insert the full length of the needle straight  down -- at a 90? angle -- into the fold of skin. -- 50 --  PATIENT GUIDE TO BARIATRIC [address] [place] [address] Rd  Step 6: Press the plunger with your thumb until the syringe is empty. Then pull the needle straight out and release the skin fold. Dispose  Step 7: Point the needle down and away from yourself and others,  and then push down on the plunger to activate the safety shield. Step 8: Place the used syringe in the sharps . Do NOT expel the air bubble from the syringe before the injection. Administration should be alternated between the left and right abdominal wall. The whole length  of the needle should be introduced into a skin fold held between the thumb and forefinger; the  skin fold should be held throughout the injection. To minimize bruising, do not rub the injection  site after completion of the injection. Questions about LOVENOX? Call [phone]  -- 49 --  PATIENT GUIDE TO [address] [place] [address]  9. DIET AND LIFESTYLE  Key Diet Principles Following Bariatric Surgery   Begin each meal with soft moist high protein foods (i.e. chicken, turkey, yogurt, tuna, eggs,  cottage cheese, other fish and seafood).  Consume a minimum of 64 ounces of fluid each day to prevent dehydration. No straws.  No food and fluid together.  Stop drinking 30 minutes before a meal. You may begin fluids again  30 minutes after you finish a meal.   Eat very slowly and chew all foods completely.  Keep portions small.  No simple sugars or high fat foods. No carbonated beverages. No caffeine.  Eat three meals per day. No skipping. Avoid snacking between meals.  No alcohol. No smoking.  Two Flintstones Complete Chewable vitamins each day. Take one in the morning and one at night.  1,500 milligrams Calcium Citrate per day in separate dosages.  Vitamin D 3: 5,000 IU taken per day.  Vitamin B-12: Take 1,000 micrograms sublingually daily.  Iron: 60 milligrams per day from [facility] [employer].  Protein supplements of your choice. Must be low sugar (0 to 3 grams), low fat (0 to 3 grams) and  provide at least 35 to 40 grams of protein each day. You need 60 to 70 grams of protein (food  and supplements) each day.  Minimum of 30 minutes of physical activity daily. Do not soto NSAIDS . Do not take Steroids without your surgeons permission. -- 48 --  [address] [facility]  Your Priorities After Surgery  ? Fluid: 64 ounces of fluid per day. ? Protein: 60 to 70 grams of protein per day. ? Walk every day. Clear Liquid Diet  One week of clear liquids: minimum of 64 ounces of fluid per day. Fluid:   Zero calorie liquids.  No caffeine.  No carbonation.  No sugary drinks.  No alcohol.  No straws. Food   Protein drinks.  Less than 3 grams of sugar and  3 grams of fat per serving.  Protein drink should provide you with  60 to 70 grams of protein. Soft Protein Diet  Five weeks of soft protein (1 ounce for soft protein, 3 ounces of yogurt/cottage cheese). Three meals per day and 1 protein shake. Protein shakes should provide you with 30 grams of  protein on the soft protein diet.   Slow transition:   First week on soft protein diet -- focus on yogurt, cottage cheese, eggs, vegetarian refried beans,  black beans, kidney beans and white beans. (NO BAKED BEANS.)   Second through fourth week on soft protein diet -- focus on yogurt, cottage cheese, eggs,  canned tuna, canned chicken, tilapia and fish (needs to be soft enough to be cut up with a fork).  Fifth week on soft protein diet -- focus on yogurt, cottage cheese, eggs, canned tuna, canned  chicken, tilapia, fish, salmon, chicken breast or turkey. Fluid is your #1 Priority! Continue clear liquids between meals. You will need 64 ounces of fluid per day. Fluids that you can have include:   Water.  Zero calorie liquids. You will need to sip throughout the day and should therefore have a water bottle with you at all  times! No liquids with your meals. Stop 30 minutes before a meal and wait 30 minutes after a meal.  No straws. Zero calorie liquids. No caffeine. No carbonation. No sugary drinks. No alcohol. -- 46 --  60 [address] [facility]  Protein  You will need 60 to 70 grams of protein per day.  60 to 70 grams of protein shakes when on the clear liquid diet (two to three shakes per day).  30 to 50 grams of protein shakes when on the soft protein diet (one shake per day). Eat Three Times Per Day  You will need to eat three times per day. My planned times are:  _________________________________________________________  _________________________________________________________  _________________________________________________________  Nausea, Vomiting, Stomach Pain  If you have problems with nausea, vomiting or stomach pain, try:   Eating slowly: 20 to 30 minutes per meal.   Chewing food thoroughly: 20 to 30 chews before food is swallowed.  Small portions: measure portions in medicine cup.  Stopping before feeling full.  AVOIDING SUGAR and FRIED FOOD: sugar will cause dumping syndrome and lead to weight gain.   Exercise  I will need to get a minimum of 30 minutes of exercise per day or 150 minutes of exercise per week.  Walking, swimming, biking or elliptical.   Find something you enjoy! Vitamins  After surgery, you will need to take the following vitamins for the rest of your life -- FOREVER.  Vitamin D 3: 5,000 IU per day.  Calcium Citrate: 1,500 milligrams, taken separately.  Flintstones Complete: two per day, taken separately.  Sublingual Vitamin B-12: 1,000 micrograms daily.  Iron for menstruating women or patients with a history of low iron: 65 milligrams daily. We recommend going to [email or web address] [email or web address] com and purchasing iron from there. The lemon-lime has 60 milligrams. This iron is better absorbed than over-the-counter iron. -- 46 --  PATIENT GUIDE TO [address]  Clear Liquid Log  Getting your fluid in is top priority during this week. Fluids (MINIUM of 64 ounces per day):  ? 8 oz. ? 8 oz. ? 8 oz. ? 8 oz. ? 8 oz. ? 8 oz. ? 8 oz. ? 8 oz. ? 8 oz. ? 8 oz. ? 8 oz. ? 8 oz. Flintstones Complete Chewable: ? a.m. ? p.m. Calcium Citrate (1,500 milligrams/day):  Pill form  ? Two crushed pills (morning) ? Two crushed pills (afternoon) ? Two crushed pills (evening)  OR Upcal D (powder)  ? One pack/scoop ? One pack/scoop ? One pack/scoop  OR Celebrate Chewable Vitamins (500 mg each) or [facility] [employer]age Chewables (500 mg)  ? One chewable (morning) ? One chewable (afternoon) ? One chewable (evening)  OR Liquid Calcium Citrate  ? 1 tbsp. Calcium Citrate ? 1 tbsp. Calcium Citrate ? 1 tbsp. Calcium Citrate  Vitamin D3: ? 5,000 IU daily. Vitamin B-12: ? 1,000 micrograms per day. Iron (menstruating women or patients with a history of low iron):  ? 60 milligrams per day from [facility] [employer]. Protein drinks (protein drinks should be under 3 grams of sugar and 3 grams of fat). Protein shake (60 grams per day): ? a.m. ? p.m. Exercise: ? 30 to 40 minutes per day.   -- 48 --  PATIENT GUIDE TO  [facility]  Bariatric Soft and Moist Diet Shopping List   alcium Citrate (1,500 milligrams/day):  Pill form  ? Two crushed pills (morning) ? Two crushed pills (afternoon) ? Two crushed pills (evening)  OR Upcal D (powder)  ? One pack/scoop ? One pack/scoop ? One pack/scoop  OR Celebrate Chewable Vitamins (500 mg each) or [facility] [employer]age Chewables (500 mg)  ? One chewable (morning) ? One chewable (afternoon) ? One chewable (evening)  OR Liquid Calcium Citrate  ? 1 tbsp. Calcium Citrate ? 1 tbsp. Calcium Citrate ? 1 tbsp. Calcium Citrate  Vitamin D3: ? 5,000 IU daily  Vitamin B-12: ? 1,000 micrograms per day  Iron (menstruating women or  patients with a history of low iron):  ? 60 milligrams per day  from [facility] [employer]  Protein drinks (protein drinks should be under  3 grams of sugar and 3 grams of fat). Protein shake (30 to 40 grams per day):  ? a.m. ? p.m. Exercise: ? 30 to 40 minutes per  Educated on Diet Progression    [facility] G[facility] Dietary Progression    Patient Name:   Date of Surgery: Ice Chips start once admitted on floor. Begin Bariatric Clear Liquid Diet on:     Clear Liquid Diet: 64 oz. of fluid per day  o Low calorie, low sugar, non-carbonated beverages  - Water, Crystal Light, Propel Water, Sugar Free Jell-O, Sugar Free Popsicles, Bouillon  - Start protein supplement during this stage. (60-70 grams per day)  - Getting your fluid in and staying hydrated is your #1 priority! - The clear liquid diet will last for 7 days. Begin Bariatric Soft and Moist on:   - This stage of the diet will last for 5 weeks, unless otherwise instructed by your surgeon. - Begin:  1 week post-op   - End:  6 weeks post-op (or when you follow up with the Registered Dietitian)    - Soft, moist, high protein foods: 3 meals per day plus protein supplements. o   Portions should emphasize on soft protein.   o Portions will be a MAXIMUM of:  o  1 ounce of solid food  o  2-3 ounces of cottage cheese and yogurt. o Protein supplements should be between meals and provide 30-40 grams per day during soft protein diet. o Continue to get 64 ounces of fluid in per day. - Protein foods that are ok on the Soft and Moist Diet include:  o Slow transition:  o 1st week on soft protein should focus on: Yogurt, cottage cheese, eggs  o 2nd -4th  week on soft protein diet should focus on: yogurt, cottage cheese, eggs, canned tuna, canned chicken, tilapia, fish (needs to be soft enough to be cut up with a fork)  o 5th week on soft protein diet should focus on: Yogurt, cottage cheese, eggs, canned tuna, canned chicken, tilapia, fish, salmon, chicken breast, or turkey. Remember to continue to get 64 ounces of fluid daily on ALL Stages. To be advanced to Bariatric Maintenance Stage of the bariatric diet, follow up with the dietitian 6 weeks post-op, around:         For any additional questions, please refer to your blue binder that was provided to you at the start of the bariatric program.

## 2020-05-19 NOTE — DIABETES MGMT
Glycemic Control Plan of Care    T2DM with elevated A1c level of 9.0% (5/08/2020). Morbid obesity s/p gastric bypass on 05/18/2020. Home diabetes medications: Patient reported on 05/19/2020  Metformin 500 mg BID with meals. Glipizide 5 mg BID. POC BG range on 05/18/2020: 141-189. POC BG report on 05/19/2020 at time of review: 151, 138. Discussed with patient this morning about the importance of cont home blood sugar monitoring, keep log and share this with his PCP each office visit. She verbalized understanding and agreed. Recommendation(s):  1.) cont inpatient glycemic monitoring and correctional lispro insulin as ordered. 2.) consider metformin 500 mg BID with meals at discharge and follow-up with his PCP for further management of diabetes. Assessment:  Patient is 27year old with past medical history including diabetes mellitus, morbid obesity, asthma, and hypertension - was admitted on 05/18/2020 post op. Noted: Morbid obesity. Hepatic Steatosis. Status post laproscopic [id number]-[id number]-gastric bypass/liver wedge biopsy, and hiatal hernia repair on 05/18/2020. GERD. T2DM. Most recent blood glucose values:        Current A1C:    Elevated level  Lab Results   Component Value Date/Time    Hemoglobin A1c 9.0 (H) 05/08/2020 01:13 PM     This lab test reflects an estimated average blood glucose of 212 over the past 3 months. Current hospital diabetes medications:   Correctional lispro insulin ACHS. Normal sensitivity dose.     Total daily dose insulin requirement previous day:  05/18/2020:  Lispro: 3 units    Diet: Bariatric clear liquid    Goals:  Blood glucose will be within target range of  mg/dL by 05/22/2020    Education:  ___  Refer to Diabetes Education Record             _X__  Education not indicated at this time    [place] RN [facility]  Pager: [phone]

## 2020-05-19 NOTE — OP NOTES
[address] [place] [address]   OPERATIVE REPORT    Name:  Florencio Conroy  MR#:   [id number]  :  1985  ACCOUNT #:  [de-identified]  DATE OF SERVICE:  2020    PREOPERATIVE DIAGNOSES:  1. 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 2.  Hepatic steatosis. 3.  Umbilical hernia. POSTOPERATIVE DIAGNOSES:  1. 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 2.  Hepatic steatosis. 3.  Umbilical hernia with a 5-cm fascial defect. PROCEDURES PERFORMED:  1. Laparoscopic Li-en-Y gastric bypass utilizing a 15 mL tubularized gastric pouch placed on the lesser curvature of the stomach. A 823 cm retrocolic retrogastric Li limb and a 40 cm biliopancreatic limb. 2.  Laparoscopic left hepatic lobe wedge biopsy. 3.  Laparoscopic primary umbilical hernia repair. SURGEON:  Mike Bone DO    ASSISTANT:  Rubin Marin. ANESTHESIA:  General endotracheal supplemented at the conclusion of the operative procedure with local infiltration of 50 mL Exparel and 50 mL of normal saline diluted. COMPLICATIONS: None. SPECIMENS REMOVED: Left hepatic lobe wedge biopsy. IMPLANTS: None. ESTIMATED BLOOD LOSS:  Less than 50 mL. OPERATIVE FINDINGS:  Hepatomegaly with morphologic appearance of hepatic steatosis. The patient had a 5-cm umbilical fascial defect.     INDICATIONS FOR SURGICAL PROCEDURE:  This is a 57-year-old black female who failed medical management of her super, super obesity and who after investigation of the surgical options, has elected to pursue laparoscopic potentially open Li-en-Y gastric bypass to achieve definitive durable weight loss on a personal level with expected resolution of obesity-related comorbidities. The patient in addition is consented for a laparoscopic partial open left hepatic lobe wedge biopsy potentially documented at the preoperative right upper quadrant ultrasonographic findings of hepatic steatosis secondary to metabolic syndrome. The patient in addition has been consented for a laparoscopic possible open umbilical hernia repair primarily without the utilization of mesh. The risks and benefits of operative versus nonoperative potential alternatives and potential complications up to and including death were extensively discussed with the patient prior to surgical procedure who voiced her understanding and wished to proceed. DESCRIPTION OF PROCEDURE:  The patient received Ancef for antibiotic prophylaxis and Lovenox for DVT prophylaxis in preoperative staging area. After signing her operative permission which was properly witnessed, she was transported to the operating room, where she was placed in the supine position on the operating table, and SCDs were placed and inflated prior to the induction of general endotracheal anesthesia. A 16-[place] Reed catheter was placed atraumatically to gravity drainage as was a 34-[place] orogastric tube for gastric decompression. The abdomen was then prepped and draped in usual sterile fashion. A transverse 12-mm cutaneous incision was then made 5 cm above the level of the umbilicus in the midline through which, a 12-mm Optiview trocar and cannula was placed into the peritoneal cavity under direct vision utilizing a 10-mm 0-degree laparoscope. The laparoscope and trocar were removed.   The abdomen was insufflated with carbon dioxide gas never exceeding a pressure of 15 mmHg and a 30-degree 10-mm laparoscope was placed and utilized for the remainder of the procedure. The remaining ports were placed under direct vision. A 5-mm transverse cutaneous incision was made in the left anterior axillary line 2 fingerbreadths below the left costal margin through which, a 5-mm Optiview trocar and cannula was placed in the peritoneal cavity. A transverse 12-mm cutaneous incision was then made midway between the left upper quadrant and supraumbilical port through which a 12-mm Optiview trocar and cannula was placed into the peritoneal cavity under direct vision. The fourth trocar was then placed in the right paramedian location 8 cm from the midline, midway between the costal margin and the level of the umbilicus. After brief exploration of the upper abdomen utilizing a laparoscope which demonstrated hepatomegaly with a morphologic appearance of hepatic steatosis, the omentum and transverse colon reflected superiorly. The ligament of Treitz was identified; 40 cm distal to the ligament of Treitz, the jejunum was transected with a triple-load stapling device 60 mm in length, loaded with 2.5-mm staples and the mesentery was then divided to its base utilizing a Harmonic scalpel. The Li limb was then measured to be 150 cm in length. At this point, on its antimesenteric border, enterotomy was created with a Harmonic scalpel. A similar antimesenteric enterotomy was created 2 cm proximal to the staple line of the biliopancreatic limb and stapled side-to-side functional end-to-side jejunojejunostomy was then constructed utilizing a triple-load stapling device 60 mm in length loaded with 2.5 mm staples introducing one arm of the stapling device into each of the respective limbs prior to firing them on the antimesenteric borders. The remaining enteric defect was then closed with a running full-thickness 3-0 Vicryl suture and the mesenteric defect was closed with a running 2-0 silk suture. The ligament of Treitz was re-identified. Just anterior to the ligament of Treitz, a fenestration was created through the mesocolon in the lesser sac utilizing a Harmonic scalpel and the Li limb was then placed through the mesocolic defect into the lesser sac. The omentum and transverse colon reflected back into the normal anatomic positions. The jadon was identified along the lesser curvature of the stomach. Just inferior to the jadon along the greater curvature of the stomach, the omentum was  from the gastric wall utilizing a Harmonic scalpel until the Li limb was visualized within the lesser sac. A transverse 10-mm cutaneous incision was then made one-third the distance from the xiphisternal junction to the umbilicus in the midline through which a trocar without a cannula was placed in the peritoneal cavity under direct vision. This was removed and replaced with a 10-mm self-grasping forceps which was utilized in conjunction with a self-retaining retractor to elevate the left lobe of the liver and provide  hiatal exposure. The peritoneum overlying the angle of His was then opened with Harmonic scalpel; 5 cm distal to the GE junction along the lesser curvature of the stomach, the neurovascular elements of the lesser omentum was  from the gastric wall utilizing a Harmonic scalpel. Completion of lesser curvature fenestration in the lesser sac was accomplished utilizing an esophageal retractor introduced posterior to the stomach through the omental fenestration along the greater curvature. The 34-Polish orogastric tube was then retracted into the esophagus. A triple-load stapling device 60 mm in length loaded with 2.5-mm staples was then introduced into the lesser curvature fenestration, oriented perpendicular to the lesser curve 5 cm distal to the GE junction prior to firing one-half length of the staple load.   The 34-Polish orogastric tube was then advanced utilizing the sizing template for construction of the tubularized gastric pouch. The initial vertical staple line was accomplished with a triple-load stapling device 60 mm in length loaded with 2.5-mm staples utilizing half length of the staple load. The remainder of the construction of the pouch was accomplished with sequential firings of a triple-load stapling device 60 mm in length loaded with 3.5-mm staples ending the transection at the angle of His. The first full-length staple load utilized a Seamguard buttressing strip and this created a 15 mL separate tubularized gastric pouch based on lesser curvature of the stomach. The Li limb was elevated posterior to the stomach in the retrogastric position where a tension-free hand-sewn two-layer gastrojejunostomy was constructed. The geometric orientation of the gastrojejunostomy was at the distal aspect of the tubularized gastric pouch to the antimesenteric border of the Li limb. A posterior running seromuscular 3-0 Vicryl suture was placed. A transverse 12-mm gastrotomy was made at the distal aspect of the tubularized gastric pouch with adjacent antimesenteric 12-mm Li limb enterotomy and the inner layer of running full-thickness 3-0 Vicryl suture was initiated in the left lateral posterior aspect of the anastomosis running across the posterior aspect of the anastomosis running around the right lateral aspect of the anastomosis through the anterior midline. A second full-thickness 3-0 Vicryl suture was begun adjacent to the origin of the first and running around the left lateral aspect of the anastomosis to the anterior midline. The 34-Greek orogastric tube was advanced across the gastrojejunal anastomosis into the Li limb prior to tying with running interrupted full-thickness 3-0 Vicryl sutures together anteriorly. The gastrojejunal anastomosis was then completed anteriorly utilizing a running seromuscular 3-0 Vicryl suture.   The 34-Greek orogastric tube was then removed and after assuring there was adequate density of the Li limb above the level of the mesocolon, the omentum and transverse colon were reflected superiorly. The space through which the Martinez's hernia might occur was closed with a running 2-0 silk suture. The mesocolic defect was closed with a series of simple interrupted 2-0 silk sutures. The omentum and transverse colon were reflected back to their normal anatomic positions. The Li limb was noted to be viable with adequate density above the level of the mesocolon. There was no tension noted to the gastrojejunal anastomosis. Esophageal retractor and stone-grasping forceps were then removed. The epigastric fascial defect was closed with a suture passing device and a #2 Vicryl suture. The inferior edge of the left lobe of the liver was retracted in such a fashion such that a 1.5 cm wedge-shaped biopsy could be obtained sharply for definitive histologic characterization. Hemostasis was then established with electrocautery. The laparoscope was then shifted to the left mid abdominal port to allow visualization of the supraumbilical fascial trocar defect with  a suture passing device and a #2 Vicryl suture. A stab incision was then made over the fascial defect at the level of the umbilicus and the umbilical fascial defect was closed primarily utilizing simple interrupted 2-0 Vicryl sutures placed with a suture passing device. The abdomen was then desufflated of carbon dioxide gas and the fascial umbilical hernia repair sutures were tied. The abdomen was then reinsufflated and the hernia repair was inspected and noted to be adequate. All operative sites were then visualized and noted to have adequate hemostasis. The abdomen was then desufflated of carbon dioxide gas. The trocars were removed under direct vision. The fascial sutures were tied.   The wounds were irrigated with normal saline solution, and closure of the skin was accomplished with a series of simple interrupted buried deep dermal 4-0 Monocryl sutures. The incision was then infiltrated with 266 mg of Exparel diluted in 50 mL of normal saline solution. Steri-Strips were applied with sterile dressing. Sponge and needle counts were correct both at the end and completion of the procedure. The patient tolerated the procedure without operative complications, and subsequently was extubated and transported to the recovery room in awake, alert, and stable condition. The estimated blood loss was less than 50 mL. The patient received 600 mL of crystalloid during the procedure and had a urine output of 250 mL. The operative start time was 1335, operative completion time was 1540.         [employer] Mins, DO      [id number]  D:  05/18/2020 16:23  T:  05/19/2020 2:42  JOB #:  [id number]

## 2020-05-19 NOTE — PROGRESS NOTES
Patient was educated on progression of diet this AM. Goal of 4 ounces per hour with one ounce being protein was clearly understood. Patient was instructed to go slow with small sips to reach goal. Patient given a report card to record intake. Education completed on I.S use and to ambulate in [place] at least 4 times. Will follow up and check progression.

## 2020-05-19 NOTE — PROGRESS NOTES
Hydration Hydration is your NUMBER ONE priority. Dehydration is the most common reason for readmission to the hospital. Dehydration occurs when 
your body does not get enough fluid to keep it functioning at its best. Your body also requires fluid 
to burn its stored fat calories for energy. Carry a bottle of water with you all day, even when you are away from home; remind yourself to 
drink even if you dont feel thirsty. Drinking 64 ounces of fluid is your daily goal. You can tell if 
youre getting enough fluid if youre making clear, light-colored urine five to 10 times per day. Signs of dehydration can be thirst, headache, hard stools or dizziness upon sitting or standing up. You should contact your surgeons office if you are unable to drink enough fluid to stay hydrated.  [facility] General Care after Surgery  No lifting over 15 pounds for four weeks.  No driving while taking the pain medication (about seven to 10 days).  No tub baths, swimming or hot tubs until incisions are healed (about two weeks).  You may shower. Clean incisions daily /gently with soap and check incisions for signs of infection:  Redness around incision.  Swelling at site.  Drainage with an foul odor (pus).  Increase tenderness around incision.  Take your temperature and resting pulse in the morning and evening. Record on tracking form 
given to you. Call if your temperature is greater than 101 or your pulse rate is greater than 115.  Please contact your surgeon if you are having excessive abdominal pain (that lasts longer than 
four hours and does not improve with prescribed pain medication), vomiting or shortness of breath.  Get up and move  do not sit in one place for more than an hour.  You need to WALK (EXERCISE) for 30 minutes per day.  Walking around your house does not count.  Bike, treadmill and elliptical are OK.  NO weight lifting or sit ups.  If constipated take an adult dose of Miralax (available over the counter). Contact the doctors 
office if Miralax doesnt help.  You may swallow pills starting the day after surgery as long as they fit inside this [place]:  Continue to use your incentive spirometer (breathing machine) for the next couple of weeks to 
help prevent pneumonia. [address] Temperature/Heart Rate Log Take your temperature and heart rate (pulse) twice a day for 14 days. Take both in the morning and 
evening at about the same time each day (when you wake up and before you go to bed when you 
are relaxed). Please contact your doctors office if your:  Temperature is higher than 101 degrees.  Heart rate (pulse) is higher than 115 beats per minute (normal heart rate is 60 to 100 beats per minute). How to Take Your Heart Rate (Pulse)  Turn your left hand so that your palm is face-up.  With the index and middle fingers of your right 
hand, draw a line from the base of your thumb to 
just below the crease in your wrist. Your fingers 
should nestle just to the left of the large tendon that 
pops up when you bend your wrist toward you.  Dont press too hard, that will make the pulse go 
away. Use gentle pressure.  Wait. It can take several seconds  and several micro-adjustments in the placement of your two 
fingers on your wrist  to find your pulse. Just keep moving your fingers down or up your wrist 
in small increments (and pausing for a few seconds) until you find it. How to Take Your Pulse Rate Silvia Weaver Find a watch with a second hand and place it on your right wrist or on the table next 
to your left hand.  After finding your pulse, count the number of beats for 20 seconds.  Multiply by three to get your heart rate, or beats per minute (or just count for 60 seconds for a math-free option).  Normal, resting heart rate is about 60 to 100 beats per minute.  46  PATIENT GUIDE TO BARIATRIC SURGERY [facility] Lovenox Self Injection Guide Prepare Step 1: Wash and dry your hands thoroughly. Step 2: Sit or lie in a comfortable position and choose an area 
on the right or left side of the abdomen at least two inches away 
from the belly button. Step 3: Clean the injection site with an alcohol swab and let dry. Inject Step 4: Remove the needle cap by pulling it straight off the syringe and 
discard it in a sharps . Step 5: With your other hand, pinch an inch of the cleansed area to 
make a fold in the skin. Insert the full length of the needle straight 
down  at a 90? angle  into the fold of skin.  48  PATIENT GUIDE TO BARIATRIC SURGERY [facility] Step 6: Press the plunger with your thumb until the syringe is empty. Then pull the needle straight out and release the skin fold. Dispose Step 7: Point the needle down and away from yourself and others, 
and then push down on the plunger to activate the safety shield. Step 8: Place the used syringe in the sharps . Do NOT expel the air bubble from the syringe before the injection. Administration should be alternated between the left and right abdominal wall. The whole length 
of the needle should be introduced into a skin fold held between the thumb and forefinger; the 
skin fold should be held throughout the injection. To minimize bruising, do not rub the injection 
site after completion of the injection. Questions about LOVENOX? Call [phone]  49  PATIENT GUIDE TO BARIATRIC SURGERY [facility] 9. DIET AND LIFESTYLE Key Diet Principles Following Bariatric Surgery  Begin each meal with soft moist high protein foods (i.e. chicken, turkey, yogurt, tuna, eggs, 
cottage cheese, other fish and seafood).  Consume a minimum of 64 ounces of fluid each day to prevent dehydration. No straws.  No food and fluid together. Stop drinking 30 minutes before a meal. You may begin fluids again 30 minutes after you finish a meal. 
 Eat very slowly and chew all foods completely.  Keep portions small.  No simple sugars or high fat foods. No carbonated beverages. No caffeine.  Eat three meals per day. No skipping. Avoid snacking between meals.  No alcohol. No smoking.  Two Flintstones Complete Chewable vitamins each day. Take one in the morning and one at night.  1,500 milligrams Calcium Citrate per day in separate dosages.  Vitamin D 3: 5,000 IU taken per day.  Vitamin B-12: Take 1,000 micrograms sublingually daily.  Iron: 60 milligrams per day from [employer].  Protein supplements of your choice. Must be low sugar (0 to 3 grams), low fat (0 to 3 grams) and 
provide at least 35 to 40 grams of protein each day. You need 60 to 70 grams of protein (food 
and supplements) each day.  Minimum of 30 minutes of physical activity daily. Do not soto NSAIDS . Do not take Steroids without your surgeons permission.  50  PATIENT GUIDE TO BARIATRIC SURGERY [facility] Your Priorities After Surgery ? Fluid: 64 ounces of fluid per day. ? Protein: 60 to 70 grams of protein per day. ? Walk every day. Clear Liquid Diet One week of clear liquids: minimum of 64 ounces of fluid per day. Fluid:  Zero calorie liquids.  No caffeine.  No carbonation.  No sugary drinks.  No alcohol.  No straws. Food  Protein drinks.  Less than 3 grams of sugar and 
3 grams of fat per serving.  Protein drink should provide you with 
60 to 70 grams of protein. Soft Protein Diet Five weeks of soft protein (1 ounce for soft protein, 3 ounces of yogurt/cottage cheese). Three meals per day and 1 protein shake. Protein shakes should provide you with 30 grams of 
protein on the soft protein diet. Slow transition:  First week on soft protein diet  focus on yogurt, cottage cheese, eggs, vegetarian refried beans, 
black beans, kidney beans and white beans. (NO BAKED BEANS.)  Second through fourth week on soft protein diet  focus on yogurt, cottage cheese, eggs, 
canned tuna, canned chicken, tilapia and fish (needs to be soft enough to be cut up with a fork).  Fifth week on soft protein diet  focus on yogurt, cottage cheese, eggs, canned tuna, canned 
chicken, tilapia, fish, salmon, chicken breast or turkey. Fluid is your #1 Priority! Continue clear liquids between meals. You will need 64 ounces of fluid per day. Fluids that you can have include:  Water.  Zero calorie liquids. You will need to sip throughout the day and should therefore have a water bottle with you at all 
times! No liquids with your meals. Stop 30 minutes before a meal and wait 30 minutes after a meal. 
No straws. Zero calorie liquids. No caffeine. No carbonation. No sugary drinks. No alcohol.  51  PATIENT GUIDE TO BARIATRIC SURGERY [address] [place] Protein You will need 60 to 70 grams of protein per day.  60 to 70 grams of protein shakes when on the clear liquid diet (two to three shakes per day).  30 to 50 grams of protein shakes when on the soft protein diet (one shake per day). Eat Three Times Per Day You will need to eat three times per day. My planned times are: 
_________________________________________________________ 
_________________________________________________________ 
_________________________________________________________ Nausea, Vomiting, Stomach Pain If you have problems with nausea, vomiting or stomach pain, try: 
 Eating slowly: 20 to 30 minutes per meal. 
 Chewing food thoroughly: 20 to 30 chews before food is swallowed.  Small portions: measure portions in medicine cup.  Stopping before feeling full.  
 AVOIDING SUGAR and FRIED FOOD: sugar will cause dumping syndrome and lead to weight gain. Exercise I will need to get a minimum of 30 minutes of exercise per day or 150 minutes of exercise per week.  Walking, swimming, biking or elliptical. 
 Find something you enjoy! Vitamins After surgery, you will need to take the following vitamins for the rest of your life  FOREVER.  Vitamin D 3: 5,000 IU per day.  Calcium Citrate: 1,500 milligrams, taken separately.  Flintstones Complete: two per day, taken separately.  Sublingual Vitamin B-12: 1,000 micrograms daily.  Iron for menstruating women or patients with a history of low iron: 65 milligrams daily. We recommend going to www.bariatricadvantage. [employer] and purchasing iron from there. The lemon-lime has 60 milligrams. This iron is better absorbed than over-the-counter iron.  52  PATIENT GUIDE TO BARIATRIC SURGERY [address] [place] Clear Liquid Log Getting your fluid in is top priority during this week. Fluids (MINIUM of 64 ounces per day): 
? 8 oz. ? 8 oz. ? 8 oz. ? 8 oz. ? 8 oz. ? 8 oz. ? 8 oz. ? 8 oz. ? 8 oz. ? 8 oz. ? 8 oz. ? 8 oz. Flintstones Complete Chewable: ? a.m. ? p.m. Calcium Citrate (1,500 milligrams/day): 
Pill form ? Two crushed pills (morning) ? Two crushed pills (afternoon) ? Two crushed pills (evening) OR Upcal D (powder) ? One pack/scoop ? One pack/scoop ? One pack/scoop OR Celebrate Chewable Vitamins (500 mg each) or [facility] [employer] Chewables (500 mg) ? One chewable (morning) ? One chewable (afternoon) ? One chewable (evening) OR Liquid Calcium Citrate ? 1 tbsp. Calcium Citrate ? 1 tbsp. Calcium Citrate ? 1 tbsp. Calcium Citrate Vitamin D3: ? 5,000 IU daily. Vitamin B-12: ? 1,000 micrograms per day. Iron (menstruating women or patients with a history of low iron): 
? 60 milligrams per day from [employer]. Protein drinks (protein drinks should be under 3 grams of sugar and 3 grams of fat). Protein shake (60 grams per day): ? a.m. ? p.m. Exercise: ? 30 to 40 minutes per day.  53  PATIENT GUIDE TO BARIATRIC SURGERY [address] [facility] Bariatric Soft and Moist Diet Shopping List 
 alcium Citrate (1,500 milligrams/day): 
Pill form ? Two crushed pills (morning) ? Two crushed pills (afternoon) ? Two crushed pills (evening) OR Upcal D (powder) ? One pack/scoop ? One pack/scoop ? One pack/scoop OR Celebrate Chewable Vitamins (500 mg each) or [facility] [employer] Chewables (500 mg) ? One chewable (morning) ? One chewable (afternoon) ? One chewable (evening) OR Liquid Calcium Citrate ? 1 tbsp. Calcium Citrate ? 1 tbsp. Calcium Citrate ? 1 tbsp. Calcium Citrate Vitamin D3: ? 5,000 IU daily Vitamin B-12: ? 1,000 micrograms per day Iron (menstruating women or 
patients with a history of low iron): 
? 60 milligrams per day 
from [employer] Protein drinks (protein drinks should be under 3 grams of sugar and 3 grams of fat). Protein shake (30 to 40 grams per day): 
? a.m. ? p.m. Exercise: ? 30 to 40 minutes per Educated on Diet Progression Satya Haynes Gastric Bypass and Sleeve Dietary Progression Patient Name:  
Date of Surgery: Ice Chips start once admitted on floor. Begin Bariatric Clear Liquid Diet on:  
 
Clear Liquid Diet: 64 oz. of fluid per day 
o Low calorie, low sugar, non-carbonated beverages ? Water, Crystal Light, Propel Water, Sugar Free Jell-O, Sugar Free Popsicles, Bouillon 
? Start protein supplement during this stage. (60-70 grams per day) ? Getting your fluid in and staying hydrated is your #1 priority! ? The clear liquid diet will last for 7 days. Begin Bariatric Soft and Moist on: ? This stage of the diet will last for 5 weeks, unless otherwise instructed by your surgeon. ? Begin:  1 week post-op ? End:  6 weeks post-op (or when you follow up with the Registered Dietitian) ? Soft, moist, high protein foods: 3 meals per day plus protein supplements. o   Portions should emphasize on soft protein. o Portions will be a MAXIMUM of: 
o  1 ounce of solid food 
o  2-3 ounces of cottage cheese and yogurt. o Protein supplements should be between meals and provide 30-40 grams per day during soft protein diet. o Continue to get 64 ounces of fluid in per day. ? Protein foods that are ok on the Soft and Moist Diet include: 
o Slow transition: 
o 1st week on soft protein should focus on: Yogurt, cottage cheese, eggs 
o 2nd -4th  week on soft protein diet should focus on: yogurt, cottage cheese, eggs, canned tuna, canned chicken, tilapia, fish (needs to be soft enough to be cut up with a fork) o 5th week on soft protein diet should focus on: Yogurt, cottage cheese, eggs, canned tuna, canned chicken, tilapia, fish, salmon, chicken breast, or turkey. Remember to continue to get 64 ounces of fluid daily on ALL Stages. To be advanced to Bariatric Maintenance Stage of the bariatric diet, follow up with the dietitian 6 weeks post-op, around:    
 
 
For any additional questions, please refer to your blue binder that was provided to you at the start of the bariatric program.

## 2020-05-19 NOTE — PROGRESS NOTES
D/C order noted for today. Orders reviewed. Patient states she has a ride home today. No needs identified at this time. CM remains available if needed.        Dominga Maya, [phone]

## 2020-05-19 NOTE — ROUTINE PROCESS
End of Shift Note Bedside and verbal shift change report given to Ruthann Luke RN (On coming nurse) by Cathi Parr RN (Off going nurse). Report included the following information:  
   --Procedure Summary 
   --MAR, 
   --Recent Results --Med Rec Status SBAR Recommendations:Monitor I&O Issues for Provider to address Activity This Shift 
 
 [] Bed Rest Order 
 [] Refused 
 [] Dangled  
 [] TDWB Ambulating: 
   [] Bathroom [] BSC [x] Room/Hallway Up in Chair for meals []Yes [] No  
Voiding       [x] Yes  [] No 
Reed          [] Yes  [] No 
Incontinent [] Yes  [] No 
 
DUE TO VOID 2020 POUR        [] Yes [] No 
Purewick    [] Yes [] No 
New Onset [] Yes [] No Straight Cath   []Yes  [] No 
Condom Cath  [] Yes [] No 
MD Called      [] Yes  [] No  
Blood Sugars Managed [x]Yes [] No   
Bowels Moved [] Yes [x] No 
 
Incontinent     [] Yes [] No Passed Gas []Yes [x] No 
 
New Onset  []Yes [] No 
  
 
 MD Called []Yes  [x] No 
  
CHG Bath Done Before Surgery After Surgery  
  
[] Yes  [] No 
[x] Yes  [] No   
  
Drain Removed [] Yes  [] No [x] N/A Dressing Changed [] Yes   [] No [x] N/A Nausea/Vomiting [x] Yes   [] No    
Ice Packs Changed [] Yes   [x] No  [] N/A Incentive Spirometer  [x] Yes  [] No     
SCD Pumps On Ankle Pumping  [x] Yes   [] No  
  
[] Yes   [] No    
  
Telemetry Monitoring [] Yes   [x] No   Rhythm

## 2020-05-19 NOTE — ROUTINE PROCESS
0745 Patient received in bed Asleep but easily aroused, respiration unlabored. Pain rating 4/10. Abdomen tender to touch lap sites x6 dry and clean , hypoactive BS. Patient ambulated in the hallway, tolerated well. 2007 Patient complaining nausea gave Zofran 4 mg iv. 2115 Patient ambulated in the hallway . Patient voided 200 ml of varinder urine . Patient compaints of pain, medicated with Toradol 15 mg iv, will continues to monitor. [id number] Patient walked around the hallway, tolerated well, back in bed. Pain rating 8/10 dilaudid 1 mg iv given. Bladder scanned 269 ml, patient stated that she does not need to go now, she will try later. 0150 Patient up to toilet, stated that the missed the hat, messuared only 20 ml from the hat, post void bladder scanned  26 ml, will continues to monitor. 0690 Patient complaining 10/10 incisional pain , medicated with dilaudid 1 mg iv. 
0615 Patient ambulated in the hallway.

## 2020-05-19 NOTE — PROGRESS NOTES
Problem: Diabetes Self-Management  Goal: *Disease process and treatment process  Description: Define diabetes and identify own type of diabetes; list 3 options for treating diabetes. Outcome: Progressing Towards Goal  Goal: *Incorporating nutritional management into lifestyle  Description: Describe effect of type, amount and timing of food on blood glucose; list 3 methods for planning meals. Outcome: Progressing Towards Goal  Goal: *Incorporating physical activity into lifestyle  Description: State effect of exercise on blood glucose levels. Outcome: Progressing Towards Goal  Goal: *Developing strategies to promote health/change behavior  Description: Define the ABC's of diabetes; identify appropriate screenings, schedule and personal plan for screenings. Outcome: Progressing Towards Goal  Goal: *Using medications safely  Description: State effect of diabetes medications on diabetes; name diabetes medication taking, action and side effects. Outcome: Progressing Towards Goal  Goal: *Monitoring blood glucose, interpreting and using results  Description: Identify recommended blood glucose targets  and personal targets. Outcome: Progressing Towards Goal  Goal: *Prevention, detection, treatment of acute complications  Description: List symptoms of hyper- and hypoglycemia; describe how to treat low blood sugar and actions for lowering  high blood glucose level. Outcome: Progressing Towards Goal  Goal: *Prevention, detection and treatment of chronic complications  Description: Define the natural course of diabetes and describe the relationship of blood glucose levels to long term complications of diabetes.   Outcome: Progressing Towards Goal  Goal: *Developing strategies to address psychosocial issues  Description: Describe feelings about living with diabetes; identify support needed and support network  Outcome: Progressing Towards Goal  Goal: *Insulin pump training  Outcome: Progressing Towards Goal  Goal: *Sick day guidelines  Outcome: Progressing Towards Goal  Goal: *Patient Specific Goal (EDIT GOAL, INSERT TEXT)  Outcome: Progressing Towards Goal     Problem: Patient Education: Go to Patient Education Activity  Goal: Patient/Family Education  Outcome: Progressing Towards Goal     Problem: Falls - Risk of  Goal: *Absence of Falls  Description: Document Nessa Blood Fall Risk and appropriate interventions in the flowsheet.   Outcome: Progressing Towards Goal  Note: Fall Risk Interventions:            Medication Interventions: Patient to call before getting OOB    Elimination Interventions: Call light in reach              Problem: Patient Education: Go to Patient Education Activity  Goal: Patient/Family Education  Outcome: Progressing Towards Goal     Problem: Laparoscopic Gastric Bypass:Day of Surgery  Goal: Off Pathway (Use only if patient is Off Pathway)  Outcome: Resolved/Met  Goal: Activity/Safety  Outcome: Resolved/Met  Goal: Consults, if ordered  Outcome: Resolved/Met  Goal: Diagnostic Test/Procedures  Outcome: Resolved/Met  Goal: Nutrition/Diet  Outcome: Resolved/Met  Goal: Medications  Outcome: Resolved/Met  Goal: Respiratory  Outcome: Resolved/Met  Goal: Treatments/Interventions/Procedures  Outcome: Resolved/Met  Goal: Psychosocial  Outcome: Resolved/Met  Goal: *No signs and symptoms of infection or wound complications  Outcome: Resolved/Met  Goal: *Optimal pain control at patient's stated goal  Outcome: Resolved/Met  Goal: *Adequate urinary output (equal to or greater than 30 milliliters/hour)  Outcome: Resolved/Met  Goal: *Hemodynamically stable  Outcome: Resolved/Met  Goal: *Tolerating diet  Outcome: Resolved/Met  Goal: *Demonstrates progressive activity  Outcome: Resolved/Met  Goal: *Absence of signs and symptoms of DVT  Outcome: Resolved/Met  Goal: *Labs within defined limits  Outcome: Resolved/Met  Goal: *Oxygen saturation within defined limits  Outcome: Resolved/Met

## 2020-05-19 NOTE — DISCHARGE SUMMARY
Bariatric Surgery Discharge Progress Note    Admission Date: 5/18/2020    Discharge Date: 5/21/2020    PREOPERATIVE DIAGNOSES:  1. 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 2.  Hepatic steatosis. 3.  Umbilical hernia.     POSTOPERATIVE DIAGNOSES:  1. 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 2.  Hepatic steatosis. 3.  Umbilical hernia with a 5-cm fascial defect.     PROCEDURES PERFORMED:  1. Laparoscopic Li-en-Y gastric bypass utilizing a 15 mL tubularized gastric pouch placed on the lesser curvature of the stomach. A 357 cm retrocolic retrogastric Li limb and a 40 cm biliopancreatic limb. 2.  Laparoscopic left hepatic lobe wedge biopsy. 3.  Laparoscopic primary umbilical hernia repair. Postop Complications: none    Hospital Course:  Patient was admitted on 5/18/2020 for scheduled bariatric surgery. Operation was without significant complication. Patient admitted to the floor postoperatively, monitored as per protocol. Diet sequentially advanced beginning POD 1, pain medications transitioned to oral during the hospital course. Currently the patient is afebrile, vital signs stable, tolerating a clear liquid diet with protein supplementation, voiding spontaneously, ambulatory with adequate pain control with oral medications and clear surgical sites without evidence of infection.     Discharge Diet:  Clear Liquid Bariatric Diet for 7 days, then soft moist protein diet for 5 weeks    Discharge Medications:  Discharge Medication List as of 5/19/2020 12:02 PM      START taking these medications    Details   oxyCODONE IR (ROXICODONE) 5 mg immediate release tablet Take 1 Tab by mouth every four (4) hours as needed for Pain for up to 3 days. Max Daily Amount: 30 mg., Normal, Disp-18 Tab, R-0      !! enoxaparin (LOVENOX) 40 mg/0.4 mL 0.4 mL by SubCUTAneous route daily. Indications: deep vein thrombosis prevention, treatment to prevent a blood clot in the lung, Normal, Disp-7 Syringe, R-0       !! - Potential duplicate medications found. Please discuss with provider. CONTINUE these medications which have CHANGED    Details   ondansetron (Zofran ODT) 4 mg disintegrating tablet Take 1 Tab by mouth every eight (8) hours as needed for Nausea. Indications: prevent nausea and vomiting after surgery, Normal, Disp-10 Tab, R-0         CONTINUE these medications which have NOT CHANGED    Details   !! enoxaparin (Lovenox) 40 mg/0.4 mL 0.4 mL by SubCUTAneous route daily for 7 days. Surgery 2/26, Normal, Disp-7 Syringe, R-0Surgery may 18      albuterol (PROVENTIL HFA, VENTOLIN HFA, PROAIR HFA) 90 mcg/actuation inhaler 1-2 Puffs., Historical Med       !! - Potential duplicate medications found. Please discuss with provider.       STOP taking these medications       metFORMIN (GLUCOPHAGE) 500 mg tablet Comments:   Reason for Stopping:         glipiZIDE (GLUCOTROL) 5 mg tablet Comments:   Reason for Stopping:         ergocalciferol (ERGOCALCIFEROL) 1,250 mcg (50,000 unit) capsule Comments:   Reason for Stopping:         omeprazole (PRILOSEC) 20 mg capsule Comments:   Reason for Stopping:         triamterene-hydroCHLOROthiazide (MAXZIDE) 37.5-25 mg per tablet Comments:   Reason for Stopping:         ibuprofen (MOTRIN) 800 mg tablet Comments:   Reason for Stopping:                 Bariatric Chewable vitamins, 2 orally daily for life  Calcium Citrate 1500 mg orally daily for life  Vitamin B12 1000 micrograms sublingual daily for life  Vitamin D3 5,000 units orally daily for life   Vitamin B1 100 mg orally daily for life    Discharge disposition: home    Discharge condition: stable      Local wound care with daily showers, keep wounds clean and dry     Activity: as desired, no lifting, pushing, or pulling greater than 15lbs or situps for 30 days     Special Instructions:            - No driving until activity is not influenced by incisional pain and off narcotics            - No bath or hot tub until wounds are healed            - Check pulse and temperature twice daily for 10 days            - Notify [employer] [facility] for a Temp >100.5 or Pulse>115     Follow-up with your surgeon in 2 weeks, call office for appointment [phone] ([address] [place] [address]) [phone]([address])

## 2020-05-19 NOTE — PROGRESS NOTES
Surgery Progress Note    5/19/2020    Admit Date: 5/18/2020    Subjective:     Patient has complaints of none Pain is controlled with current regimen. Patient has been ambulating in halls. She reports no nausea and no vomiting. Bowel Movements: None    Objective:     Patient Vitals for the past 24 hrs:   Temp Pulse Resp BP SpO2   05/19/20 0623 97.1 °F (36.2 °C) 84 18 127/84 96 %   05/19/20 0420 98.5 °F (36.9 °C) 87 18 131/85 99 %   05/19/20 0032 99.1 °F (37.3 °C) 91 18 131/78 98 %   05/18/20 2204 98 °F (36.7 °C) 78 18 127/84 100 %   05/18/20 2004 97.8 °F (36.6 °C) 94 18 131/75 98 %   05/18/20 1745 97.6 °F (36.4 °C) 90 18 114/78 90 %   05/18/20 1715  86 16  95 %   05/18/20 1709  88 13 132/78 92 %   05/18/20 1704  84 12  97 %   05/18/20 1703 98.6 °F (37 °C)       05/18/20 1659  77 13 135/72 98 %   05/18/20 1649  75 16 124/80 93 %   05/18/20 1640  74 17  99 %   05/18/20 1639  72 16 134/78 100 %   05/18/20 1629  73 16 134/75 98 %   05/18/20 1619  73 17 134/77 99 %   05/18/20 1609  72 20 128/71 97 %   05/18/20 1559 98.4 °F (36.9 °C) 85 20 110/73 98 %   05/18/20 1200 98.6 °F (37 °C) 77 18 113/71 100 %         Date 05/18/20 0700 - 05/19/20 0659 05/19/20 0700 - 05/20/20 0659   Shift 5417-7024 0165-9450 24 Hour Total 5492-7693 7633-9867 24 Hour Total   INTAKE   P.O. 10 120 130        P. O. 10 120 130      I. V.(mL/kg/hr) 1600(0.7) 1830(0.8) 3430(0.8)        Volume (lactated Ringers infusion)  1530 1530        Volume (lactated Ringers infusion) 8378 351 4980      Shift Total(mL/kg) 1610(8.6) 1950(10.4) 3560(19)      OUTPUT   Urine(mL/kg/hr) 250(0.1) 420(0.2) 670(0.1)        Urine Voided 0 420 420        Urine Occurrence(s)  1 x 1 x        Urine Output 250  250      Blood 50  50        Estimated Blood Loss 50  50      Shift Total(mL/kg) 300(1.6) 420(2.2) 720(3.8)      NET 1310 1530 2840      Weight (kg) 187.8 187.8 187.8 187.8 187.8 187.8       EXAM: GENERAL: alert, pleasant, no distress   HEART: regular rate and rhythm   LUNGS: clear to auscultation   ABDOMEN:  Soft, obese, non tender, non distended, incisions clean, dry, no erythema or drainage.  +BS    EXTREMITIES: warm, well perfused    Data Review    Recent Results (from the past 24 hour(s))   GLUCOSE, POC    Collection Time: 05/18/20 12:02 PM   Result Value Ref Range    Glucose (POC) 141 (H) 70 - 110 mg/dL   HCG URINE, QL. - POC    Collection Time: 05/18/20 12:16 PM   Result Value Ref Range    Pregnancy test,urine (POC) Negative NEG     GLUCOSE, POC    Collection Time: 05/18/20  4:21 PM   Result Value Ref Range    Glucose (POC) 181 (H) 70 - 110 mg/dL   GLUCOSE, POC    Collection Time: 05/18/20  6:18 PM   Result Value Ref Range    Glucose (POC) 189 (H) 70 - 110 mg/dL   GLUCOSE, POC    Collection Time: 05/19/20 12:05 AM   Result Value Ref Range    Glucose (POC) 151 (H) 70 - 110 mg/dL   CBC W/O DIFF    Collection Time: 05/19/20  5:10 AM   Result Value Ref Range    WBC 7.7 4.6 - 13.2 K/uL    RBC 4.22 4.20 - 5.30 M/uL    HGB 10.8 (L) 12.0 - 16.0 g/dL    HCT 33.9 (L) 35.0 - 45.0 %    MCV 80.3 74.0 - 97.0 FL    MCH 25.6 24.0 - 34.0 PG    MCHC 31.9 31.0 - 37.0 g/dL    RDW 14.1 11.6 - 14.5 %    PLATELET 966 498 - 562 K/uL    MPV 10.2 9.2 - 10.3 FL   METABOLIC PANEL, BASIC    Collection Time: 05/19/20  5:10 AM   Result Value Ref Range    Sodium 138 136 - 145 mmol/L    Potassium 3.9 3.5 - 5.5 mmol/L    Chloride 105 100 - 111 mmol/L    CO2 28 21 - 32 mmol/L    Anion gap 5 3.0 - 18 mmol/L    Glucose 118 (H) 74 - 99 mg/dL    BUN 8 7.0 - 18 MG/DL    Creatinine 0.52 (L) 0.6 - 1.3 MG/DL    BUN/Creatinine ratio 15 12 - 20      GFR est AA >60 >60 ml/min/1.73m2    GFR est non-AA >60 >60 ml/min/1.73m2    Calcium 8.3 (L) 8.5 - 10.1 MG/DL   MAGNESIUM    Collection Time: 05/19/20  5:10 AM   Result Value Ref Range    Magnesium 2.0 1.6 - 2.6 mg/dL   GLUCOSE, POC    Collection Time: 05/19/20  6:14 AM   Result Value Ref Range    Glucose (POC) 138 (H) 70 - 110 mg/dL       Assessment:   Timbo Tiwari Tran Cross is a 27 y.o. female,  day 1 status post   1. Laparoscopic Li-en-Y gastric bypass utilizing a 15 mL tubularized gastric pouch placed on the lesser curvature of the stomach. A 796 cm retrocolic retrogastric Li limb and a 40 cm biliopancreatic limb. 2.  Laparoscopic left hepatic lobe wedge biopsy. 3.  Laparoscopic primary umbilical hernia repair. .    Condition: good    Plan:   -Ambulate every four hours  - Pain managed with Toradol, PO Tylenol, PO Roxicodone prn pain not controlled by previous prior to d/c   -Nausea managed prior to d/c   -Advance to Clear liquid Gastric Bypass Diet, if able to tolerate clear liquid diet (with pro shake) 4oz per hour for 3 hours prior to d/c    If patient continue to progress d/c home later today     Irena Salmeron NP  8:21 AM  5/19/2020

## 2020-05-19 NOTE — PROGRESS NOTES
Reason for Admission:  Morbid obesity ([facility] [E66.01]  Gastroesophageal reflux disease, esophagitis presence not specified [K21.9]  Type 2 diabetes mellitus without complication, unspecified whether long term insulin use ([facility] [E11.9]  Morbid obesity with BMI of 60.0-69.9, adult ([facility] [E66.01, Z68.44]                 RRAT Score:    8%            Plan for utilizing home health:    No                      Likelihood of Readmission:   LOW                         Transition of Care Plan:              Initial assessment completed with patient. Cognitive status of patient: oriented to time, place, person and situation. Face sheet information confirmed:  yes. The patient designates her mother Nasima Haji [phone] to participate in her discharge plan and to receive any needed information. This patient lives in a apartment with her mother. Patient is able to navigate steps as needed. Prior to hospitalization, patient was considered to be independent with ADLs/IADLS : yes . Patient has a current ACP document on file: no  The patient's  mother will be available to transport patient home upon discharge. The patient already has none reported, medical equipment available in the home. Patient is not currently active with home health. Patient has not stayed in a skilled nursing facility or rehab. This patient is on dialysis :no      Currently, the discharge plan is Home. The patient states that she can obtain her medications from the pharmacy, and take her medications as directed. Patient's current insurance is [employer]. Care Management Interventions  PCP Verified by CM:  Yes  Mode of Transport at Discharge: Self(Patient states she will have a ride home at time of discharge. )  Transition of Care Consult (CM Consult): Discharge Planning  Discharge Durable Medical Equipment: No  Physical Therapy Consult: No  Occupational Therapy Consult: No  Speech Therapy Consult: No  Current Support Network: Relative's Home  Confirm Follow Up Transport: Family  Discharge Location  Discharge Placement: [address] RN  Case Management [phone]

## 2020-05-19 NOTE — PROGRESS NOTES
Education folder with patient. Discharge instructions explained. Prescriptions filled and medications given to patient. Left unit via wheelchair.

## 2020-05-19 NOTE — PROGRESS NOTES
Patient was educated on lovenox injections. 1. Wash and dry hands  2. Sit or lie in comfortable position  3. Choose an area around love handles 3 inches away from [facility]  4. Clean site with alcohol and let dry  5. Remove needle cap pull straight out  6. With your other and pinch an inch of the cleanses area and insert full length of needle straight in 90 degree  7. Press the plunger with your thumb slowly until syringe is empty. Pull needle out and point away from you. Push down on plunger to activate the safety shield. Push hard  8. Place the syringe in a plastic bottle and dispose in trash. 9. Rotate sites  10. Do not remove air from syringe before injection  11.  Do not rub the area

## 2020-05-20 ENCOUNTER — TELEPHONE (OUTPATIENT)
Dept: SURGERY | Age: 35
End: 2020-05-20

## 2020-05-20 ENCOUNTER — PATIENT OUTREACH (OUTPATIENT)
Dept: CASE MANAGEMENT | Age: 35
End: 2020-05-20

## 2020-05-20 NOTE — PROGRESS NOTES
Patient contacted regarding recent discharge and COVID-19 risk     Care Transition Nurse/ Ambulatory Care Manager contacted the patient by telephone to perform post discharge assessment. Verified name and  with patient as identifiers. Patient states that she is doing well. Patient states that she has her Lovenox subq. CDC guidelines,  and Red flags on when to go back to ED (Chest pain, difficulty breathing, shortness of breath, high fevers and/or worsening of symptoms) were reviewed and discuss with Pt. Pt. Verbalized and repeated back understanding    Patient has following risk factors of: asthma and diabetes. CTN/ACM reviewed discharge instructions, medical action plan and red flags related to discharge diagnosis. Reviewed and educated them on any new and changed medications related to discharge diagnosis. Advised obtaining a 90-day supply of all daily and as-needed medications. Education provided regarding infection prevention, and signs and symptoms of COVID-19 and when to seek medical attention with patient who verbalized understanding. Discussed exposure protocols and quarantine from [address] you at higher risk for severe illness  and given an opportunity for questions and concerns. The patient agrees to contact the COVID-19 hotline [phone] or PCP office for questions related to their healthcare. CTN/ACM provided contact information for future reference. From CDC: Are you at higher risk for severe illness?  Wash your hands often.  Avoid close contact (6 feet, which is about two arm lengths) with people who are sick.  Put distance between yourself and other people if COVID-19 is spreading in your community.  Clean and disinfect frequently touched surfaces.  Avoid all cruise travel and non-essential air travel.  Call your healthcare professional if you have concerns about COVID-19 and your underlying condition or if you are sick.     For more information on steps you can take to protect yourself, see CDC's How to Protect Yourself      Patient/family/caregiver given information for [employer] and agrees to enroll no    Plan for follow-up call in 7-14 days based on severity of symptoms and risk factors.

## 2020-05-21 NOTE — ADT AUTH CERT NOTES
PREVIOUSLY DENIED IP AUTH # [id number], MD AGREED TO OBS DOWNGRADED TO OBSERVATION 
ONCE RECEIVED AND COMPLETED, PLEASE FAX BACK CONFIRMATION AND NEW OBS AUTH#.  
Yazan Cruz

## 2020-05-27 ENCOUNTER — TELEPHONE (OUTPATIENT)
Dept: SURGERY | Age: 35
End: 2020-05-27

## 2020-05-27 NOTE — TELEPHONE ENCOUNTER
Pt called stating she had GBP 5/18/2020 and is constipated states she has been taking the miralax and not working. States she in taking in her fluids and has been walking. Also c/o that she can feel something about small dime size at left bottom of her stomach not any where near incision site but can only feel it when laying down and feels like a lot of pressure/pain. Also states after walking and being up at the end of the day she is in a lot of pain and tylenol doesn't help and requesting refill of pain meds. Spoke with provider ROSENDA and informed patient that if miralax not working to drink 4 oz of magnesium citrate and if no BM after 4-6 hours to drink the other 4 oz. The pressure/pain in bottom of stomach could be stool. If no improvement after magnesium citrate to call back immediately and we will make a sooner appointment than already scheduled post op appointment on 6/5/2020. Also informed patient that pain meds would not be refilled to continue with tylenol as needed. Before hanging up pt informed me that she can use the bathroom just a little bit but feels like it is stuck in there. Pt verbalized understanding of our conversation and will call back if no improvement.

## 2020-06-05 ENCOUNTER — OFFICE VISIT (OUTPATIENT)
Dept: SURGERY | Age: 35
End: 2020-06-05

## 2020-06-05 VITALS
HEART RATE: 90 BPM | HEIGHT: 67 IN | BODY MASS INDEX: 45.99 KG/M2 | SYSTOLIC BLOOD PRESSURE: 126 MMHG | WEIGHT: 293 LBS | DIASTOLIC BLOOD PRESSURE: 82 MMHG | TEMPERATURE: 98.5 F | RESPIRATION RATE: 18 BRPM

## 2020-06-05 DIAGNOSIS — K91.2 POSTOPERATIVE INTESTINAL MALABSORPTION: Primary | ICD-10-CM

## 2020-06-05 DIAGNOSIS — K91.2 POSTOPERATIVE MALABSORPTION: Primary | ICD-10-CM

## 2020-06-05 RX ORDER — MELATONIN
5000 DAILY
COMMUNITY
End: 2021-08-02 | Stop reason: ALTCHOICE

## 2020-06-05 RX ORDER — LANOLIN ALCOHOL/MO/W.PET/CERES
1000 CREAM (GRAM) TOPICAL DAILY
COMMUNITY

## 2020-06-05 NOTE — PROGRESS NOTES
Chief Complaint   Patient presents with    Post OP Follow Up     gastric bypass 5 2020   1. Have you been to the ER, urgent care clinic since your last visit? Hospitalized since your last visit? No    2. Have you seen or consulted any other health care providers outside of the [address] [place] [address] since your last visit? Include any pap smears or colon screening. No    Pt ID confirmed    Weight Loss Metrics 6/5/2020 6/5/2020 5/18/2020 5/13/2020 5/7/2020 5/7/2020 3/2/2020   Pre op / Initial Wt 415 - - - 415.2 - 428   Today's Wt - 390 lb - 414 lb - 415 lb 3.2 oz -   BMI - 61.08 kg/m2 62.95 kg/m2 - - 65.03 kg/m2 -   Ideal Body Wt 140 - - - 140 - 140   Excess Body Wt 275 - - - 275.2 - 288   Goal Wt 195 - - - - - 198   Wt loss to date 25 - - - 0 - 11   % Wt Loss 0.11 - - - 0 - 0.05   80%  - - - 220.16 - 230.4       Body mass index is 61.08 kg/m².     Post op medications:  MVI: 2x  Calcium Citrate: 500 milligram 3x day  Actigal 0  B-12 1000 microgram  Vit D 3 5000 units not taking not taking ferrous

## 2020-06-05 NOTE — PROGRESS NOTES
Bariatric Follow-Up Evaluation    Jordy Bailey is a 27 y.o. black female presents in follow-up status post laparoscopic gastric bypass/liver biopsy May 2, 2020 noting expected decreasing incisional discomfort and otherwise without complaint. The incisional discomfort is been managed by the analgesic regimen prescribed. Compliant with intolerant of a early postoperative gastric bypass diet with the exception of formation of crab dip containing cream cheese which made the patient sick. Patient notes appropriate early satiety and no specific food intolerances of items on the bariatric diet. Compliant with multivitamin, calcium citrate, B12, vitamin B1, vitamin D supplementation. Exercise regimen: Elliptical 30 minutes daily. Comorbidities: Hypertension, adult-onset obese mellitus, Gastrosoft reflux disease, reactive airway disease, stricture incontinence-resolved                           Clinical obstructive sleep apnea, weight related arthropathy-improved                           Hypertriglyceridemia- evaluated  Preoperative weight: 415. Current weight: 390 BMI: 61  Estimated postsurgical weight loss: 220. Current weight loss: 25. Goal weight: 195. Percentage weight loss: 11% / 17 days    Weight Loss Metrics 6/5/2020 6/5/2020 5/18/2020 5/13/2020 5/7/2020 5/7/2020 3/2/2020   Pre op / Initial Wt 415 - - - 415.2 - 428   Today's Wt - 390 lb - 414 lb - 415 lb 3.2 oz -   BMI - 61.08 kg/m2 62.95 kg/m2 - - 65.03 kg/m2 -   Ideal Body Wt 140 - - - 140 - 140   Excess Body Wt 275 - - - 275.2 - 288   Goal Wt 195 - - - - - 198   Wt loss to date 25 - - - 0 - 11   % Wt Loss 0.11 - - - 0 - 0.05   80%  - - - 220.16 - 230.4       No Known Allergies    Current Outpatient Medications on File Prior to Visit   Medication Sig Dispense Refill    multivit-min/iron/folic acid/K (BARIATRIC MULTIVITAMINS PO) Take  by mouth two (2) times a day.       OTHER Calcium chew 500 milligrams 3xday      cholecalciferol (Vitamin D3) (1000 Units /25 mcg) tablet Take 5,000 Units by mouth daily.  cyanocobalamin (Vitamin B-12) 1,000 mcg tablet Take 1,000 mcg by mouth daily.  ondansetron (Zofran ODT) 4 mg disintegrating tablet Take 1 Tab by mouth every eight (8) hours as needed for Nausea. Indications: prevent nausea and vomiting after surgery 10 Tab 0    albuterol (PROVENTIL HFA, VENTOLIN HFA, PROAIR HFA) 90 mcg/actuation inhaler 1-2 Puffs. No current facility-administered medications on file prior to visit. Past Medical History:   Diagnosis Date    Asthma     Community acquired pneumonia     Diabetes ([facility]     no meds    Hypertension     no meds    Obese     Umbilical hernia        Past Surgical History:   Procedure Laterality Date    DILATION AND CURETTAGE      HX GI      gastric bypass    HX GYN      d and c       Social History     Tobacco Use    Smoking status: Former Smoker     Packs/day: 0.50     Types: Cigarettes     Last attempt to quit: 2019     Years since quittin.5    Smokeless tobacco: Never Used   Substance Use Topics    Alcohol use: Not Currently     Alcohol/week: 1.7 standard drinks     Types: 2 Standard drinks or equivalent per week     Frequency: 2-4 times a month     Comment: holidays/special occassion    Drug use: No       Family History   Problem Relation Age of Onset    Asthma Mother     Hypertension Mother     Diabetes Mother     Asthma Father        ROS:  General: Negative for fevers, chills, night sweats, fatigue, weight loss, or weight gain. GI: Negative for abdominal pain, change in bowel habits, hematochezia, melena, or GERD. Integumentary: Negative for dermatitis or abnormal moles.     HEENT: Negative for visual changes, vertigo, epistaxis, dysphagia, or hoarseness    Cardiac: Negative for chest pain, palpitations, hypertension, edema, or varicosities    Resp: Negative for cough, shortness of breath, wheezing, hemoptysis, snoring, or reactive airway disease    : Negative for hematuria, dysuria, frequency, urgency, nocturia, or stress urinary incontinence    MSK:  Negative for weakness, joint pain, or arthritis    Endocrine: Negative for diabetes, thyroid disease, polyuria, polydipsia, polyphagia, poor wound, heat intolerance, or cold intolerance. Lymph/Heme: Negative for anemia, bruising, or history of blood transfusions. Neuro:  Negative for dizziness, headache, fainting, seizures, and stroke. Psychiatry:  Negative for anxiety or depression    Physical Exam    Visit Vitals  /82   Pulse 90   Temp 98.5 °F (36.9 °C)   Resp 18   Ht 5' 7\" (1.702 m)   Wt (!) 176.9 kg (390 lb)   BMI 61.08 kg/m²       Nursing note reviewed. General: 27 y.o. female is in no acute distress. Resp: Clear to auscultation bilaterally, no wheezing, rhonchi, or rales, excursions normal and symmetrical.  Cardio: Regular rate and rhythm, no murmurs, clicks, gallops, or rubs. No edema or varicosities. Abdomen: Obese, soft, nontender, nondistended, normoactive bowel sounds, no hernias, no hepatosplenomegaly, wounds clean dry approximated with appropriate straining induration and incisional tenderness without evidence of infectious complications or incisional hernia  Psych: Alert and oriented to person, place, and time. May 2020 pathology: Liver biopsy: 60% replacement of hepatic parenchyma with fat/steatosis    Impression    Super, super obesity status post laparoscopic gastric bypass May 18, 2020 with appropriate weight loss and comorbidity resolution. Plan    Continue compliance with the early postoperative gastric bypass diet with advancement with the assistance of bariatric nutrition  Continue compliance the bariatric surgical diet, exercise regimen, vitamin supplementation protocol, encourage monthly attendance is bariatric support group meetings.   Follow bariatric nutrition evaluation as scheduled  Follow-up August 2020 with labs for 3-month postoperative bariatric surgical evaluation

## 2020-06-09 ENCOUNTER — TELEPHONE (OUTPATIENT)
Dept: SURGERY | Age: 35
End: 2020-06-09

## 2020-06-09 NOTE — TELEPHONE ENCOUNTER
Complaints of some abd pain lower abd both sides states she does not feel like she is running temp and pulse 98 no nausea or vomiting has not been getting in her fluids discussed with dr Ada Clark to push fluids and protein supplement and follow all post op instructions to call back if no improvement

## 2020-06-11 ENCOUNTER — TELEPHONE (OUTPATIENT)
Dept: SURGERY | Age: 35
End: 2020-06-11

## 2020-06-11 NOTE — TELEPHONE ENCOUNTER
Called to check on patient on 6/10/2020 she states she had went to [facility] and has been diagnosed with diverticulitis and placed on flagy she states pain is intermittent she is tolerating the flagy and fluids and her protein supplement discussed wth Lilian Uribe NP and message to Dr Tk Eric

## 2020-06-29 ENCOUNTER — TELEPHONE (OUTPATIENT)
Dept: SURGERY | Age: 35
End: 2020-06-29

## 2020-06-29 NOTE — TELEPHONE ENCOUNTER
Left voicemail . Patient stated she ws constipated. I called to give the following suggestions:  Intake 20 grams of fiber ( bene fiber or metamucil)  4oz of magnesium citrate 500 mg  64oz of water  3 caps of Mirelax    If neither of the following works give us a call back to book an appointment.

## 2020-07-08 ENCOUNTER — HOSPITAL ENCOUNTER (OUTPATIENT)
Dept: BARIATRICS/WEIGHT MGMT | Age: 35
Discharge: HOME | End: 2020-07-08

## 2020-07-08 ENCOUNTER — DOCUMENTATION ONLY (OUTPATIENT)
Dept: BARIATRICS/WEIGHT MGMT | Age: 35
End: 2020-07-08

## 2020-07-22 ENCOUNTER — TELEPHONE (OUTPATIENT)
Dept: SURGERY | Age: 35
End: 2020-07-22

## 2020-07-22 NOTE — TELEPHONE ENCOUNTER
attempted to call back she left message on nurse line that was difficult to understand no answer and left her message to please call us back also tried her cell number which was busy

## 2020-07-27 ENCOUNTER — HOSPITAL ENCOUNTER (EMERGENCY)
Age: 35
Discharge: HOME | End: 2020-07-27
Attending: EMERGENCY MEDICINE
Payer: COMMERCIAL

## 2020-07-27 VITALS
HEART RATE: 86 BPM | DIASTOLIC BLOOD PRESSURE: 93 MMHG | TEMPERATURE: 98.4 F | SYSTOLIC BLOOD PRESSURE: 132 MMHG | OXYGEN SATURATION: 100 % | RESPIRATION RATE: 16 BRPM

## 2020-07-27 DIAGNOSIS — Z76.0 MEDICATION REFILL: ICD-10-CM

## 2020-07-27 DIAGNOSIS — L03.312 CELLULITIS OF BACK EXCEPT BUTTOCK: ICD-10-CM

## 2020-07-27 DIAGNOSIS — J45.901 MILD ASTHMA WITH ACUTE EXACERBATION, UNSPECIFIED WHETHER PERSISTENT: Primary | ICD-10-CM

## 2020-07-27 PROCEDURE — 99281 EMR DPT VST MAYX REQ PHY/QHP: CPT

## 2020-07-27 RX ORDER — ALBUTEROL SULFATE 0.83 MG/ML
2.5 SOLUTION RESPIRATORY (INHALATION)
Qty: 30 NEBULE | Refills: 0 | Status: SHIPPED | OUTPATIENT
Start: 2020-07-27 | End: 2021-10-14 | Stop reason: SDUPTHER

## 2020-07-27 RX ORDER — CEPHALEXIN 500 MG/1
500 CAPSULE ORAL 4 TIMES DAILY
Qty: 28 CAP | Refills: 0 | Status: SHIPPED | OUTPATIENT
Start: 2020-07-27 | End: 2020-08-03

## 2020-07-27 RX ORDER — CETIRIZINE HCL 10 MG
10 TABLET ORAL DAILY
Qty: 31 TAB | Refills: 0 | Status: SHIPPED | OUTPATIENT
Start: 2020-07-27 | End: 2022-02-09

## 2020-07-27 RX ORDER — PREDNISONE 50 MG/1
50 TABLET ORAL DAILY
Qty: 3 TAB | Refills: 0 | Status: SHIPPED | OUTPATIENT
Start: 2020-07-27 | End: 2020-07-30

## 2020-07-27 RX ORDER — ALBUTEROL SULFATE 90 UG/1
2 AEROSOL, METERED RESPIRATORY (INHALATION)
Qty: 1 INHALER | Refills: 0 | Status: SHIPPED | OUTPATIENT
Start: 2020-07-27 | End: 2020-09-08 | Stop reason: SDUPTHER

## 2020-07-27 RX ORDER — SULFAMETHOXAZOLE AND TRIMETHOPRIM 800; 160 MG/1; MG/1
1 TABLET ORAL 2 TIMES DAILY
Qty: 14 TAB | Refills: 0 | Status: SHIPPED | OUTPATIENT
Start: 2020-07-27 | End: 2020-08-03

## 2020-07-27 NOTE — ED PROVIDER NOTES
EMERGENCY DEPARTMENT HISTORY AND PHYSICAL EXAM    Date: 7/27/2020  Patient Name: Blanchie Hodgkin    History of Presenting Illness     Chief Complaint   Patient presents with    Shortness of Breath    Skin Problem         History Provided By: Patient    Chief Complaint: Shortness of breath, asthma exacerbation, skin lesion  Duration: Days  Timing: Gradually worsening  Location: Back  Quality: Aching  Severity: Moderate  Modifying Factors: None  Associated Symptoms: none       Additional History (Context): Blanchie Hodgkin is a 27 y.o. female with a history of asthma, diabetes and hypertension who presents today for history as listed above. Patient reports her main concern today is a lesion on her back. Reports it initially started out as something very small and has now gotten bigger. Has not tried thing for this at home. Patient also reports some wheezing that quickly resolved with her at home albuterol inhaler. Reports that she is running out of her albuterol at home. Denies any recent fevers, chills, nausea, vomiting, diarrhea, body aches, chest pain or current shortness of breath, or cough. PCP: Carmen Duran MD    Current Outpatient Medications   Medication Sig Dispense Refill    albuterol (PROVENTIL VENTOLIN) 2.5 mg /3 mL (0.083 %) nebu 3 mL by Nebulization route every four (4) hours as needed for Wheezing. 30 Nebule 0    albuterol (Ventolin HFA) 90 mcg/actuation inhaler Take 2 Puffs by inhalation every four (4) hours as needed for Wheezing. 1 Inhaler 0    predniSONE (DELTASONE) 50 mg tablet Take 1 Tab by mouth daily for 3 days. 3 Tab 0    cephALEXin (Keflex) 500 mg capsule Take 1 Cap by mouth four (4) times daily for 7 days. 28 Cap 0    trimethoprim-sulfamethoxazole (Bactrim DS) 160-800 mg per tablet Take 1 Tab by mouth two (2) times a day for 7 days. 14 Tab 0    cetirizine (ZyrTEC) 10 mg tablet Take 1 Tab by mouth daily.  31 Tab 0    multivit-min/iron/folic acid/K (BARIATRIC MULTIVITAMINS PO) Take  by mouth two (2) times a day.  OTHER Calcium chew 500 milligrams 3xday      cholecalciferol (Vitamin D3) (1000 Units /25 mcg) tablet Take 5,000 Units by mouth daily.  cyanocobalamin (Vitamin B-12) 1,000 mcg tablet Take 1,000 mcg by mouth daily.  ondansetron (Zofran ODT) 4 mg disintegrating tablet Take 1 Tab by mouth every eight (8) hours as needed for Nausea. Indications: prevent nausea and vomiting after surgery 10 Tab 0    albuterol (PROVENTIL HFA, VENTOLIN HFA, PROAIR HFA) 90 mcg/actuation inhaler 1-2 Puffs. Past History     Past Medical History:  Past Medical History:   Diagnosis Date    Asthma     Community acquired pneumonia     Diabetes ([address]     no meds    Hypertension     no meds    Obese     Umbilical hernia        Past Surgical History:  Past Surgical History:   Procedure Laterality Date    DILATION AND CURETTAGE      HX GI      gastric bypass    HX GYN      d and c       Family History:  Family History   Problem Relation Age of Onset   [facility] Asthma Mother     Hypertension Mother     Diabetes Mother     Asthma Father        Social History:  Social History     Tobacco Use    Smoking status: Former Smoker     Packs/day: 0.50     Types: Cigarettes     Last attempt to quit: 2019     Years since quittin.7    Smokeless tobacco: Never Used   Substance Use Topics    Alcohol use: Not Currently     Alcohol/week: 1.7 standard drinks     Types: 2 Standard drinks or equivalent per week     Frequency: 2-4 times a month     Comment: holidays/special occassion    Drug use: No       Allergies:  No Known Allergies      Review of Systems   Review of Systems   Constitutional: Negative for chills and fever. HENT: Negative for congestion, rhinorrhea and sore throat. Respiratory: Positive for shortness of breath and wheezing. Negative for cough. Cardiovascular: Negative for chest pain.    Gastrointestinal: Negative for abdominal pain, blood in stool, constipation, diarrhea, nausea and vomiting. Genitourinary: Negative for dysuria, frequency and hematuria. Musculoskeletal: Negative for back pain and myalgias. Skin: Negative for rash and wound. Skin lesion   Neurological: Negative for dizziness and headaches. All other systems reviewed and are negative. All Other Systems Negative  Physical Exam     Vitals:    07/27/20 1606   BP: (!) 132/93   Pulse: 86   Resp: 16   Temp: 98.4 °F (36.9 °C)   SpO2: 100%     Physical Exam  Vitals signs and nursing note reviewed. Constitutional:       General: She is not in acute distress. Appearance: She is well-developed. She is not diaphoretic. HENT:      Head: Normocephalic and atraumatic. Eyes:      Conjunctiva/sclera: Conjunctivae normal.   Neck:      Musculoskeletal: Normal range of motion and neck supple. Cardiovascular:      Rate and Rhythm: Normal rate and regular rhythm. Heart sounds: Normal heart sounds. Pulmonary:      Effort: Pulmonary effort is normal. No respiratory distress. Breath sounds: Normal breath sounds. No stridor, decreased air movement or transmitted upper airway sounds. No decreased breath sounds, wheezing or rhonchi. Comments: Clear breath sounds bilaterally. No respiratory distress, speaking in full sentences  Chest:      Chest wall: No tenderness. Abdominal:      General: Bowel sounds are normal. There is no distension. Palpations: Abdomen is soft. Tenderness: There is no abdominal tenderness. There is no guarding or rebound. Musculoskeletal:         General: No deformity. Skin:     General: Skin is warm and dry. Findings: Lesion present. Comments: Pea-sized area of induration noted to left lower back, no fluctuance. Neurological:      Mental Status: She is alert and oriented to person, place, and time. Deep Tendon Reflexes: Reflexes are normal and symmetric.            Diagnostic Study Results     Labs -   No results found for this or any previous visit (from the past 12 hour(s)). Radiologic Studies -   No orders to display     CT Results  (Last 48 hours)    None        CXR Results  (Last 48 hours)    None            Medical Decision Making   I am the first provider for this patient. I reviewed the vital signs, available nursing notes, past medical history, past surgical history, family history and social history. Vital Signs-Reviewed the patient's vital signs. Records Reviewed: Nursing Notes and Old Medical Records     Procedures: None   Procedures    Provider Notes (Medical Decision Making):     Differential: Asthma exacerbation, medication refill, abscess, acne, cellulitis      Plan: We will discharge home on antibiotics and have advised patient to do hot compresses at home to area of induration. No I&D needed at this time. Have advised patient to return in 2 days as needed if no improvement. Patient denies need for DuoNeb here in the ED. Will discharge home with albuterol, short course of steroids and antibiotics. Return precautions have been given. MED RECONCILIATION:  No current facility-administered medications for this encounter. Current Outpatient Medications   Medication Sig    albuterol (PROVENTIL VENTOLIN) 2.5 mg /3 mL (0.083 %) nebu 3 mL by Nebulization route every four (4) hours as needed for Wheezing.  albuterol (Ventolin HFA) 90 mcg/actuation inhaler Take 2 Puffs by inhalation every four (4) hours as needed for Wheezing.  predniSONE (DELTASONE) 50 mg tablet Take 1 Tab by mouth daily for 3 days.  cephALEXin (Keflex) 500 mg capsule Take 1 Cap by mouth four (4) times daily for 7 days.  trimethoprim-sulfamethoxazole (Bactrim DS) 160-800 mg per tablet Take 1 Tab by mouth two (2) times a day for 7 days.  cetirizine (ZyrTEC) 10 mg tablet Take 1 Tab by mouth daily.  multivit-min/iron/folic acid/K (BARIATRIC MULTIVITAMINS PO) Take  by mouth two (2) times a day.     OTHER Calcium chew 500 milligrams 3xday    cholecalciferol (Vitamin D3) (1000 Units /25 mcg) tablet Take 5,000 Units by mouth daily.  cyanocobalamin (Vitamin B-12) 1,000 mcg tablet Take 1,000 mcg by mouth daily.  ondansetron (Zofran ODT) 4 mg disintegrating tablet Take 1 Tab by mouth every eight (8) hours as needed for Nausea. Indications: prevent nausea and vomiting after surgery    albuterol (PROVENTIL HFA, VENTOLIN HFA, PROAIR HFA) 90 mcg/actuation inhaler 1-2 Puffs. Disposition:  Home     DISCHARGE NOTE:   Pt has been reexamined. Patient has no new complaints, changes, or physical findings. Care plan outlined and precautions discussed. Results of workup were reviewed with the patient. All medications were reviewed with the patient. All of pt's questions and concerns were addressed. Patient was instructed and agrees to follow up with PCP as well as to return to the ED upon further deterioration. Patient is ready to go home. Follow-up Information     Follow up With Specialties Details Why Contact Info    [facility] EMERGENCY DEPT Emergency Medicine  As needed [address] [place] [address]    Bobbi Donis MD Internal Medicine Schedule an appointment as soon as possible for a visit  [address] [place] [address]            Current Discharge Medication List      START taking these medications    Details   albuterol (PROVENTIL VENTOLIN) 2.5 mg /3 mL (0.083 %) nebu 3 mL by Nebulization route every four (4) hours as needed for Wheezing. Qty: 30 Nebule, Refills: 0      !! albuterol (Ventolin HFA) 90 mcg/actuation inhaler Take 2 Puffs by inhalation every four (4) hours as needed for Wheezing. Qty: 1 Inhaler, Refills: 0      predniSONE (DELTASONE) 50 mg tablet Take 1 Tab by mouth daily for 3 days. Qty: 3 Tab, Refills: 0      cephALEXin (Keflex) 500 mg capsule Take 1 Cap by mouth four (4) times daily for 7 days.   Qty: 28 Cap, Refills: 0      trimethoprim-sulfamethoxazole (Bactrim DS) 160-800 mg per tablet Take 1 Tab by mouth two (2) times a day for 7 days. Qty: 14 Tab, Refills: 0      cetirizine (ZyrTEC) 10 mg tablet Take 1 Tab by mouth daily. Qty: 31 Tab, Refills: 0       !! - Potential duplicate medications found. Please discuss with provider. CONTINUE these medications which have NOT CHANGED    Details   !! albuterol (PROVENTIL HFA, VENTOLIN HFA, PROAIR HFA) 90 mcg/actuation inhaler 1-2 Puffs. !! - Potential duplicate medications found. Please discuss with provider. Diagnosis     Clinical Impression:   1. Mild asthma with acute exacerbation, unspecified whether persistent    2. Medication refill    3. Cellulitis of back except buttock          \"Please note that this dictation was completed with [email or web address], the [employer] voice recognition software. Quite often unanticipated grammatical, syntax, homophones, and other interpretive errors are inadvertently transcribed by the computer software. Please disregard these errors. Please excuse any errors that have escaped final proofreading. \"

## 2020-07-27 NOTE — ED TRIAGE NOTES
Pt presents via triage from home with complaints of shortness of breath, reports hx of asthma. Pt also reports an abscess on her lower back. Pt rates pain 9/10.        Past Medical History:   Diagnosis Date    Asthma     Community acquired pneumonia     Diabetes ([facility] [address]     no meds    Hypertension     no meds    Obese     Umbilical hernia

## 2020-07-27 NOTE — DISCHARGE INSTRUCTIONS
Patient Education     Learning About Asthma Triggers  What are triggers? When you have asthma, certain things can make your symptoms worse. These are called triggers. They include:  · Cigarette smoke or air pollution. · Things you are allergic to, such as:  ¨ Pollen, mold, or dust mites. ¨ Pet hair, skin, or saliva. · Illnesses, like colds, flu, or pneumonia. · Exercise. · Dry, cold air. How do triggers affect asthma? Triggers can make it harder for your lungs to work as they should and can lead to sudden difficulty breathing and other symptoms. When you are around a trigger, an asthma attack is more likely. If your symptoms are severe, you may need emergency treatment or have to go to the hospital for treatment. If you know what your triggers are and can avoid them, you may be able to prevent asthma attacks, reduce how often you have them, and make them less severe. What can you do to avoid triggers? The first thing is to know your triggers. When you are having symptoms, note the things around you that might be causing them. Then look for patterns in what may be triggering your symptoms. Record your triggers on a piece of paper or in an asthma diary. When you have your list of possible triggers, work with your doctor to find ways to avoid them. You also can check how well your lungs are working by measuring your peak expiratory flow (PEF) throughout the day. Your PEF may drop when you are near things that trigger symptoms. Here are some ways to avoid a few common triggers. · Do not smoke or allow others to smoke around you. If you need help quitting, talk to your doctor about stop-smoking programs and medicines. These can increase your chances of quitting for good. · If there is a lot of pollution, pollen, or dust outside, stay at home and keep your windows closed. Use an air conditioner or air filter in your home.  Check your local weather report or newspaper for air quality and pollen reports. · Get a flu shot every year. Talk to your doctor about getting a pneumococcal shot. Wash your hands often to prevent infections. · Avoid exercising outdoors in cold weather. If you are outdoors in cold weather, wear a scarf around your face and breathe through your nose. How can you manage an asthma attack? · If you have an asthma action plan, follow the plan. In general:  ¨ Use your quick-relief inhaler as directed by your doctor. If your symptoms do not get better after you use your medicine, have someone take you to the emergency room. Call an ambulance if needed. ¨ If your doctor has given you other inhaled medicines or steroid pills, take them as directed. Where can you learn more? Go to [employer]  Enter M564 in the search box to learn more about \"Learning About Asthma Triggers. \"   © 1390-3816 Healthwise, Incorporated. Care instructions adapted under license by [employer] (which disclaims liability or warranty for this information). This care instruction is for use with your licensed healthcare professional. If you have questions about a medical condition or this instruction, always ask your healthcare professional. [place] [address] any warranty or liability for your use of this information. Content Version: 81.2.372359;  Last Revised: February 23, 2012

## 2020-09-04 ENCOUNTER — HOSPITAL ENCOUNTER (OUTPATIENT)
Dept: LAB | Age: 35
Discharge: HOME | End: 2020-09-04
Payer: COMMERCIAL

## 2020-09-04 DIAGNOSIS — K91.2 POSTOPERATIVE INTESTINAL MALABSORPTION: ICD-10-CM

## 2020-09-04 DIAGNOSIS — K91.2 POSTOPERATIVE MALABSORPTION: ICD-10-CM

## 2020-09-04 LAB
25(OH)D3 SERPL-MCNC: 20.4 NG/ML (ref 30–100)
ALBUMIN SERPL-MCNC: 3.4 G/DL (ref 3.4–5)
ANION GAP SERPL CALC-SCNC: 7 MMOL/L (ref 3–18)
BASOPHILS # BLD: 0 K/UL (ref 0–0.1)
BASOPHILS NFR BLD: 0 % (ref 0–2)
BUN SERPL-MCNC: 8 MG/DL (ref 7–18)
BUN/CREAT SERPL: 13 (ref 12–20)
CALCIUM SERPL-MCNC: 9.1 MG/DL (ref 8.5–10.1)
CHLORIDE SERPL-SCNC: 111 MMOL/L (ref 100–111)
CHOLEST SERPL-MCNC: 115 MG/DL
CO2 SERPL-SCNC: 29 MMOL/L (ref 21–32)
CREAT SERPL-MCNC: 0.64 MG/DL (ref 0.6–1.3)
DIFFERENTIAL METHOD BLD: ABNORMAL
EOSINOPHIL # BLD: 0.1 K/UL (ref 0–0.4)
EOSINOPHIL NFR BLD: 2 % (ref 0–5)
ERYTHROCYTE [DISTWIDTH] IN BLOOD BY AUTOMATED COUNT: 16 % (ref 11.6–14.5)
FERRITIN SERPL-MCNC: 27 NG/ML (ref 8–388)
FOLATE SERPL-MCNC: 7.3 NG/ML (ref 3.1–17.5)
GLUCOSE SERPL-MCNC: 99 MG/DL (ref 74–99)
HCT VFR BLD AUTO: 38.4 % (ref 35–45)
HDLC SERPL-MCNC: 47 MG/DL (ref 40–60)
HDLC SERPL: 2.4 {RATIO} (ref 0–5)
HGB BLD-MCNC: 12.4 G/DL (ref 12–16)
IRON SERPL-MCNC: 26 UG/DL (ref 50–175)
LDLC SERPL CALC-MCNC: 51 MG/DL (ref 0–100)
LIPID PROFILE,FLP: NORMAL
LYMPHOCYTES # BLD: 2.2 K/UL (ref 0.9–3.6)
LYMPHOCYTES NFR BLD: 37 % (ref 21–52)
MCH RBC QN AUTO: 26.4 PG (ref 24–34)
MCHC RBC AUTO-ENTMCNC: 32.3 G/DL (ref 31–37)
MCV RBC AUTO: 81.9 FL (ref 74–97)
MONOCYTES # BLD: 0.2 K/UL (ref 0.05–1.2)
MONOCYTES NFR BLD: 4 % (ref 3–10)
NEUTS SEG # BLD: 3.4 K/UL (ref 1.8–8)
NEUTS SEG NFR BLD: 57 % (ref 40–73)
PLATELET # BLD AUTO: 254 K/UL (ref 135–420)
PMV BLD AUTO: 10.8 FL (ref 9.2–11.8)
POTASSIUM SERPL-SCNC: 3.9 MMOL/L (ref 3.5–5.5)
RBC # BLD AUTO: 4.69 M/UL (ref 4.2–5.3)
SODIUM SERPL-SCNC: 147 MMOL/L (ref 136–145)
TRIGL SERPL-MCNC: 85 MG/DL (ref ?–150)
VIT B12 SERPL-MCNC: 541 PG/ML (ref 211–911)
VLDLC SERPL CALC-MCNC: 17 MG/DL
WBC # BLD AUTO: 6 K/UL (ref 4.6–13.2)

## 2020-09-04 PROCEDURE — 83540 ASSAY OF IRON: CPT

## 2020-09-04 PROCEDURE — 85025 COMPLETE CBC W/AUTO DIFF WBC: CPT

## 2020-09-04 PROCEDURE — 80061 LIPID PANEL: CPT

## 2020-09-04 PROCEDURE — 80048 BASIC METABOLIC PNL TOTAL CA: CPT

## 2020-09-04 PROCEDURE — 82306 VITAMIN D 25 HYDROXY: CPT

## 2020-09-04 PROCEDURE — 82607 VITAMIN B-12: CPT

## 2020-09-04 PROCEDURE — 82040 ASSAY OF SERUM ALBUMIN: CPT

## 2020-09-04 PROCEDURE — 82728 ASSAY OF FERRITIN: CPT

## 2020-09-04 PROCEDURE — 36415 COLL VENOUS BLD VENIPUNCTURE: CPT

## 2020-09-04 PROCEDURE — 84425 ASSAY OF VITAMIN B-1: CPT

## 2020-09-08 ENCOUNTER — OFFICE VISIT (OUTPATIENT)
Dept: SURGERY | Age: 35
End: 2020-09-08

## 2020-09-08 VITALS
HEART RATE: 82 BPM | TEMPERATURE: 98.1 F | RESPIRATION RATE: 18 BRPM | HEIGHT: 67 IN | WEIGHT: 293 LBS | DIASTOLIC BLOOD PRESSURE: 80 MMHG | BODY MASS INDEX: 45.99 KG/M2 | SYSTOLIC BLOOD PRESSURE: 148 MMHG

## 2020-09-08 DIAGNOSIS — K91.2 POSTOPERATIVE MALABSORPTION: Primary | ICD-10-CM

## 2020-09-08 RX ORDER — PHENOL/SODIUM PHENOLATE
20 AEROSOL, SPRAY (ML) MUCOUS MEMBRANE DAILY
COMMUNITY
End: 2020-12-10

## 2020-09-08 NOTE — PROGRESS NOTES
Chief Complaint   Patient presents with    Follow-up     gastric bypass 5/2/2020   1. Have you been to the ER, urgent care clinic since your last visit? Hospitalized since your last visit? Yes er for abd pain    2. Have you seen or consulted any other health care providers outside of the [address] [place] [address] since your last visit? Include any pap smears or colon screening. No     Pt ID confirmed    Weight Loss Metrics 9/8/2020 9/8/2020 6/5/2020 6/5/2020 5/18/2020 5/13/2020 5/7/2020   Pre op / Initial Wt 415 - 415 - - - 415.2   Today's Wt - 343 lb - 390 lb - 414 lb -   BMI - 53.72 kg/m2 - 61.08 kg/m2 62.95 kg/m2 - -   Ideal Body Wt 140 - 140 - - - 140   Excess Body Wt 275 - 275 - - - 275.2   Goal Wt 195 - 195 - - - -   Wt loss to date 72 - 25 - - - 0   % Wt Loss 0.33 - 0.11 - - - 0   80%  - 220 - - - 220.16       Body mass index is 53.72 kg/m².     Post op medications:  MVI: 2x  Calcium Citrate: 500 milligrams  Actigal none  B-12 1000 micrograms  Vit D 3 5000 units

## 2020-09-08 NOTE — PROGRESS NOTES
Bariatric Follow-Up Evaluation    Main Bridges is a 27 y.o. black female who presents in follow-up status post laparoscopic gastric bypass May 19, 2020 with no complaints relative to her bariatric history. Compliant with intolerant of a regular bariatric diet with appropriate early satiety and no specific food intolerances or pathologic emesis. The patient has not attempted high-density carbohydrates and therefore is not experience dumping syndrome  Compliant with the multivitamin calcium citrate vitamin B1 vitamin B12 and vitamin D supplementation protocol  Exercise regimen: Walking 45 to 60 minutes on a daily basis  Comorbidities: Hypertension, adult-onset diabetes mellitus, hypertriglyceridemia, Gastrosoft reflux disease, reactive airway disease, stricture incontinence, clinical obstructive sleep apnea-resolved                          Weight related arthropathy-improved  Preoperative weight 415  Current weight 343 BMI 54  Estimated postsurgical weight loss: 220  Current weight loss: 72  Postoperative goal weight: 195  Percentage weight loss: 32% / 4 months  Weight Loss Metrics 9/8/2020 9/8/2020 6/5/2020 6/5/2020 5/18/2020 5/13/2020 5/7/2020   Pre op / Initial Wt 415 - 415 - - - 415.2   Today's Wt - 343 lb - 390 lb - 414 lb -   BMI - 53.72 kg/m2 - 61.08 kg/m2 62.95 kg/m2 - -   Ideal Body Wt 140 - 140 - - - 140   Excess Body Wt 275 - 275 - - - 275.2   Goal Wt 195 - 195 - - - -   Wt loss to date 72 - 25 - - - 0   % Wt Loss 0.33 - 0.11 - - - 0   80%  - 220 - - - 220.16       No Known Allergies    Current Outpatient Medications on File Prior to Visit   Medication Sig Dispense Refill    Omeprazole delayed release (PRILOSEC D/R) 20 mg tablet Take 20 mg by mouth daily.  albuterol (PROVENTIL VENTOLIN) 2.5 mg /3 mL (0.083 %) nebu 3 mL by Nebulization route every four (4) hours as needed for Wheezing. 30 Nebule 0    cetirizine (ZyrTEC) 10 mg tablet Take 1 Tab by mouth daily.  31 Tab 0    multivit-min/iron/folic acid/K (BARIATRIC MULTIVITAMINS PO) Take  by mouth two (2) times a day.  OTHER Calcium chew 500 milligrams 3xday      cholecalciferol (Vitamin D3) (1000 Units /25 mcg) tablet Take 5,000 Units by mouth daily.  cyanocobalamin (Vitamin B-12) 1,000 mcg tablet Take 1,000 mcg by mouth daily.  ondansetron (Zofran ODT) 4 mg disintegrating tablet Take 1 Tab by mouth every eight (8) hours as needed for Nausea. Indications: prevent nausea and vomiting after surgery 10 Tab 0    albuterol (PROVENTIL HFA, VENTOLIN HFA, PROAIR HFA) 90 mcg/actuation inhaler 1-2 Puffs. No current facility-administered medications on file prior to visit. Past Medical History:   Diagnosis Date    Asthma     Community acquired pneumonia     Diabetes ([facility] [address]     no meds    Hypertension     no meds    Obese     Umbilical hernia        Past Surgical History:   Procedure Laterality Date    DILATION AND CURETTAGE      HX GI      gastric bypass    HX GYN      d and c       Social History     Tobacco Use    Smoking status: Former Smoker     Packs/day: 0.50     Types: Cigarettes     Last attempt to quit: 2019     Years since quittin.8    Smokeless tobacco: Never Used   Substance Use Topics    Alcohol use: Not Currently     Alcohol/week: 1.7 standard drinks     Types: 2 Standard drinks or equivalent per week     Frequency: 2-4 times a month     Comment: holidays/special occassion    Drug use: No       Family History   Problem Relation Age of Onset    Asthma Mother     Hypertension Mother     Diabetes Mother     Asthma Father        ROS:  General: Negative for fevers, chills, night sweats, fatigue, weight loss, or weight gain. GI: Negative for abdominal pain, change in bowel habits, hematochezia, melena, or GERD. Integumentary: Negative for dermatitis or abnormal moles.     HEENT: Negative for visual changes, vertigo, epistaxis, dysphagia, or hoarseness    Cardiac: Negative for chest pain, palpitations, hypertension, edema, or varicosities    Resp: Negative for cough, shortness of breath, wheezing, hemoptysis, snoring, or reactive airway disease    : Negative for hematuria, dysuria, frequency, urgency, nocturia, or stress urinary incontinence    MSK:  Negative for weakness, joint pain, or arthritis    Endocrine: Negative for diabetes, thyroid disease, polyuria, polydipsia, polyphagia, poor wound, heat intolerance, or cold intolerance. Lymph/Heme: Negative for anemia, bruising, or history of blood transfusions. Neuro:  Negative for dizziness, headache, fainting, seizures, and stroke. Psychiatry:  Negative for anxiety or depression    Physical Exam    Visit Vitals  /80   Pulse 82   Temp 98.1 °F (36.7 °C)   Resp 18   Ht 5' 7\" (1.702 m)   Wt 155.6 kg (343 lb)   BMI 53.72 kg/m²       Nursing note reviewed. General: 27 y.o. female is in no acute distress. Resp: Clear to auscultation bilaterally, no wheezing, rhonchi, or rales, excursions normal and symmetrical.  Cardio: Regular rate and rhythm, no murmurs, clicks, gallops, or rubs. No edema or varicosities. Abdomen: Obese, soft, nontender, nondistended, normoactive bowel sounds, no hernias, no hepatosplenomegaly,   Psych: Alert and oriented to person, place, and time.     September 4, 2020 CBC, CMP, cholesterol panel, iron, vitamin B1, B12, folate, vitamin D                                   Iron 26, sodium 147, vitamin D 20.4, with a vitamin B1 level pending and the remainder laboratory parameters within normal limits    Impression    Super obesity status post laparoscopic gastric bypass May 19, 2020 with less than expected weight loss but with appropriate resolution of obesity related comorbidities      Plan    Continue compliance with the bariatric diet  Continue vitamin supplementation protocol increasing vitamin D and initiating iron/vitamin C  Continue current exercise regimen  Follow-up November 2020 with labs for 6-month bariatric surgical evaluation

## 2020-09-10 LAB — VIT B1 BLD-SCNC: 105.1 NMOL/L (ref 66.5–200)

## 2020-09-11 ENCOUNTER — DOCUMENTATION ONLY (OUTPATIENT)
Dept: BARIATRICS/WEIGHT MGMT | Age: 35
End: 2020-09-11

## 2020-09-11 NOTE — PROGRESS NOTES
9/11/20:  Patient was contacted today and made aware that provider will not be available for the 9 month post op class scheduled for Friday, March 5, 2021 at 9:30 am.  Patient may contact me to get moved into a 1-1 slot or another 9 month post op class.     Kings Art MS RD

## 2020-11-17 ENCOUNTER — TELEPHONE (OUTPATIENT)
Dept: SURGERY | Age: 35
End: 2020-11-17

## 2020-11-17 NOTE — TELEPHONE ENCOUNTER
Called patient and left message to please call office at earliest convenience. Left appointment reminder and reminder that patient needs to get labs drawn prior to this appointment.

## 2020-12-10 ENCOUNTER — HOSPITAL ENCOUNTER (EMERGENCY)
Age: 35
Discharge: HOME | End: 2020-12-11
Attending: EMERGENCY MEDICINE
Payer: COMMERCIAL

## 2020-12-10 ENCOUNTER — APPOINTMENT (OUTPATIENT)
Dept: CT IMAGING | Age: 35
End: 2020-12-10
Attending: PHYSICIAN ASSISTANT
Payer: COMMERCIAL

## 2020-12-10 VITALS
BODY MASS INDEX: 43.4 KG/M2 | SYSTOLIC BLOOD PRESSURE: 135 MMHG | HEIGHT: 69 IN | DIASTOLIC BLOOD PRESSURE: 93 MMHG | TEMPERATURE: 98.4 F | HEART RATE: 63 BPM | OXYGEN SATURATION: 99 % | RESPIRATION RATE: 18 BRPM | WEIGHT: 293 LBS

## 2020-12-10 DIAGNOSIS — R10.84 ABDOMINAL PAIN, GENERALIZED: Primary | ICD-10-CM

## 2020-12-10 DIAGNOSIS — K42.9 UMBILICAL HERNIA WITHOUT OBSTRUCTION AND WITHOUT GANGRENE: ICD-10-CM

## 2020-12-10 LAB
ALBUMIN SERPL-MCNC: 3.2 G/DL (ref 3.4–5)
ALBUMIN/GLOB SERPL: 0.8 {RATIO} (ref 0.8–1.7)
ALP SERPL-CCNC: 75 U/L (ref 45–117)
ALT SERPL-CCNC: 29 U/L (ref 13–56)
ANION GAP SERPL CALC-SCNC: 2 MMOL/L (ref 3–18)
AST SERPL-CCNC: 33 U/L (ref 10–38)
BASOPHILS # BLD: 0 K/UL (ref 0–0.1)
BASOPHILS NFR BLD: 0 % (ref 0–2)
BILIRUB SERPL-MCNC: 0.4 MG/DL (ref 0.2–1)
BUN SERPL-MCNC: 12 MG/DL (ref 7–18)
BUN/CREAT SERPL: 17 (ref 12–20)
CALCIUM SERPL-MCNC: 9.1 MG/DL (ref 8.5–10.1)
CHLORIDE SERPL-SCNC: 111 MMOL/L (ref 100–111)
CO2 SERPL-SCNC: 29 MMOL/L (ref 21–32)
CREAT SERPL-MCNC: 0.71 MG/DL (ref 0.6–1.3)
DIFFERENTIAL METHOD BLD: ABNORMAL
EOSINOPHIL # BLD: 0.2 K/UL (ref 0–0.4)
EOSINOPHIL NFR BLD: 2 % (ref 0–5)
ERYTHROCYTE [DISTWIDTH] IN BLOOD BY AUTOMATED COUNT: 14.5 % (ref 11.6–14.5)
GLOBULIN SER CALC-MCNC: 3.8 G/DL (ref 2–4)
GLUCOSE SERPL-MCNC: 91 MG/DL (ref 74–99)
HCG SERPL QL: NEGATIVE
HCT VFR BLD AUTO: 39.5 % (ref 35–45)
HGB BLD-MCNC: 12.9 G/DL (ref 12–16)
LIPASE SERPL-CCNC: 84 U/L (ref 73–393)
LYMPHOCYTES # BLD: 3.8 K/UL (ref 0.9–3.6)
LYMPHOCYTES NFR BLD: 46 % (ref 21–52)
MAGNESIUM SERPL-MCNC: 2.5 MG/DL (ref 1.6–2.6)
MCH RBC QN AUTO: 25.9 PG (ref 24–34)
MCHC RBC AUTO-ENTMCNC: 32.7 G/DL (ref 31–37)
MCV RBC AUTO: 79.2 FL (ref 74–97)
MONOCYTES # BLD: 0.4 K/UL (ref 0.05–1.2)
MONOCYTES NFR BLD: 5 % (ref 3–10)
NEUTS SEG # BLD: 3.9 K/UL (ref 1.8–8)
NEUTS SEG NFR BLD: 47 % (ref 40–73)
PLATELET # BLD AUTO: 274 K/UL (ref 135–420)
PMV BLD AUTO: 10.8 FL (ref 9.2–11.8)
POTASSIUM SERPL-SCNC: 4.9 MMOL/L (ref 3.5–5.5)
PROT SERPL-MCNC: 7 G/DL (ref 6.4–8.2)
RBC # BLD AUTO: 4.99 M/UL (ref 4.2–5.3)
SODIUM SERPL-SCNC: 142 MMOL/L (ref 136–145)
WBC # BLD AUTO: 8.3 K/UL (ref 4.6–13.2)

## 2020-12-10 PROCEDURE — 99281 EMR DPT VST MAYX REQ PHY/QHP: CPT

## 2020-12-10 PROCEDURE — 74011000636 HC RX REV CODE- 636: Performed by: EMERGENCY MEDICINE

## 2020-12-10 PROCEDURE — 83735 ASSAY OF MAGNESIUM: CPT

## 2020-12-10 PROCEDURE — 99282 EMERGENCY DEPT VISIT SF MDM: CPT

## 2020-12-10 PROCEDURE — 74177 CT ABD & PELVIS W/CONTRAST: CPT

## 2020-12-10 PROCEDURE — 84703 CHORIONIC GONADOTROPIN ASSAY: CPT

## 2020-12-10 PROCEDURE — 80053 COMPREHEN METABOLIC PANEL: CPT

## 2020-12-10 PROCEDURE — 83690 ASSAY OF LIPASE: CPT

## 2020-12-10 PROCEDURE — 85025 COMPLETE CBC W/AUTO DIFF WBC: CPT

## 2020-12-10 RX ORDER — FAMOTIDINE 20 MG/1
20 TABLET, FILM COATED ORAL 2 TIMES DAILY
Qty: 20 TAB | Refills: 0 | Status: SHIPPED | OUTPATIENT
Start: 2020-12-10 | End: 2020-12-20

## 2020-12-10 RX ADMIN — IOPAMIDOL 100 ML: 612 INJECTION, SOLUTION INTRAVENOUS at 20:41

## 2020-12-10 NOTE — ED TRIAGE NOTES
Patient comes into the ER with a complaint of abdominal pain that began yesterday. Patient states eating and drinking makes it worse. Denies nausea, vomit, diarrhea. Rates it a 10/10 on the pain scale located in all four quadrants of the abdomen.

## 2020-12-10 NOTE — ED PROVIDER NOTES
EMERGENCY DEPARTMENT HISTORY AND PHYSICAL EXAM    Date: 12/10/2020  Patient Name: Jair Berman    History of Presenting Illness     Chief Complaint   Patient presents with    Abdominal Pain         History Provided By: patient    Chief Complaint: abd pain   Duration: 2 days  Timing:  acute  Location: diffuse abd  Quality: throbbing   Severity: moderate  Modifying Factors: worse after eating and drinking   Associated Symptoms: none       Additional History (Context): Jair Berman is a 27 y.o. female with PMH asthma, htn, diabetes, and obesity who presents with complaints of 2 days of generalized abdominal pain. Patient states the pain is worse whenever she attempts to eat or drink anything. She denies nausea, vomiting, diarrhea, constipation. Denies urinary symptoms. No known sick exposures. Denies fever or chills. No other complaints are reported at this time. PCP: Janet Montana MD    Current Outpatient Medications   Medication Sig Dispense Refill    famotidine (Pepcid) 20 mg tablet Take 1 Tab by mouth two (2) times a day for 10 days. 20 Tab 0    albuterol (PROVENTIL VENTOLIN) 2.5 mg /3 mL (0.083 %) nebu 3 mL by Nebulization route every four (4) hours as needed for Wheezing. 30 Nebule 0    cetirizine (ZyrTEC) 10 mg tablet Take 1 Tab by mouth daily. 31 Tab 0    multivit-min/iron/folic acid/K (BARIATRIC MULTIVITAMINS PO) Take  by mouth two (2) times a day.  OTHER Calcium chew 500 milligrams 3xday      cholecalciferol (Vitamin D3) (1000 Units /25 mcg) tablet Take 5,000 Units by mouth daily.  cyanocobalamin (Vitamin B-12) 1,000 mcg tablet Take 1,000 mcg by mouth daily.  ondansetron (Zofran ODT) 4 mg disintegrating tablet Take 1 Tab by mouth every eight (8) hours as needed for Nausea. Indications: prevent nausea and vomiting after surgery 10 Tab 0    albuterol (PROVENTIL HFA, VENTOLIN HFA, PROAIR HFA) 90 mcg/actuation inhaler 1-2 Puffs.          Past History     Past Medical History:  Past Medical History:   Diagnosis Date    Asthma     Community acquired pneumonia     Diabetes ([address]     no meds    Hypertension     no meds    Obese     Umbilical hernia        Past Surgical History:  Past Surgical History:   Procedure Laterality Date    DILATION AND CURETTAGE      HX GI      gastric bypass    HX GYN      d and c       Family History:  Family History   Problem Relation Age of Onset   [facility] Asthma Mother     Hypertension Mother     Diabetes Mother     Asthma Father        Social History:  Social History     Tobacco Use    Smoking status: Former Smoker     Packs/day: 0.50     Types: Cigarettes     Last attempt to quit: 2019     Years since quittin.0    Smokeless tobacco: Never Used   Substance Use Topics    Alcohol use: Not Currently     Alcohol/week: 1.7 standard drinks     Types: 2 Standard drinks or equivalent per week     Frequency: 2-4 times a month     Comment: holidays/special occassion    Drug use: No       Allergies:  No Known Allergies      Review of Systems   Review of Systems   Constitutional: Negative. Negative for chills and fever. HENT: Negative. Negative for congestion, ear pain and rhinorrhea. Eyes: Negative. Negative for pain and redness. Respiratory: Negative. Negative for cough, shortness of breath, wheezing and stridor. Cardiovascular: Negative. Negative for chest pain and leg swelling. Gastrointestinal: Positive for abdominal pain. Negative for constipation, diarrhea, nausea and vomiting. Genitourinary: Negative. Negative for dysuria and frequency. Musculoskeletal: Negative. Negative for back pain and neck pain. Skin: Negative. Negative for rash and wound. Neurological: Negative. Negative for dizziness, seizures, syncope and headaches. All other systems reviewed and are negative.     All Other Systems Negative  Physical Exam     Vitals:    12/10/20 1735   BP: (!) 135/93   Pulse: 63   Resp: 18   Temp: 98.4 °F (36.9 °C)   SpO2: 99% Weight: 136.1 kg (300 lb)   Height: 5' 9\" (1.753 m)     Physical Exam  Vitals signs and nursing note reviewed. Constitutional:       General: She is not in acute distress. Appearance: She is well-developed. She is obese. She is not diaphoretic. HENT:      Head: Normocephalic and atraumatic. Eyes:      General: No scleral icterus. Right eye: No discharge. Left eye: No discharge. Conjunctiva/sclera: Conjunctivae normal.   Neck:      Musculoskeletal: Normal range of motion and neck supple. Cardiovascular:      Rate and Rhythm: Normal rate and regular rhythm. Heart sounds: Normal heart sounds. No murmur. No friction rub. No gallop. Pulmonary:      Effort: Pulmonary effort is normal. No respiratory distress. Breath sounds: Normal breath sounds. No stridor. No wheezing, rhonchi or rales. Abdominal:      General: Bowel sounds are normal. There is no distension. Palpations: Abdomen is soft. Tenderness: There is abdominal tenderness. There is no guarding. Hernia: A hernia is present. Comments: Reducible umbilical hernia noted. Abdomen is soft, generalized tenderness palpation noted on exam without guarding or point tenderness. No peritoneal signs noted. Musculoskeletal: Normal range of motion. Skin:     General: Skin is warm and dry. Findings: No erythema or rash. Neurological:      Mental Status: She is alert and oriented to person, place, and time. Coordination: Coordination normal.      Comments: Gait is steady and patient exhibits no evidence of ataxia. Patient is able to ambulate without difficulty. No focal neurological deficit noted. No facial droop, slurred speech, or evidence of altered mentation noted on exam.     Psychiatric:         Behavior: Behavior normal.         Thought Content:  Thought content normal.                Diagnostic Study Results     Labs -     Recent Results (from the past 12 hour(s))   CBC WITH AUTOMATED DIFF Collection Time: 12/10/20  5:50 PM   Result Value Ref Range    WBC 8.3 4.6 - 13.2 K/uL    RBC 4.99 4.20 - 5.30 M/uL    HGB 12.9 12.0 - 16.0 g/dL    HCT 39.5 35.0 - 45.0 %    MCV 79.2 74.0 - 97.0 FL    MCH 25.9 24.0 - 34.0 PG    MCHC 32.7 31.0 - 37.0 g/dL    RDW 14.5 11.6 - 14.5 %    PLATELET 045 953 - 396 K/uL    MPV 10.8 9.2 - 11.8 FL    NEUTROPHILS 47 40 - 73 %    LYMPHOCYTES 46 21 - 52 %    MONOCYTES 5 3 - 10 %    EOSINOPHILS 2 0 - 5 %    BASOPHILS 0 0 - 2 %    ABS. NEUTROPHILS 3.9 1.8 - 8.0 K/UL    ABS. LYMPHOCYTES 3.8 (H) 0.9 - 3.6 K/UL    ABS. MONOCYTES 0.4 0.05 - 1.2 K/UL    ABS. EOSINOPHILS 0.2 0.0 - 0.4 K/UL    ABS. BASOPHILS 0.0 0.0 - 0.1 K/UL    DF AUTOMATED     METABOLIC PANEL, COMPREHENSIVE    Collection Time: 12/10/20  5:50 PM   Result Value Ref Range    Sodium 142 136 - 145 mmol/L    Potassium 4.9 3.5 - 5.5 mmol/L    Chloride 111 100 - 111 mmol/L    CO2 29 21 - 32 mmol/L    Anion gap 2 (L) 3.0 - 18 mmol/L    Glucose 91 74 - 99 mg/dL    BUN 12 7.0 - 18 MG/DL    Creatinine 0.71 0.6 - 1.3 MG/DL    BUN/Creatinine ratio 17 12 - 20      GFR est AA >60 >60 ml/min/1.73m2    GFR est non-AA >60 >60 ml/min/1.73m2    Calcium 9.1 8.5 - 10.1 MG/DL    Bilirubin, total 0.4 0.2 - 1.0 MG/DL    ALT (SGPT) 29 13 - 56 U/L    AST (SGOT) 33 10 - 38 U/L    Alk. phosphatase 75 45 - 117 U/L    Protein, total 7.0 6.4 - 8.2 g/dL    Albumin 3.2 (L) 3.4 - 5.0 g/dL    Globulin 3.8 2.0 - 4.0 g/dL    A-G Ratio 0.8 0.8 - 1.7     LIPASE    Collection Time: 12/10/20  5:50 PM   Result Value Ref Range    Lipase 84 73 - 393 U/L   MAGNESIUM    Collection Time: 12/10/20  5:50 PM   Result Value Ref Range    Magnesium 2.5 1.6 - 2.6 mg/dL   HCG QL SERUM    Collection Time: 12/10/20  5:50 PM   Result Value Ref Range    HCG, Ql. Negative NEG         Radiologic Studies -   CT ABD PELV W CONT   Final Result   IMPRESSION:      Fat filled umbilical hernia defect with mild associated inflammatory fat   stranding. No focal fluid collection. No herniated small bowel. CT Results  (Last 48 hours)               12/10/20 2044  CT ABD PELV W CONT Final result    Impression:  IMPRESSION:       Fat filled umbilical hernia defect with mild associated inflammatory fat   stranding. No focal fluid collection. No herniated small bowel. Narrative:  CT ABD PELV W CONT: 12/10/2020 8:44 PM       CLINICAL INFORMATION   Generalized abdominal pain. COMPARISON   None. TECHNIQUE   Axial CT images of the abdomen and pelvis obtained following the administration   of intravenous contrast. Sagittal and coronal reformations performed. The following CT dose reduction techniques were utilized: automated exposure   control and/or adjustment of the mA and/or kV according to patient size, and the   use of iterative reconstruction technique       FINDINGS   Lung base: No evidence of pulmonary parenchymal consolidation. Liver: Focal fat along the falciform ligament. .   Gallbladder and biliary tree: No calcified gallstones. Normal caliber wall. No   intra- or extrahepatic biliary ductal dilation. Pancreas: Normal.   Spleen: Spleen at the upper limits of normal in size. .   Adrenals: Normal.   Kidneys and ureters: Normal.       Stomach: Postsurgical changes. Small hiatal hernia. Small bowel: Normal caliber. Large bowel: Normal. The appendix is normal, noted to be projecting superiorly   at the anterior margin of the falciform ligament. Lymph nodes: No pathologically enlarged lymph nodes. Vessels: No aneurysm. Peritoneum: No ascites or free air. No other fluid collection. Retroperitoneum: Normal.   Abdominal wall: There is a umbilical hernia defect measuring 3.2 cm in   transverse dimension and 3.5 cm in craniocaudal dimension. There is herniated   fat contents with mild inflammatory fat stranding. No focal fluid collection.        Bladder: Normal.   Reproductive organs: Normal CT appearance. Osseous structures: No acute findings. CXR Results  (Last 48 hours)    None            Medical Decision Making   I am the first provider for this patient. I reviewed the vital signs, available nursing notes, past medical history, past surgical history, family history and social history. Vital Signs-Reviewed the patient's vital signs. Records Reviewed: Courtney Henriquez PA-C     Procedures:  Procedures    Provider Notes (Medical Decision Making): Impression:  abd pain, umbilical hernia    Labs essentially normal, serum hcg negative, patient has not yet provided a specimen for testing. CT abdomen pelvis shows umbilical hernia containing fat, mild stranding. Hernia is reducible on exam, consulted with ED attending Dr. Rian Fleming and she agrees with the plan to d/c home with GI and outpatient general surgery follow-up. Pt made aware and agrees with this plan. Courtney Henriquez PA-C     MED RECONCILIATION:  No current facility-administered medications for this encounter. Current Outpatient Medications   Medication Sig    famotidine (Pepcid) 20 mg tablet Take 1 Tab by mouth two (2) times a day for 10 days.  albuterol (PROVENTIL VENTOLIN) 2.5 mg /3 mL (0.083 %) nebu 3 mL by Nebulization route every four (4) hours as needed for Wheezing.  cetirizine (ZyrTEC) 10 mg tablet Take 1 Tab by mouth daily.  multivit-min/iron/folic acid/K (BARIATRIC MULTIVITAMINS PO) Take  by mouth two (2) times a day.  OTHER Calcium chew 500 milligrams 3xday    cholecalciferol (Vitamin D3) (1000 Units /25 mcg) tablet Take 5,000 Units by mouth daily.  cyanocobalamin (Vitamin B-12) 1,000 mcg tablet Take 1,000 mcg by mouth daily.  ondansetron (Zofran ODT) 4 mg disintegrating tablet Take 1 Tab by mouth every eight (8) hours as needed for Nausea.  Indications: prevent nausea and vomiting after surgery    albuterol (PROVENTIL HFA, VENTOLIN HFA, PROAIR HFA) 90 mcg/actuation inhaler 1-2 Puffs.       Disposition:  D/c    DISCHARGE NOTE:   Patient is stable for discharge at this time. I have discussed all the findings from today's work up with the patient, including lab results and imaging. I have answered all questions. Rx for pepcid given. Rest and close follow-up with the GI and general surgery recommended this week. Return to the ED immediately for any new or worsening symptoms. Courtney Banerjee PA-C       Follow-up Information     Follow up With Specialties Details Why Contact Info    Diogo Rome MD Gastroenterology In 1 week  [facility]      Torrie Mackenzie MD Colon and Rectal Surgery In 1 week  [facility] [id number]  [phone]      [facility] EMERGENCY DEPT Emergency Medicine  As needed, If symptoms worsen [address] [place] [id number]  [phone]          Current Discharge Medication List      START taking these medications    Details   famotidine (Pepcid) 20 mg tablet Take 1 Tab by mouth two (2) times a day for 10 days. Qty: 20 Tab, Refills: 0         STOP taking these medications       Omeprazole delayed release (PRILOSEC D/R) 20 mg tablet Comments:   Reason for Stopping:                   Diagnosis     Clinical Impression:   1. Abdominal pain, generalized    2.  Umbilical hernia without obstruction and without gangrene

## 2020-12-11 NOTE — DISCHARGE INSTRUCTIONS
Abdominal Hernia Repair: Before Your Surgery  What is abdominal hernia repair surgery? An abdominal hernia repair is a type of surgery. It fixes a problem called a hernia. A hernia is a bulge under the skin in your belly. It happens when you have a weak spot in your belly muscles and a piece of your intestines or tissues pokes through your muscles. This can cause pain. You may notice the pain most when you lift something heavy. You can have a hernia near your belly button. Or it may be in a scar from an earlier surgery. To fix it, the doctor will do one of two kinds of surgery. In open surgery, the doctor makes one cut near the hernia. This cut is called an incision. In laparoscopic surgery, the doctor makes several very small incisions and uses a thin, lighted scope and small tools. In either type of surgery, the doctor pushes the bulge back in place, if needed. Then the doctor sews the healthy tissue back together. Often the doctor patches the weak spot with a piece of material.  Laparoscopic surgery leaves several small scars. Open surgery leaves one long scar. The scars fade with time. You will probably need to take 1 to 2 weeks off from work. But if your job requires heavy lifting or other physical work, you may need to take 4 to 6 weeks off. Follow-up care is a key part of your treatment and safety. Be sure to make and go to all appointments, and call your doctor if you are having problems. It's also a good idea to know your test results and keep a list of the medicines you take. How do you prepare for the surgery? Surgery can be stressful. This information will help you understand what you can expect. And it will help you safely prepare for surgery. Preparing for surgery    · Be sure you have someone to take you home.  Anesthesia and pain medicine will make it unsafe for you to drive or get home on your own.     · Understand exactly what surgery is planned, along with the risks, benefits, and other options.     · Tell your doctor ALL the medicines, vitamins, supplements, and herbal remedies you take. Some may increase the risk of problems during your surgery. Your doctor will tell you if you should stop taking any of them before the surgery and how soon to do it.     · If you take aspirin or some other blood thinner, ask your doctor if you should stop taking it before your surgery. Make sure that you understand exactly what your doctor wants you to do. These medicines increase the risk of bleeding.     · Make sure your doctor and the hospital have a copy of your advance directive. If you don't have one, you may want to prepare one. It lets others know your health care wishes. It's a good thing to have before any type of surgery or procedure. .   What happens on the day of surgery? · Follow the instructions exactly about when to stop eating and drinking. If you don't, your surgery may be canceled. If your doctor told you to take your medicines on the day of surgery, take them with only a sip of water.     · Take a bath or shower before you come in for your surgery. Do not apply lotions, perfumes, deodorants, or nail polish.     · Do not shave the surgical site yourself.     · Take off all jewelry and piercings. And take out contact lenses, if you wear them. At the hospital or surgery center   · Bring a picture ID.     · The area for surgery is often marked to make sure there are no errors.     · You will be kept comfortable and safe by your anesthesia provider. You will be asleep during the surgery.     · The surgery will take 30 minutes to 2 hours. It depends on how large the hernia is and where it is. When should you call your doctor? · You have questions or concerns.     · You don't understand how to prepare for your surgery.     · You become ill before the surgery (such as fever, flu, or a cold).     · You need to reschedule or have changed your mind about having the surgery.    Where can you learn more?  Go to http://www.gray.com/  Enter U288 in the search box to learn more about \"Abdominal Hernia Repair: Before Your Surgery. \"  Current as of: April 15, 2020               Content Version: 12.6  © 2006-2020 [employer]. Care instructions adapted under license by [employer] (which disclaims liability or warranty for this information). If you have questions about a medical condition or this instruction, always ask your healthcare professional. [address] any warranty or liability for your use of this information. Patient Education        Abdominal Pain: Care Instructions  Your Care Instructions     Abdominal pain has many possible causes. Some aren't serious and get better on their own in a few days. Others need more testing and treatment. If your pain continues or gets worse, you need to be rechecked and may need more tests to find out what is wrong. You may need surgery to correct the problem. Don't ignore new symptoms, such as fever, nausea and vomiting, urination problems, pain that gets worse, and dizziness. These may be signs of a more serious problem. Your doctor may have recommended a follow-up visit in the next 8 to 12 hours. If you are not getting better, you may need more tests or treatment. The doctor has checked you carefully, but problems can develop later. If you notice any problems or new symptoms, get medical treatment right away. Follow-up care is a key part of your treatment and safety. Be sure to make and go to all appointments, and call your doctor if you are having problems. It's also a good idea to know your test results and keep a list of the medicines you take. How can you care for yourself at home? · Rest until you feel better. · To prevent dehydration, drink plenty of fluids, enough so that your urine is light yellow or clear like water.  Choose water and other caffeine-free clear liquids until you feel better. If you have kidney, heart, or liver disease and have to limit fluids, talk with your doctor before you increase the amount of fluids you drink. · If your stomach is upset, eat mild foods, such as rice, dry toast or crackers, bananas, and applesauce. Try eating several small meals instead of two or three large ones. · Wait until 48 hours after all symptoms have gone away before you have spicy foods, alcohol, and drinks that contain caffeine. · Do not eat foods that are high in fat. · Avoid anti-inflammatory medicines such as aspirin, ibuprofen (Advil, Motrin), and naproxen (Aleve). These can cause stomach upset. Talk to your doctor if you take daily aspirin for another health problem. When should you call for help? Call 911 anytime you think you may need emergency care. For example, call if:    · You passed out (lost consciousness).     · You pass maroon or very bloody stools.     · You vomit blood or what looks like coffee grounds.     · You have new, severe belly pain. Call your doctor now or seek immediate medical care if:    · Your pain gets worse, especially if it becomes focused in one area of your belly.     · You have a new or higher fever.     · Your stools are black and look like tar, or they have streaks of blood.     · You have unexpected vaginal bleeding.     · You have symptoms of a urinary tract infection. These may include:  ? Pain when you urinate. ? Urinating more often than usual.  ? Blood in your urine.     · You are dizzy or lightheaded, or you feel like you may faint. Watch closely for changes in your health, and be sure to contact your doctor if:    · You are not getting better after 1 day (24 hours). Where can you learn more? Go to http://www.gray.com/  Enter S373 in the search box to learn more about \"Abdominal Pain: Care Instructions. \"  Current as of: June 26, 2019               Content Version: 12.6  © 8900-0917 [employer]. Care instructions adapted under license by [employer] (which disclaims liability or warranty for this information). If you have questions about a medical condition or this instruction, always ask your healthcare professional. [address]  any warranty or liability for your use of this information. [employer] Activation    Thank you for requesting access to [employer]. Please follow the instructions below to securely access and download your online medical record. [employer] allows you to send messages to your doctor, view your test results, renew your prescriptions, schedule appointments, and more. How Do I Sign Up? 1. In your internet browser, go to [email or web address]  2. Click on the First Time User? Click Here link in the Sign In box. You will be redirect to the New Member Sign Up page. 3. Enter your Att[employer]ex Access Code exactly as it appears below. You will not need to use this code after youve completed the sign-up process. If you do not sign up before the expiration date, you must request a new code. Att[employer]ex Access Code: [email or web address]  Expires: 2020  2:37 PM (This is the date your Att[employer]ex access code will )    4. Enter the last four digits of your Social Security Number (xxxx) and Date of Birth (mm/dd/yyyy) as indicated and click Submit. You will be taken to the next sign-up page. 5. Create a Attenex ID. This will be your Att[employer]ex login ID and cannot be changed, so think of one that is secure and easy to remember. 6. Create a Att[employer]ex password. You can change your password at any time. 7. Enter your Password Reset Question and Answer. This can be used at a later time if you forget your password. 8. Enter your e-mail address. You will receive e-mail notification when new information is available in [address] 9. Click Sign Up. You can now view and download portions of your medical record.   10. Click the Download Summary menu link to download a portable copy of your medical information. Additional Information    If you have questions, please visit the Frequently Asked Questions section of the [employer] website at [email or web address] Remember, [employer] is NOT to be used for urgent needs. For medical emergencies, dial 911.

## 2021-01-23 ENCOUNTER — APPOINTMENT (OUTPATIENT)
Dept: GENERAL RADIOLOGY | Age: 36
End: 2021-01-23
Attending: EMERGENCY MEDICINE
Payer: COMMERCIAL

## 2021-01-23 ENCOUNTER — HOSPITAL ENCOUNTER (EMERGENCY)
Age: 36
Discharge: HOME | End: 2021-01-23
Attending: EMERGENCY MEDICINE
Payer: COMMERCIAL

## 2021-01-23 VITALS
DIASTOLIC BLOOD PRESSURE: 93 MMHG | HEART RATE: 58 BPM | HEIGHT: 69 IN | WEIGHT: 293 LBS | OXYGEN SATURATION: 100 % | SYSTOLIC BLOOD PRESSURE: 146 MMHG | BODY MASS INDEX: 43.4 KG/M2 | TEMPERATURE: 98 F | RESPIRATION RATE: 16 BRPM

## 2021-01-23 DIAGNOSIS — R10.84 ABDOMINAL PAIN, GENERALIZED: Primary | ICD-10-CM

## 2021-01-23 LAB
ALBUMIN SERPL-MCNC: 2.9 G/DL (ref 3.4–5)
ALBUMIN/GLOB SERPL: 0.8 {RATIO} (ref 0.8–1.7)
ALP SERPL-CCNC: 87 U/L (ref 45–117)
ALT SERPL-CCNC: 27 U/L (ref 13–56)
ANION GAP SERPL CALC-SCNC: 6 MMOL/L (ref 3–18)
APPEARANCE UR: ABNORMAL
AST SERPL-CCNC: 22 U/L (ref 10–38)
BACTERIA URNS QL MICRO: ABNORMAL /HPF
BASOPHILS # BLD: 0 K/UL (ref 0–0.1)
BASOPHILS NFR BLD: 0 % (ref 0–2)
BILIRUB SERPL-MCNC: 0.3 MG/DL (ref 0.2–1)
BILIRUB UR QL: NEGATIVE
BUN SERPL-MCNC: 10 MG/DL (ref 7–18)
BUN/CREAT SERPL: 15 (ref 12–20)
CALCIUM SERPL-MCNC: 8.7 MG/DL (ref 8.5–10.1)
CHLORIDE SERPL-SCNC: 108 MMOL/L (ref 100–111)
CO2 SERPL-SCNC: 30 MMOL/L (ref 21–32)
COLOR UR: YELLOW
CREAT SERPL-MCNC: 0.67 MG/DL (ref 0.6–1.3)
DIFFERENTIAL METHOD BLD: ABNORMAL
EOSINOPHIL # BLD: 0.2 K/UL (ref 0–0.4)
EOSINOPHIL NFR BLD: 3 % (ref 0–5)
EPITH CASTS URNS QL MICRO: ABNORMAL /LPF (ref 0–5)
ERYTHROCYTE [DISTWIDTH] IN BLOOD BY AUTOMATED COUNT: 15.5 % (ref 11.6–14.5)
GLOBULIN SER CALC-MCNC: 3.6 G/DL (ref 2–4)
GLUCOSE SERPL-MCNC: 105 MG/DL (ref 74–99)
GLUCOSE UR STRIP.AUTO-MCNC: NEGATIVE MG/DL
HCG UR QL: NEGATIVE
HCT VFR BLD AUTO: 37 % (ref 35–45)
HGB BLD-MCNC: 11.7 G/DL (ref 12–16)
HGB UR QL STRIP: NEGATIVE
KETONES UR QL STRIP.AUTO: ABNORMAL MG/DL
LEUKOCYTE ESTERASE UR QL STRIP.AUTO: ABNORMAL
LIPASE SERPL-CCNC: 67 U/L (ref 73–393)
LYMPHOCYTES # BLD: 2.6 K/UL (ref 0.9–3.6)
LYMPHOCYTES NFR BLD: 43 % (ref 21–52)
MCH RBC QN AUTO: 25.8 PG (ref 24–34)
MCHC RBC AUTO-ENTMCNC: 31.6 G/DL (ref 31–37)
MCV RBC AUTO: 81.7 FL (ref 74–97)
MONOCYTES # BLD: 0.3 K/UL (ref 0.05–1.2)
MONOCYTES NFR BLD: 5 % (ref 3–10)
MUCOUS THREADS URNS QL MICRO: ABNORMAL /LPF
NEUTS SEG # BLD: 3 K/UL (ref 1.8–8)
NEUTS SEG NFR BLD: 49 % (ref 40–73)
NITRITE UR QL STRIP.AUTO: NEGATIVE
PH UR STRIP: 5.5 [PH] (ref 5–8)
PLATELET # BLD AUTO: 219 K/UL (ref 135–420)
PMV BLD AUTO: 11.1 FL (ref 9.2–11.8)
POTASSIUM SERPL-SCNC: 3.5 MMOL/L (ref 3.5–5.5)
PROT SERPL-MCNC: 6.5 G/DL (ref 6.4–8.2)
PROT UR STRIP-MCNC: NEGATIVE MG/DL
RBC # BLD AUTO: 4.53 M/UL (ref 4.2–5.3)
RBC #/AREA URNS HPF: ABNORMAL /HPF (ref 0–5)
SODIUM SERPL-SCNC: 144 MMOL/L (ref 136–145)
SP GR UR REFRACTOMETRY: >1.03 (ref 1–1.03)
UROBILINOGEN UR QL STRIP.AUTO: 1 EU/DL (ref 0.2–1)
WBC # BLD AUTO: 6 K/UL (ref 4.6–13.2)
WBC URNS QL MICRO: ABNORMAL /HPF (ref 0–4)

## 2021-01-23 PROCEDURE — 81001 URINALYSIS AUTO W/SCOPE: CPT

## 2021-01-23 PROCEDURE — 83690 ASSAY OF LIPASE: CPT

## 2021-01-23 PROCEDURE — 99283 EMERGENCY DEPT VISIT LOW MDM: CPT

## 2021-01-23 PROCEDURE — 80053 COMPREHEN METABOLIC PANEL: CPT

## 2021-01-23 PROCEDURE — 85025 COMPLETE CBC W/AUTO DIFF WBC: CPT

## 2021-01-23 PROCEDURE — 74022 RADEX COMPL AQT ABD SERIES: CPT

## 2021-01-23 PROCEDURE — 81025 URINE PREGNANCY TEST: CPT

## 2021-01-23 RX ORDER — OMEPRAZOLE 20 MG/1
20 CAPSULE, DELAYED RELEASE ORAL 2 TIMES DAILY
COMMUNITY
Start: 2021-01-08 | End: 2022-02-04 | Stop reason: ALTCHOICE

## 2021-01-23 RX ORDER — DICYCLOMINE HYDROCHLORIDE 10 MG/5ML
20 SOLUTION ORAL 4 TIMES DAILY
Qty: 1 BOTTLE | Refills: 0 | Status: SHIPPED | OUTPATIENT
Start: 2021-01-23 | End: 2021-01-26

## 2021-01-23 RX ORDER — ONDANSETRON 4 MG/1
4 TABLET, FILM COATED ORAL
Qty: 12 TAB | Refills: 0 | Status: SHIPPED | OUTPATIENT
Start: 2021-01-23 | End: 2021-08-02

## 2021-01-23 NOTE — ED PROVIDER NOTES
EMERGENCY DEPARTMENT HISTORY AND PHYSICAL EXAM    4:23 PM      Date: 1/23/2021  Patient Name: Dorene Liu    History of Presenting Illness     Chief Complaint   Patient presents with    Abdominal Pain    Back Pain         History Provided By: Patient    Additional History (Context): Dorene Liu is a 38 y.o. female with diabetes, hypertension and asthma who presents with dental pain since this morning. Patient states the pain is epigastric and periumbilical.  Patient states its sharp 8 out of 10, nonradiating, nothing makes better or worse. Patient has a history of bypass surgery. Patient denies chest pain, cough, vomiting or diarrhea. Patient does admit to being nauseated. Denies smoking, alcohol or recreational drug use. PCP: Sharri Moffett MD      Current Outpatient Medications   Medication Sig Dispense Refill    ondansetron hcl (Zofran) 4 mg tablet Take 1 Tab by mouth every eight (8) hours as needed for Nausea. 12 Tab 0    dicyclomine (BENTYL) 10 mg/5 mL soln oral solution Take 10 mL by mouth four (4) times daily for 3 days. 1 Bottle 0    albuterol (PROVENTIL VENTOLIN) 2.5 mg /3 mL (0.083 %) nebu 3 mL by Nebulization route every four (4) hours as needed for Wheezing. 30 Nebule 0    cetirizine (ZyrTEC) 10 mg tablet Take 1 Tab by mouth daily. 31 Tab 0    multivit-min/iron/folic acid/K (BARIATRIC MULTIVITAMINS PO) Take  by mouth two (2) times a day.  OTHER Calcium chew 500 milligrams 3xday      cholecalciferol (Vitamin D3) (1000 Units /25 mcg) tablet Take 5,000 Units by mouth daily.  cyanocobalamin (Vitamin B-12) 1,000 mcg tablet Take 1,000 mcg by mouth daily.  albuterol (PROVENTIL HFA, VENTOLIN HFA, PROAIR HFA) 90 mcg/actuation inhaler 1-2 Puffs.  omeprazole (PRILOSEC) 20 mg capsule Take 20 mg by mouth two (2) times a day.          Past History     Past Medical History:  Past Medical History:   Diagnosis Date    Asthma     Community acquired pneumonia     Diabetes ([facility] [address] no meds    Hypertension     no meds    Obese     Umbilical hernia        Past Surgical History:  Past Surgical History:   Procedure Laterality Date    DILATION AND CURETTAGE      HX GI      gastric bypass    HX GYN      d and c       Family History:  Family History   Problem Relation Age of Onset   [facility] Asthma Mother     Hypertension Mother     Diabetes Mother     Asthma Father        Social History:  Social History     Tobacco Use    Smoking status: Former Smoker     Packs/day: 0.50     Types: Cigarettes     Quit date: 2019     Years since quittin.2    Smokeless tobacco: Never Used   Substance Use Topics    Alcohol use: Not Currently     Alcohol/week: 1.7 standard drinks     Types: 2 Standard drinks or equivalent per week     Frequency: 2-4 times a month     Comment: holidays/special occassion    Drug use: No       Allergies:  No Known Allergies      Review of Systems       Review of Systems   Constitutional: Negative. Negative for chills, diaphoresis and fever. HENT: Negative. Negative for congestion, rhinorrhea and sore throat. Eyes: Negative. Negative for pain, discharge and redness. Respiratory: Negative. Negative for cough, chest tightness, shortness of breath and wheezing. Cardiovascular: Negative. Negative for chest pain. Gastrointestinal: Positive for abdominal pain and nausea. Negative for constipation, diarrhea and vomiting. Genitourinary: Negative. Negative for dysuria, flank pain, frequency, hematuria and urgency. Musculoskeletal: Negative. Negative for back pain and neck pain. Skin: Negative. Negative for rash. Neurological: Negative. Negative for syncope, weakness, numbness and headaches. Psychiatric/Behavioral: Negative. All other systems reviewed and are negative.         Physical Exam     Visit Vitals  BP (!) 146/93 (BP 1 Location: Left arm, BP Patient Position: At rest)   Pulse (!) 58   Temp 98 °F (36.7 °C)   Resp 16   Ht 5' 9\" (1.753 m)   Wt 136.1 kg (300 lb)   LMP 01/14/2021   SpO2 100%   Breastfeeding No   BMI 44.30 kg/m²         Physical Exam  Vitals signs and nursing note reviewed. Constitutional:       General: She is not in acute distress. Appearance: Normal appearance. She is well-developed. She is not ill-appearing, toxic-appearing or diaphoretic. HENT:      Head: Normocephalic and atraumatic. Mouth/Throat:      Pharynx: No oropharyngeal exudate. Eyes:      General: No scleral icterus. Conjunctiva/sclera: Conjunctivae normal.      Pupils: Pupils are equal, round, and reactive to light. Neck:      Musculoskeletal: Normal range of motion and neck supple. Thyroid: No thyromegaly. Vascular: No hepatojugular reflux or JVD. Trachea: No tracheal deviation. Cardiovascular:      Rate and Rhythm: Normal rate and regular rhythm. Pulses: Normal pulses. Radial pulses are 2+ on the right side and 2+ on the left side. Dorsalis pedis pulses are 2+ on the right side and 2+ on the left side. Heart sounds: Normal heart sounds, S1 normal and S2 normal. No murmur. No gallop. No S3 or S4 sounds. Pulmonary:      Effort: Pulmonary effort is normal. No respiratory distress. Breath sounds: Normal breath sounds. No decreased breath sounds, wheezing, rhonchi or rales. Abdominal:      General: Bowel sounds are normal. There is no distension. Palpations: Abdomen is soft. Abdomen is not rigid. There is no mass. Tenderness: There is abdominal tenderness in the epigastric area and periumbilical area. There is no guarding or rebound. Negative signs include García's sign and McBurney's sign. Musculoskeletal: Normal range of motion. Comments: Strength 5/5 throughout    Lymphadenopathy:      Head:      Right side of head: No submental, submandibular, preauricular or occipital adenopathy. Left side of head: No submental, submandibular, preauricular or occipital adenopathy.       Cervical: No cervical adenopathy. Upper Body:      Right upper body: No supraclavicular adenopathy. Left upper body: No supraclavicular adenopathy. Skin:     General: Skin is warm and dry. Findings: No rash. Neurological:      Mental Status: She is alert. She is not disoriented. GCS: GCS eye subscore is 4. GCS verbal subscore is 5. GCS motor subscore is 6. Cranial Nerves: No cranial nerve deficit. Sensory: No sensory deficit. Coordination: Coordination normal.      Gait: Gait normal.      Deep Tendon Reflexes: Reflexes are normal and symmetric. Comments: Grossly intact    Psychiatric:         Speech: Speech normal.         Behavior: Behavior normal.         Thought Content: Thought content normal.         Judgment: Judgment normal.           Diagnostic Study Results     Labs -  Recent Results (from the past 12 hour(s))   CBC WITH AUTOMATED DIFF    Collection Time: 01/23/21  4:16 PM   Result Value Ref Range    WBC 6.0 4.6 - 13.2 K/uL    RBC 4.53 4.20 - 5.30 M/uL    HGB 11.7 (L) 12.0 - 16.0 g/dL    HCT 37.0 35.0 - 45.0 %    MCV 81.7 74.0 - 97.0 FL    MCH 25.8 24.0 - 34.0 PG    MCHC 31.6 31.0 - 37.0 g/dL    RDW 15.5 (H) 11.6 - 14.5 %    PLATELET 907 647 - 028 K/uL    MPV 11.1 9.2 - 11.8 FL    NEUTROPHILS 49 40 - 73 %    LYMPHOCYTES 43 21 - 52 %    MONOCYTES 5 3 - 10 %    EOSINOPHILS 3 0 - 5 %    BASOPHILS 0 0 - 2 %    ABS. NEUTROPHILS 3.0 1.8 - 8.0 K/UL    ABS. LYMPHOCYTES 2.6 0.9 - 3.6 K/UL    ABS. MONOCYTES 0.3 0.05 - 1.2 K/UL    ABS. EOSINOPHILS 0.2 0.0 - 0.4 K/UL    ABS.  BASOPHILS 0.0 0.0 - 0.1 K/UL    DF AUTOMATED     METABOLIC PANEL, COMPREHENSIVE    Collection Time: 01/23/21  4:16 PM   Result Value Ref Range    Sodium 144 136 - 145 mmol/L    Potassium 3.5 3.5 - 5.5 mmol/L    Chloride 108 100 - 111 mmol/L    CO2 30 21 - 32 mmol/L    Anion gap 6 3.0 - 18 mmol/L    Glucose 105 (H) 74 - 99 mg/dL    BUN 10 7.0 - 18 MG/DL    Creatinine 0.67 0.6 - 1.3 MG/DL    BUN/Creatinine ratio 15 12 - 20      GFR est AA >60 >60 ml/min/1.73m2    GFR est non-AA >60 >60 ml/min/1.73m2    Calcium 8.7 8.5 - 10.1 MG/DL    Bilirubin, total 0.3 0.2 - 1.0 MG/DL    ALT (SGPT) 27 13 - 56 U/L    AST (SGOT) 22 10 - 38 U/L    Alk. phosphatase 87 45 - 117 U/L    Protein, total 6.5 6.4 - 8.2 g/dL    Albumin 2.9 (L) 3.4 - 5.0 g/dL    Globulin 3.6 2.0 - 4.0 g/dL    A-G Ratio 0.8 0.8 - 1.7     LIPASE    Collection Time: 01/23/21  4:16 PM   Result Value Ref Range    Lipase 67 (L) 73 - 393 U/L   URINALYSIS W/ RFLX MICROSCOPIC    Collection Time: 01/23/21  4:59 PM   Result Value Ref Range    Color YELLOW      Appearance CLOUDY      Specific gravity >1.030 (H) 1.005 - 1.030    pH (UA) 5.5 5.0 - 8.0      Protein Negative NEG mg/dL    Glucose Negative NEG mg/dL    Ketone TRACE (A) NEG mg/dL    Bilirubin Negative NEG      Blood Negative NEG      Urobilinogen 1.0 0.2 - 1.0 EU/dL    Nitrites Negative NEG      Leukocyte Esterase SMALL (A) NEG     HCG URINE, QL    Collection Time: 01/23/21  4:59 PM   Result Value Ref Range    HCG urine, QL Negative NEG     URINE MICROSCOPIC ONLY    Collection Time: 01/23/21  4:59 PM   Result Value Ref Range    WBC 4 to 10 0 - 4 /hpf    RBC 0 to 3 0 - 5 /hpf    Epithelial cells 3+ 0 - 5 /lpf    Bacteria 1+ (A) NEG /hpf    Mucus 1+ (A) NEG /lpf       Radiologic Studies -   XR ABD ACUTE W 1 V CHEST    (Results Pending)         Medical Decision Making   Provider Notes (Medical Decision Making):  MDM  Number of Diagnoses or Management Options  Diagnosis management comments: DDX: Gastritis, gerd, peptic ulcer disease, cholecystitis, pancreatitis, gastroenteritis, hepatitis, constipation related pain, appendicitis pain, diverticulitis, urinary tract infection, obstruction, abdominal wall pain, atypical cardiac (ami or anginal pain), referred pain from pulmonary process (pneumonia, empyema), or combination of the above versus many other processes. I am the first provider for this patient.     I reviewed the vital signs, available nursing notes, past medical history, past surgical history, family history and social history. Vital Signs-Reviewed the patient's vital signs. Records Reviewed: Nursing Notes (Time of Review: 4:23 PM)    ED Course: Progress Notes, Reevaluation, and Consults:    Labs essentially normal.  AAS X-Ray showed No acute process. 6:03 PM 1/23/2021      Diagnosis       I have reassessed the patient. Patient is feeling well. Patient will be prescribed Bentyl and Zofran. Patient was discharged in stable condition. Patient is to return to emergency department if any new or worsening condition. Clinical Impression:   1. Abdominal pain, generalized        Disposition: Discharged home     Follow-up Information     Follow up With Specialties Details Why Contact Info    Ari Marley MD Internal Medicine In 2 days  [address] [place] [address] [address] [place] [address] [id number]               Attestation        Provider Attestation:     I personally performed the services described in the documentation, reviewed the documentation and it accurately and completely records my words and actions utilizing the [address] January 23, 2021 at 6:09 PM - Gill Alfaro DO    Disclaimer. It is dictated using utilizing voice recognition software. Unfortunately this leads to occasional typographical errors. I apologize in advance if the situation occurs. If questions arise please do not hesitate to contact me or call our department.

## 2021-01-23 NOTE — ED TRIAGE NOTES
Patient c/o nausea, back pain and abdominal pain x 2 days. She states burning sensation to abdomen after eating. Denies urinary symptoms. States last bowel movement was this morning.

## 2021-02-05 ENCOUNTER — HOSPITAL ENCOUNTER (OUTPATIENT)
Dept: LAB | Age: 36
Discharge: HOME | End: 2021-02-05
Payer: COMMERCIAL

## 2021-02-05 DIAGNOSIS — K91.2 POSTOPERATIVE MALABSORPTION: ICD-10-CM

## 2021-02-05 LAB
25(OH)D3 SERPL-MCNC: 16.7 NG/ML (ref 30–100)
ALBUMIN SERPL-MCNC: 3.5 G/DL (ref 3.4–5)
ANION GAP SERPL CALC-SCNC: 7 MMOL/L (ref 3–18)
BASOPHILS # BLD: 0 K/UL (ref 0–0.1)
BASOPHILS NFR BLD: 0 % (ref 0–2)
BUN SERPL-MCNC: 12 MG/DL (ref 7–18)
BUN/CREAT SERPL: 23 (ref 12–20)
CALCIUM SERPL-MCNC: 8.9 MG/DL (ref 8.5–10.1)
CHLORIDE SERPL-SCNC: 107 MMOL/L (ref 100–111)
CO2 SERPL-SCNC: 27 MMOL/L (ref 21–32)
CREAT SERPL-MCNC: 0.53 MG/DL (ref 0.6–1.3)
DIFFERENTIAL METHOD BLD: ABNORMAL
EOSINOPHIL # BLD: 0.1 K/UL (ref 0–0.4)
EOSINOPHIL NFR BLD: 2 % (ref 0–5)
ERYTHROCYTE [DISTWIDTH] IN BLOOD BY AUTOMATED COUNT: 15.4 % (ref 11.6–14.5)
FERRITIN SERPL-MCNC: 15 NG/ML (ref 8–388)
FOLATE SERPL-MCNC: 19.6 NG/ML (ref 3.1–17.5)
GLUCOSE SERPL-MCNC: 72 MG/DL (ref 74–99)
HCT VFR BLD AUTO: 36.6 % (ref 35–45)
HGB BLD-MCNC: 12 G/DL (ref 12–16)
IRON SERPL-MCNC: 106 UG/DL (ref 50–175)
LYMPHOCYTES # BLD: 2.9 K/UL (ref 0.9–3.6)
LYMPHOCYTES NFR BLD: 55 % (ref 21–52)
MCH RBC QN AUTO: 26.3 PG (ref 24–34)
MCHC RBC AUTO-ENTMCNC: 32.8 G/DL (ref 31–37)
MCV RBC AUTO: 80.1 FL (ref 74–97)
MONOCYTES # BLD: 0.4 K/UL (ref 0.05–1.2)
MONOCYTES NFR BLD: 7 % (ref 3–10)
NEUTS SEG # BLD: 1.9 K/UL (ref 1.8–8)
NEUTS SEG NFR BLD: 36 % (ref 40–73)
PLATELET # BLD AUTO: 213 K/UL (ref 135–420)
PMV BLD AUTO: 11.7 FL (ref 9.2–11.8)
POTASSIUM SERPL-SCNC: 4.2 MMOL/L (ref 3.5–5.5)
RBC # BLD AUTO: 4.57 M/UL (ref 4.2–5.3)
SODIUM SERPL-SCNC: 141 MMOL/L (ref 136–145)
VIT B12 SERPL-MCNC: 345 PG/ML (ref 211–911)
WBC # BLD AUTO: 5.3 K/UL (ref 4.6–13.2)

## 2021-02-05 PROCEDURE — 82728 ASSAY OF FERRITIN: CPT

## 2021-02-05 PROCEDURE — 36415 COLL VENOUS BLD VENIPUNCTURE: CPT

## 2021-02-05 PROCEDURE — 82306 VITAMIN D 25 HYDROXY: CPT

## 2021-02-05 PROCEDURE — 85025 COMPLETE CBC W/AUTO DIFF WBC: CPT

## 2021-02-05 PROCEDURE — 82040 ASSAY OF SERUM ALBUMIN: CPT

## 2021-02-05 PROCEDURE — 82607 VITAMIN B-12: CPT

## 2021-02-05 PROCEDURE — 80048 BASIC METABOLIC PNL TOTAL CA: CPT

## 2021-02-05 PROCEDURE — 84425 ASSAY OF VITAMIN B-1: CPT

## 2021-02-05 PROCEDURE — 83540 ASSAY OF IRON: CPT

## 2021-02-09 ENCOUNTER — OFFICE VISIT (OUTPATIENT)
Dept: SURGERY | Age: 36
End: 2021-02-09
Payer: COMMERCIAL

## 2021-02-09 VITALS — BODY MASS INDEX: 45.99 KG/M2 | WEIGHT: 293 LBS | TEMPERATURE: 98.6 F | HEIGHT: 67 IN | RESPIRATION RATE: 18 BRPM

## 2021-02-09 DIAGNOSIS — K91.2 POSTOPERATIVE MALABSORPTION: Primary | ICD-10-CM

## 2021-02-09 PROCEDURE — 99214 OFFICE O/P EST MOD 30 MIN: CPT | Performed by: SURGERY

## 2021-02-09 NOTE — PROGRESS NOTES
Bariatric Follow-Up Evaluation    Harris Hardy is a 38 y.o. black female presents in follow-up status post left scopic gastric bypass May 19, 2020 without complaint  Tolerant of a regular bariatric diet with appropriate early satiety no specific food intolerances or pathologic emesis. Patient is marginally compliant with avoidance of high-density carbohydrates  Compliant with multivitamin, calcium citrate, vitamin B1, vitamin B12, marginally compliant with vitamin D supplementation  Activity: Walking 45 minutes daily  Preoperative weight 415  Current weight: 302 body mass index 47  Estimated weight loss: 220  Current weight loss: 113  Goal weight: 195  Percentage weight loss: 51% / 9 months    Weight Loss Metrics 2/9/2021 2/9/2021 1/23/2021 12/10/2020 9/8/2020 9/8/2020 6/5/2020   Pre op / Initial Wt 415 - - - 415 - 415   Today's Wt - 302 lb 300 lb 300 lb - 343 lb -   BMI - 47.3 kg/m2 44.3 kg/m2 44.3 kg/m2 - 53.72 kg/m2 -   Ideal Body Wt 140 - - - 140 - 140   Excess Body Wt 275 - - - 275 - 275   Goal Wt 195 - - - 195 - 195   Wt loss to date 113 - - - 72 - 25   % Wt Loss 0.51 - - - 0.33 - 0.11   80%  - - - 220 - 220       No Known Allergies    Current Outpatient Medications on File Prior to Visit   Medication Sig Dispense Refill    omeprazole (PRILOSEC) 20 mg capsule Take 20 mg by mouth two (2) times a day.  ondansetron hcl (Zofran) 4 mg tablet Take 1 Tab by mouth every eight (8) hours as needed for Nausea. 12 Tab 0    albuterol (PROVENTIL VENTOLIN) 2.5 mg /3 mL (0.083 %) nebu 3 mL by Nebulization route every four (4) hours as needed for Wheezing. 30 Nebule 0    cetirizine (ZyrTEC) 10 mg tablet Take 1 Tab by mouth daily. 31 Tab 0    multivit-min/iron/folic acid/K (BARIATRIC MULTIVITAMINS PO) Take  by mouth two (2) times a day.  OTHER Calcium chew 500 milligrams 3xday      cholecalciferol (Vitamin D3) (1000 Units /25 mcg) tablet Take 5,000 Units by mouth daily.       cyanocobalamin (Vitamin B-12) 1,000 mcg tablet Take 1,000 mcg by mouth daily.  albuterol (PROVENTIL HFA, VENTOLIN HFA, PROAIR HFA) 90 mcg/actuation inhaler 1-2 Puffs. No current facility-administered medications on file prior to visit. Past Medical History:   Diagnosis Date    Asthma     Community acquired pneumonia     Diabetes ([facility] [address]     no meds    Hypertension     no meds    Obese     Umbilical hernia        Past Surgical History:   Procedure Laterality Date    DILATION AND CURETTAGE      HX GI      gastric bypass    HX GYN      d and c       Social History     Tobacco Use    Smoking status: Former Smoker     Packs/day: 0.50     Types: Cigarettes     Quit date: 2019     Years since quittin.2    Smokeless tobacco: Never Used   Substance Use Topics    Alcohol use: Not Currently     Alcohol/week: 1.7 standard drinks     Types: 2 Standard drinks or equivalent per week     Frequency: 2-4 times a month     Comment: holidays/special occassion    Drug use: No       Family History   Problem Relation Age of Onset    Asthma Mother     Hypertension Mother     Diabetes Mother     Asthma Father        ROS:  General: Negative for fevers, chills, night sweats, fatigue, weight loss, or weight gain. GI: Negative for abdominal pain, change in bowel habits, hematochezia, melena, or GERD. Integumentary: Negative for dermatitis or abnormal moles.     HEENT: Negative for visual changes, vertigo, epistaxis, dysphagia, or hoarseness    Cardiac: Negative for chest pain, palpitations, hypertension, edema, or varicosities    Resp: Negative for cough, shortness of breath, wheezing, hemoptysis, snoring, or reactive airway disease    : Negative for hematuria, dysuria, frequency, urgency, nocturia, or stress urinary incontinence    MSK:  Negative for weakness, joint pain, or arthritis    Endocrine: Negative for diabetes, thyroid disease, polyuria, polydipsia, polyphagia, poor wound, heat intolerance, or cold intolerance. Lymph/Heme: Negative for anemia, bruising, or history of blood transfusions. Neuro:  Negative for dizziness, headache, fainting, seizures, and stroke. Psychiatry:  Negative for anxiety or depression    Physical Exam    Visit Vitals  Temp 98.6 °F (37 °C)   Resp 18   Ht 5' 7\" (1.702 m)   Wt 137 kg (302 lb)   LMP 01/14/2021   BMI 47.30 kg/m²       Nursing note reviewed. General: Morbidly obese 44-year-old black female  Resp: Clear to auscultation bilaterally, no wheezing, rhonchi, or rales, excursions normal and symmetrical.  Cardio: Regular rate and rhythm, no murmurs, clicks, gallops, or rubs. No edema or varicosities. Abdomen: Obese, soft, nontender, nondistended, normoactive bowel sounds, no hernias, no hepatosplenomegaly, wounds clean dry approximated evidence of incisional hernia  Psych: Alert and oriented to person, place, and time. February 5, 2021 CBC, vitamin B12, folate, CMP vitamin D: Vitamin D 16.7, glucose 105 with otherwise normal laboratory parameters.   Vitamin B1 pending, iron cholesterol panel and hemoglobin A1c not obtained    Impression    Status post laparoscopic gastric bypass May 19, 2020 with less than expected weight loss and expected comorbidity resolution      Plan    Full bariatric nutrition evaluation  Exercise importance of compliance with bariatric surgical principles  Improve compliance to bariatric surgical diet, continue current exercise regimen, continue vitamin supplementation protocol with improvement in compliance with vitamin D, encourage monthly intensive efforts for food meetings  Follow-up May 2021 with labs for ongoing annual bariatric surgical follow-up

## 2021-02-09 NOTE — PROGRESS NOTES
Chief Complaint   Patient presents with    Follow-up     [facility] 5/18/2020   1. Have you been to the ER, urgent care clinic since your last visit? Hospitalized since your last visit? Yes er x 2 abdominal pain and nausea    2. Have you seen or consulted any other health care providers outside of the [address] [place] [address] since your last visit? Include any pap smears or colon screening. No   Pt ID confirmed    Weight Loss Metrics 2/9/2021 2/9/2021 1/23/2021 12/10/2020 9/8/2020 9/8/2020 6/5/2020   Pre op / Initial Wt 415 - - - 415 - 415   Today's Wt - 302 lb 300 lb 300 lb - 343 lb -   BMI - 47.3 kg/m2 44.3 kg/m2 44.3 kg/m2 - 53.72 kg/m2 -   Ideal Body Wt 140 - - - 140 - 140   Excess Body Wt 275 - - - 275 - 275   Goal Wt 195 - - - 195 - 195   Wt loss to date 113 - - - 72 - 25   % Wt Loss 0.51 - - - 0.33 - 0.11   80%  - - - 220 - 220       Body mass index is 47.3 kg/m².     Post op medications:  MVI: 2x  Calcium Citrate: 500 milligrams 3x day  Actigal 0  B-12 1000 micrograms  Vit D 3 5000 units

## 2021-02-10 ENCOUNTER — TELEPHONE (OUTPATIENT)
Dept: SURGERY | Age: 36
End: 2021-02-10

## 2021-02-10 LAB — VIT B1 BLD-SCNC: 110.6 NMOL/L (ref 66.5–200)

## 2021-03-01 ENCOUNTER — DOCUMENTATION ONLY (OUTPATIENT)
Dept: BARIATRICS/WEIGHT MGMT | Age: 36
End: 2021-03-01

## 2021-03-01 ENCOUNTER — HOSPITAL ENCOUNTER (EMERGENCY)
Age: 36
Discharge: HOME | End: 2021-03-01
Attending: EMERGENCY MEDICINE | Admitting: EMERGENCY MEDICINE
Payer: COMMERCIAL

## 2021-03-01 ENCOUNTER — APPOINTMENT (OUTPATIENT)
Dept: GENERAL RADIOLOGY | Age: 36
End: 2021-03-01
Attending: NURSE PRACTITIONER
Payer: COMMERCIAL

## 2021-03-01 ENCOUNTER — HOSPITAL ENCOUNTER (EMERGENCY)
Dept: BARIATRICS/WEIGHT MGMT | Age: 36
Discharge: HOME | End: 2021-03-01
Payer: COMMERCIAL

## 2021-03-01 VITALS
TEMPERATURE: 98.5 F | SYSTOLIC BLOOD PRESSURE: 114 MMHG | HEIGHT: 69 IN | RESPIRATION RATE: 18 BRPM | BODY MASS INDEX: 43.4 KG/M2 | WEIGHT: 293 LBS | DIASTOLIC BLOOD PRESSURE: 79 MMHG | OXYGEN SATURATION: 100 % | HEART RATE: 64 BPM

## 2021-03-01 DIAGNOSIS — R20.2 RIGHT HAND PARESTHESIA: ICD-10-CM

## 2021-03-01 DIAGNOSIS — M79.641 PAIN OF RIGHT HAND: Primary | ICD-10-CM

## 2021-03-01 PROCEDURE — 73130 X-RAY EXAM OF HAND: CPT

## 2021-03-01 PROCEDURE — 99282 EMERGENCY DEPT VISIT SF MDM: CPT

## 2021-03-01 RX ORDER — NAPROXEN 500 MG/1
500 TABLET ORAL
Qty: 20 TAB | Refills: 0 | Status: SHIPPED | OUTPATIENT
Start: 2021-03-01 | End: 2021-03-11

## 2021-03-01 NOTE — ED TRIAGE NOTES
Pt c/o right hand pain that started when she woke up yesterday morning, worse this morning with tingling down to right 1st and 2nd fingers. Pt also c/o swelling. Pt states symptoms are isolated to hand. Denies trauma, repetitive movement.

## 2021-03-01 NOTE — ED PROVIDER NOTES
EMERGENCY DEPARTMENT HISTORY AND PHYSICAL EXAM    Date: (Not on file)  Patient Name: Lynne Sesay    History of Presenting Illness     Chief Complaint   Patient presents with    Hand Pain         History Provided By: patient      Additional History (Context): Lynne Sesay is a 29-year-old female who presents to the ER with complaints of right hand pain that she noticed upon waking today with numbness and tingling in her thumb and index finger. This has gotten progressively better throughout the day but still has pain. She denies any redness, swelling, warmth, injury or trauma. She denies any repetitive motions or overuse recently. Denies any similar pain in the past.  She is right-hand dominant. Tried taking Tylenol without much improvement. PCP: Aaron Bauer MD    Current Outpatient Medications   Medication Sig Dispense Refill    naproxen (Naprosyn) 500 mg tablet Take 1 Tab by mouth two (2) times daily as needed for Pain for up to 10 days. 20 Tab 0    omeprazole (PRILOSEC) 20 mg capsule Take 20 mg by mouth two (2) times a day.  ondansetron hcl (Zofran) 4 mg tablet Take 1 Tab by mouth every eight (8) hours as needed for Nausea. 12 Tab 0    albuterol (PROVENTIL VENTOLIN) 2.5 mg /3 mL (0.083 %) nebu 3 mL by Nebulization route every four (4) hours as needed for Wheezing. 30 Nebule 0    cetirizine (ZyrTEC) 10 mg tablet Take 1 Tab by mouth daily. 31 Tab 0    multivit-min/iron/folic acid/K (BARIATRIC MULTIVITAMINS PO) Take  by mouth two (2) times a day.  OTHER Calcium chew 500 milligrams 3xday      cholecalciferol (Vitamin D3) (1000 Units /25 mcg) tablet Take 5,000 Units by mouth daily.  cyanocobalamin (Vitamin B-12) 1,000 mcg tablet Take 1,000 mcg by mouth daily.  albuterol (PROVENTIL HFA, VENTOLIN HFA, PROAIR HFA) 90 mcg/actuation inhaler 1-2 Puffs.          Past History     Past Medical History:  Past Medical History:   Diagnosis Date    Asthma     Community acquired pneumonia  Diabetes ([facility] [address]     no meds    Hypertension     no meds    Obese     Umbilical hernia        Past Surgical History:  Past Surgical History:   Procedure Laterality Date    DILATION AND CURETTAGE      HX GI      gastric bypass    HX GYN      d and c       Family History:  Family History   Problem Relation Age of Onset   [place] Asthma Mother     Hypertension Mother     Diabetes Mother     Asthma Father        Social History:  Social History     Tobacco Use    Smoking status: Former Smoker     Packs/day: 0.50     Types: Cigarettes     Quit date: 2019     Years since quittin.3    Smokeless tobacco: Never Used   Substance Use Topics    Alcohol use: Not Currently     Alcohol/week: 1.7 standard drinks     Types: 2 Standard drinks or equivalent per week     Frequency: 2-4 times a month     Comment: holidays/special occassion    Drug use: No       Allergies:  No Known Allergies      Review of Systems     Review of Systems   Constitutional: Negative for chills and fever. HENT: Negative for nasal congestion, sore throat, rhinorrhea  Eyes: Negative. Respiratory: Negative for cough and for shortness of breath. Cardiovascular: Negative for chest pain and palpitations. Gastrointestinal: Negative for abdominal pain, constipation, diarrhea, nausea and vomiting. Genitourinary: Negative. Negative for difficulty urinating and flank pain. Musculoskeletal: Positive for right hand pain. Negative for back pain. Negative for gait problem and neck pain. Skin: Negative. Allergic/Immunologic: Negative. Neurological: Positive for right finger tingling. Negative for dizziness, weakness, numbness and headaches. Psychiatric/Behavioral: Negative. All other systems reviewed and are negative.   All Other Systems Negative    Physical Exam     Vitals:    21 1301   BP: 114/79   Pulse: 64   Resp: 18   Temp: 98.5 °F (36.9 °C)   SpO2: 100%   Weight: 133.8 kg (295 lb)   Height: 5' 9\" (1.753 m) Physical Exam  Vitals signs and nursing note reviewed. Constitutional:       General: She is not in acute distress. Appearance: Normal appearance. She is not ill-appearing, toxic-appearing or diaphoretic. HENT:      Head: Normocephalic and atraumatic. Nose: Nose normal.   Eyes:      General: Lids are normal. Vision grossly intact. Conjunctiva/sclera: Conjunctivae normal.      Pupils: Pupils are equal, round, and reactive to light. Neck:      Musculoskeletal: Full passive range of motion without pain and normal range of motion. Cardiovascular:      Rate and Rhythm: Normal rate and regular rhythm. Pulmonary:      Effort: Pulmonary effort is normal.      Breath sounds: Normal breath sounds. Musculoskeletal: Normal range of motion. Right hand: She exhibits tenderness (Diffusely to the thumb and index finger/thenar eminence. No redness or swelling.). She exhibits normal range of motion, no bony tenderness, normal two-point discrimination, normal capillary refill, no deformity, no laceration and no swelling. Normal sensation noted. Normal strength noted. She exhibits no thumb/finger opposition. Comments: Ulnar, radial, and medial nerve distribution intact. Skin:     General: Skin is warm and dry. Capillary Refill: Capillary refill takes less than 2 seconds. Neurological:      General: No focal deficit present. Mental Status: She is alert and oriented to person, place, and time. Psychiatric:         Mood and Affect: Mood normal.         Behavior: Behavior normal. Behavior is cooperative. Diagnostic Study Results     Labs -   No results found for this or any previous visit (from the past 12 hour(s)). Radiologic Studies -   XR HAND RT MIN 3 V   Final Result   1. No acute pathology appreciated in the right hand.         CT Results  (Last 48 hours)    None        CXR Results  (Last 48 hours)    None            Medical Decision Making   I am the first provider for this patient. I reviewed the vital signs, available nursing notes, past medical history, past surgical history, family history and social history. Vital Signs-Reviewed the patient's vital signs. Records Reviewed: Nursing notes, old medical records and any previous labs, imaging, visits, consultations pertinent to patient care    Procedures:  Procedures    ED Course: Progress Notes, Reevaluation, and Consults:  1:04 PM  Initial assessment performed. The patients presenting problems have been discussed, and they/their family are in agreement with the care plan formulated and outlined with them. I have encouraged them to ask questions as they arise throughout their visit. 3:07 PM x-ray negative for acute findings. Suspect possible carpal tunnel syndrome or other radiculopathy. Will have patient follow-up with PCP and hand specialist/Ortho. We will give a thumb spica splint to wear at nighttime and as needed. Also prescribed naproxen as needed for pain. ER precautions discussed. Provider Notes (Medical Decision Making):     Patient suffered no trauma and has no significant past medical history but suspect underlying carpal tunnel syndrome due to the pain in the hand over the thenar eminence with complaints of paresthesias in the thumb index and middle fingers that awoke her from sleep today. X-ray negative for acute process. Appropriate for outpatient management. Will discuss supportive treatment, nocturnal cock-up splint, NSAIDS, RICE and orthopedic follow-up. Discussed treatment plan, return precautions, symptomatic relief, and expected time to improvement. All questions answered. Patient is stable for discharge and outpatient management. Medication use, risk/benefit, side effects, and precautions discussed. The patient was educated extensively on mandatory return criteria as well as splint home care.   The patient was instructed both verbally and written to follow-up with orthopedics within 1 to 2 weeks. The patient verbalized understanding of all instruction provided and agrees with the plan of care. Return to the ER if you experience increased pain or swelling, if you have numbness or tingling in the affected extremity, or for any new/worsening concerns. MED RECONCILIATION:  No current facility-administered medications for this encounter. Current Outpatient Medications   Medication Sig    naproxen (Naprosyn) 500 mg tablet Take 1 Tab by mouth two (2) times daily as needed for Pain for up to 10 days.  omeprazole (PRILOSEC) 20 mg capsule Take 20 mg by mouth two (2) times a day.  ondansetron hcl (Zofran) 4 mg tablet Take 1 Tab by mouth every eight (8) hours as needed for Nausea.  albuterol (PROVENTIL VENTOLIN) 2.5 mg /3 mL (0.083 %) nebu 3 mL by Nebulization route every four (4) hours as needed for Wheezing.  cetirizine (ZyrTEC) 10 mg tablet Take 1 Tab by mouth daily.  multivit-min/iron/folic acid/K (BARIATRIC MULTIVITAMINS PO) Take  by mouth two (2) times a day.  OTHER Calcium chew 500 milligrams 3xday    cholecalciferol (Vitamin D3) (1000 Units /25 mcg) tablet Take 5,000 Units by mouth daily.  cyanocobalamin (Vitamin B-12) 1,000 mcg tablet Take 1,000 mcg by mouth daily.  albuterol (PROVENTIL HFA, VENTOLIN HFA, PROAIR HFA) 90 mcg/actuation inhaler 1-2 Puffs. Disposition:  Home in stable condition    DISCHARGE NOTE:     Patient has been reexamined. Patient has no new complaints, changes, or physical findings. Care plan outlined and precautions discussed. Discussed proper way to take medications. Discussed treatment plan, return precautions, symptomatic relief, and expected time to improvement. All questions answered. Patient is stable for discharge and outpatient management. Patient is ready to go home.     Follow-up Information     Follow up With Specialties Details Why Contact Info    Haley Stein MD Internal Medicine Schedule an appointment as soon as possible for a visit in 2 days Follow-up from the Emergency Department [address] [place] [address] [address]  [phone]      Joy Guaman DO Hand Surgery Schedule an appointment as soon as possible for a visit  Follow-up from the Emergency [address] [address]  [phone]      [facility] EMERGENCY DEPT Emergency Medicine  As needed, If symptoms worsen [address] [place] [address]  [phone]          Current Discharge Medication List      START taking these medications    Details   naproxen (Naprosyn) 500 mg tablet Take 1 Tab by mouth two (2) times daily as needed for Pain for up to 10 days. Qty: 20 Tab, Refills: 0                   Diagnosis     Clinical Impression:   1. Pain of right hand    2. Right hand paresthesia          Dictation disclaimer:  Please note that this dictation was completed with [employer], the computer voice recognition software. Quite often unanticipated grammatical, syntax, homophones, and other interpretive errors are inadvertently transcribed by the computer software. Please disregard these errors. Please excuse any errors that have escaped final proofreading.

## 2021-03-01 NOTE — PROGRESS NOTES
[address] [facility], [address]      Patient's Name: Yehuda Sidhu   Age: 38 y.o. YOB: 1985   Sex: female    Date:   3/1/2021    Height: 5 f 7 Weight:    295      Lbs. BMI: 46.2     Surgery Date: 5/18/2020    Starting Weight: 415   Last Recorded Weight:   Overall Pounds Lost: 120     Procedure: Gastric Bypass    Lowest Weight patient has achieved since surgery: Current    How long has patient been at this weight for: Patient states she has been here for about a week, but is otherwise steadily losing. Other Pertinent Information:     Diet History (reported by patient on diet history form)    Do you smoke: None    Alcohol Intake: 1 drinks at a time, 1 times a week. Meals per day: 3 small meals    Diet history:  Patient states she will have an egg and unsweetened applesauce Breakfast-Lunch:  Premier shake. 12:00 pm:  Patient states she will get a half of salad from [facility]. She states there is chicken on that. Dinner:    Protein and some vegetables. She states in the evening she may have some almonds. Portion sizes ~: 2-2.5 ounces. She states that is her whole meal.    Simple sugar intake: None    Experiencing dumping syndrome: Patient states she has had ice cream.  She did not dump from this. She states she avoid the other foods that make her sick. Carbohydrate intake: She states she may have a potato sometime. She does not like the way bread and rice make her feel. Symptoms that occur when carbohydrates are consumed: She states in the beginning she was having issues with low blood sugars. Ounces of fluid consumed per day: Trying to get 48 ounces in. She states she think she is drinking it too fast and gets very full from the liquid. Fluids being consumed: Patient is drinking water, sparking water, protein drinks. I have talked to her about the carbonated water.     Patient is drinking protein drinks, Premier    Patient is snacking on: Almonds and sometimes fruits    Exercise History    Patient is doing treadmill 25 minutes, elliptical 20 minutes 2-3 x a week for exercise. Vitamins    Patient is taking Bariatric Advantage BID Multivitamins per day    Patient is taking Calcium in the form of chewable. Patient is taking 1500 mg per day. Patient is taking 1000  mcg of Vitamin B12. Patient is taking 5000 IU of Vitamin D 3 today. B1 is included in [facility]    Summary:  Patient attended 9 month post-op class. Weight loss is at 120 pounds. I have reinforced the key diet principles to the patient. Patient was instructed to follow a 3 meal a day routine. I have reinforced the importance of filling up on meat and vegetables and avoiding carbohydrates. I have talked with patient about reactive hypoglycemia and although carbohydrates are not good from a weight-management point of view, they are actually very dangerous and should be avoided. If patient is getting hungry between meals focus on meat and vegetables and a list of food choices was reviewed with patient. Reinforced to patient the importance of being properly hydrated and the need to get 64 ounces of fluid in per day. Reinforced to patient the need to do 30 minutes of exercise per day. We also spent some time talking about the vitamins and the importance of taking them. Reinforced the dosage of vitamins to patient. Patient was reminded that the vitamins are lifelong. Other Pertinent Information: Patient is doing well at 9 months post op. Her portions are appropriate to promote continued weight loss. I have reinforced the protocol with an emphasis on protein and vegetables. She is encouraged to push her fluids and work towards 64 ounces per day. She states sometimes it makes her feel ill, but states she knows she likely drank too much at one time. She is doing well with the vitamins.   She is encouraged to work towards daily activity. Will monitor her progress.       Molly Armando [address]  3/1/2021

## 2021-05-12 ENCOUNTER — HOSPITAL ENCOUNTER (EMERGENCY)
Age: 36
Discharge: HOME | End: 2021-05-12
Attending: EMERGENCY MEDICINE
Payer: COMMERCIAL

## 2021-05-12 VITALS
DIASTOLIC BLOOD PRESSURE: 99 MMHG | TEMPERATURE: 98.4 F | SYSTOLIC BLOOD PRESSURE: 138 MMHG | HEART RATE: 86 BPM | OXYGEN SATURATION: 99 % | RESPIRATION RATE: 14 BRPM

## 2021-05-12 DIAGNOSIS — B34.9 VIRAL ILLNESS: Primary | ICD-10-CM

## 2021-05-12 LAB
FLUAV AG NPH QL IA: NEGATIVE
FLUBV AG NOSE QL IA: NEGATIVE
SARS-COV-2, COV2: NORMAL

## 2021-05-12 PROCEDURE — 99282 EMERGENCY DEPT VISIT SF MDM: CPT

## 2021-05-12 PROCEDURE — 87804 INFLUENZA ASSAY W/OPTIC: CPT

## 2021-05-12 PROCEDURE — U0003 INFECTIOUS AGENT DETECTION BY NUCLEIC ACID (DNA OR RNA); SEVERE ACUTE RESPIRATORY SYNDROME CORONAVIRUS 2 (SARS-COV-2) (CORONAVIRUS DISEASE [COVID-19]), AMPLIFIED PROBE TECHNIQUE, MAKING USE OF HIGH THROUGHPUT TECHNOLOGIES AS DESCRIBED BY CMS-2020-01-R: HCPCS

## 2021-05-12 RX ORDER — NAPROXEN 500 MG/1
500 TABLET ORAL 2 TIMES DAILY WITH MEALS
Qty: 20 TAB | Refills: 0 | Status: SHIPPED | OUTPATIENT
Start: 2021-05-12 | End: 2021-08-02

## 2021-05-12 NOTE — ED TRIAGE NOTES
\"I've been having body aches, chills, sore throat, and I'm feeling tired and weak. \" Onset of symptoms two days ago.

## 2021-05-12 NOTE — Clinical Note
[address] [place] [address] [facility] EMERGENCY DEPT 
[address] [facility] [address] [phone] Work/School Note Date: 5/12/2021 To Whom It May concern: 
 
Cuco Diallo was evaulated by the following provider(s): 
Attending Provider: Bronson Stewart MD 
Physician Assistant: GRICELDA Sal.   COVID19 virus is suspected. Per the CDC guidelines we recommend home isolation until the following conditions are all met: 1. At least 10 days have passed since symptoms first appeared and 2. At least 24 hours have passed since last fever without the use of fever-reducing medications and 
3. Symptoms (e.g., cough, shortness of breath) have improved Sincerely, GRICELDA Pinto

## 2021-05-12 NOTE — ED PROVIDER NOTES
EMERGENCY DEPARTMENT HISTORY AND PHYSICAL EXAM      Date: 5/12/2021  Patient Name: Jovi Anna    History of Presenting Illness     Chief Complaint   Patient presents with    Flu Like Symptoms       History Provided By: Patient    HPI: Jovi Anna, 38 y.o. female PMHx significant for asthma, DM, htn presents ambulatory to the ED with cc of myalgias, chills, and sore throat  x 2 days. Denies cough, congestion, CP, SOB and dizziness. Denies known exposure to COVID. Patient has not received Covid vaccine. Patient has not taken anything for symptoms. There are no other complaints, changes, or physical findings at this time. PCP: Penny Mercedes MD    No current facility-administered medications on file prior to encounter. Current Outpatient Medications on File Prior to Encounter   Medication Sig Dispense Refill    omeprazole (PRILOSEC) 20 mg capsule Take 20 mg by mouth two (2) times a day.  ondansetron hcl (Zofran) 4 mg tablet Take 1 Tab by mouth every eight (8) hours as needed for Nausea. 12 Tab 0    albuterol (PROVENTIL VENTOLIN) 2.5 mg /3 mL (0.083 %) nebu 3 mL by Nebulization route every four (4) hours as needed for Wheezing. 30 Nebule 0    cetirizine (ZyrTEC) 10 mg tablet Take 1 Tab by mouth daily. 31 Tab 0    multivit-min/iron/folic acid/K (BARIATRIC MULTIVITAMINS PO) Take  by mouth two (2) times a day.  OTHER Calcium chew 500 milligrams 3xday      cholecalciferol (Vitamin D3) (1000 Units /25 mcg) tablet Take 5,000 Units by mouth daily.  cyanocobalamin (Vitamin B-12) 1,000 mcg tablet Take 1,000 mcg by mouth daily.  albuterol (PROVENTIL HFA, VENTOLIN HFA, PROAIR HFA) 90 mcg/actuation inhaler 1-2 Puffs.          Past History     Past Medical History:  Past Medical History:   Diagnosis Date    Asthma     Community acquired pneumonia     Diabetes ([facility] [address]     no meds    Hypertension     no meds    Obese     Umbilical hernia        Past Surgical History:  Past Surgical History:   Procedure Laterality Date    DILATION AND CURETTAGE      HX GI      gastric bypass    HX GYN      d and c       Family History:  Family History   Problem Relation Age of Onset   [de-identified] Asthma Mother     Hypertension Mother     Diabetes Mother     Asthma Father        Social History:  Social History     Tobacco Use    Smoking status: Former Smoker     Packs/day: 0.50     Types: Cigarettes     Quit date: 2019     Years since quittin.5    Smokeless tobacco: Never Used   Substance Use Topics    Alcohol use: Not Currently     Alcohol/week: 1.7 standard drinks     Types: 2 Standard drinks or equivalent per week     Frequency: 2-4 times a month     Comment: holidays/special occassion    Drug use: No       Allergies:  No Known Allergies      Review of Systems   Review of Systems   Constitutional: Positive for chills. Negative for fever. HENT: Positive for sore throat. Respiratory: Negative for shortness of breath. Cardiovascular: Negative for chest pain. Gastrointestinal: Negative for abdominal pain, nausea and vomiting. Genitourinary: Negative for flank pain. Musculoskeletal: Positive for myalgias. Negative for back pain. Skin: Negative for color change, pallor, rash and wound. Neurological: Negative for dizziness, weakness and light-headedness. All other systems reviewed and are negative. Physical Exam   Physical Exam  Vitals signs and nursing note reviewed. Constitutional:       General: She is not in acute distress. Appearance: She is well-developed. Comments: Pt well-appearing in NAD   HENT:      Head: Normocephalic and atraumatic. Mouth/Throat:      Comments: No tonsillar or pharyngeal erythema, swelling or exudate    Eyes:      Conjunctiva/sclera: Conjunctivae normal.   Cardiovascular:      Rate and Rhythm: Normal rate and regular rhythm. Heart sounds: Normal heart sounds.    Pulmonary:      Effort: Pulmonary effort is normal. No respiratory distress. Breath sounds: Normal breath sounds. Comments: Lungs CTA  Not working to breathe  Abdominal:      General: Bowel sounds are normal. There is no distension. Palpations: Abdomen is soft. Musculoskeletal: Normal range of motion. Skin:     General: Skin is warm. Findings: No rash. Neurological:      Mental Status: She is alert and oriented to person, place, and time. Psychiatric:         Behavior: Behavior normal.         Diagnostic Study Results     Labs -   No results found for this or any previous visit (from the past 12 hour(s)). Radiologic Studies -   No orders to display     CT Results  (Last 48 hours)    None        CXR Results  (Last 48 hours)    None          Medical Decision Making   I am the first provider for this patient. I reviewed the vital signs, available nursing notes, past medical history, past surgical history, family history and social history. Vital Signs-Reviewed the patient's vital signs. No data found. .    Records Reviewed: Nursing Notes and Old Medical Records    Provider Notes (Medical Decision Making):   DDx: Tonsillitis, Pharyngitis, COVID, Influenza, Viral Illness    37 yo F who presents with myalgias, fatigue, chills and sore throat x 2 days. Denies known exposure to Covid. On exam lungs CTA not working to breathe. No tonsillar pharyngeal erythema, swelling or warmth. Influenza negative. COVID swab sent. Will treat patient symptomatically. Suspect viral infection. At time of discharge patient nontoxic-appearing in NAD. Patient stable for prompt outpatient follow-up with PCP 1 to 2 days. Patient given strict instructions to return if symptoms worsen. ED Course:   Initial assessment performed. The patients presenting problems have been discussed, and they are in agreement with the care plan formulated and outlined with them. I have encouraged them to ask questions as they arise throughout their visit.          Disposition:  6:04 PM  Discussed lab results with pt along with dx and treatment plan. Discussed importance of PCP follow up. All questions answered. Pt voiced they understood. Return if sx worsen. PLAN:  1. Discharge Medication List as of 5/12/2021  6:35 PM      START taking these medications    Details   naproxen (NAPROSYN) 500 mg tablet Take 1 Tab by mouth two (2) times daily (with meals). , Normal, Disp-20 Tab, R-0         CONTINUE these medications which have NOT CHANGED    Details   omeprazole (PRILOSEC) 20 mg capsule Take 20 mg by mouth two (2) times a day., Historical Med      ondansetron hcl (Zofran) 4 mg tablet Take 1 Tab by mouth every eight (8) hours as needed for Nausea. , Print, Disp-12 Tab, R-0      albuterol (PROVENTIL VENTOLIN) 2.5 mg /3 mL (0.083 %) nebu 3 mL by Nebulization route every four (4) hours as needed for Wheezing., Normal, Disp-30 Nebule,R-0      cetirizine (ZyrTEC) 10 mg tablet Take 1 Tab by mouth daily. , Normal, Disp-31 Tab,R-0      multivit-min/iron/folic acid/K (BARIATRIC MULTIVITAMINS PO) Take  by mouth two (2) times a day., Historical Med      OTHER Calcium chew 500 milligrams 3xday, Historical Med      cholecalciferol (Vitamin D3) (1000 Units /25 mcg) tablet Take 5,000 Units by mouth daily. , Historical Med      cyanocobalamin (Vitamin B-12) 1,000 mcg tablet Take 1,000 mcg by mouth daily. , Historical Med      albuterol (PROVENTIL HFA, VENTOLIN HFA, PROAIR HFA) 90 mcg/actuation inhaler 1-2 Puffs., Historical Med           2. Follow-up Information     Follow up With Specialties Details Why Contact Info    Luzma Capone MD Internal Medicine Schedule an appointment as soon as possible for a visit in 1 day  [address] [place] [address]      [address] [facility] EMERGENCY DEPT Emergency Medicine  As needed, If symptoms worsen [address]  [phone]        Return to ED if worse     Diagnosis     Clinical Impression:   1.  Viral illness Attestations:    GRICELDA Clinton    Please note that this dictation was completed with [employer], the computer voice recognition software. Quite often unanticipated grammatical, syntax, homophones, and other interpretive errors are inadvertently transcribed by the computer software. Please disregard these errors. Please excuse any errors that have escaped final proofreading. Thank you.

## 2021-05-13 ENCOUNTER — PATIENT OUTREACH (OUTPATIENT)
Dept: CASE MANAGEMENT | Age: 36
End: 2021-05-13

## 2021-05-13 LAB — SARS-COV-2, COV2NT: NOT DETECTED

## 2021-05-13 NOTE — PROGRESS NOTES
Patient contacted regarding COVID-19 viral symptoms. Discussed COVID-19 related testing which was available at this time. Test results were negative. Patient informed of results, if available? yes     Care Transition Nurse contacted the patient by telephone to perform post discharge assessment. Call within 2 business days of discharge: Yes Verified name and  with patient as identifiers. Provided introduction to self, and explanation of the CTN/ACM role, and reason for call due to risk factors for infection and/or exposure to COVID-19. Symptoms reviewed with patient who verbalized the following symptoms: fatigue, pain or aching joints, no new symptoms and no worsening symptoms      Due to no new or worsening symptoms encounter was not routed to provider for escalation. Discussed follow-up appointments. If no appointment was previously scheduled, appointment scheduling offered:  yes   [facility] follow up appointment(s): No future appointments. Non-[facility] follow up appointment(s): none    Interventions to address risk factors: Obtained and reviewed discharge summary and/or continuity of care documents     Advance Care Planning:   Does patient have an Advance Directive: not on file     Educated patient about risk for severe COVID-19 due to risk factors according to CDC guidelines. CTN reviewed discharge instructions, medical action plan and red flag symptoms patient who verbalized understanding. Discussed COVID vaccination status no. Education provided on COVID-19 vaccination as appropriate. Discussed exposure protocols and quarantine with CDC Guidelines. Patient was given an opportunity to verbalize any questions and concerns and agrees to contact CTN or health care provider for questions related to their healthcare. Reviewed and educated patient on any new and changed medications related to discharge diagnosis     Was patient discharged with a pulse oximeter?  no Discussed and confirmed pulse oximeter discharge instructions and when to notify provider or seek emergency care. For patients discharged due to monoclonal antibody infusion or pulse ox at discharge; information provided for remote patient monitoring, and agrees to enroll for follow up no    CTN provided contact information. Plan for follow-up call in 5-7 days based on severity of symptoms and risk factors.

## 2021-05-20 ENCOUNTER — PATIENT OUTREACH (OUTPATIENT)
Dept: CASE MANAGEMENT | Age: 36
End: 2021-05-20

## 2021-05-20 NOTE — PROGRESS NOTES
Patient contacted regarding COVID-19 viral symptoms. Discussed COVID-19 related testing which was available at this time. Test results were negative. Patient informed of results, if available? yes      Care Transition Nurse contacted the patient by telephone to perform follow-up assessment. Verified name and  with patient as identifiers. Patient has following risk factors of: asthma and diabetes. Symptoms reviewed with patient who verbalized the following symptoms: pain or aching joints, nausea and patient reports that she went to [facility] ED for follow up because her symptoms were not managed. patient reports she is taking zofran each day before she gets out of bed due to nausea. CTN recomended she call PCP due to being sick and in pain for 2 weeks. Due to no new or worsening symptoms encounter was not routed to provider for escalation. Interventions to address risk factors: Obtained and reviewed discharge summary and/or continuity of care documents    Educated patient about risk for severe COVID-19 due to risk factors according to CDC guidelines. CTN reviewed discharge instructions, medical action plan and red flag symptoms patient who verbalized understanding. Discussed COVID vaccination status no. Education provided on COVID-19 vaccination as appropriate. Discussed exposure protocols and quarantine with CDC Guidelines. Patient was given an opportunity to verbalize any questions and concerns and agrees to contact CTN or health care provider for questions related to their healthcare. Reviewed and educated patient on any new and changed medications related to discharge diagnosis     Was patient discharged with a pulse oximeter? no Discussed and confirmed pulse oximeter discharge instructions and when to notify provider or seek emergency care. CTN provided contact information. Plan for follow-up call in 5-7 days based on severity of symptoms and risk factors. s.

## 2021-06-22 ENCOUNTER — HOSPITAL ENCOUNTER (EMERGENCY)
Age: 36
Discharge: HOME | End: 2021-06-22
Attending: EMERGENCY MEDICINE
Payer: COMMERCIAL

## 2021-06-22 VITALS
SYSTOLIC BLOOD PRESSURE: 141 MMHG | HEIGHT: 69 IN | BODY MASS INDEX: 43.4 KG/M2 | DIASTOLIC BLOOD PRESSURE: 96 MMHG | OXYGEN SATURATION: 99 % | RESPIRATION RATE: 18 BRPM | TEMPERATURE: 98.2 F | WEIGHT: 293 LBS | HEART RATE: 73 BPM

## 2021-06-22 DIAGNOSIS — S61.411D LACERATION OF RIGHT HAND WITHOUT FOREIGN BODY, SUBSEQUENT ENCOUNTER: Primary | ICD-10-CM

## 2021-06-22 PROCEDURE — 99282 EMERGENCY DEPT VISIT SF MDM: CPT

## 2021-06-22 NOTE — ED PROVIDER NOTES
EMERGENCY DEPARTMENT HISTORY AND PHYSICAL EXAM    12:28 PM      Date: 6/22/2021  Patient Name: Pastora Lamb    History of Presenting Illness     Chief Complaint   Patient presents with    Suture Removal         History Provided By: Patient    Additional History (Context): Pastora Lamb is a 38 y.o. female with past medical history as below who presents with complaints of a laceration to the right hand that was sustained 7 days ago. This happened at home but the lid of a can. She was seen at another hospital and had her tetanus booster given at that time and her wound was repaired. She had 5 stitches placed. She states one of them came out on its own prior to arriving to the ER. She was also given cephalexin which she completed today. She has follow-up with hand surgeon in a couple weeks. She denies paresthesias or extremity weakness. States wound is healing well with no redness, warmth, tenderness, or drainage. She is right-hand dominant. PCP: Cherrie Ortega MD    Current Outpatient Medications   Medication Sig Dispense Refill    naproxen (NAPROSYN) 500 mg tablet Take 1 Tab by mouth two (2) times daily (with meals). 20 Tab 0    omeprazole (PRILOSEC) 20 mg capsule Take 20 mg by mouth two (2) times a day.  ondansetron hcl (Zofran) 4 mg tablet Take 1 Tab by mouth every eight (8) hours as needed for Nausea. 12 Tab 0    albuterol (PROVENTIL VENTOLIN) 2.5 mg /3 mL (0.083 %) nebu 3 mL by Nebulization route every four (4) hours as needed for Wheezing. 30 Nebule 0    cetirizine (ZyrTEC) 10 mg tablet Take 1 Tab by mouth daily. 31 Tab 0    multivit-min/iron/folic acid/K (BARIATRIC MULTIVITAMINS PO) Take  by mouth two (2) times a day.  OTHER Calcium chew 500 milligrams 3xday      cholecalciferol (Vitamin D3) (1000 Units /25 mcg) tablet Take 5,000 Units by mouth daily.  cyanocobalamin (Vitamin B-12) 1,000 mcg tablet Take 1,000 mcg by mouth daily.       albuterol (PROVENTIL HFA, VENTOLIN HFA, PROAIR HFA) 90 mcg/actuation inhaler 1-2 Puffs. Past History     Past Medical History:  Past Medical History:   Diagnosis Date    Asthma     Community acquired pneumonia     Diabetes ([address]     no meds    Hypertension     no meds    Obese     Umbilical hernia        Past Surgical History:  Past Surgical History:   Procedure Laterality Date    DILATION AND CURETTAGE      HX GI      gastric bypass    HX GYN      d and c       Family History:  Family History   Problem Relation Age of Onset   Cindy Searing Asthma Mother     Hypertension Mother     Diabetes Mother     Asthma Father        Social History:  Social History     Tobacco Use    Smoking status: Former Smoker     Packs/day: 0.50     Types: Cigarettes     Quit date: 2019     Years since quittin.6    Smokeless tobacco: Never Used   Substance Use Topics    Alcohol use: Not Currently     Alcohol/week: 1.7 standard drinks     Types: 2 Standard drinks or equivalent per week     Comment: holidays/special occassion    Drug use: No       Allergies:  No Known Allergies      Review of Systems       Review of Systems   Skin: Positive for wound (Right hand). Physical Exam     Visit Vitals  BP (!) 141/96 (BP 1 Location: Left lower arm, BP Patient Position: Sitting)   Pulse 73   Temp 98.2 °F (36.8 °C)   Resp 18   Ht 5' 9\" (1.753 m)   Wt 133.8 kg (295 lb)   SpO2 99%   BMI 43.56 kg/m²         Physical Exam  Vitals and nursing note reviewed. Constitutional:       General: She is not in acute distress. Appearance: Normal appearance. She is obese. She is not ill-appearing, toxic-appearing or diaphoretic. HENT:      Head: Normocephalic and atraumatic. Nose: Nose normal.   Eyes:      General: Lids are normal. Vision grossly intact. Conjunctiva/sclera: Conjunctivae normal.   Cardiovascular:      Rate and Rhythm: Normal rate and regular rhythm. Pulses: Normal pulses. Heart sounds: Normal heart sounds.    Pulmonary:      Effort: Pulmonary effort is normal.      Breath sounds: Normal breath sounds. Musculoskeletal:         General: Normal range of motion. Right hand: Laceration present. No swelling, deformity, tenderness or bony tenderness. Normal range of motion. Normal strength. Normal sensation. Normal capillary refill. Arms:       Cervical back: Full passive range of motion without pain, normal range of motion and neck supple. Comments: Patient is neurovascularly intact distally from the site of the laceration. No reproducible bony tenderness of the hand or thumb.  5 out of 5 strength with both flexion and extension of the thumb against resistance.  No snuffbox tenderness. Normal thumb extension, A-OK sign and cross finger abduction and finger abduction normal and intact.  Opposition is normal.  Normal digital sensation along the median, ulnar and radial distributions.  Normal capillary refill. Skin:     General: Skin is warm and dry. Capillary Refill: Capillary refill takes less than 2 seconds. Neurological:      General: No focal deficit present. Mental Status: She is alert and oriented to person, place, and time. Psychiatric:         Mood and Affect: Mood normal.         Behavior: Behavior normal. Behavior is cooperative. Diagnostic Study Results     Labs -  No results found for this or any previous visit (from the past 12 hour(s)). Radiologic Studies -   No orders to display         Medical Decision Making   I am the first provider for this patient. I reviewed available nursing notes, past medical history, past surgical history, family history and social history. Vital Signs-Reviewed the patient's vital signs. Records Reviewed: Nursing Notes and Old Medical Records (Time of Review: 12:28 PM)      ED Course: Progress Notes, Reevaluation, and Consults:  12:28 PM  Initial assessment performed.  The patients presenting problems have been discussed, and they/their family are in agreement with the care plan formulated and outlined with them. I have encouraged them to ask questions as they arise throughout their visit. Suture/Staple Removal    Date/Time: 6/22/2021 12:20 PM  Performed by: KITTY Gaitan  Authorized by: Richie Lopez MD     Consent:     Consent obtained:  Verbal    Consent given by:  Patient    Risks discussed:  Bleeding, pain and wound separation    Alternatives discussed:  No treatment, delayed treatment and observation  Location:     Location:  Upper extremity    Upper extremity location:  Hand    Hand location:  R hand  Procedure details:     Wound appearance:  No signs of infection, good wound healing and clean    Number of sutures removed:  4  Post-procedure details:     Post-removal:  Steri-Strips applied and Band-Aid applied    Patient tolerance of procedure: Tolerated well, no immediate complications  Comments: It appears that the second from the most proximal suture accidentally was removed prior to arrival.  I discussed that there is the possibility that there is still threaded underneath the wound that we are unable to see and to keep a close eye on this. No evidence of secondary infection at this time. Provider Notes (Medical Decision Making):     Patient is a 42-year-old female with a laceration to the right hand that she sustained over the thenar eminence close to the base of the right thumb. Wound edges are well approximated and healing appropriately. She finished her prophylactic antibiotics and tetanus shot was updated her initial visit. She also had an x-ray at that time that was negative for acute bony abnormality or foreign body. We will remove sutures and reinforced with Steri-Strips. Wound care instructions given both written and verbal and close follow-up with hand surgeon as planned. Diagnosis     Clinical Impression:   1.  Laceration of right hand without foreign body, subsequent encounter        Disposition: Discharged home in stable condition    DISCHARGE NOTE:     Patient has been reexamined. Patient has no new complaints, changes, or physical findings. Care plan outlined and precautions discussed. Results of physical exam findings were reviewed with the patient. All of patient's questions and concerns were addressed. Patient was instructed and agrees to follow up with PCP/hand surgeon, as well as to return to the ED upon further deterioration. Patient is ready to go home. Follow-up Information     Follow up With Specialties Details Why Contact Info    Guilherme Courtney MD Internal Medicine Schedule an appointment as soon as possible for a visit  Follow-up from the Emergency Department [address] [facility] EMERGENCY DEPT Emergency Medicine  If symptoms worsen, As needed [address]  [phone]           Current Discharge Medication List            Dictation disclaimer:  Please note that this dictation was completed with [employer], the computer voice recognition software. Quite often unanticipated grammatical, syntax, homophones, and other interpretive errors are inadvertently transcribed by the computer software. Please disregard these errors. Please excuse any errors that have escaped final proofreading.

## 2021-07-30 ENCOUNTER — HOSPITAL ENCOUNTER (OUTPATIENT)
Dept: LAB | Age: 36
Discharge: HOME | End: 2021-07-30
Payer: COMMERCIAL

## 2021-07-30 DIAGNOSIS — K91.2 POSTOPERATIVE MALABSORPTION: ICD-10-CM

## 2021-07-30 LAB
25(OH)D3 SERPL-MCNC: 18 NG/ML (ref 30–100)
ALBUMIN SERPL-MCNC: 3.3 G/DL (ref 3.4–5)
ANION GAP SERPL CALC-SCNC: 4 MMOL/L (ref 3–18)
BASOPHILS # BLD: 0 K/UL (ref 0–0.1)
BASOPHILS NFR BLD: 0 % (ref 0–2)
BUN SERPL-MCNC: 12 MG/DL (ref 7–18)
BUN/CREAT SERPL: 24 (ref 12–20)
CALCIUM SERPL-MCNC: 8.5 MG/DL (ref 8.5–10.1)
CHLORIDE SERPL-SCNC: 110 MMOL/L (ref 100–111)
CO2 SERPL-SCNC: 28 MMOL/L (ref 21–32)
CREAT SERPL-MCNC: 0.51 MG/DL (ref 0.6–1.3)
DIFFERENTIAL METHOD BLD: ABNORMAL
EOSINOPHIL # BLD: 0.1 K/UL (ref 0–0.4)
EOSINOPHIL NFR BLD: 2 % (ref 0–5)
ERYTHROCYTE [DISTWIDTH] IN BLOOD BY AUTOMATED COUNT: 14.7 % (ref 11.6–14.5)
EST. AVERAGE GLUCOSE BLD GHB EST-MCNC: 105 MG/DL
FERRITIN SERPL-MCNC: 7 NG/ML (ref 8–388)
FOLATE SERPL-MCNC: 14.4 NG/ML (ref 3.1–17.5)
GLUCOSE SERPL-MCNC: 85 MG/DL (ref 74–99)
HBA1C MFR BLD: 5.3 % (ref 4.2–5.6)
HCT VFR BLD AUTO: 33.6 % (ref 35–45)
HGB BLD-MCNC: 10.4 G/DL (ref 12–16)
IRON SERPL-MCNC: 117 UG/DL (ref 50–175)
LYMPHOCYTES # BLD: 1.8 K/UL (ref 0.9–3.6)
LYMPHOCYTES NFR BLD: 34 % (ref 21–52)
MCH RBC QN AUTO: 25 PG (ref 24–34)
MCHC RBC AUTO-ENTMCNC: 31 G/DL (ref 31–37)
MCV RBC AUTO: 80.8 FL (ref 74–97)
MONOCYTES # BLD: 0.3 K/UL (ref 0.05–1.2)
MONOCYTES NFR BLD: 7 % (ref 3–10)
NEUTS SEG # BLD: 2.9 K/UL (ref 1.8–8)
NEUTS SEG NFR BLD: 57 % (ref 40–73)
PLATELET # BLD AUTO: 181 K/UL (ref 135–420)
PMV BLD AUTO: 11.4 FL (ref 9.2–11.8)
POTASSIUM SERPL-SCNC: 4.2 MMOL/L (ref 3.5–5.5)
RBC # BLD AUTO: 4.16 M/UL (ref 4.2–5.3)
SODIUM SERPL-SCNC: 142 MMOL/L (ref 136–145)
VIT B12 SERPL-MCNC: 646 PG/ML (ref 211–911)
WBC # BLD AUTO: 5.2 K/UL (ref 4.6–13.2)

## 2021-07-30 PROCEDURE — 36415 COLL VENOUS BLD VENIPUNCTURE: CPT

## 2021-07-30 PROCEDURE — 83540 ASSAY OF IRON: CPT

## 2021-07-30 PROCEDURE — 83036 HEMOGLOBIN GLYCOSYLATED A1C: CPT

## 2021-07-30 PROCEDURE — 82306 VITAMIN D 25 HYDROXY: CPT

## 2021-07-30 PROCEDURE — 82040 ASSAY OF SERUM ALBUMIN: CPT

## 2021-07-30 PROCEDURE — 80048 BASIC METABOLIC PNL TOTAL CA: CPT

## 2021-07-30 PROCEDURE — 84425 ASSAY OF VITAMIN B-1: CPT

## 2021-07-30 PROCEDURE — 82728 ASSAY OF FERRITIN: CPT

## 2021-07-30 PROCEDURE — 85025 COMPLETE CBC W/AUTO DIFF WBC: CPT

## 2021-07-30 PROCEDURE — 82607 VITAMIN B-12: CPT

## 2021-08-02 ENCOUNTER — OFFICE VISIT (OUTPATIENT)
Dept: SURGERY | Age: 36
End: 2021-08-02
Payer: COMMERCIAL

## 2021-08-02 VITALS
HEIGHT: 67 IN | OXYGEN SATURATION: 99 % | WEIGHT: 293 LBS | BODY MASS INDEX: 45.99 KG/M2 | DIASTOLIC BLOOD PRESSURE: 89 MMHG | SYSTOLIC BLOOD PRESSURE: 143 MMHG | HEART RATE: 58 BPM | TEMPERATURE: 97.8 F

## 2021-08-02 DIAGNOSIS — R10.33 PERIUMBILICAL ABDOMINAL PAIN: ICD-10-CM

## 2021-08-02 DIAGNOSIS — G89.29 CHRONIC NECK AND BACK PAIN: ICD-10-CM

## 2021-08-02 DIAGNOSIS — E55.9 VITAMIN D DEFICIENCY: ICD-10-CM

## 2021-08-02 DIAGNOSIS — M54.2 CHRONIC NECK AND BACK PAIN: ICD-10-CM

## 2021-08-02 DIAGNOSIS — K59.00 CONSTIPATION, UNSPECIFIED CONSTIPATION TYPE: ICD-10-CM

## 2021-08-02 DIAGNOSIS — Z98.84 S/P GASTRIC BYPASS: ICD-10-CM

## 2021-08-02 DIAGNOSIS — R11.0 NAUSEA: ICD-10-CM

## 2021-08-02 DIAGNOSIS — M54.9 CHRONIC NECK AND BACK PAIN: ICD-10-CM

## 2021-08-02 DIAGNOSIS — E66.01 MORBID OBESITY (HCC): ICD-10-CM

## 2021-08-02 DIAGNOSIS — K91.2 POSTOPERATIVE MALABSORPTION: Primary | ICD-10-CM

## 2021-08-02 DIAGNOSIS — D64.9 ANEMIA, UNSPECIFIED TYPE: ICD-10-CM

## 2021-08-02 LAB — VIT B1 BLD-SCNC: 120.3 NMOL/L (ref 66.5–200)

## 2021-08-02 PROCEDURE — 99214 OFFICE O/P EST MOD 30 MIN: CPT | Performed by: NURSE PRACTITIONER

## 2021-08-02 RX ORDER — ONDANSETRON 4 MG/1
4 TABLET, ORALLY DISINTEGRATING ORAL
Qty: 20 TABLET | Refills: 1 | Status: SHIPPED | OUTPATIENT
Start: 2021-08-02 | End: 2022-02-10

## 2021-08-02 RX ORDER — DICYCLOMINE HYDROCHLORIDE 10 MG/1
10 CAPSULE ORAL
Status: CANCELLED | OUTPATIENT
Start: 2021-08-02

## 2021-08-02 RX ORDER — CHOLECALCIFEROL TAB 125 MCG (5000 UNIT) 125 MCG
10000 TAB ORAL DAILY
Qty: 60 TABLET | Refills: 2 | Status: SHIPPED | OUTPATIENT
Start: 2021-08-02

## 2021-08-02 RX ORDER — DICYCLOMINE HYDROCHLORIDE 10 MG/1
20 CAPSULE ORAL
Qty: 20 CAPSULE | Refills: 0 | Status: SHIPPED | OUTPATIENT
Start: 2021-08-02 | End: 2022-02-10

## 2021-08-02 NOTE — PATIENT INSTRUCTIONS
Central scheduling will call you to schedule your testing. Please allow 24-48 hours for them to contact you. IF you don't hear from them within that time frame please call them directly at #[phone]. [unfilled]          Constipation    It typically can be prevented by drinking at least 64 ounces of water daily    If you're prone to constipation:  - Take a Stool Softener every day:  (such as Dulcolax Stool Softener)  - Eat Plenty of Fiber   Keep your fiber intake to at least 20 grams a day   Use SUGAR-FREE products: Fiber One products, Benefiber or Metamucil    If you get constipated:  1. Start with a Stool Softener such as Ducloax (bisacodyl)    Dulcolax Stool Softener 100 mg    2. Or try an osmotic laxative such as Miralax   1 capful daily, can go up to 3 capfuls daily    3. If you still have constipation after 2 or 3 days, add a Stimulant Laxative to the Stool Softener (this will produce a bowel movement in 6 - 12 hr):   Examples:    Exlax (1 small square) for up to 4 days    Dulcolax stimulant laxative    Magnesium citrate pill 500 mg start with once a day and go up to 3 times a day if needed    3. Or you can go straight to a combination Stool Softner / Stimulant at night. If you need, you can add a morning dose. Examples:    Pericolace 50 mg / 8.25 mg    Sennakot-S  50 mg / 8.25 mg    4.   If You're Really locked up, get Liquid Magnesium Citrate (take according to the  directions)

## 2021-08-02 NOTE — PROGRESS NOTES
Bariatric Postoperative Progress Note    CC: Annual [facility]GBP Follow Up/Abd Pain    Shane Mehta is a 38 y.o. female now 1 years status post laparoscopic gastric bypass surgery performed on 5/18/20. Doing well overall. Currently eating beef, seafood, broccoli, zucchini, squash, cabbage, oranges, peaches. Taking in 50-60 oz water,  30-40 g protein. 30 min of activity 4 days a week on the treadmill or elliptical. Bowel movements are constipated. The patient is having pain. She is compliant with multivitamins, calcium, Vit D and B12 supplements. Reports a cramping pain that will happen a few minutes after eating that starts in umbilical area that radiates to back. Can last up to 10 min. Nothing makes pain better but time. Reports it is not related to specific foods and can vary in intensity. Can also be triggered by activity. She has been seen in the ER twice for this pain since December. Also reporting neck/back pain that has increased since surgery. BM every few days with hard stool. Has used Miralax but will sprinkle a little bit in drinks throughout the day.        Weight Loss Metrics 8/2/2021 8/2/2021 6/22/2021 3/1/2021 2/9/2021 2/9/2021 1/23/2021   Pre op / Initial Wt 415 - - - 415 - -   Today's Wt - 295 lb 295 lb 295 lb - 302 lb 300 lb   BMI - 46.2 kg/m2 43.56 kg/m2 43.56 kg/m2 - 47.3 kg/m2 44.3 kg/m2   Ideal Body Wt 140 - - - 140 - -   Excess Body Wt 275 - - - 275 - -   Goal Wt 195 - - - 195 - -   Wt loss to date 120 - - - 113 - -   % Wt Loss 0.55 - - - 0.51 - -   80%  - - - 220 - -         Comorbidities:  Hypertension: improved  Diabetes: resolved  Obstructive Sleep Apnea: resolved  Hyperlipidemia: resolved  Stress Urinary Incontinence: resolved  Gastroesophageal Reflux: improved  Weight related arthropathy:improved        Past Medical History:   Diagnosis Date    Asthma     Community acquired pneumonia     Diabetes ([address]     no meds    Hypertension     no meds    Obese     Umbilical hernia Past Surgical History:   Procedure Laterality Date    DILATION AND CURETTAGE      HX GI      gastric bypass    HX GYN      d and c       Current Outpatient Medications   Medication Sig Dispense Refill    cholecalciferol (VITAMIN D3) (5000 Units/125 mcg) tab tablet Take 2 Tablets by mouth daily. 60 Tablet 2    ondansetron (ZOFRAN ODT) 4 mg disintegrating tablet Take 1 Tablet by mouth every eight (8) hours as needed for Nausea or Vomiting. 20 Tablet 1    dicyclomine (BENTYL) 10 mg capsule Take 2 Capsules by mouth four (4) times daily as needed for Abdominal Cramps. 20 Capsule 0    omeprazole (PRILOSEC) 20 mg capsule Take 20 mg by mouth two (2) times a day.  albuterol (PROVENTIL VENTOLIN) 2.5 mg /3 mL (0.083 %) nebu 3 mL by Nebulization route every four (4) hours as needed for Wheezing. 30 Nebule 0    cetirizine (ZyrTEC) 10 mg tablet Take 1 Tab by mouth daily. 31 Tab 0    multivit-min/iron/folic acid/K (BARIATRIC MULTIVITAMINS PO) Take  by mouth two (2) times a day.  OTHER Calcium chew 500 milligrams 3xday      cyanocobalamin (Vitamin B-12) 1,000 mcg tablet Take 1,000 mcg by mouth daily.  albuterol (PROVENTIL HFA, VENTOLIN HFA, PROAIR HFA) 90 mcg/actuation inhaler 1-2 Puffs. No Known Allergies    ROS:  Review of Systems   Constitutional: Positive for malaise/fatigue and weight loss. Eyes: Negative. Respiratory: Negative. Cardiovascular: Negative. Gastrointestinal: Positive for abdominal pain, constipation and nausea. Negative for diarrhea and vomiting. Genitourinary: Negative. Musculoskeletal: Positive for back pain and neck pain. Skin: Negative. Neurological: Negative.           Physicial Exam:  Visit Vitals  BP (!) 143/89 (BP 1 Location: Right arm, BP Patient Position: Sitting)   Pulse (!) 58   Temp 97.8 °F (36.6 °C)   Ht 5' 7\" (1.702 m)   Wt 133.8 kg (295 lb)   LMP 07/10/2021 (Exact Date)   SpO2 99%   BMI 46.20 kg/m²     Physical Exam  Constitutional: Appearance: She is obese. HENT:      Head: Normocephalic and atraumatic. Cardiovascular:      Rate and Rhythm: Normal rate and regular rhythm. Pulses: Normal pulses. Heart sounds: Normal heart sounds. Pulmonary:      Effort: Pulmonary effort is normal.      Breath sounds: Normal breath sounds. Abdominal:      General: Bowel sounds are normal. There is no distension. Palpations: Abdomen is soft. There is no mass. Tenderness: There is no abdominal tenderness. Hernia: A hernia is present. Comments: Approximately 2 cm retractable umbilical hernia. Skin:     General: Skin is warm and dry. Neurological:      General: No focal deficit present. Mental Status: She is alert and oriented to person, place, and time. Psychiatric:         Mood and Affect: Mood normal.         Behavior: Behavior normal.       Labs:   Recent Results (from the past 672 hour(s))   ALBUMIN    Collection Time: 07/30/21 11:21 AM   Result Value Ref Range    Albumin 3.3 (L) 3.4 - 5.0 g/dL   CBC WITH AUTOMATED DIFF    Collection Time: 07/30/21 11:21 AM   Result Value Ref Range    WBC 5.2 4.6 - 13.2 K/uL    RBC 4.16 (L) 4.20 - 5.30 M/uL    HGB 10.4 (L) 12.0 - 16.0 g/dL    HCT 33.6 (L) 35.0 - 45.0 %    MCV 80.8 74.0 - 97.0 FL    MCH 25.0 24.0 - 34.0 PG    MCHC 31.0 31.0 - 37.0 g/dL    RDW 14.7 (H) 11.6 - 14.5 %    PLATELET 796 543 - 721 K/uL    MPV 11.4 9.2 - 11.8 FL    NEUTROPHILS 57 40 - 73 %    LYMPHOCYTES 34 21 - 52 %    MONOCYTES 7 3 - 10 %    EOSINOPHILS 2 0 - 5 %    BASOPHILS 0 0 - 2 %    ABS. NEUTROPHILS 2.9 1.8 - 8.0 K/UL    ABS. LYMPHOCYTES 1.8 0.9 - 3.6 K/UL    ABS. MONOCYTES 0.3 0.05 - 1.2 K/UL    ABS. EOSINOPHILS 0.1 0.0 - 0.4 K/UL    ABS.  BASOPHILS 0.0 0.0 - 0.1 K/UL    DF AUTOMATED     FERRITIN    Collection Time: 07/30/21 11:21 AM   Result Value Ref Range    Ferritin 7 (L) 8 - 388 NG/ML   IRON    Collection Time: 07/30/21 11:21 AM   Result Value Ref Range    Iron 117 50 - 694 ug/dL   METABOLIC PANEL, BASIC    Collection Time: 07/30/21 11:21 AM   Result Value Ref Range    Sodium 142 136 - 145 mmol/L    Potassium 4.2 3.5 - 5.5 mmol/L    Chloride 110 100 - 111 mmol/L    CO2 28 21 - 32 mmol/L    Anion gap 4 3.0 - 18 mmol/L    Glucose 85 74 - 99 mg/dL    BUN 12 7.0 - 18 MG/DL    Creatinine 0.51 (L) 0.6 - 1.3 MG/DL    BUN/Creatinine ratio 24 (H) 12 - 20      GFR est AA >60 >60 ml/min/1.73m2    GFR est non-AA >60 >60 ml/min/1.73m2    Calcium 8.5 8.5 - 10.1 MG/DL   VITAMIN B12 & FOLATE    Collection Time: 07/30/21 11:21 AM   Result Value Ref Range    Vitamin B12 646 211 - 911 pg/mL    Folate 14.4 3.10 - 17.50 ng/mL   VITAMIN D, 25 HYDROXY    Collection Time: 07/30/21 11:21 AM   Result Value Ref Range    Vitamin D 25-Hydroxy 18.0 (L) 30 - 100 ng/mL   HEMOGLOBIN A1C WITH EAG    Collection Time: 07/30/21 11:21 AM   Result Value Ref Range    Hemoglobin A1c 5.3 4.2 - 5.6 %    Est. average glucose 105 mg/dL     CT ABD PELV W CONT: 12/10/2020     CLINICAL INFORMATION  Generalized abdominal pain.     COMPARISON  None.     TECHNIQUE  Axial CT images of the abdomen and pelvis obtained following the administration  of intravenous contrast. Sagittal and coronal reformations performed.     The following CT dose reduction techniques were utilized: automated exposure  control and/or adjustment of the mA and/or kV according to patient size, and the  use of iterative reconstruction technique     FINDINGS  Lung base: No evidence of pulmonary parenchymal consolidation.     Liver: Focal fat along the falciform ligament. .  Gallbladder and biliary tree: No calcified gallstones. Normal caliber wall. No  intra- or extrahepatic biliary ductal dilation. Pancreas: Normal.  Spleen: Spleen at the upper limits of normal in size. .  Adrenals: Normal.  Kidneys and ureters: Normal.     Stomach: Postsurgical changes. Small hiatal hernia. Small bowel: Normal caliber.   Large bowel: Normal. The appendix is normal, noted to be projecting superiorly  at the anterior margin of the falciform ligament.     Lymph nodes: No pathologically enlarged lymph nodes.     Vessels: No aneurysm.     Peritoneum: No ascites or free air. No other fluid collection. Retroperitoneum: Normal.  Abdominal wall: There is a umbilical hernia defect measuring 3.2 cm in  transverse dimension and 3.5 cm in craniocaudal dimension. There is herniated  fat contents with mild inflammatory fat stranding. No focal fluid collection.     Bladder: Normal.  Reproductive organs: Normal CT appearance.     Osseous structures: No acute findings.     IMPRESSION:     Fat filled umbilical hernia defect with mild associated inflammatory fat  stranding.      No focal fluid collection.      No herniated small bowel.     XR Abd 1/23/21  HISTORY: Abdominal pain     COMPARISON: None.     FINDINGS: Chest PA single view shows normal heart and clear lungs. The pulmonary  vascularity and mediastinum as well as the bony thorax are unremarkable.      Abdominal flat and upright views show nonspecific bowel gas pattern with  questionable mild small bowel wall thickening without air-fluid level or  dilatation in bilateral lower quadrants. No evidence of bowel obstruction or  intraperitoneal free air identified. No organomegaly identified. Multiple  phleboliths present in the pelvis. The soft tissue and the bone structures  appear unremarkable.      IMPRESSION     1. No acute cardiopulmonary process.     2.  Questionable mild small bowel wall thickening without dilatation or sign of  obstruction. Correlate clinically to exclude potential mild enteritis. Assessment/Plan:   She is currently 1 year s/p laparoscopic gastric bypass surgery with a total weight loss of 120 lbs to date, struggling with weight plateau and abd pain. Labs were reviewed and pt was instructed to continue MVI/B1/B12/D supplementation. H/H low, iron wnl but ferritin low. She does not take a separate oral iron supplement and is having significant constipation, will refer to hematology. Will also send in prescription for vitamin D since that was low. Will refer to PT for neck/back pain. Will trial Bentyl for cramping abd pain. Hernia on exam, and pt reports that pain is located mostly around hernia. Will order CT since last CT performed 12/2020 and pain has increased since then. Constipation protocol discussed. Will increase Miralax and add on stool softener daily as well as increase fluids. Start food log and incorporate if having pain related to foods, follow up with RD. We have reviewed the components of a successful postoperative course including requirement for a high protein, low carbohydrate diet, 64 oz a day of zero calorie liquids, daily vitamin supplementation, daily exercise (150 mis/week), regular follow-up, and participation in support groups. Refer to registered dietitian for more detailed medical nutrition therapy as needed. The primary encounter diagnosis was Postoperative malabsorption. Diagnoses of S/P gastric bypass, Morbid obesity ([address] BMI 45.0-49.9, adult ([address] Nausea, Periumbilical abdominal pain, Chronic neck and back pain, Constipation, unspecified constipation type, Anemia, unspecified type, and Vitamin D deficiency were also pertinent to this visit.      John Hernandez NP

## 2021-08-04 ENCOUNTER — TELEPHONE (OUTPATIENT)
Dept: SURGERY | Age: 36
End: 2021-08-04

## 2021-08-04 NOTE — TELEPHONE ENCOUNTER
Patient will increase her protein and she has had appointment with [facility] and discussed her iron levels

## 2021-08-11 ENCOUNTER — HOSPITAL ENCOUNTER (OUTPATIENT)
Dept: CT IMAGING | Age: 36
Discharge: HOME | End: 2021-08-11
Attending: NURSE PRACTITIONER
Payer: COMMERCIAL

## 2021-08-11 ENCOUNTER — HOSPITAL ENCOUNTER (OUTPATIENT)
Dept: PHYSICAL THERAPY | Age: 36
Discharge: HOME | End: 2021-08-11
Payer: COMMERCIAL

## 2021-08-11 DIAGNOSIS — K59.00 CONSTIPATION, UNSPECIFIED CONSTIPATION TYPE: ICD-10-CM

## 2021-08-11 DIAGNOSIS — R11.0 NAUSEA: ICD-10-CM

## 2021-08-11 DIAGNOSIS — M54.2 CHRONIC NECK AND BACK PAIN: ICD-10-CM

## 2021-08-11 DIAGNOSIS — Z98.84 S/P GASTRIC BYPASS: ICD-10-CM

## 2021-08-11 DIAGNOSIS — M54.9 CHRONIC NECK AND BACK PAIN: ICD-10-CM

## 2021-08-11 DIAGNOSIS — R10.33 PERIUMBILICAL ABDOMINAL PAIN: ICD-10-CM

## 2021-08-11 DIAGNOSIS — D64.9 ANEMIA, UNSPECIFIED TYPE: ICD-10-CM

## 2021-08-11 DIAGNOSIS — G89.29 CHRONIC NECK AND BACK PAIN: ICD-10-CM

## 2021-08-11 DIAGNOSIS — E66.01 MORBID OBESITY (HCC): ICD-10-CM

## 2021-08-11 DIAGNOSIS — K91.2 POSTOPERATIVE MALABSORPTION: ICD-10-CM

## 2021-08-11 PROCEDURE — 74150 CT ABDOMEN W/O CONTRAST: CPT

## 2021-08-11 PROCEDURE — 97161 PT EVAL LOW COMPLEX 20 MIN: CPT

## 2021-08-11 NOTE — PROGRESS NOTES
[facility] [address] [place] [address]  [phone] fax  Plan of Care/ Statement of Necessity for Physical Therapy Services     Patient name: Fang Jack Start of Care: 2021   Referral source: Radha Mckeon NP : 1985    Medical Diagnosis: Neck pain [M54.2]  Dorsalgia, unspecified [M54.9]  Payor: [address] [place] [address] Road / Plan: [address] / Product Type: Managed Care Medicaid /  Onset Date: May 2021    Treatment Diagnosis: Bilateral shoulder pain, neck pain, Low back pain   Prior Hospitalization: see medical history Provider#: [id number]   Medications: Verified on Patient summary List    Comorbidities: BMI >30, prior surgery (gastric bypass 1 year ago)   Prior Level of Function: Independent in all activities of PLOF with no A.D., 20 min of exercise 3x/wk, lifting approx. 30 lbs. , standing >30 min to cook. The Plan of Care and following information is based on the information from the initial evaluation. Assessment/ key information: Pt is a pleasant 37 y/o female who presents to clinic with c/o B shoulder, neck, and Low back pain. Pt reports history of weight loss secondary to bypass surgery ~ 1 year ago with worsening pain which currently limits bending to  objects off floor, cooking, holding son. S/s at eval consistent with mechanical bilateral neck, shoulder, and low back pain which are multifactorial in origin secondary to poor posture, impaired proximal strength, and decreased activity levels at home. . Functional deficits include: decreased standing tolerance to cook, decreased ability to  son due to pain, decrease sleep quality due to pain. Rehab potential is good due to desire to attain PLOF. Pt would benefit from skilled PT to address above deficits to improve Pt's function and ability to return to PLOF of house work and caring for son.      Evaluation Complexity History MEDIUM  Complexity : 1-2 comorbidities / personal factors will impact the outcome/ POC ; Examination LOW Complexity : 1-2 Standardized tests and measures addressing body structure, function, activity limitation and / or participation in recreation  ;Presentation LOW Complexity : Stable, uncomplicated  ;Clinical Decision Making MEDIUM Complexity : FOTO score of 26-74  Overall Complexity Rating: LOW   Problem List: pain affecting function, decrease ROM, decrease strength, edema affecting function, decrease ADL/ functional abilitiies, decrease activity tolerance, decrease flexibility/ joint mobility and decrease transfer abilities   Treatment Plan may include any combination of the following: Therapeutic exercise, Therapeutic activities, Neuromuscular re-education, Physical agent/modality, Gait/balance training, Manual therapy, Aquatic therapy, Patient education, Self Care training, Functional mobility training, Home safety training and Stair training  Patient / Family readiness to learn indicated by: asking questions, trying to perform skills and interest  Persons(s) to be included in education: patient (P)  Barriers to Learning/Limitations: None  Patient Goal (s): \"decrease pain, stand longer to cook, improve sleep quality   Patient Self Reported Health Status: good  Rehabilitation Potential: good    Short Term Goals: To be accomplished in 1 week  - Goal: Pt to be compliant with initial HEP to improve cervical range of motion and pain. Status at last note/certification: Established and reviewed with Pt  Long Term Goals: To be accomplished in 10 treatments  - Goal: Pt to demonstrate cervical extension to preferred end range with no exacerbations of S/s to facilitate ease visual scanning tasks when walking and driving.   Status at last note/certification: Full cervical extension AROM with pain  - Goal: Pt to demonstrate 5/5 MMT of Left shoulder flexion and abduction to improve overhead lifting ability and ease of UE daily tasks such as grooming and dishes. Status at last note/certification: 4-/5 shoulder flexion and abduction MMT  - Goal: Pt to report < 5/10 pain at worst to return to PLOF activities. Status at last note/certification: 32/89 pain at worst  - Goal: Pt to report improved quality of sleep in 4/7 nights/week uninterrupted by pain in order to improve healing and quality of life. Status at last note/certification: sleep frequently disrupted by neck/back pain  - Goal: Pt to report FOTO score of at least 61 pts to show improved function and quality of life. Status at last note/certification: FOTO 49 pts     Frequency / Duration: Patient to be seen 2 times per week for 10 treatments. Patient/ Caregiver education and instruction: Diagnosis, prognosis, self care, activity modification and exercises   [x]  Plan of care has been reviewed with KRYSTAL Benoit 8/11/2021 11:29 AM  _____________________________________________________________________  I certify that the above Therapy Services are being furnished while the patient is under my care. I agree with the treatment plan and certify that this therapy is necessary.     [de-identified] Signature:____________Date:_________TIME:________     Jhonatan Carmona NP  ** Signature, Date and Time must be completed for valid certification **    Please sign and return to [facility] [address]  [phone] fax

## 2021-08-11 NOTE — PROGRESS NOTES
PT DAILY TREATMENT NOTE     Patient Name: Pierre Rose  Date:2021  : 1985  [x]  Patient  Verified  [facility] [address] / Plan: [address] / Product Type: Managed Care Medicaid /    In time:905  Out time:950  Total Treatment Time (min): 45  Visit #: 1 of 10    Medicare/BCBS Only   Total Timed Codes (min):   1:1 Treatment Time:         Treatment Area: Neck pain [M54.2]  Dorsalgia, unspecified [M54.9]    SUBJECTIVE  Pain Level (0-10 scale): 7  Any medication changes, allergies to medications, adverse drug reactions, diagnosis change, or new procedure performed?: [x] No    [] Yes (see summary sheet for update)  Subjective functional status/changes:   [] No changes reported  See POC    OBJECTIVE    30 min [x]Eval                  []Re-Eval       10 min Therapeutic Exercise:  [] See flow sheet :   Rationale: increase ROM and increase strength to improve the patients ability to improve cervical range of motion and pain. 10 min Therapeutic Activity:  []  See flow sheet : Patient education on therapy assessment, prognosis, expectations for therapy sessions, patient goals, sleep positional modification, posture, pain managment, and HEP. Rationale: to improve the patients ability to adhere to HEP and therapy sessions for increased compliance when working toward therapy goals.            With   [x] TE   [] TA   [] neuro   [] other: Patient Education: [x] Review HEP    [] Progressed/Changed HEP based on:   [x] positioning   [x] body mechanics   [] transfers   [] heat/ice application    [] other:      Other Objective/Functional Measures: See POC     Pain Level (0-10 scale) post treatment: 6    ASSESSMENT/Changes in Function: See POC    Patient will continue to benefit from skilled PT services to modify and progress therapeutic interventions, address functional mobility deficits, address ROM deficits, address strength deficits, analyze and address soft tissue restrictions, analyze and cue movement patterns, analyze and modify body mechanics/ergonomics, assess and modify postural abnormalities, address imbalance/dizziness and instruct in home and community integration to attain remaining goals.      [x]  See Plan of Care  []  See progress note/recertification  []  See Discharge Summary         Progress towards goals / Updated goals:  See POC    PLAN  [x]  Upgrade activities as tolerated     []  Continue plan of care  [x]  Update interventions per flow sheet       []  Discharge due to:_  []  Other:_      REYES Fernando 8/11/2021  11:26 AM    Future Appointments   Date Time Provider Narciso Varela   9/29/2021  9:30 AM Estefania Benavides MD [facility]

## 2021-08-13 DIAGNOSIS — E66.01 MORBID OBESITY (HCC): ICD-10-CM

## 2021-08-13 DIAGNOSIS — R10.33 PERIUMBILICAL ABDOMINAL PAIN: ICD-10-CM

## 2021-08-13 DIAGNOSIS — Z98.84 S/P GASTRIC BYPASS: ICD-10-CM

## 2021-08-13 DIAGNOSIS — K91.2 POSTOPERATIVE MALABSORPTION: Primary | ICD-10-CM

## 2021-08-28 ENCOUNTER — HOSPITAL ENCOUNTER (EMERGENCY)
Age: 36
Discharge: HOME | End: 2021-08-28
Attending: EMERGENCY MEDICINE
Payer: COMMERCIAL

## 2021-08-28 VITALS
TEMPERATURE: 98.8 F | HEIGHT: 69 IN | SYSTOLIC BLOOD PRESSURE: 133 MMHG | DIASTOLIC BLOOD PRESSURE: 75 MMHG | HEART RATE: 81 BPM | OXYGEN SATURATION: 99 % | WEIGHT: 281 LBS | BODY MASS INDEX: 41.62 KG/M2 | RESPIRATION RATE: 16 BRPM

## 2021-08-28 DIAGNOSIS — J03.90 TONSILLITIS: Primary | ICD-10-CM

## 2021-08-28 LAB
DEPRECATED S PYO AG THROAT QL EIA: NEGATIVE
SARS-COV-2, COV2: NORMAL

## 2021-08-28 PROCEDURE — 99282 EMERGENCY DEPT VISIT SF MDM: CPT

## 2021-08-28 PROCEDURE — 87880 STREP A ASSAY W/OPTIC: CPT

## 2021-08-28 PROCEDURE — U0003 INFECTIOUS AGENT DETECTION BY NUCLEIC ACID (DNA OR RNA); SEVERE ACUTE RESPIRATORY SYNDROME CORONAVIRUS 2 (SARS-COV-2) (CORONAVIRUS DISEASE [COVID-19]), AMPLIFIED PROBE TECHNIQUE, MAKING USE OF HIGH THROUGHPUT TECHNOLOGIES AS DESCRIBED BY CMS-2020-01-R: HCPCS

## 2021-08-28 PROCEDURE — 87147 CULTURE TYPE IMMUNOLOGIC: CPT

## 2021-08-28 PROCEDURE — 87070 CULTURE OTHR SPECIMN AEROBIC: CPT

## 2021-08-28 RX ORDER — TRAMADOL HYDROCHLORIDE 50 MG/1
50 TABLET ORAL
Qty: 18 TABLET | Refills: 0 | Status: SHIPPED | OUTPATIENT
Start: 2021-08-28 | End: 2021-08-31

## 2021-08-28 RX ORDER — AMOXICILLIN AND CLAVULANATE POTASSIUM 875; 125 MG/1; MG/1
1 TABLET, FILM COATED ORAL 2 TIMES DAILY
Qty: 20 TABLET | Refills: 0 | Status: SHIPPED | OUTPATIENT
Start: 2021-08-28 | End: 2021-09-07

## 2021-08-28 NOTE — ED PROVIDER NOTES
EMERGENCY DEPARTMENT HISTORY AND PHYSICAL EXAM      Date: 8/28/2021  Patient Name: Joann Dickerson    History of Presenting Illness     Chief Complaint   Patient presents with    Sore Throat    Covid Testing       History (Context): Joann Dickerson is a 38 y.o. *** with ***who presents with subacute onset, progressive, moderate to severe sore throat associated with odynophagia. Swallowing makes the pain worse. No relieving features. The patient does ***engage in risky sexual practices. On review of systems, the patient denies ***fever, chills, rashes, neck pain, neck stiffness, dysphagia    PCP: Serena Harada, MD    Current Outpatient Medications   Medication Sig Dispense Refill    traMADoL (Ultram) 50 mg tablet Take 1 Tablet by mouth every four (4) hours as needed for Pain for up to 3 days. Max Daily Amount: 300 mg. 18 Tablet 0    benzocaine 15 mg lozg 1 Lozenge by Mucous Membrane route four (4) times daily as needed for Pain. 100 Lozenge 0    amoxicillin-clavulanate (Augmentin) 875-125 mg per tablet Take 1 Tablet by mouth two (2) times a day for 10 days. 20 Tablet 0    cholecalciferol (VITAMIN D3) (5000 Units/125 mcg) tab tablet Take 2 Tablets by mouth daily. 60 Tablet 2    ondansetron (ZOFRAN ODT) 4 mg disintegrating tablet Take 1 Tablet by mouth every eight (8) hours as needed for Nausea or Vomiting. 20 Tablet 1    dicyclomine (BENTYL) 10 mg capsule Take 2 Capsules by mouth four (4) times daily as needed for Abdominal Cramps. 20 Capsule 0    omeprazole (PRILOSEC) 20 mg capsule Take 20 mg by mouth two (2) times a day.  cetirizine (ZyrTEC) 10 mg tablet Take 1 Tab by mouth daily. 31 Tab 0    multivit-min/iron/folic acid/K (BARIATRIC MULTIVITAMINS PO) Take  by mouth two (2) times a day.  OTHER Calcium chew 500 milligrams 3xday      cyanocobalamin (Vitamin B-12) 1,000 mcg tablet Take 1,000 mcg by mouth daily.       albuterol (PROVENTIL HFA, VENTOLIN HFA, PROAIR HFA) 90 mcg/actuation inhaler 1-2 Puffs.      albuterol (PROVENTIL VENTOLIN) 2.5 mg /3 mL (0.083 %) nebu 3 mL by Nebulization route every four (4) hours as needed for Wheezing. 30 Nebule 0       Past History     Past Medical History:  Past Medical History:   Diagnosis Date    Asthma     Community acquired pneumonia     Diabetes ([address]     no meds    Hypertension     no meds    Obese     Umbilical hernia        Past Surgical History:  Past Surgical History:   Procedure Laterality Date    DILATION AND CURETTAGE      HX GI      gastric bypass    HX GYN      d and c       Family History:  Family History   Problem Relation Age of Onset   Isabelle Ballesteros Asthma Mother     Hypertension Mother     Diabetes Mother     Asthma Father        Social History:  Social History     Tobacco Use    Smoking status: Former Smoker     Packs/day: 0.50     Types: Cigarettes     Quit date: 2019     Years since quittin.8    Smokeless tobacco: Never Used   Substance Use Topics    Alcohol use: Not on file     Comment: holidays/special occassion    Drug use: No       Allergies:  No Known Allergies    PMH, PSH, family history, social history, allergies reviewed with the patient with significant items noted above. Review of Systems   As per HPI, otherwise reviewed and negative. Physical Exam     Vitals:    21 1009   BP: 133/75   Pulse: 81   Resp: 16   Temp: 98.8 °F (37.1 °C)   SpO2: 99%   Weight: 127.5 kg (281 lb)   Height: 5' 9\" (1.753 m)       Gen: Well-appearing, in no acute distress   HEENT: Normocephalic, sclera anicteric, TMs clear, oropharynx: ***, ***Lymphadenopathy  Cardiovascular: Normal rate, regular rhythm, no murmurs, rubs, gallops. Pulses intact and equal distally. Pulmonary: No respiratory distress. No stridor. Clear lungs. ABD: Soft, nontender, nondistended. Neuro: Alert. Normal speech. Normal mentation. Psych: Normal thought content and thought processes. EXT: Moves all extremities well. No cyanosis or clubbing.   Skin: Warm and well-perfused. Diagnostic Study Results     Labs -     Recent Results (from the past 12 hour(s))   SARS-COV-2    Collection Time: 08/28/21 10:15 AM   Result Value Ref Range    SARS-CoV-2 Please find results under separate order     STREP AG SCREEN, GROUP A    Collection Time: 08/28/21 10:15 AM    Specimen: Throat   Result Value Ref Range    Group A Strep Ag ID Negative         Radiologic Studies -   No orders to display     CT Results  (Last 48 hours)    None        CXR Results  (Last 48 hours)    None            Medical Decision Making   I am the first provider for this patient. I reviewed the vital signs, available nursing notes, past medical history, past surgical history, family history and social history. Vital Signs-Reviewed the patient's vital signs. Records Reviewed: Personally, on initial evaluation    MDM:   Patient presents with sore throat and odynophagia. Exam significant for ***. DDX considered: Strep pharyngitis, other bacterial pharyngitis, viral pharyngitis  DDX thought to be less likely but also considered due to high risk condition: RPA, Ted's angina, parapharyngeal space infection, PTA, Lemierre's syndrome, ICA aneurysm, parotitis, lymphoma    Patient condition on initial evaluation: ***      Criteria Points   Age 3-14 years +1    15-44 years 0    ?45 years -1   Exudate or swelling on tonsils No 0    Yes +1   Tender/swollen anterior cervical lymph nodes No 0    Yes +1   Temp >38°C (100.4°F) No 0    Yes +1   Cough Cough present 0    Cough absent +1   Facts & Figures  Interpretation:  Centor Score Probability of strep pharyngitis Recommendation   0 1-2.5% No further testing or antibiotics. 1 5-10%    2 11-17% Optional rapid strep testing and/or culture. 3 28-35% Consider rapid strep testing and/or culture. ?4 51-53% Consider rapid strep testing and/or culture.  Per the Centers for Disease Control, empiric antibiotics no longer recommended for meeting all Centor criteria. Centor: ***      Plan:   Pain Control  Close Observation    Orders as below:  Orders Placed This Encounter    STREP AG SCREEN, GROUP A    CULTURE, THROAT    SARS-COV-2    NOVEL CORONAVIRUS (COVID-19)    traMADoL (Ultram) 50 mg tablet    benzocaine 15 mg lozg    amoxicillin-clavulanate (Augmentin) 875-125 mg per tablet        ED Course:          Patient condition at time of disposition: ***  DISCHARGE NOTE:   Pt has been reexamined. *** Patient has no new complaints, changes, or physical findings. Care plan outlined and precautions discussed. Results were reviewed with the patient. All medications were reviewed with the patient; will d/c home with ***. All of pt's questions and concerns were addressed. Alarm symptoms and return precautions associated with chief complaint and evaluation were reviewed with the patient in detail. The patient demonstrated adequate understanding. Patient was instructed and agrees to follow up with ***, as well as to return to the ED upon further deterioration. Patient is ready to go home. The patient is ***happy with this plan    Follow-up Information    None         Current Discharge Medication List      START taking these medications    Details   traMADoL (Ultram) 50 mg tablet Take 1 Tablet by mouth every four (4) hours as needed for Pain for up to 3 days. Max Daily Amount: 300 mg. Qty: 18 Tablet, Refills: 0  Start date: 8/28/2021, End date: 8/31/2021    Associated Diagnoses: Tonsillitis      benzocaine 15 mg lozg 1 Lozenge by Mucous Membrane route four (4) times daily as needed for Pain. Qty: 100 Lozenge, Refills: 0  Start date: 8/28/2021      amoxicillin-clavulanate (Augmentin) 875-125 mg per tablet Take 1 Tablet by mouth two (2) times a day for 10 days. Qty: 20 Tablet, Refills: 0  Start date: 8/28/2021, End date: 9/7/2021             Procedures:  Procedures          Diagnosis     Clinical Impression:   1.  Tonsillitis        Signed,  Marea Blazing, MD  Emergency Physician  [facility]    As a voice dictation software was utilized to dictate this note, minor word transpositions can occur. I apologize for confusing wording and typographic errors. Please feel free to contact me for clarification.

## 2021-08-28 NOTE — ED TRIAGE NOTES
Patient c/o sore throat x 4 days. Patient noted to have white patches to tonsils. She requests covid testing.

## 2021-08-29 LAB — SARS-COV-2, COV2NT: NOT DETECTED

## 2021-08-30 LAB
BACTERIA SPEC CULT: ABNORMAL
BACTERIA SPEC CULT: ABNORMAL
SERVICE CMNT-IMP: ABNORMAL

## 2021-09-01 NOTE — PROGRESS NOTES
[facility], [address] [place] [address]  [phone] fax    Discharge Summary    Patient name: Osiel Gary Start of Care: 21   Referral source: Taylor Thibodeaux NP : 1985   Medical/Treatment Diagnosis: Neck pain [M54.2]  Dorsalgia, unspecified [M54.9]  Payor: [address] [place] [address] Road / Plan: [address] / Product Type: Managed Care Medicaid /  Onset Date:May 2021     Prior Hospitalization: see medical history Provider#: [id number]   Medications: Verified on Patient Summary List     Comorbidities: BMI >30, prior surgery (gastric bypass 1 year ago)   Prior Level of Function: Independent in all activities of PLOF with no A.D., 20 min of exercise 3x/wk, lifting approx. 30 lbs. , standing >30 min to cook. Visits from Start of Care: 1    Missed Visits: 3    Reporting Period : 21 to 21    Short Term Goals: To be accomplished in 1 week  - Goal: Pt to be compliant with initial HEP to improve cervical range of motion and pain. Status at last note/certification: Established and reviewed with Pt  Unable to re-assess; not met  Long Term Goals: To be accomplished in 10 treatments  - Goal: Pt to demonstrate cervical extension to preferred end range with no exacerbations of S/s to facilitate ease visual scanning tasks when walking and driving. Status at last note/certification: Full cervical extension AROM with pain  Unable to re-assess; not met  - Goal: Pt to demonstrate 5/5 MMT of Left shoulder flexion and abduction to improve overhead lifting ability and ease of UE daily tasks such as grooming and dishes. Status at last note/certification: 4-/5 shoulder flexion and abduction MMT  Unable to re-assess; not met  - Goal: Pt to report < 5/10 pain at worst to return to PLOF activities.   Status at last note/certification: 18 pain at worst  Unable to re-assess; not met  - Goal: Pt to report improved quality of sleep in 4/7 nights/week uninterrupted by pain in order to improve healing and quality of life. Status at last note/certification: sleep frequently disrupted by neck/back pain  Unable to re-assess; not met  - Goal: Pt to report FOTO score of at least 61 pts to show improved function and quality of life. Status at last note/certification: FOTO 49 pts   Unable to re-assess; not met      Assessment/ Summary of Care: Pt attended initial evaluation then did not return due to unknown reasons; thus, Pt's goals were unable to be further reassessed. Pt was contacted via phone 9/1/21. Due to clinic attendance policy, Pt will be discharged at this time with no further instructions. Thank you for this referral.      RECOMMENDATIONS:  [x]Discontinue therapy: []Patient has reached or is progressing toward set goals      [x]Patient is non-compliant or has abdicated      []Due to lack of appreciable progress towards set goals    Rosanne Logan, PT 9/1/2021 12:38 PM    NOTE TO PHYSICIAN:  Your patient's insurance requires this discharge note be signed and returned. PLEASE COMPLETE THE ORDERS BELOW AND RETURN TO:  [facility]    ___ I have read the above report and request that my patient be discharged from therapy.      Physician Signature:        Date:       Time:                                        Lizy Montiel NP    To ensure we are able to process the patients encounter and avoid risk of your patient receiving a bill for our services, please sign and return this discharge summary by 09/30/21. (30days following DC date)

## 2021-09-03 ENCOUNTER — APPOINTMENT (OUTPATIENT)
Dept: PHYSICAL THERAPY | Age: 36
End: 2021-09-03

## 2021-09-08 ENCOUNTER — APPOINTMENT (OUTPATIENT)
Dept: PHYSICAL THERAPY | Age: 36
End: 2021-09-08

## 2021-09-10 ENCOUNTER — APPOINTMENT (OUTPATIENT)
Dept: PHYSICAL THERAPY | Age: 36
End: 2021-09-10

## 2021-09-15 ENCOUNTER — APPOINTMENT (OUTPATIENT)
Dept: PHYSICAL THERAPY | Age: 36
End: 2021-09-15

## 2021-09-17 ENCOUNTER — APPOINTMENT (OUTPATIENT)
Dept: PHYSICAL THERAPY | Age: 36
End: 2021-09-17

## 2021-09-21 NOTE — PROGRESS NOTES
Hematology/Oncology Consultation Note      Date: 2021    Name: Toby Lora  : 1985        Christen Morgan MD         Subjective:     Chief complaint: Anemia    History of Present Illness:   Ms. Aneta Waller is a most pleasant 38y.o. year old female who was seen for consultation of anemia. The patient has a past medical history significant of asthma, diabetes, hypertension, status post gastric bypass 2020. Lab reviews indicated chronic anemia since at least 2012, hemoglobin 9.2.  2021 CBC reported hemoglobin 10.4, hematocrit 33.6%, WBC 5.2, platelet 526,269. Ferritin 7, iron 117. Lab reviews indicated chronic anemia since at least 2012, hemoglobin 9.2. She reported she has chronic fatigue and low energy level since her gastric bypass surgery last year. She could not take PO Iron due to significant constipation. The patient otherwise has no other complaints. Denied fever, chills, night sweat, unintentional weight loss, skin lumps or bumps, acute bleeding or bruising issues. Denied headache, acute vision change, dizziness, chest pain, worsen shortness of breath, palpitation, productive cough, nausea, vomiting, abdominal pain, altered bowel habits, dysuria, worsen bone pain or back pain.       Past Medical History, Family History, and Social History:    Past Medical History:   Diagnosis Date    Asthma     Community acquired pneumonia     Diabetes ([address]     no meds    Hypertension     no meds    Obese     Umbilical hernia      Past Surgical History:   Procedure Laterality Date    DILATION AND CURETTAGE      HX GI      gastric bypass    HX GYN      d and c     Social History     Socioeconomic History    Marital status: SINGLE     Spouse name: Not on file    Number of children: Not on file    Years of education: Not on file    Highest education level: Not on file   Occupational History    Not on file   Tobacco Use    Smoking status: Former Smoker     Packs/day: 0.50 Types: Cigarettes     Quit date: 2019     Years since quittin.8    Smokeless tobacco: Never Used   Substance and Sexual Activity    Alcohol use: Not Currently     Alcohol/week: 1.7 standard drinks     Types: 2 Standard drinks or equivalent per week     Comment: holidays/special occassion    Drug use: No    Sexual activity: Yes     Partners: Female     Birth control/protection: None   Other Topics Concern    Not on file   Social History Narrative    Not on file     Social Determinants of Health     Financial Resource Strain:     Difficulty of Paying Living Expenses:    Food Insecurity:     Worried About Running Out of Food in the Last Year:     Ran Out of Food in the Last Year:    Transportation Needs:     Lack of Transportation (Medical):  Lack of Transportation (Non-Medical):    Physical Activity:     Days of Exercise per Week:     Minutes of Exercise per Session:    Stress:     Feeling of Stress :    Social Connections:     Frequency of Communication with Friends and Family:     Frequency of Social Gatherings with Friends and Family:     Attends Hinduism Services:     Active Member of Clubs or Organizations:     Attends Club or Organization Meetings:     Marital Status:    Intimate Partner Violence:     Fear of Current or Ex-Partner:     Emotionally Abused:     Physically Abused:     Sexually Abused:      Family History   Problem Relation Age of Onset    Asthma Mother     Hypertension Mother     Diabetes Mother     Asthma Father      Current Outpatient Medications   Medication Sig Dispense Refill    benzocaine 15 mg lozg 1 Lozenge by Mucous Membrane route four (4) times daily as needed for Pain. 100 Lozenge 0    cholecalciferol (VITAMIN D3) (5000 Units/125 mcg) tab tablet Take 2 Tablets by mouth daily. 60 Tablet 2    ondansetron (ZOFRAN ODT) 4 mg disintegrating tablet Take 1 Tablet by mouth every eight (8) hours as needed for Nausea or Vomiting.  20 Tablet 1    dicyclomine (BENTYL) 10 mg capsule Take 2 Capsules by mouth four (4) times daily as needed for Abdominal Cramps. 20 Capsule 0    omeprazole (PRILOSEC) 20 mg capsule Take 20 mg by mouth two (2) times a day.  albuterol (PROVENTIL VENTOLIN) 2.5 mg /3 mL (0.083 %) nebu 3 mL by Nebulization route every four (4) hours as needed for Wheezing. 30 Nebule 0    cetirizine (ZyrTEC) 10 mg tablet Take 1 Tab by mouth daily. 31 Tab 0    multivit-min/iron/folic acid/K (BARIATRIC MULTIVITAMINS PO) Take  by mouth two (2) times a day.  OTHER Calcium chew 500 milligrams 3xday      cyanocobalamin (Vitamin B-12) 1,000 mcg tablet Take 1,000 mcg by mouth daily.  albuterol (PROVENTIL HFA, VENTOLIN HFA, PROAIR HFA) 90 mcg/actuation inhaler 1-2 Puffs. Review of Systems   Constitutional: Positive for malaise/fatigue. Negative for chills, diaphoresis, fever and weight loss. Respiratory: Negative for cough, hemoptysis, shortness of breath and wheezing. Cardiovascular: Negative for chest pain, palpitations and leg swelling. Gastrointestinal: Negative for abdominal pain, diarrhea, heartburn, nausea and vomiting. Genitourinary: Negative for dysuria, frequency, hematuria and urgency. Musculoskeletal: Negative for joint pain and myalgias. Skin: Negative for itching and rash. Neurological: Negative for dizziness, seizures, weakness and headaches. Psychiatric/Behavioral: Negative for depression. The patient does not have insomnia. Objective:     Visit Vitals  /81 (BP 1 Location: Left upper arm, BP Patient Position: Sitting, BP Cuff Size: Adult)   Pulse 78   Temp 98.6 °F (37 °C) (Temporal)   Resp 18   Ht 5' 9\" (1.753 m)   Wt 125.6 kg (277 lb)   SpO2 97%   BMI 40.91 kg/m²       Physical Exam  Constitutional:       Appearance: Normal appearance. HENT:      Head: Normocephalic and atraumatic. Eyes:      Pupils: Pupils are equal, round, and reactive to light.    Cardiovascular:      Rate and Rhythm: Normal rate and regular rhythm. Heart sounds: Normal heart sounds. Pulmonary:      Effort: Pulmonary effort is normal.      Breath sounds: Normal breath sounds. Abdominal:      General: Bowel sounds are normal.      Palpations: Abdomen is soft. Tenderness: There is no abdominal tenderness. There is no guarding. Musculoskeletal:         General: Normal range of motion. Cervical back: Neck supple. Right lower leg: No edema. Left lower leg: No edema. Skin:     General: Skin is warm. Neurological:      General: No focal deficit present. Mental Status: She is alert and oriented to person, place, and time. Mental status is at baseline. Diagnostics:      No results found for this or any previous visit (from the past 96 hour(s)). Imaging:  No results found for this or any previous visit. Results for orders placed during the hospital encounter of 03/01/21    XR HAND RT MIN 3 V    Narrative  EXAM: Right hand x-ray    INDICATION: pain    TECHNIQUE: 3 views of the right hand obtained. COMPARISON:  None. FINDINGS: The bone density is grossly unremarkable. The alignment of the hand  is unremarkable. No evidence of acute fracture or dislocation. The joint spaces  are preserved. There is no evidence of erosions or periostitis. No lytic or  blastic focus appreciated. The soft tissues are unremarkable. Impression  1. No acute pathology appreciated in the right hand. Results for orders placed during the hospital encounter of 08/11/21    CT ABD WO CONT    Narrative  CT ABDOMEN WITHOUT ENHANCEMENT    INDICATION: Postoperative malabsorption status post gastric bypass, morbid  obesity, nausea, periumbilical pain, constipation, anemia, periumbilical pain  has worsened over 6 months, history of umbilical hernia repair.     TECHNIQUE: Axial images obtained from the diaphragm to the level of the iliac  crest without intravenous contrast. Oral contrast was administered. All CT scans at this facility are performed using dose optimization technique as  appropriate to a performed exam, to include automated exposure control,  adjustment of the mA and/or kV according to patient size (including appropriate  matching first site-specific examinations), or use of iterative reconstruction  technique. COMPARISON: 12/10/2020. FINDINGS:    ABDOMEN FINDINGS:    Lack of intravenous contrast renders this study suboptimal for evaluating the  solid abdominal organs, vasculature and bowel. Liver: Grossly unremarkable. Spleen: No splenomegaly. Pancreas: Grossly unremarkable. Biliary: Gallbladder unremarkable. Bowel: Li-en-Y. . Fat-containing umbilical hernia. .  Peritoneum/ Retroperitoneum: No free fluid or free air. Lymph Nodes: No significant lymphadenopathy. Adrenal Glands: No focal nodule. Kidneys: No contour deforming mass. No renal or ureteral calculi. No  hydronephrosis. Vessels: Unremarkable. Feminine hygiene product is in place. Lung Base: Unremarkable. Bones: SI joint degenerative changes. Degenerative disc disease. Impression  Fat-containing umbilical hernia is unchanged. Pathology          Assessment:                                        1. Iron deficiency anemia following bariatric surgery    2. Normocytic anemia    3. Chronic anemia        Plan:                                        # Iron deficiency anemia following gastric bypass surgery  # Normocytic Anemia/ Chronic anemia  -- Past medical history significant of asthma, diabetes, hypertension, status post gastric bypass 5/2020.  -- Lab reviews indicated chronic anemia since at least 1/27/2012, hemoglobin 9.2.  -- 7/30/2021 CBC reported hemoglobin 10.4, hematocrit 33.6%, WBC 5.2, platelet 346,556. Ferritin 7, iron 117.  -- She could not take PO Iron due to significant constipation.   -- Today I have reviewed with the patient about the diagnosis and natural history of the disease. Given intolerance to iron pills, we have suggested the IV Iron options. I have explained that the potential side effect of IV iron which included but not limited to mild reaction with skin rashes, fever, chills, shortness of breath, or severe reactions. The patient was willing to proceed with treatment. Plan:  -- IV Iron Injectafer x 2  -- Lab check: CBC, CMP, Iron profiles, ferritin, B12/folate, ret count  -- We will see the patient back in about 3 months. Always sooner if required. Orders Placed This Encounter    METABOLIC PANEL, COMPREHENSIVE     Standing Status:   Future     Standing Expiration Date:   9/30/2022    IRON PROFILE     Standing Status:   Future     Standing Expiration Date:   9/30/2022    FERRITIN     Standing Status:   Future     Standing Expiration Date:   9/29/2022    VITAMIN B12 & FOLATE     Standing Status:   Future     Standing Expiration Date:   9/29/2022    RETICULOCYTE COUNT     Standing Status:   Future     Standing Expiration Date:   9/29/2022    CBC WITH AUTOMATED DIFF     Standing Status:   Future     Standing Expiration Date:   9/30/2022           Ms. Wili Driscoll has a reminder for a \"due or due soon\" health maintenance. I have asked that she contact her primary care provider for follow-up on this health maintenance. All of patient's questions answered to their apparent satisfaction. They verbally show understanding and agreement with aforementioned plan. Kushal Munoz MD  9/29/2021        Parts of this document has been produced using Dragon dictation system. Unrecognized errors in transcription may be present. Please do not hesitate to reach out for any questions or clarifications.       CC: Randall Shaw MD

## 2021-09-22 ENCOUNTER — APPOINTMENT (OUTPATIENT)
Dept: PHYSICAL THERAPY | Age: 36
End: 2021-09-22

## 2021-09-29 ENCOUNTER — OFFICE VISIT (OUTPATIENT)
Dept: ONCOLOGY | Age: 36
End: 2021-09-29
Payer: COMMERCIAL

## 2021-09-29 VITALS
BODY MASS INDEX: 41.03 KG/M2 | WEIGHT: 277 LBS | OXYGEN SATURATION: 97 % | HEART RATE: 78 BPM | SYSTOLIC BLOOD PRESSURE: 119 MMHG | DIASTOLIC BLOOD PRESSURE: 81 MMHG | RESPIRATION RATE: 18 BRPM | HEIGHT: 69 IN | TEMPERATURE: 98.6 F

## 2021-09-29 DIAGNOSIS — D64.9 NORMOCYTIC ANEMIA: ICD-10-CM

## 2021-09-29 DIAGNOSIS — D50.8 IRON DEFICIENCY ANEMIA FOLLOWING BARIATRIC SURGERY: Primary | ICD-10-CM

## 2021-09-29 DIAGNOSIS — D64.9 CHRONIC ANEMIA: ICD-10-CM

## 2021-09-29 DIAGNOSIS — K95.89 IRON DEFICIENCY ANEMIA FOLLOWING BARIATRIC SURGERY: Primary | ICD-10-CM

## 2021-09-29 PROCEDURE — 99203 OFFICE O/P NEW LOW 30 MIN: CPT | Performed by: INTERNAL MEDICINE

## 2021-09-30 LAB
ABSOLUTE RETIC COUNT,RETA: NORMAL CELLS/UL (ref 20000–80000)
ALB/GLOBRATIO, 58C: 1.5 (CALC) (ref 1–2.5)
ALBUMIN SERPL-MCNC: 4 G/DL (ref 3.6–5.1)
ALKALINE PHOSPHATASE, TOTAL, 25002000: 64 U/L (ref 31–125)
ALT SERPL-CCNC: 16 U/L (ref 6–29)
AST SERPL W P-5'-P-CCNC: 18 U/L (ref 10–30)
BASOPHILS # BLD: 21 CELLS/UL (ref 0–200)
BASOPHILS NFR BLD: 0.4 %
BILIRUB SERPL-MCNC: 0.4 MG/DL (ref 0.2–1.2)
BUN SERPL-MCNC: 17 MG/DL (ref 7–25)
BUN/CREATININE RATIO,BUCR: NORMAL (CALC) (ref 6–22)
CALCIUM SERPL-MCNC: 9.5 MG/DL (ref 8.6–10.2)
CHLORIDE SERPL-SCNC: 108 MMOL/L (ref 98–110)
CO2 SERPL-SCNC: 26 MMOL/L (ref 20–32)
CREAT SERPL-MCNC: 0.66 MG/DL (ref 0.5–1.1)
EOSINOPHIL # BLD: 208 CELLS/UL (ref 15–500)
EOSINOPHIL NFR BLD: 4 %
ERYTHROCYTE [DISTWIDTH] IN BLOOD BY AUTOMATED COUNT: 14.4 % (ref 11–15)
FERRITIN SERPL-MCNC: 6 NG/ML (ref 16–154)
FOLATE,FOL: 9.7 NG/ML
GLOBULIN,GLOB: 2.6 G/DL (CALC) (ref 1.9–3.7)
GLUCOSE SERPL-MCNC: 89 MG/DL (ref 65–99)
HCT VFR BLD AUTO: 36.2 % (ref 35–45)
HGB BLD-MCNC: 11.3 G/DL (ref 11.7–15.5)
IRON,IRN: 59 MCG/DL (ref 40–190)
LYMPHOCYTES # BLD: 2434 CELLS/UL (ref 850–3900)
LYMPHOCYTES NFR BLD: 46.8 %
MCH RBC QN AUTO: 25.6 PG (ref 27–33)
MCHC RBC AUTO-ENTMCNC: 31.2 G/DL (ref 32–36)
MCV RBC AUTO: 81.9 FL (ref 80–100)
MONOCYTES # BLD: 390 CELLS/UL (ref 200–950)
MONOCYTES NFR BLD: 7.5 %
NEUTROPHILS # BLD AUTO: 2148 CELLS/UL (ref 1500–7800)
NEUTROPHILS # BLD: 41.3 %
PLATELET # BLD AUTO: 222 THOUSAND/UL (ref 140–400)
PMV BLD AUTO: 12.3 FL (ref 7.5–12.5)
POTASSIUM SERPL-SCNC: 4.7 MMOL/L (ref 3.5–5.3)
PROT SERPL-MCNC: 6.6 G/DL (ref 6.1–8.1)
RBC # BLD AUTO: 4.42 MILLION/UL (ref 3.8–5.1)
RETIC COUNT, AUTOMATED,RETIC: 0.8 %
SODIUM SERPL-SCNC: 142 MMOL/L (ref 135–146)
VIT B12 SERPL-MCNC: 535 PG/ML (ref 200–1100)
WBC # BLD AUTO: 5.2 THOUSAND/UL (ref 3.8–10.8)

## 2021-10-14 ENCOUNTER — HOSPITAL ENCOUNTER (EMERGENCY)
Age: 36
Discharge: HOME | End: 2021-10-14
Attending: EMERGENCY MEDICINE
Payer: COMMERCIAL

## 2021-10-14 ENCOUNTER — APPOINTMENT (OUTPATIENT)
Dept: GENERAL RADIOLOGY | Age: 36
End: 2021-10-14
Attending: EMERGENCY MEDICINE
Payer: COMMERCIAL

## 2021-10-14 VITALS
OXYGEN SATURATION: 99 % | WEIGHT: 270 LBS | HEIGHT: 69 IN | DIASTOLIC BLOOD PRESSURE: 95 MMHG | TEMPERATURE: 99.1 F | HEART RATE: 78 BPM | SYSTOLIC BLOOD PRESSURE: 154 MMHG | RESPIRATION RATE: 19 BRPM | BODY MASS INDEX: 39.99 KG/M2

## 2021-10-14 DIAGNOSIS — J20.9 ACUTE BRONCHITIS, UNSPECIFIED ORGANISM: Primary | ICD-10-CM

## 2021-10-14 DIAGNOSIS — Z20.822 SUSPECTED 2019 NOVEL CORONAVIRUS INFECTION: ICD-10-CM

## 2021-10-14 LAB — SARS-COV-2, COV2: NORMAL

## 2021-10-14 PROCEDURE — 74011250637 HC RX REV CODE- 250/637: Performed by: EMERGENCY MEDICINE

## 2021-10-14 PROCEDURE — U0003 INFECTIOUS AGENT DETECTION BY NUCLEIC ACID (DNA OR RNA); SEVERE ACUTE RESPIRATORY SYNDROME CORONAVIRUS 2 (SARS-COV-2) (CORONAVIRUS DISEASE [COVID-19]), AMPLIFIED PROBE TECHNIQUE, MAKING USE OF HIGH THROUGHPUT TECHNOLOGIES AS DESCRIBED BY CMS-2020-01-R: HCPCS

## 2021-10-14 PROCEDURE — 71045 X-RAY EXAM CHEST 1 VIEW: CPT

## 2021-10-14 PROCEDURE — 99283 EMERGENCY DEPT VISIT LOW MDM: CPT

## 2021-10-14 RX ORDER — ACETAMINOPHEN 500 MG
1000 TABLET ORAL ONCE
Status: COMPLETED | OUTPATIENT
Start: 2021-10-14 | End: 2021-10-14

## 2021-10-14 RX ORDER — BENZONATATE 100 MG/1
100 CAPSULE ORAL
Qty: 15 CAPSULE | Refills: 0 | Status: SHIPPED | OUTPATIENT
Start: 2021-10-14 | End: 2021-10-21

## 2021-10-14 RX ORDER — ALBUTEROL SULFATE 0.83 MG/ML
2.5 SOLUTION RESPIRATORY (INHALATION)
Qty: 30 NEBULE | Refills: 0 | OUTPATIENT
Start: 2021-10-14 | End: 2022-01-05

## 2021-10-14 RX ORDER — DEXAMETHASONE SODIUM PHOSPHATE 4 MG/ML
10 INJECTION, SOLUTION INTRA-ARTICULAR; INTRALESIONAL; INTRAMUSCULAR; INTRAVENOUS; SOFT TISSUE
Status: COMPLETED | OUTPATIENT
Start: 2021-10-14 | End: 2021-10-14

## 2021-10-14 RX ORDER — ALBUTEROL SULFATE 90 UG/1
1-2 AEROSOL, METERED RESPIRATORY (INHALATION)
Qty: 18 G | Refills: 1 | OUTPATIENT
Start: 2021-10-14 | End: 2022-01-05

## 2021-10-14 RX ADMIN — DEXAMETHASONE SODIUM PHOSPHATE 10 MG: 4 INJECTION, SOLUTION INTRAMUSCULAR; INTRAVENOUS at 08:14

## 2021-10-14 RX ADMIN — ACETAMINOPHEN 1000 MG: 500 TABLET ORAL at 08:14

## 2021-10-14 NOTE — Clinical Note
[address] [facility] EMERGENCY DEPT  [id number] [address]  [phone]    Work/School Note    Date: 10/14/2021     To Whom It May concern:    Prakash Boston was evaulated by the following provider(s):  Attending Provider: Ez Story [address] [place] [address] virus is suspected. Per the CDC guidelines we recommend home isolation until the following conditions are all met:    1. At least 10 days have passed since symptoms first appeared and  2. At least 24 hours have passed since last fever without the use of fever-reducing medications and  3.  Symptoms (e.g., cough, shortness of breath) have improved    Sincerely,          Micaela Duque MD

## 2021-10-14 NOTE — ED PROVIDER NOTES
EMERGENCY DEPARTMENT HISTORY AND PHYSICAL EXAM    7:51 AM patient seen at this time in room QC      Date: 10/14/2021  Patient Name: Tanya Crow    History of Presenting Illness     Chief Complaint   Patient presents with    Generalized Body Aches         History Provided By: patient    Additional History (Context): Tanya Crow is a 38 y.o. female presents with 3 days of cough headache production of clear sputum myalgias particularly the backs of her legs and lethargy sleeping all day. She has been staying hydrated, taking Tylenol 325 at a time. Avoids NSAIDs due to gastric bypass surgery 2 years ago with no complications. Has asthma has run out of her albuterol. Rates her symptoms as moderate to severe. Lacey Hernandez PCP: Goyo Yates MD    Chief Complaint:   Duration:    Timing:    Location:   Quality:   Severity:   Modifying Factors:   Associated Symptoms:       Current Facility-Administered Medications   Medication Dose Route Frequency Provider Last Rate Last Admin    acetaminophen (TYLENOL) tablet 1,000 mg  1,000 mg Oral ONCE Dakotah Wahl MD        dexamethasone (DECADRON) 4 mg/mL Oral 10 mg  10 mg Oral NOW Roland Kwon MD         Current Outpatient Medications   Medication Sig Dispense Refill    albuterol (PROVENTIL HFA, VENTOLIN HFA, PROAIR HFA) 90 mcg/actuation inhaler Take 1-2 Puffs by inhalation every six (6) hours as needed for Wheezing. 18 g 1    albuterol (PROVENTIL VENTOLIN) 2.5 mg /3 mL (0.083 %) nebu 3 mL by Nebulization route every four (4) hours as needed for Wheezing. 30 Nebule 0    benzonatate (Tessalon Perles) 100 mg capsule Take 1 Capsule by mouth three (3) times daily as needed for Cough for up to 7 days. 15 Capsule 0    benzocaine 15 mg lozg 1 Lozenge by Mucous Membrane route four (4) times daily as needed for Pain. 100 Lozenge 0    cholecalciferol (VITAMIN D3) (5000 Units/125 mcg) tab tablet Take 2 Tablets by mouth daily.  60 Tablet 2    ondansetron (ZOFRAN ODT) 4 mg disintegrating tablet Take 1 Tablet by mouth every eight (8) hours as needed for Nausea or Vomiting. 20 Tablet 1    dicyclomine (BENTYL) 10 mg capsule Take 2 Capsules by mouth four (4) times daily as needed for Abdominal Cramps. 20 Capsule 0    omeprazole (PRILOSEC) 20 mg capsule Take 20 mg by mouth two (2) times a day.  cetirizine (ZyrTEC) 10 mg tablet Take 1 Tab by mouth daily. 31 Tab 0    multivit-min/iron/folic acid/K (BARIATRIC MULTIVITAMINS PO) Take  by mouth two (2) times a day.  OTHER Calcium chew 500 milligrams 3xday      cyanocobalamin (Vitamin B-12) 1,000 mcg tablet Take 1,000 mcg by mouth daily. Past History     Past Medical History:  Past Medical History:   Diagnosis Date    Asthma     Community acquired pneumonia     Diabetes ([facility] [address]     no meds    Hypertension     no meds    Obese     Umbilical hernia        Past Surgical History:  Past Surgical History:   Procedure Laterality Date    DILATION AND CURETTAGE      HX GI      gastric bypass    HX GYN      d and c       Family History:  Family History   Problem Relation Age of Onset   Smita Solum Asthma Mother     Hypertension Mother     Diabetes Mother     Asthma Father        Social History:  Social History     Tobacco Use    Smoking status: Former Smoker     Packs/day: 0.50     Types: Cigarettes     Quit date: 2019     Years since quittin.9    Smokeless tobacco: Never Used   Substance Use Topics    Alcohol use: Not Currently     Alcohol/week: 1.7 standard drinks     Types: 2 Standard drinks or equivalent per week     Comment: holidays/special occassion    Drug use: No       Allergies:  No Known Allergies      Review of Systems     Review of Systems   Constitutional: Positive for fatigue. Negative for diaphoresis and fever. HENT: Negative for congestion and sore throat. Eyes: Negative for pain and itching. Respiratory: Positive for cough. Negative for shortness of breath.     Cardiovascular: Negative for chest pain and palpitations. Gastrointestinal: Negative for abdominal pain and diarrhea. Endocrine: Negative for polydipsia and polyuria. Genitourinary: Negative for dysuria and hematuria. Musculoskeletal: Negative for arthralgias and myalgias. Skin: Negative for rash and wound. Neurological: Negative for seizures and syncope. Hematological: Does not bruise/bleed easily. Psychiatric/Behavioral: Negative for agitation and hallucinations. Physical Exam       Patient Vitals for the past 12 hrs:   Temp Pulse Resp BP SpO2   10/14/21 0742     99 %   10/14/21 0728   19     10/14/21 0719 99.1 °F (37.3 °C) 78  (!) 154/95 99 %       IPVITALS  Patient Vitals for the past 24 hrs:   BP Temp Pulse Resp SpO2 Height Weight   10/14/21 0742     99 %     10/14/21 0728    19      10/14/21 0719 (!) 154/95 99.1 °F (37.3 °C) 78  99 % 5' 9\" (1.753 m) 122.5 kg (270 lb)       Physical Exam  Vitals and nursing note reviewed. Constitutional:       General: She is not in acute distress. Appearance: She is well-developed. She is obese. She is not ill-appearing. HENT:      Head: Normocephalic and atraumatic. Eyes:      General: No scleral icterus. Conjunctiva/sclera: Conjunctivae normal.   Neck:      Vascular: No JVD. Cardiovascular:      Rate and Rhythm: Normal rate and regular rhythm. Heart sounds: Normal heart sounds. Comments: 4 intact extremity pulses  Pulmonary:      Effort: Pulmonary effort is normal.      Breath sounds: Wheezing present. Comments: Few coughs during exam  Abdominal:      Palpations: Abdomen is soft. There is no mass. Tenderness: There is no abdominal tenderness. Musculoskeletal:         General: Normal range of motion. Cervical back: Normal range of motion and neck supple. Lymphadenopathy:      Cervical: No cervical adenopathy. Skin:     General: Skin is warm and dry. Neurological:      Mental Status: She is alert.            Diagnostic Study Results   Labs -  Recent Results (from the past 12 hour(s))   SARS-COV-2    Collection Time: 10/14/21  7:18 AM   Result Value Ref Range    SARS-CoV-2 Nasopharyngeal         Radiologic Studies -   XR CHEST PORT    (Results Pending)     No results found. Medications ordered:   Medications   acetaminophen (TYLENOL) tablet 1,000 mg (has no administration in time range)   dexamethasone (DECADRON) 4 mg/mL Oral 10 mg (has no administration in time range)         Medical Decision Making   Initial Medical Decision Making and DDx:  Suggestive of viral syndrome will test for coronavirus. Other considerations influenza, other nonspecific virus. Testing would not  otherwise. Chest x-ray no acute process sharp costophrenic angles no lobar infiltrate no acute disease. Prescription for albuterol MDI albuterol solution Tessalon. Treatment here with a gram of Tylenol, she should adjust her dosing of Tylenol to 1 g at a time. Ensure hydration. Decadron here. Work note. ED Course: Progress Notes, Reevaluation, and Consults:         I am the first provider for this patient. I reviewed the vital signs, available nursing notes, past medical history, past surgical history, family history and social history. Patient Vitals for the past 12 hrs:   Temp Pulse Resp BP SpO2   10/14/21 0742     99 %   10/14/21 0728   19     10/14/21 0719 99.1 °F (37.3 °C) 78  (!) 154/95 99 %       Vital Signs-Reviewed the patient's vital signs. Pulse Oximetry Analysis, Cardiac Monitor, 12 lead ekg:      Interpreted by the EP. Records Reviewed: Nursing notes reviewed (Time of Review: 7:51 AM)    Procedures:   Critical Care Time:   Aspirin: (was aspirin given for stroke?)    Diagnosis     Clinical Impression:   1. Acute bronchitis, unspecified organism    2.  Suspected 2019 novel coronavirus infection        Disposition: Discharged      Follow-up Information     Follow up With Specialties Details Why Contact Cristo Vu MD Internal Medicine In 2 days  [address] [place], [place] [address]ernestina LimStoughton  [phone]             Patient's Medications   Start Taking    BENZONATATE (TESSALON PERLES) 100 MG CAPSULE    Take 1 Capsule by mouth three (3) times daily as needed for Cough for up to 7 days. Continue Taking    BENZOCAINE 15 MG LOZG    1 Lozenge by Mucous Membrane route four (4) times daily as needed for Pain. CETIRIZINE (ZYRTEC) 10 MG TABLET    Take 1 Tab by mouth daily. CHOLECALCIFEROL (VITAMIN D3) (5000 UNITS/125 MCG) TAB TABLET    Take 2 Tablets by mouth daily. CYANOCOBALAMIN (VITAMIN B-12) 1,000 MCG TABLET    Take 1,000 mcg by mouth daily. DICYCLOMINE (BENTYL) 10 MG CAPSULE    Take 2 Capsules by mouth four (4) times daily as needed for Abdominal Cramps. MULTIVIT-MIN/IRON/FOLIC ACID/K (BARIATRIC MULTIVITAMINS PO)    Take  by mouth two (2) times a day. OMEPRAZOLE (PRILOSEC) 20 MG CAPSULE    Take 20 mg by mouth two (2) times a day. ONDANSETRON (ZOFRAN ODT) 4 MG DISINTEGRATING TABLET    Take 1 Tablet by mouth every eight (8) hours as needed for Nausea or Vomiting. OTHER    Calcium chew 500 milligrams 3xday   These Medications have changed    Modified Medication Previous Medication    ALBUTEROL (PROVENTIL HFA, VENTOLIN HFA, PROAIR HFA) 90 MCG/ACTUATION INHALER albuterol (PROVENTIL HFA, VENTOLIN HFA, PROAIR HFA) 90 mcg/actuation inhaler       Take 1-2 Puffs by inhalation every six (6) hours as needed for Wheezing. 1-2 Puffs. ALBUTEROL (PROVENTIL VENTOLIN) 2.5 MG /3 ML (0.083 %) NEBU albuterol (PROVENTIL VENTOLIN) 2.5 mg /3 mL (0.083 %) nebu       3 mL by Nebulization route every four (4) hours as needed for Wheezing. 3 mL by Nebulization route every four (4) hours as needed for Wheezing.    Stop Taking    No medications on file     _______________________________    Notes:    MD gtao Arreola dictation      _______________________________

## 2021-10-15 ENCOUNTER — PATIENT OUTREACH (OUTPATIENT)
Dept: CASE MANAGEMENT | Age: 36
End: 2021-10-15

## 2021-10-15 LAB — SARS-COV-2, NAA: NOT DETECTED

## 2021-10-15 NOTE — PROGRESS NOTES
Patient contacted regarding COVID-19 risk. Discussed COVID-19 related testing which was available at this time. Test results were negative. Patient informed of results, if available? yes. Care Transition Nurse contacted the patient by telephone to perform post discharge assessment. Call within 2 business days of discharge: Yes No answer. Left HIPPA compliant message. Name, role and contact information provided. Requested return call. Will attempt to contact at a later time. Verified name and  with patient as identifiers. Provided introduction to self, and explanation of the CTN/ACM role, and reason for call due to risk factors for infection and/or exposure to COVID-19. Symptoms reviewed with patient who verbalized the following symptoms: pain or aching joints, shortness of breath and wheezing      Due to no new or worsening symptoms encounter was not routed to provider for escalation. Discussed follow-up appointments. If no appointment was previously scheduled, appointment scheduling offered:  no. [address] follow up appointment(s):   Future Appointments   Date Time Provider Narciso Varela   10/26/2021 11:00 AM [facility] INFUSION NURSE 1 [facility]   2021 11:00 AM [facility] INFUSION NURSE 1 [facility]   2021 10:15 AM [facility] AMB   2022  9:45 AM Josue Benavides MD [facility] AMB     Non-[facility] follow up appointment(s): none    Interventions to address risk factors: Obtained and reviewed discharge summary and/or continuity of care documents     Advance Care Planning:   Does patient have an Advance Directive: not on file. Educated patient about risk for severe COVID-19 due to risk factors according to CDC guidelines. CTN reviewed discharge instructions, medical action plan and red flag symptoms with the patient who verbalized understanding. Discussed COVID vaccination status: no. Education provided on COVID-19 vaccination as appropriate.  Discussed exposure protocols and quarantine with CDC Guidelines. Patient was given an opportunity to verbalize any questions and concerns and agrees to contact CTN or health care provider for questions related to their healthcare. Reviewed and educated patient on any new and changed medications related to discharge diagnosis   Patient voiced she will  new meds today. Patient verbalized she wanted a steroid to help open lungs and help  with breathing. Informed patient albuterol is a steroid. Advised patient to perform neb tx once she has picked up meds and if SOB worsens or treatment does not help to report back to ED. Also suggested following up with PCP. Patient voiced understanding. CTN provided contact information. Plan for follow-up call in 5-7 days based on severity of symptoms and risk factors.

## 2021-10-19 PROBLEM — D50.8 IRON DEFICIENCY ANEMIA FOLLOWING BARIATRIC SURGERY: Status: ACTIVE | Noted: 2021-10-19

## 2021-10-19 PROBLEM — K95.89 IRON DEFICIENCY ANEMIA FOLLOWING BARIATRIC SURGERY: Status: ACTIVE | Noted: 2021-10-19

## 2021-10-19 RX ORDER — HEPARIN 100 UNIT/ML
300-500 SYRINGE INTRAVENOUS AS NEEDED
Status: CANCELLED
Start: 2021-10-27

## 2021-10-19 RX ORDER — SODIUM CHLORIDE 0.9 % (FLUSH) 0.9 %
10 SYRINGE (ML) INJECTION AS NEEDED
Status: CANCELLED | OUTPATIENT
Start: 2021-10-27

## 2021-10-19 RX ORDER — EPINEPHRINE 1 MG/ML
0.3 INJECTION, SOLUTION, CONCENTRATE INTRAVENOUS AS NEEDED
Status: CANCELLED | OUTPATIENT
Start: 2021-10-27

## 2021-10-19 RX ORDER — ONDANSETRON 2 MG/ML
8 INJECTION INTRAMUSCULAR; INTRAVENOUS AS NEEDED
Status: CANCELLED | OUTPATIENT
Start: 2021-10-27

## 2021-10-19 RX ORDER — HYDROCORTISONE SODIUM SUCCINATE 100 MG/2ML
100 INJECTION, POWDER, FOR SOLUTION INTRAMUSCULAR; INTRAVENOUS AS NEEDED
Status: CANCELLED | OUTPATIENT
Start: 2021-10-27

## 2021-10-19 RX ORDER — ALBUTEROL SULFATE 0.83 MG/ML
2.5 SOLUTION RESPIRATORY (INHALATION) AS NEEDED
Status: CANCELLED
Start: 2021-10-27

## 2021-10-19 RX ORDER — DIPHENHYDRAMINE HYDROCHLORIDE 50 MG/ML
50 INJECTION, SOLUTION INTRAMUSCULAR; INTRAVENOUS AS NEEDED
Status: CANCELLED
Start: 2021-10-27

## 2021-10-19 RX ORDER — ACETAMINOPHEN 325 MG/1
650 TABLET ORAL AS NEEDED
Status: CANCELLED
Start: 2021-10-27

## 2021-10-19 RX ORDER — DIPHENHYDRAMINE HYDROCHLORIDE 50 MG/ML
25 INJECTION, SOLUTION INTRAMUSCULAR; INTRAVENOUS AS NEEDED
Status: CANCELLED
Start: 2021-10-27

## 2021-10-19 RX ORDER — SODIUM CHLORIDE 9 MG/ML
10 INJECTION INTRAMUSCULAR; INTRAVENOUS; SUBCUTANEOUS AS NEEDED
Status: CANCELLED | OUTPATIENT
Start: 2021-10-27

## 2021-10-19 RX ORDER — SODIUM CHLORIDE 9 MG/ML
25 INJECTION, SOLUTION INTRAVENOUS CONTINUOUS
Status: CANCELLED | OUTPATIENT
Start: 2021-10-27

## 2021-10-22 ENCOUNTER — PATIENT OUTREACH (OUTPATIENT)
Dept: CASE MANAGEMENT | Age: 36
End: 2021-10-22

## 2021-10-26 ENCOUNTER — HOSPITAL ENCOUNTER (OUTPATIENT)
Dept: INFUSION THERAPY | Age: 36
End: 2021-10-26

## 2021-10-27 ENCOUNTER — HOSPITAL ENCOUNTER (OUTPATIENT)
Dept: INFUSION THERAPY | Age: 36
Discharge: HOME | End: 2021-10-27
Payer: COMMERCIAL

## 2021-10-27 VITALS
DIASTOLIC BLOOD PRESSURE: 83 MMHG | SYSTOLIC BLOOD PRESSURE: 141 MMHG | TEMPERATURE: 97.8 F | RESPIRATION RATE: 18 BRPM | HEART RATE: 57 BPM | OXYGEN SATURATION: 100 %

## 2021-10-27 DIAGNOSIS — K95.89 IRON DEFICIENCY ANEMIA FOLLOWING BARIATRIC SURGERY: Primary | ICD-10-CM

## 2021-10-27 DIAGNOSIS — D50.8 IRON DEFICIENCY ANEMIA FOLLOWING BARIATRIC SURGERY: Primary | ICD-10-CM

## 2021-10-27 PROCEDURE — 74011250636 HC RX REV CODE- 250/636: Performed by: INTERNAL MEDICINE

## 2021-10-27 PROCEDURE — 96365 THER/PROPH/DIAG IV INF INIT: CPT

## 2021-10-27 RX ORDER — SODIUM CHLORIDE 9 MG/ML
25 INJECTION, SOLUTION INTRAVENOUS CONTINUOUS
Status: DISCONTINUED | OUTPATIENT
Start: 2021-10-27 | End: 2021-10-28 | Stop reason: HOSPADM

## 2021-10-27 RX ORDER — ACETAMINOPHEN 325 MG/1
650 TABLET ORAL AS NEEDED
Status: CANCELLED
Start: 2021-11-03

## 2021-10-27 RX ORDER — SODIUM CHLORIDE 0.9 % (FLUSH) 0.9 %
10 SYRINGE (ML) INJECTION AS NEEDED
Status: DISCONTINUED | OUTPATIENT
Start: 2021-10-27 | End: 2021-10-28 | Stop reason: HOSPADM

## 2021-10-27 RX ORDER — EPINEPHRINE 1 MG/ML
0.3 INJECTION, SOLUTION, CONCENTRATE INTRAVENOUS AS NEEDED
Status: CANCELLED | OUTPATIENT
Start: 2021-11-03

## 2021-10-27 RX ORDER — ALBUTEROL SULFATE 0.83 MG/ML
2.5 SOLUTION RESPIRATORY (INHALATION) AS NEEDED
Status: CANCELLED
Start: 2021-11-03

## 2021-10-27 RX ORDER — HEPARIN 100 UNIT/ML
300-500 SYRINGE INTRAVENOUS AS NEEDED
Status: CANCELLED
Start: 2021-11-03

## 2021-10-27 RX ORDER — ONDANSETRON 2 MG/ML
8 INJECTION INTRAMUSCULAR; INTRAVENOUS AS NEEDED
Status: CANCELLED | OUTPATIENT
Start: 2021-11-03

## 2021-10-27 RX ORDER — HYDROCORTISONE SODIUM SUCCINATE 100 MG/2ML
100 INJECTION, POWDER, FOR SOLUTION INTRAMUSCULAR; INTRAVENOUS AS NEEDED
Status: CANCELLED | OUTPATIENT
Start: 2021-11-03

## 2021-10-27 RX ORDER — SODIUM CHLORIDE 9 MG/ML
25 INJECTION, SOLUTION INTRAVENOUS CONTINUOUS
Status: CANCELLED | OUTPATIENT
Start: 2021-11-03

## 2021-10-27 RX ORDER — SODIUM CHLORIDE 0.9 % (FLUSH) 0.9 %
10 SYRINGE (ML) INJECTION AS NEEDED
Status: CANCELLED | OUTPATIENT
Start: 2021-11-03

## 2021-10-27 RX ORDER — DIPHENHYDRAMINE HYDROCHLORIDE 50 MG/ML
25 INJECTION, SOLUTION INTRAMUSCULAR; INTRAVENOUS AS NEEDED
Status: CANCELLED
Start: 2021-11-03

## 2021-10-27 RX ORDER — SODIUM CHLORIDE 9 MG/ML
10 INJECTION INTRAMUSCULAR; INTRAVENOUS; SUBCUTANEOUS AS NEEDED
Status: CANCELLED | OUTPATIENT
Start: 2021-11-03

## 2021-10-27 RX ORDER — DIPHENHYDRAMINE HYDROCHLORIDE 50 MG/ML
50 INJECTION, SOLUTION INTRAMUSCULAR; INTRAVENOUS AS NEEDED
Status: CANCELLED
Start: 2021-11-03

## 2021-10-27 RX ADMIN — Medication 20 ML: at 13:57

## 2021-10-27 RX ADMIN — FERRIC CARBOXYMALTOSE INJECTION 750 MG: 50 INJECTION, SOLUTION INTRAVENOUS at 13:32

## 2021-10-27 RX ADMIN — Medication 10 ML: at 13:30

## 2021-10-27 RX ADMIN — SODIUM CHLORIDE 25 ML/HR: 9 INJECTION, SOLUTION INTRAVENOUS at 13:32

## 2021-10-27 NOTE — PROGRESS NOTES
[facility] Progress Note    Date: 2021    Name: Ani Mello    MRN: [id number]         : 1985     James Rolandkarla #1 OF 2    Ms. Romelia Willams was assessed and education was provided. Injectafer carenotes read to adeline and all questions answered, copy sent home with patient. Ms. Brady Aragon vitals were reviewed and patient was observed for 5 minutes prior to treatment. Visit Vitals  /87 (BP 1 Location: Right upper arm, BP Patient Position: Sitting)   Pulse (!) 58   Temp 98.3 °F (36.8 °C)   Resp 18   Breastfeeding No     #22 PIV started in right [facility] x2 attempts, flushed with ease and brisk blood return noted. 750mg Ferric carboxymaltose mixed in 250ml NS IV given over 20 minutes as ordered. Upon completion line flushed with 30mls NS. Ms. Romelia Willams observed for 30 minutes. At end of observation period Ms. Romelia Willams denies any signs or symptoms of an allergic reaction. Ms. Romelia Willams tolerated the infusion, and had no complaints. Patient armband removed and shredded. Discharge instructions verbalized to adeline and all questions answered. Ms. Romelia Willams was discharged from [place] [id number] in stable condition at 1430. She is to return on 21 at 1500 for her next appointment.     Tre Bajwa RN  2021  1:44 PM

## 2021-11-02 ENCOUNTER — HOSPITAL ENCOUNTER (OUTPATIENT)
Dept: INFUSION THERAPY | Age: 36
End: 2021-11-02
Payer: COMMERCIAL

## 2021-11-03 ENCOUNTER — HOSPITAL ENCOUNTER (OUTPATIENT)
Dept: INFUSION THERAPY | Age: 36
Discharge: HOME | End: 2021-11-03
Payer: COMMERCIAL

## 2021-11-03 VITALS
HEART RATE: 69 BPM | DIASTOLIC BLOOD PRESSURE: 86 MMHG | OXYGEN SATURATION: 100 % | SYSTOLIC BLOOD PRESSURE: 137 MMHG | TEMPERATURE: 98.8 F | RESPIRATION RATE: 18 BRPM

## 2021-11-03 DIAGNOSIS — D50.8 IRON DEFICIENCY ANEMIA FOLLOWING BARIATRIC SURGERY: Primary | ICD-10-CM

## 2021-11-03 DIAGNOSIS — K95.89 IRON DEFICIENCY ANEMIA FOLLOWING BARIATRIC SURGERY: Primary | ICD-10-CM

## 2021-11-03 PROCEDURE — 96365 THER/PROPH/DIAG IV INF INIT: CPT

## 2021-11-03 PROCEDURE — 74011250636 HC RX REV CODE- 250/636: Performed by: INTERNAL MEDICINE

## 2021-11-03 RX ORDER — SODIUM CHLORIDE 9 MG/ML
25 INJECTION, SOLUTION INTRAVENOUS CONTINUOUS
Status: CANCELLED | OUTPATIENT
Start: 2021-11-03

## 2021-11-03 RX ORDER — ACETAMINOPHEN 325 MG/1
650 TABLET ORAL AS NEEDED
Status: CANCELLED
Start: 2021-11-03

## 2021-11-03 RX ORDER — SODIUM CHLORIDE 9 MG/ML
10 INJECTION INTRAMUSCULAR; INTRAVENOUS; SUBCUTANEOUS AS NEEDED
Status: CANCELLED | OUTPATIENT
Start: 2021-11-03

## 2021-11-03 RX ORDER — ONDANSETRON 2 MG/ML
8 INJECTION INTRAMUSCULAR; INTRAVENOUS AS NEEDED
Status: CANCELLED | OUTPATIENT
Start: 2021-11-03

## 2021-11-03 RX ORDER — DIPHENHYDRAMINE HYDROCHLORIDE 50 MG/ML
50 INJECTION, SOLUTION INTRAMUSCULAR; INTRAVENOUS AS NEEDED
Status: CANCELLED
Start: 2021-11-03

## 2021-11-03 RX ORDER — EPINEPHRINE 1 MG/ML
0.3 INJECTION, SOLUTION, CONCENTRATE INTRAVENOUS AS NEEDED
Status: CANCELLED | OUTPATIENT
Start: 2021-11-03

## 2021-11-03 RX ORDER — DIPHENHYDRAMINE HYDROCHLORIDE 50 MG/ML
25 INJECTION, SOLUTION INTRAMUSCULAR; INTRAVENOUS AS NEEDED
Status: CANCELLED
Start: 2021-11-03

## 2021-11-03 RX ORDER — SODIUM CHLORIDE 9 MG/ML
25 INJECTION, SOLUTION INTRAVENOUS CONTINUOUS
Status: DISCONTINUED | OUTPATIENT
Start: 2021-11-03 | End: 2021-11-04 | Stop reason: HOSPADM

## 2021-11-03 RX ORDER — HYDROCORTISONE SODIUM SUCCINATE 100 MG/2ML
100 INJECTION, POWDER, FOR SOLUTION INTRAMUSCULAR; INTRAVENOUS AS NEEDED
Status: CANCELLED | OUTPATIENT
Start: 2021-11-03

## 2021-11-03 RX ORDER — ALBUTEROL SULFATE 0.83 MG/ML
2.5 SOLUTION RESPIRATORY (INHALATION) AS NEEDED
Status: CANCELLED
Start: 2021-11-03

## 2021-11-03 RX ORDER — HEPARIN 100 UNIT/ML
300-500 SYRINGE INTRAVENOUS AS NEEDED
Status: CANCELLED
Start: 2021-11-03

## 2021-11-03 RX ORDER — SODIUM CHLORIDE 0.9 % (FLUSH) 0.9 %
10 SYRINGE (ML) INJECTION AS NEEDED
Status: CANCELLED | OUTPATIENT
Start: 2021-11-03

## 2021-11-03 RX ADMIN — SODIUM CHLORIDE 25 ML/HR: 9 INJECTION, SOLUTION INTRAVENOUS at 13:49

## 2021-11-03 RX ADMIN — FERRIC CARBOXYMALTOSE INJECTION 750 MG: 50 INJECTION, SOLUTION INTRAVENOUS at 13:49

## 2021-11-03 NOTE — PROGRESS NOTES
[facility] Progress Note    Date: November 3, 2021    Name: Sherley Cho    MRN: [id number]         : 1985     Robbin Laser # 2 OF 2    Ms. Bridgette Alaniz was assessed and education was provided. Ms. Marc Hair vitals were reviewed and patient was observed for 5 minutes prior to treatment. Visit Vitals  /86 (BP 1 Location: Right lower arm, BP Patient Position: Sitting)   Pulse 69   Temp 98.8 °F (37.1 °C)   Resp 18   SpO2 100%   Breastfeeding No     #24 PIV started in right AC x 1 attempts, flushed with ease and brisk blood return noted. 750mg Ferric carboxymaltose mixed in 250ml NS IV given over 20 minutes as ordered. Ms. Bridgette Alaniz tolerated the infusion, and had no complaints. Patient armband removed and shredded. Discharge instructions verbalized to nichot and all questions answered. Ms. Bridgette Alaniz was discharged from [place] [address] in stable condition at 1415. She has no future appointments with [facility] at this time.     Kait Gao  November 3, 2021  1:44 PM

## 2022-01-05 ENCOUNTER — HOSPITAL ENCOUNTER (EMERGENCY)
Age: 37
Discharge: HOME | End: 2022-01-05
Attending: EMERGENCY MEDICINE
Payer: COMMERCIAL

## 2022-01-05 ENCOUNTER — APPOINTMENT (OUTPATIENT)
Dept: GENERAL RADIOLOGY | Age: 37
End: 2022-01-05
Attending: EMERGENCY MEDICINE
Payer: COMMERCIAL

## 2022-01-05 VITALS
BODY MASS INDEX: 41.18 KG/M2 | RESPIRATION RATE: 18 BRPM | HEART RATE: 66 BPM | WEIGHT: 278 LBS | OXYGEN SATURATION: 100 % | HEIGHT: 69 IN

## 2022-01-05 DIAGNOSIS — U07.1 COVID-19 VIRUS INFECTION: Primary | ICD-10-CM

## 2022-01-05 LAB
ALBUMIN SERPL-MCNC: 3.6 G/DL (ref 3.4–5)
ALBUMIN/GLOB SERPL: 1.1 {RATIO} (ref 0.8–1.7)
ALP SERPL-CCNC: 78 U/L (ref 45–117)
ALT SERPL-CCNC: 27 U/L (ref 13–56)
ANION GAP SERPL CALC-SCNC: 5 MMOL/L (ref 3–18)
AST SERPL-CCNC: 14 U/L (ref 10–38)
ATRIAL RATE: 71 BPM
BASOPHILS # BLD: 0 K/UL (ref 0–0.1)
BASOPHILS NFR BLD: 0 % (ref 0–2)
BILIRUB SERPL-MCNC: 0.3 MG/DL (ref 0.2–1)
BUN SERPL-MCNC: 12 MG/DL (ref 7–18)
BUN/CREAT SERPL: 23 (ref 12–20)
CALCIUM SERPL-MCNC: 8.7 MG/DL (ref 8.5–10.1)
CALCULATED P AXIS, ECG09: 62 DEGREES
CALCULATED R AXIS, ECG10: 80 DEGREES
CALCULATED T AXIS, ECG11: -10 DEGREES
CHLORIDE SERPL-SCNC: 108 MMOL/L (ref 100–111)
CO2 SERPL-SCNC: 28 MMOL/L (ref 21–32)
CREAT SERPL-MCNC: 0.53 MG/DL (ref 0.6–1.3)
DIAGNOSIS, 93000: NORMAL
DIFFERENTIAL METHOD BLD: ABNORMAL
EOSINOPHIL # BLD: 0.1 K/UL (ref 0–0.4)
EOSINOPHIL NFR BLD: 3 % (ref 0–5)
ERYTHROCYTE [DISTWIDTH] IN BLOOD BY AUTOMATED COUNT: 15.1 % (ref 11.6–14.5)
GLOBULIN SER CALC-MCNC: 3.2 G/DL (ref 2–4)
GLUCOSE SERPL-MCNC: 103 MG/DL (ref 74–99)
HCT VFR BLD AUTO: 34.7 % (ref 35–45)
HGB BLD-MCNC: 11.2 G/DL (ref 12–16)
IMM GRANULOCYTES # BLD AUTO: 0 K/UL (ref 0–0.04)
IMM GRANULOCYTES NFR BLD AUTO: 0 % (ref 0–0.5)
LYMPHOCYTES # BLD: 2.7 K/UL (ref 0.9–3.6)
LYMPHOCYTES NFR BLD: 68 % (ref 21–52)
MCH RBC QN AUTO: 27.5 PG (ref 24–34)
MCHC RBC AUTO-ENTMCNC: 32.3 G/DL (ref 31–37)
MCV RBC AUTO: 85 FL (ref 78–100)
MONOCYTES # BLD: 0.2 K/UL (ref 0.05–1.2)
MONOCYTES NFR BLD: 4 % (ref 3–10)
NEUTS SEG # BLD: 1 K/UL (ref 1.8–8)
NEUTS SEG NFR BLD: 25 % (ref 40–73)
NRBC # BLD: 0 K/UL (ref 0–0.01)
NRBC BLD-RTO: 0 PER 100 WBC
P-R INTERVAL, ECG05: 130 MS
PLATELET # BLD AUTO: 180 K/UL (ref 135–420)
PMV BLD AUTO: 10.3 FL (ref 9.2–11.8)
POTASSIUM SERPL-SCNC: 3.3 MMOL/L (ref 3.5–5.5)
PROT SERPL-MCNC: 6.8 G/DL (ref 6.4–8.2)
Q-T INTERVAL, ECG07: 424 MS
QRS DURATION, ECG06: 76 MS
QTC CALCULATION (BEZET), ECG08: 460 MS
RBC # BLD AUTO: 4.08 M/UL (ref 4.2–5.3)
SODIUM SERPL-SCNC: 141 MMOL/L (ref 136–145)
TROPONIN-HIGH SENSITIVITY: 5 NG/L (ref 0–54)
VENTRICULAR RATE, ECG03: 71 BPM
WBC # BLD AUTO: 4 K/UL (ref 4.6–13.2)

## 2022-01-05 PROCEDURE — 99283 EMERGENCY DEPT VISIT LOW MDM: CPT

## 2022-01-05 PROCEDURE — 84484 ASSAY OF TROPONIN QUANT: CPT

## 2022-01-05 PROCEDURE — 80053 COMPREHEN METABOLIC PANEL: CPT

## 2022-01-05 PROCEDURE — 71045 X-RAY EXAM CHEST 1 VIEW: CPT

## 2022-01-05 PROCEDURE — 74011250637 HC RX REV CODE- 250/637: Performed by: PHYSICIAN ASSISTANT

## 2022-01-05 PROCEDURE — 94640 AIRWAY INHALATION TREATMENT: CPT

## 2022-01-05 PROCEDURE — 93005 ELECTROCARDIOGRAM TRACING: CPT

## 2022-01-05 PROCEDURE — 74011000250 HC RX REV CODE- 250: Performed by: PHYSICIAN ASSISTANT

## 2022-01-05 PROCEDURE — 74011636637 HC RX REV CODE- 636/637: Performed by: PHYSICIAN ASSISTANT

## 2022-01-05 PROCEDURE — 85025 COMPLETE CBC W/AUTO DIFF WBC: CPT

## 2022-01-05 RX ORDER — IPRATROPIUM BROMIDE AND ALBUTEROL SULFATE 2.5; .5 MG/3ML; MG/3ML
3 SOLUTION RESPIRATORY (INHALATION)
Status: COMPLETED | OUTPATIENT
Start: 2022-01-05 | End: 2022-01-05

## 2022-01-05 RX ORDER — PREDNISONE 20 MG/1
60 TABLET ORAL
Status: COMPLETED | OUTPATIENT
Start: 2022-01-05 | End: 2022-01-05

## 2022-01-05 RX ORDER — PREDNISONE 50 MG/1
50 TABLET ORAL DAILY
Qty: 3 TABLET | Refills: 0 | Status: SHIPPED | OUTPATIENT
Start: 2022-01-05 | End: 2022-01-08

## 2022-01-05 RX ORDER — LIDOCAINE 50 MG/G
PATCH TOPICAL
Qty: 15 EACH | Refills: 0 | Status: SHIPPED | OUTPATIENT
Start: 2022-01-05 | End: 2022-02-10

## 2022-01-05 RX ORDER — HYDROCODONE BITARTRATE AND ACETAMINOPHEN 5; 325 MG/1; MG/1
1 TABLET ORAL
Status: COMPLETED | OUTPATIENT
Start: 2022-01-05 | End: 2022-01-05

## 2022-01-05 RX ORDER — ALBUTEROL SULFATE 90 UG/1
1 AEROSOL, METERED RESPIRATORY (INHALATION)
Qty: 1 EACH | Refills: 0 | Status: SHIPPED | OUTPATIENT
Start: 2022-01-05 | End: 2022-07-21

## 2022-01-05 RX ORDER — METHOCARBAMOL 500 MG/1
500 TABLET, FILM COATED ORAL 3 TIMES DAILY
Qty: 15 TABLET | Refills: 0 | Status: SHIPPED | OUTPATIENT
Start: 2022-01-05 | End: 2022-02-10

## 2022-01-05 RX ADMIN — IPRATROPIUM BROMIDE AND ALBUTEROL SULFATE 3 ML: .5; 2.5 SOLUTION RESPIRATORY (INHALATION) at 12:56

## 2022-01-05 RX ADMIN — HYDROCODONE BITARTRATE AND ACETAMINOPHEN 1 TABLET: 5; 325 TABLET ORAL at 13:03

## 2022-01-05 RX ADMIN — PREDNISONE 60 MG: 20 TABLET ORAL at 12:55

## 2022-01-05 NOTE — ED PROVIDER NOTES
EMERGENCY DEPARTMENT HISTORY AND PHYSICAL EXAM      Date: 1/5/2022  Patient Name: Ashly Lim    History of Presenting Illness     Chief Complaint   Patient presents with    Positive For Covid-19    Cough    Chest Pain    Back Pain    Generalized Body Aches       History Provided By: Patient    HPI: Ashly Lim, 39 y.o. female PMHx significant for asthma, DM, htn presents ambulatory to the ED. Pt reports productive cough with myalgias. Pt tested positive for COVID on 12/26. Pt reports continuous productive cough with intermittent nonradiating right sided chest pain. Denies pleuritic pain. Denies SOB, dizziness. Pt has been taking Tylenol and Tylenol Cold and flu without relief of symptoms. There are no other complaints, changes, or physical findings at this time. PCP: Rl Waite MD    No current facility-administered medications on file prior to encounter. Current Outpatient Medications on File Prior to Encounter   Medication Sig Dispense Refill    benzocaine 15 mg lozg 1 Lozenge by Mucous Membrane route four (4) times daily as needed for Pain. 100 Lozenge 0    cholecalciferol (VITAMIN D3) (5000 Units/125 mcg) tab tablet Take 2 Tablets by mouth daily. 60 Tablet 2    ondansetron (ZOFRAN ODT) 4 mg disintegrating tablet Take 1 Tablet by mouth every eight (8) hours as needed for Nausea or Vomiting. 20 Tablet 1    dicyclomine (BENTYL) 10 mg capsule Take 2 Capsules by mouth four (4) times daily as needed for Abdominal Cramps. 20 Capsule 0    omeprazole (PRILOSEC) 20 mg capsule Take 20 mg by mouth two (2) times a day.  cetirizine (ZyrTEC) 10 mg tablet Take 1 Tab by mouth daily. 31 Tab 0    multivit-min/iron/folic acid/K (BARIATRIC MULTIVITAMINS PO) Take  by mouth two (2) times a day.  OTHER Calcium chew 500 milligrams 3xday      cyanocobalamin (Vitamin B-12) 1,000 mcg tablet Take 1,000 mcg by mouth daily.          Past History     Past Medical History:  Past Medical History: Diagnosis Date    Asthma     Community acquired pneumonia     Diabetes ([facility] [address]     no meds    Hypertension     no meds    Obese     Umbilical hernia        Past Surgical History:  Past Surgical History:   Procedure Laterality Date    DILATION AND CURETTAGE      HX GI      gastric bypass    HX GYN      d and c       Family History:  Family History   Problem Relation Age of Onset   Jessie Gaytan Asthma Mother     Hypertension Mother     Diabetes Mother     Asthma Father        Social History:  Social History     Tobacco Use    Smoking status: Former Smoker     Packs/day: 0.50     Types: Cigarettes     Quit date: 2019     Years since quittin.1    Smokeless tobacco: Never Used   Substance Use Topics    Alcohol use: Not Currently     Alcohol/week: 1.7 standard drinks     Types: 2 Standard drinks or equivalent per week     Comment: holidays/special occassion    Drug use: No       Allergies:  No Known Allergies      Review of Systems   Review of Systems   Constitutional: Negative for chills and fever. Respiratory: Positive for cough. Negative for shortness of breath. Cardiovascular: Positive for chest pain. Gastrointestinal: Negative for abdominal pain, nausea and vomiting. Genitourinary: Negative for flank pain. Musculoskeletal: Negative for back pain and myalgias. Skin: Negative for color change, pallor, rash and wound. Neurological: Negative for dizziness, weakness and light-headedness. All other systems reviewed and are negative. Physical Exam   Physical Exam  Vitals and nursing note reviewed. Constitutional:       General: She is not in acute distress. Appearance: She is well-developed. Comments: Pt in NAD   HENT:      Head: Normocephalic and atraumatic. Eyes:      Conjunctiva/sclera: Conjunctivae normal.   Cardiovascular:      Rate and Rhythm: Normal rate and regular rhythm. Heart sounds: Normal heart sounds.    Pulmonary:      Effort: Pulmonary effort is normal. No respiratory distress. Breath sounds: Normal breath sounds. Comments: Tight lung sounds  Not working to breathe  Abdominal:      General: Bowel sounds are normal. There is no distension. Palpations: Abdomen is soft. Musculoskeletal:         General: Normal range of motion. Skin:     General: Skin is warm. Findings: No rash. Neurological:      Mental Status: She is alert and oriented to person, place, and time. Psychiatric:         Behavior: Behavior normal.         Diagnostic Study Results     Labs -   No results found for this or any previous visit (from the past 12 hour(s)). Radiologic Studies -   XR CHEST PORT   Final Result      No radiographic finding for an acute cardiopulmonary process. CT Results  (Last 48 hours)    None        CXR Results  (Last 48 hours)    None          Medical Decision Making   I am the first provider for this patient. I reviewed the vital signs, available nursing notes, past medical history, past surgical history, family history and social history. Vital Signs-Reviewed the patient's vital signs. No data found. EKG interpretation: (Preliminary)  Rhythm: normal sinus rhythm; and regular . Rate (approx.): 37; Axis: normal; GA interval: normal; QRS interval: normal ; ST/T wave: non specific changes; Other findings: normal.    No acute ischemic changes. T wave inversion present on previous EKG form 3/2017. Records Reviewed: Nursing Notes, Old Medical Records, Previous Radiology Studies and Previous Laboratory Studies    Provider Notes (Medical Decision Making):   DDx: COVID, PNA, Asthma exacerbation    38 yo F who presents with productive cough and myalgias x 10 days. Tested positive for COVID on 12/26. On exam, tight lung sounds. Lung sounds improved with breathing treatments. Normal oxygen saturation and respiratory rate. Chest x-ray shows no acute infectious process. EKG shows no acute ischemic changes with negative troponin.   Her labs are unremarkable. Discharged home with symptomatic treatment. Pt given strict instructions to return if symptoms worsen. At time of discharge, pt non-toxic appearing in NAD. Pt stable for prompt outpatient follow-up with PCP 1 to 2 days. Patient given strict instructions to return if symptoms worsen. ED Course:   Initial assessment performed. The patients presenting problems have been discussed, and they are in agreement with the care plan formulated and outlined with them. I have encouraged them to ask questions as they arise throughout their visit. 1504: Pt re-evaluated after breathing treatments. Breathing sounds improved. Not working to breathe. Disposition:  Discussed lab and imaging results with pt along with dx and treatment plan. Discussed importance of PCP follow up. All questions answered. Pt voiced they understood. Return if sx worsen. PLAN:  1. Discharge Medication List as of 1/5/2022  2:48 PM      START taking these medications    Details   predniSONE (DELTASONE) 50 mg tablet Take 1 Tablet by mouth daily for 3 days. , Normal, Disp-3 Tablet, R-0      lidocaine (Lidoderm) 5 % Apply patch to the affected area for 12 hours a day and remove for 12 hours a day., Normal, Disp-15 Each, R-0      methocarbamoL (Robaxin) 500 mg tablet Take 1 Tablet by mouth three (3) times daily. , Normal, Disp-15 Tablet, R-0         CONTINUE these medications which have CHANGED    Details   albuterol (PROVENTIL HFA, VENTOLIN HFA, PROAIR HFA) 90 mcg/actuation inhaler Take 1 Puff by inhalation every four (4) hours as needed for Wheezing., Normal, Disp-1 Each, R-0         CONTINUE these medications which have NOT CHANGED    Details   benzocaine 15 mg lozg 1 Lozenge by Mucous Membrane route four (4) times daily as needed for Pain., Normal, Disp-100 Lozenge, R-0      cholecalciferol (VITAMIN D3) (5000 Units/125 mcg) tab tablet Take 2 Tablets by mouth daily. , Normal, Disp-60 Tablet, R-2      ondansetron (ZOFRAN ODT) 4 mg disintegrating tablet Take 1 Tablet by mouth every eight (8) hours as needed for Nausea or Vomiting., Normal, Disp-20 Tablet, R-1      dicyclomine (BENTYL) 10 mg capsule Take 2 Capsules by mouth four (4) times daily as needed for Abdominal Cramps., Normal, Disp-20 Capsule, R-0      omeprazole (PRILOSEC) 20 mg capsule Take 20 mg by mouth two (2) times a day., Historical Med      cetirizine (ZyrTEC) 10 mg tablet Take 1 Tab by mouth daily. , Normal, Disp-31 Tab,R-0      multivit-min/iron/folic acid/K (BARIATRIC MULTIVITAMINS PO) Take  by mouth two (2) times a day., Historical Med      OTHER Calcium chew 500 milligrams 3xday, Historical Med      cyanocobalamin (Vitamin B-12) 1,000 mcg tablet Take 1,000 mcg by mouth daily. , Historical Med         STOP taking these medications       albuterol (PROVENTIL VENTOLIN) 2.5 mg /3 mL (0.083 %) nebu Comments:   Reason for Stoppin.   Follow-up Information     Follow up With Specialties Details Why Contact Info    Megan Snyder MD Internal Medicine Schedule an appointment as soon as possible for a visit in 1 day  [address] [place] [address] [address] [address]      [id number] [place] [address] EMERGENCY DEPT Emergency Medicine  As needed, If symptoms worsen [address] [facility]  [phone]        Return to ED if worse     Diagnosis     Clinical Impression:   1. COVID-19 virus infection        Attestations:    GRICELDA Blackburn    Please note that this dictation was completed with [employer], the [employer] voice recognition software. Quite often unanticipated grammatical, syntax, homophones, and other interpretive errors are inadvertently transcribed by the computer software. Please disregard these errors. Please excuse any errors that have escaped final proofreading. Thank you.

## 2022-01-05 NOTE — ED NOTES
Pt discharged at this time. Yoanna MADISON reviewed discharge instructions at this time with the pt, & pt does not have any questions. Pt ambulatory upon discharge, & in stable condition. Pt armband removed & shredded.

## 2022-01-06 ENCOUNTER — PATIENT OUTREACH (OUTPATIENT)
Dept: CASE MANAGEMENT | Age: 37
End: 2022-01-06

## 2022-01-06 NOTE — PROGRESS NOTES
Patient contacted regarding LLLOU-21 diagnosis\". Discussed COVID-19 related testing which was available at this time. Test results were positive. Patient informed of results, if available? Yes. Tested Positive on 21. Slight residual cough at present - no other symptoms     Ambulatory Care Manager contacted the patient by telephone to perform post discharge assessment. Call within 2 business days of discharge: Yes Verified name and  with patient as identifiers. Provided introduction to self, and explanation of the CTN/ACM role, and reason for call due to risk factors for infection and/or exposure to COVID-19. Symptoms reviewed with patient who verbalized the following symptoms: cough and no new symptoms      Due to no new or worsening symptoms encounter was not routed to provider for escalation. Discussed follow-up appointments. If no appointment was previously scheduled, appointment scheduling offered:  yes   [facility] follow up appointment(s):   Future Appointments   Date Time Provider Narciso Varela   2022 10:45 AM LAB_BSMO [facility] BS AMB   2022  2:45 PM Do, Waynard Kanner, MD [facility] BS AMB          Advance Care Planning:   Does patient have an Advance Directive:  not on file. Patient has following risk factors of: asthma and diabetes. ACM reviewed discharge instructions, medical action plan and red flags such as increased shortness of breath, increasing fever and signs of decompensation with patient who verbalized understanding. Discussed exposure protocols and quarantine with CDC Guidelines What to do if you are sick with coronavirus disease .  Patient was given an opportunity for questions and concerns. The patient agrees to contact the [facility] exposure line [phone], [facility] department  [address]  ([phone] and PCP office for questions related to their healthcare. ACM provided contact information for future needs.     Reviewed and educated patient on any new and changed medications related to discharge diagnosis     Was patient discharged with a pulse oximeter? no Discussed and confirmed pulse oximeter discharge instructions and when to notify provider or seek emergency care. Plan for follow-up call in 5-7 days based on severity of symptoms and risk factors.

## 2022-01-13 ENCOUNTER — PATIENT OUTREACH (OUTPATIENT)
Dept: CASE MANAGEMENT | Age: 37
End: 2022-01-13

## 2022-01-13 NOTE — PROGRESS NOTES
Patient resolved from Transition of Care episode on  1/13/2022  Patient/family has been provided the following resources and education related to COVID-19:                         Signs, symptoms and red flags related to COVID-19            CDC exposure and quarantine guidelines            [facility] exposure contact - [phone]            Contact for their local Department of Health                 Patient currently reports that the following symptoms have improved:  no new symptoms. No further outreach scheduled with this CTN/ACM. Episode of Care resolved. Patient has this CTN/ACM contact information if future needs arise.

## 2022-01-25 ENCOUNTER — APPOINTMENT (OUTPATIENT)
Dept: ONCOLOGY | Age: 37
End: 2022-01-25

## 2022-02-02 ENCOUNTER — VIRTUAL VISIT (OUTPATIENT)
Dept: ONCOLOGY | Age: 37
End: 2022-02-02
Payer: COMMERCIAL

## 2022-02-02 DIAGNOSIS — D50.8 IRON DEFICIENCY ANEMIA FOLLOWING BARIATRIC SURGERY: Primary | ICD-10-CM

## 2022-02-02 DIAGNOSIS — D64.9 NORMOCYTIC ANEMIA: ICD-10-CM

## 2022-02-02 DIAGNOSIS — K95.89 IRON DEFICIENCY ANEMIA FOLLOWING BARIATRIC SURGERY: Primary | ICD-10-CM

## 2022-02-02 DIAGNOSIS — D64.9 CHRONIC ANEMIA: ICD-10-CM

## 2022-02-02 PROCEDURE — 99443 PR PHYS/QHP TELEPHONE EVALUATION 21-30 MIN: CPT | Performed by: INTERNAL MEDICINE

## 2022-02-02 NOTE — PROGRESS NOTES
Sarah Redd is a 39 y.o. female, evaluated via audio-only technology on 2/2/2022 for Follow-up  . Assessment & Plan:   Diagnoses and all orders for this visit:    1. Iron deficiency anemia following bariatric surgery    2. Normocytic anemia    3. Chronic anemia      The complexity of medical decision making for this visit is moderate. # Iron deficiency anemia following gastric bypass surgery  # Normocytic Anemia/ Chronic anemia  -- Past medical history significant of asthma, diabetes, hypertension, status post gastric bypass 5/2020.  -- Lab reviews indicated chronic anemia since at least 1/27/2012, hemoglobin 9.2.  -- 7/30/2021 CBC reported hemoglobin 10.4, hematocrit 33.6%, WBC 5.2, platelet 577,947. Ferritin 7, iron 117.  -- She could not take PO Iron due to significant constipation. -- 11/3/2021 IV Injectafer x 2. Tolerated it well. -- Today I have reviewed with the patient about recent lab reports. 1/25/2022 Hb/Hct 11.1/35% Ferritin 33, Iron sat 20%    Plan:  -- Given intolerance to iron pills will order IV Iron Injectafer x 2.   -- Lab check: CBC, CMP, Iron profiles, ferritin, B12/folate, ret count  -- She has persistent stomachache, diarrhea and nausea. Advised f/u GI and bariatric teams. Advised to present to ED of worsening symptoms or concerns. -- We will see the patient back in about 4 months. Always sooner if required. 12  Subjective:   Ms. Wili Martinez is a most pleasant 38y.o. year old female who was seen for consultation of anemia. The patient has a past medical history significant of asthma, diabetes, hypertension, status post gastric bypass 5/2020. Lab reviews indicated chronic anemia since at least 1/27/2012, hemoglobin 9.2.  7/30/2021 CBC reported hemoglobin 10.4, hematocrit 33.6%, WBC 5.2, platelet 789,646. Ferritin 7, iron 117. Lab reviews indicated chronic anemia since at least 1/27/2012, hemoglobin 9.2.   She reported she has chronic fatigue and low energy level since her gastric bypass surgery last year. She could not take PO Iron due to significant constipation. She has persistent abdominal pain, diarrhea and nausea. She stated she will f/u Bariatric teams for those. The patient otherwise has no other complaints. Denied fever, chills, night sweat, unintentional weight loss, skin lumps or bumps, acute bleeding or bruising issues. Denied headache, acute vision change, dizziness, chest pain, worsen shortness of breath, palpitation, productive cough, altered bowel habits, dysuria, worsen bone pain or back pain. Prior to Admission medications    Medication Sig Start Date End Date Taking? Authorizing Provider   albuterol (PROVENTIL HFA, VENTOLIN HFA, PROAIR HFA) 90 mcg/actuation inhaler Take 1 Puff by inhalation every four (4) hours as needed for Wheezing. 1/5/22   GRICELDA Sigala   lidocaine (Lidoderm) 5 % Apply patch to the affected area for 12 hours a day and remove for 12 hours a day. 1/5/22   GRICELDA Sigala   methocarbamoL (Robaxin) 500 mg tablet Take 1 Tablet by mouth three (3) times daily. 1/5/22   GRICELDA Sigala   benzocaine 15 mg lozg 1 Lozenge by Mucous Membrane route four (4) times daily as needed for Pain. 8/28/21   Adina Toth MD   cholecalciferol (VITAMIN D3) (5000 Units/125 mcg) tab tablet Take 2 Tablets by mouth daily. 8/2/21   Edelmiraliz Coker NP   ondansetron (ZOFRAN ODT) 4 mg disintegrating tablet Take 1 Tablet by mouth every eight (8) hours as needed for Nausea or Vomiting. 8/2/21   Edelmiraliz Coker NP   dicyclomine (BENTYL) 10 mg capsule Take 2 Capsules by mouth four (4) times daily as needed for Abdominal Cramps. 8/2/21   Edelmira Coker NP   omeprazole (PRILOSEC) 20 mg capsule Take 20 mg by mouth two (2) times a day. 1/8/21   Aman Campbell MD   cetirizine (ZyrTEC) 10 mg tablet Take 1 Tab by mouth daily.  7/27/20   GRICELDA Cabrera   multivit-min/iron/folic acid/K (BARIATRIC MULTIVITAMINS PO) Take  by mouth two (2) times a day.    Provider, Historical   OTHER Calcium chew 500 milligrams 3xday    Provider, Historical   cyanocobalamin (Vitamin B-12) 1,000 mcg tablet Take 1,000 mcg by mouth daily. Provider, Historical     Patient Active Problem List   Diagnosis Code    Obesity complicating pregnancy, childbirth, or the puerperium, unspecified as to episode of care or not applicable(649.10) PLL6380    Hyperglycemia R73.9    Cough R05.9    Pregnancy Z34.90    Shortness of breath R06.02    Morbid obesity ([facility] [address] E66.01    Gestational diabetes O24.419    Antepartum fetal tachycardia affecting care of mother X05.3268    Hypertension I10    History of asthma Z87.09    Asthma attack J45. 0    Morbid obesity with BMI of 60.0-69.9, adult ([facility]) E66.01, Z68.44    Iron deficiency anemia following bariatric surgery K95.89, D50.8       Review of Systems   Constitutional: Negative for chills, diaphoresis, fever, malaise/fatigue and weight loss. Respiratory: Negative for cough, hemoptysis, shortness of breath and wheezing. Cardiovascular: Negative for chest pain, palpitations and leg swelling. Gastrointestinal: Positive for abdominal pain. Negative for diarrhea, heartburn, nausea and vomiting. Genitourinary: Negative for dysuria, frequency, hematuria and urgency. Musculoskeletal: Negative for joint pain and myalgias. Skin: Negative for itching and rash. Neurological: Negative for dizziness, seizures, weakness and headaches. Psychiatric/Behavioral: Negative for depression. The patient does not have insomnia. Patient-Reported Vitals 2/2/2022   Patient-Reported Weight 280lb         The patient was advised that our priority at this time is to keep the patients safe, and therefore we are reaching out to patients virtually to prevent them coming into the office unless necessarily. Patient verbalized understanding and was agreeable for virtual visit.     Narcisa Aguillon, who was evaluated through a patient-initiated, synchronous (real-time) audio only encounter, and/or her healthcare decision maker, is aware that it is a billable service, with coverage as determined by her insurance carrier. She provided verbal consent to proceed: Yes. She has not had a related appointment within my department in the past 7 days or scheduled within the next 24 hours. On this date 02/02/2022 I have spent 20 minutes reviewing previous notes, test results and face to face (virtual) with the patient discussing the diagnosis and importance of compliance with the treatment plan as well as documenting on the day of the visit.     Cheko Lubin MD

## 2022-02-03 RX ORDER — ALBUTEROL SULFATE 0.83 MG/ML
2.5 SOLUTION RESPIRATORY (INHALATION) AS NEEDED
Status: CANCELLED
Start: 2022-02-09

## 2022-02-03 RX ORDER — SODIUM CHLORIDE 9 MG/ML
10 INJECTION INTRAMUSCULAR; INTRAVENOUS; SUBCUTANEOUS AS NEEDED
Status: CANCELLED | OUTPATIENT
Start: 2022-02-09

## 2022-02-03 RX ORDER — HEPARIN 100 UNIT/ML
300-500 SYRINGE INTRAVENOUS AS NEEDED
Status: CANCELLED
Start: 2022-02-09

## 2022-02-03 RX ORDER — DIPHENHYDRAMINE HYDROCHLORIDE 50 MG/ML
25 INJECTION, SOLUTION INTRAMUSCULAR; INTRAVENOUS AS NEEDED
Status: CANCELLED
Start: 2022-02-09

## 2022-02-03 RX ORDER — HYDROCORTISONE SODIUM SUCCINATE 100 MG/2ML
100 INJECTION, POWDER, FOR SOLUTION INTRAMUSCULAR; INTRAVENOUS AS NEEDED
Status: CANCELLED | OUTPATIENT
Start: 2022-02-09

## 2022-02-03 RX ORDER — SODIUM CHLORIDE 0.9 % (FLUSH) 0.9 %
10 SYRINGE (ML) INJECTION AS NEEDED
Status: CANCELLED | OUTPATIENT
Start: 2022-02-09

## 2022-02-03 RX ORDER — DIPHENHYDRAMINE HYDROCHLORIDE 50 MG/ML
50 INJECTION, SOLUTION INTRAMUSCULAR; INTRAVENOUS AS NEEDED
Status: CANCELLED
Start: 2022-02-09

## 2022-02-03 RX ORDER — ONDANSETRON 2 MG/ML
8 INJECTION INTRAMUSCULAR; INTRAVENOUS AS NEEDED
Status: CANCELLED | OUTPATIENT
Start: 2022-02-09

## 2022-02-03 RX ORDER — EPINEPHRINE 1 MG/ML
0.3 INJECTION, SOLUTION, CONCENTRATE INTRAVENOUS AS NEEDED
Status: CANCELLED | OUTPATIENT
Start: 2022-02-09

## 2022-02-03 RX ORDER — ACETAMINOPHEN 325 MG/1
650 TABLET ORAL AS NEEDED
Status: CANCELLED
Start: 2022-02-09

## 2022-02-04 ENCOUNTER — APPOINTMENT (OUTPATIENT)
Dept: CT IMAGING | Age: 37
End: 2022-02-04
Attending: EMERGENCY MEDICINE
Payer: COMMERCIAL

## 2022-02-04 ENCOUNTER — OFFICE VISIT (OUTPATIENT)
Dept: SURGERY | Age: 37
End: 2022-02-04
Payer: COMMERCIAL

## 2022-02-04 ENCOUNTER — APPOINTMENT (OUTPATIENT)
Dept: ULTRASOUND IMAGING | Age: 37
End: 2022-02-04
Attending: EMERGENCY MEDICINE
Payer: COMMERCIAL

## 2022-02-04 ENCOUNTER — HOSPITAL ENCOUNTER (EMERGENCY)
Age: 37
Discharge: HOME | End: 2022-02-04
Attending: EMERGENCY MEDICINE
Payer: COMMERCIAL

## 2022-02-04 VITALS
RESPIRATION RATE: 18 BRPM | BODY MASS INDEX: 42.27 KG/M2 | HEIGHT: 69 IN | HEART RATE: 68 BPM | DIASTOLIC BLOOD PRESSURE: 84 MMHG | TEMPERATURE: 97.2 F | WEIGHT: 285.4 LBS | SYSTOLIC BLOOD PRESSURE: 138 MMHG

## 2022-02-04 VITALS
SYSTOLIC BLOOD PRESSURE: 150 MMHG | HEIGHT: 69 IN | OXYGEN SATURATION: 98 % | WEIGHT: 285 LBS | HEART RATE: 62 BPM | RESPIRATION RATE: 15 BRPM | DIASTOLIC BLOOD PRESSURE: 88 MMHG | BODY MASS INDEX: 42.21 KG/M2 | TEMPERATURE: 98.7 F

## 2022-02-04 DIAGNOSIS — K21.9 GASTROESOPHAGEAL REFLUX DISEASE WITHOUT ESOPHAGITIS: ICD-10-CM

## 2022-02-04 DIAGNOSIS — E66.01 MORBID OBESITY (HCC): ICD-10-CM

## 2022-02-04 DIAGNOSIS — Z98.84 S/P GASTRIC BYPASS: ICD-10-CM

## 2022-02-04 DIAGNOSIS — O02.1: Primary | ICD-10-CM

## 2022-02-04 DIAGNOSIS — K91.2 POST-RESECTION MALABSORPTION: Primary | ICD-10-CM

## 2022-02-04 DIAGNOSIS — R10.33 PERIUMBILICAL ABDOMINAL PAIN: ICD-10-CM

## 2022-02-04 LAB
ALBUMIN SERPL-MCNC: 3.3 G/DL (ref 3.4–5)
ALBUMIN/GLOB SERPL: 1 {RATIO} (ref 0.8–1.7)
ALP SERPL-CCNC: 69 U/L (ref 45–117)
ALT SERPL-CCNC: 27 U/L (ref 13–56)
ANION GAP SERPL CALC-SCNC: 5 MMOL/L (ref 3–18)
APPEARANCE UR: NORMAL
AST SERPL-CCNC: 17 U/L (ref 10–38)
BASOPHILS # BLD: 0 K/UL (ref 0–0.1)
BASOPHILS NFR BLD: 0 % (ref 0–2)
BILIRUB SERPL-MCNC: 0.2 MG/DL (ref 0.2–1)
BILIRUB UR QL: NEGATIVE
BUN SERPL-MCNC: 10 MG/DL (ref 7–18)
BUN/CREAT SERPL: 23 (ref 12–20)
CALCIUM SERPL-MCNC: 8.5 MG/DL (ref 8.5–10.1)
CHLORIDE SERPL-SCNC: 109 MMOL/L (ref 100–111)
CO2 SERPL-SCNC: 29 MMOL/L (ref 21–32)
COLOR UR: YELLOW
CREAT SERPL-MCNC: 0.43 MG/DL (ref 0.6–1.3)
DIFFERENTIAL METHOD BLD: ABNORMAL
EOSINOPHIL # BLD: 0.2 K/UL (ref 0–0.4)
EOSINOPHIL NFR BLD: 5 % (ref 0–5)
ERYTHROCYTE [DISTWIDTH] IN BLOOD BY AUTOMATED COUNT: 12.7 % (ref 11.6–14.5)
GLOBULIN SER CALC-MCNC: 3.4 G/DL (ref 2–4)
GLUCOSE SERPL-MCNC: 93 MG/DL (ref 74–99)
GLUCOSE UR STRIP.AUTO-MCNC: NEGATIVE MG/DL
HCG SERPL QL: POSITIVE
HCG SERPL-ACNC: 10 MIU/ML (ref 0–10)
HCT VFR BLD AUTO: 36.2 % (ref 35–45)
HGB BLD-MCNC: 11.8 G/DL (ref 12–16)
HGB UR QL STRIP: NEGATIVE
IMM GRANULOCYTES # BLD AUTO: 0 K/UL (ref 0–0.04)
IMM GRANULOCYTES NFR BLD AUTO: 0 % (ref 0–0.5)
KETONES UR QL STRIP.AUTO: NEGATIVE MG/DL
LEUKOCYTE ESTERASE UR QL STRIP.AUTO: NEGATIVE
LIPASE SERPL-CCNC: 56 U/L (ref 73–393)
LYMPHOCYTES # BLD: 2.7 K/UL (ref 0.9–3.6)
LYMPHOCYTES NFR BLD: 54 % (ref 21–52)
MCH RBC QN AUTO: 27.6 PG (ref 24–34)
MCHC RBC AUTO-ENTMCNC: 32.6 G/DL (ref 31–37)
MCV RBC AUTO: 84.6 FL (ref 78–100)
MONOCYTES # BLD: 0.4 K/UL (ref 0.05–1.2)
MONOCYTES NFR BLD: 7 % (ref 3–10)
NEUTS SEG # BLD: 1.7 K/UL (ref 1.8–8)
NEUTS SEG NFR BLD: 34 % (ref 40–73)
NITRITE UR QL STRIP.AUTO: NEGATIVE
NRBC # BLD: 0 K/UL (ref 0–0.01)
NRBC BLD-RTO: 0 PER 100 WBC
PH UR STRIP: 6 [PH] (ref 5–8)
PLATELET # BLD AUTO: 224 K/UL (ref 135–420)
PMV BLD AUTO: 10.2 FL (ref 9.2–11.8)
POTASSIUM SERPL-SCNC: 3.7 MMOL/L (ref 3.5–5.5)
PROT SERPL-MCNC: 6.7 G/DL (ref 6.4–8.2)
PROT UR STRIP-MCNC: NEGATIVE MG/DL
RBC # BLD AUTO: 4.28 M/UL (ref 4.2–5.3)
SODIUM SERPL-SCNC: 143 MMOL/L (ref 136–145)
SP GR UR REFRACTOMETRY: 1.03 (ref 1–1.03)
UROBILINOGEN UR QL STRIP.AUTO: 1 EU/DL (ref 0.2–1)
WBC # BLD AUTO: 5 K/UL (ref 4.6–13.2)

## 2022-02-04 PROCEDURE — 85025 COMPLETE CBC W/AUTO DIFF WBC: CPT

## 2022-02-04 PROCEDURE — 80053 COMPREHEN METABOLIC PANEL: CPT

## 2022-02-04 PROCEDURE — 84702 CHORIONIC GONADOTROPIN TEST: CPT

## 2022-02-04 PROCEDURE — 96376 TX/PRO/DX INJ SAME DRUG ADON: CPT

## 2022-02-04 PROCEDURE — 74011250636 HC RX REV CODE- 250/636: Performed by: EMERGENCY MEDICINE

## 2022-02-04 PROCEDURE — 84703 CHORIONIC GONADOTROPIN ASSAY: CPT

## 2022-02-04 PROCEDURE — 76830 TRANSVAGINAL US NON-OB: CPT

## 2022-02-04 PROCEDURE — 96374 THER/PROPH/DIAG INJ IV PUSH: CPT

## 2022-02-04 PROCEDURE — 99213 OFFICE O/P EST LOW 20 MIN: CPT | Performed by: NURSE PRACTITIONER

## 2022-02-04 PROCEDURE — 99284 EMERGENCY DEPT VISIT MOD MDM: CPT

## 2022-02-04 PROCEDURE — 83690 ASSAY OF LIPASE: CPT

## 2022-02-04 PROCEDURE — 81003 URINALYSIS AUTO W/O SCOPE: CPT

## 2022-02-04 PROCEDURE — 96375 TX/PRO/DX INJ NEW DRUG ADDON: CPT

## 2022-02-04 PROCEDURE — 74177 CT ABD & PELVIS W/CONTRAST: CPT

## 2022-02-04 PROCEDURE — 74011000636 HC RX REV CODE- 636: Performed by: EMERGENCY MEDICINE

## 2022-02-04 RX ORDER — MORPHINE SULFATE 4 MG/ML
4 INJECTION INTRAVENOUS
Status: COMPLETED | OUTPATIENT
Start: 2022-02-04 | End: 2022-02-04

## 2022-02-04 RX ORDER — SUCRALFATE 1 G/10ML
SUSPENSION ORAL 4 TIMES DAILY
Status: CANCELLED | OUTPATIENT
Start: 2022-02-04

## 2022-02-04 RX ORDER — OXYCODONE AND ACETAMINOPHEN 5; 325 MG/1; MG/1
1 TABLET ORAL
Qty: 12 TABLET | Refills: 0 | Status: SHIPPED | OUTPATIENT
Start: 2022-02-04 | End: 2022-02-10

## 2022-02-04 RX ORDER — ONDANSETRON 2 MG/ML
4 INJECTION INTRAMUSCULAR; INTRAVENOUS
Status: COMPLETED | OUTPATIENT
Start: 2022-02-04 | End: 2022-02-04

## 2022-02-04 RX ORDER — OMEPRAZOLE 40 MG/1
40 CAPSULE, DELAYED RELEASE ORAL DAILY
Qty: 90 CAPSULE | Refills: 1 | Status: SHIPPED | OUTPATIENT
Start: 2022-02-04 | End: 2022-02-10

## 2022-02-04 RX ORDER — MORPHINE SULFATE 4 MG/ML
4 INJECTION INTRAVENOUS
Status: DISCONTINUED | OUTPATIENT
Start: 2022-02-04 | End: 2022-02-04

## 2022-02-04 RX ORDER — SUCRALFATE 1 G/1
1 TABLET ORAL 4 TIMES DAILY
Qty: 60 TABLET | Refills: 1 | Status: SHIPPED | OUTPATIENT
Start: 2022-02-04 | End: 2022-07-21

## 2022-02-04 RX ADMIN — MORPHINE SULFATE 4 MG: 4 INJECTION INTRAVENOUS at 16:48

## 2022-02-04 RX ADMIN — ONDANSETRON 4 MG: 2 INJECTION INTRAMUSCULAR; INTRAVENOUS at 16:48

## 2022-02-04 RX ADMIN — MORPHINE SULFATE 4 MG: 4 INJECTION INTRAVENOUS at 19:20

## 2022-02-04 RX ADMIN — IOPAMIDOL 100 ML: 612 INJECTION, SOLUTION INTRAVENOUS at 18:55

## 2022-02-04 NOTE — ED TRIAGE NOTES
Patient with abdominal pain for the past two years. Patient with history of gastric bypass surgery 05/18/2020. Patient has had CT scan of her stomach in august.  No blood in stool, no blood in emesis. Denies vomiting, endorses nausea and diarrhea. Patient diarrhea is new and has started in the past 4 days.

## 2022-02-04 NOTE — PROGRESS NOTES
Shine Shepard is a 39 y.o. female  Chief Complaint   Patient presents with    Follow-up     GBP 5/18/2020     Bariatric Postoperative Progress Note    CC: Abd Pain    Shine Shepard is a 39 y.o. female is now 1 year and 9 months out status post laparoscopic gastric bypass surgery preformed on 5/18/2020. Taking in 64oz water,  60 plus g protein. Patient is currently eating fruit, chicken, fish, veggies. Patient says the amount of time spent exercising is 30 minutes 4 times per week. Bowel movements are constipated. She is compliant with multivitamins, calcium, Vit D and B12 supplements.  -10 lbs since last visit. Seeing Dr. Pradip Nam for JERMAINE. Reports she is still experiencing the same pain we discussed at last visit, but it has worsened. No improvement with Bentyl. Reports no one from GI office has reached out to her. Pain worsens with food intake. Goal weight: 195    Weight Loss Metrics 2/4/2022 2/4/2022 2/4/2022 1/5/2022 10/14/2021 9/29/2021 8/28/2021   Pre op / Initial Wt 415 - - - - - -   Today's Wt - 285 lb 285 lb 6.4 oz 278 lb 270 lb 277 lb 281 lb   BMI - 42.09 kg/m2 42.15 kg/m2 41.05 kg/m2 39.87 kg/m2 40.91 kg/m2 41.5 kg/m2   Ideal Body Wt 146 - - - - - -   Excess Body Wt 269 - - - - - -   Goal Wt - - - - - - -   Wt loss to date 129.6 - - - - - -   % Wt Loss 0.6 - - - - - -   80% .2 - - - - - -           Past Medical History:   Diagnosis Date    Asthma     Community acquired pneumonia     Diabetes ([facility] [address]     no meds    Hypertension     no meds    Obese     Umbilical hernia        Past Surgical History:   Procedure Laterality Date    DILATION AND CURETTAGE      HX GI      gastric bypass    HX GYN      d and c       Current Outpatient Medications   Medication Sig Dispense Refill    omeprazole (PRILOSEC) 40 mg capsule Take 1 Capsule by mouth daily. 90 Capsule 1    sucralfate (CARAFATE) 1 gram tablet Take 1 Tablet by mouth four (4) times daily.  60 Tablet 1    albuterol (PROVENTIL HFA, VENTOLIN HFA, PROAIR HFA) 90 mcg/actuation inhaler Take 1 Puff by inhalation every four (4) hours as needed for Wheezing. 1 Each 0    benzocaine 15 mg lozg 1 Lozenge by Mucous Membrane route four (4) times daily as needed for Pain. 100 Lozenge 0    cholecalciferol (VITAMIN D3) (5000 Units/125 mcg) tab tablet Take 2 Tablets by mouth daily. 60 Tablet 2    ondansetron (ZOFRAN ODT) 4 mg disintegrating tablet Take 1 Tablet by mouth every eight (8) hours as needed for Nausea or Vomiting. 20 Tablet 1    dicyclomine (BENTYL) 10 mg capsule Take 2 Capsules by mouth four (4) times daily as needed for Abdominal Cramps. 20 Capsule 0    cetirizine (ZyrTEC) 10 mg tablet Take 1 Tab by mouth daily. 31 Tab 0    multivit-min/iron/folic acid/K (BARIATRIC MULTIVITAMINS PO) Take  by mouth two (2) times a day.  OTHER Calcium chew 500 milligrams 3xday      cyanocobalamin (Vitamin B-12) 1,000 mcg tablet Take 1,000 mcg by mouth daily.  oxyCODONE-acetaminophen (Percocet) 5-325 mg per tablet Take 1 Tablet by mouth every six (6) hours as needed for Pain for up to 5 days. Max Daily Amount: 4 Tablets. Take 1 tablet every 6 hours as needed for pain control. If you were instructed to try over the counter ibuprofen or tylenol, only take the percocet for pain not controlled with the over the counter medication. 12 Tablet 0    lidocaine (Lidoderm) 5 % Apply patch to the affected area for 12 hours a day and remove for 12 hours a day. (Patient not taking: Reported on 2/4/2022) 15 Each 0    methocarbamoL (Robaxin) 500 mg tablet Take 1 Tablet by mouth three (3) times daily. (Patient not taking: Reported on 2/4/2022) 15 Tablet 0       No Known Allergies    ROS:  Review of Systems   Constitutional: Positive for malaise/fatigue and weight loss. Gastrointestinal: Positive for abdominal pain and constipation. See HPI above.      Physicial Exam:  Visit Vitals  /84   Pulse 68   Temp 97.2 °F (36.2 °C) (Temporal)   Resp 18   Ht 5' 9\" (1.753 m) Wt 129.5 kg (285 lb 6.4 oz)   LMP 01/07/2022 (Exact Date)   BMI 42.15 kg/m²     Physical Exam  Vitals and nursing note reviewed. Constitutional:       Appearance: She is obese. HENT:      Head: Normocephalic and atraumatic. Cardiovascular:      Rate and Rhythm: Normal rate. Pulmonary:      Effort: Pulmonary effort is normal.   Abdominal:      General: Bowel sounds are normal. There is no distension. Palpations: Abdomen is soft. There is no mass. Tenderness: There is abdominal tenderness. Hernia: A hernia is present. Musculoskeletal:         General: Normal range of motion. Neurological:      General: No focal deficit present. Mental Status: She is alert and oriented to person, place, and time.    Psychiatric:         Mood and Affect: Mood normal.         Behavior: Behavior normal.         Labs:   Recent Results (from the past 672 hour(s))   METABOLIC PANEL, COMPREHENSIVE    Collection Time: 01/25/22 10:54 AM   Result Value Ref Range    Glucose 81 65 - 99 mg/dL    BUN 14 7 - 25 mg/dL    Creatinine 0.43 (L) 0.50 - 1.10 mg/dL    GFR est non- > OR = 60 mL/min/1.73m2    GFR est  > OR = 60 mL/min/1.73m2    BUN/Creatinine ratio 33 (H) 6 - 22 (calc)    Sodium 143 135 - 146 mmol/L    Potassium 4.2 3.5 - 5.3 mmol/L    Chloride 107 98 - 110 mmol/L    CO2 29 20 - 32 mmol/L    Calcium 9.2 8.6 - 10.2 mg/dL    Protein, total 6.1 6.1 - 8.1 g/dL    Albumin 3.9 3.6 - 5.1 g/dL    Globulin 2.2 1.9 - 3.7 g/dL (calc)    ALB/GLOBRATIO 1.8 1.0 - 2.5 (calc)    Bilirubin, total 0.4 0.2 - 1.2 mg/dL    Alkaline Phosphatase, total 55 31 - 125 U/L    AST (SGOT) 16 10 - 30 U/L    ALT (SGPT) 14 6 - 29 U/L   RETICULOCYTE COUNT    Collection Time: 01/25/22 10:54 AM   Result Value Ref Range    Reticulocyte count 1.3 %    Absolute Retic Cnt. 52,000 20,000 - 80,000 cells/uL   CBC WITH AUTOMATED DIFF    Collection Time: 01/25/22 10:54 AM   Result Value Ref Range    WBC 3.9 3.8 - 10.8 Thousand/uL    RBC 4.00 3.80 - 5.10 Million/uL    HGB 11.1 (L) 11.7 - 15.5 g/dL    HCT 35.0 35.0 - 45.0 %    MCV 87.5 80.0 - 100.0 fL    MCH 27.8 27.0 - 33.0 pg    MCHC 31.7 (L) 32.0 - 36.0 g/dL    RDW 13.0 11.0 - 15.0 %    PLATELET 375 435 - 320 Thousand/uL    MEAN PLATELET VOLUME 96.0 7.5 - 12.5 fL    ABS. NEUTROPHILS 1,322 (L) 1,500 - 7,800 cells/uL    ABS. LYMPHOCYTES 2,102 850 - 3,900 cells/uL    ABS. MONOCYTES 304 200 - 950 cells/uL    ABS. EOSINOPHILS 160 15 - 500 cells/uL    ABS.  BASOPHILS 12 0 - 200 cells/uL    Neutrophils 33.9 %    LYMPHOCYTES 53.9 %    MONOCYTES 7.8 %    EOSINOPHILS 4.1 %    BASOPHILS 0.3 %   IRON PROFILE    Collection Time: 01/25/22 10:54 AM   Result Value Ref Range    Iron 73 40 - 190 mcg/dL    Iron binding capacity 359 250 - 450 mcg/dL (calc)    % SATURATION 20 16 - 45 % (calc)   FERRITIN    Collection Time: 01/25/22 10:54 AM   Result Value Ref Range    Ferritin 33 16 - 154 ng/mL   VITAMIN B12 & FOLATE    Collection Time: 01/25/22 10:54 AM   Result Value Ref Range    Vitamin B12 590 200 - 1,100 pg/mL    Folate 8.2 ng/mL   URINALYSIS W/ RFLX MICROSCOPIC    Collection Time: 02/04/22  4:30 PM   Result Value Ref Range    Color YELLOW      Appearance CLOUDY      Specific gravity 1.029 1.005 - 1.030      pH (UA) 6.0 5.0 - 8.0      Protein Negative NEG mg/dL    Glucose Negative NEG mg/dL    Ketone Negative NEG mg/dL    Bilirubin Negative NEG      Blood Negative NEG      Urobilinogen 1.0 0.2 - 1.0 EU/dL    Nitrites Negative NEG      Leukocyte Esterase Negative NEG     CBC WITH AUTOMATED DIFF    Collection Time: 02/04/22  4:35 PM   Result Value Ref Range    WBC 5.0 4.6 - 13.2 K/uL    RBC 4.28 4.20 - 5.30 M/uL    HGB 11.8 (L) 12.0 - 16.0 g/dL    HCT 36.2 35.0 - 45.0 %    MCV 84.6 78.0 - 100.0 FL    MCH 27.6 24.0 - 34.0 PG    MCHC 32.6 31.0 - 37.0 g/dL    RDW 12.7 11.6 - 14.5 %    PLATELET 795 771 - 801 K/uL    MPV 10.2 9.2 - 11.8 FL    NRBC 0.0 0  WBC    ABSOLUTE NRBC 0.00 0.00 - 0.01 K/uL    NEUTROPHILS 34 (L) 40 - 73 %    LYMPHOCYTES 54 (H) 21 - 52 %    MONOCYTES 7 3 - 10 %    EOSINOPHILS 5 0 - 5 %    BASOPHILS 0 0 - 2 %    IMMATURE GRANULOCYTES 0 0.0 - 0.5 %    ABS. NEUTROPHILS 1.7 (L) 1.8 - 8.0 K/UL    ABS. LYMPHOCYTES 2.7 0.9 - 3.6 K/UL    ABS. MONOCYTES 0.4 0.05 - 1.2 K/UL    ABS. EOSINOPHILS 0.2 0.0 - 0.4 K/UL    ABS. BASOPHILS 0.0 0.0 - 0.1 K/UL    ABS. IMM. GRANS. 0.0 0.00 - 0.04 K/UL    DF AUTOMATED     METABOLIC PANEL, COMPREHENSIVE    Collection Time: 02/04/22  4:35 PM   Result Value Ref Range    Sodium 143 136 - 145 mmol/L    Potassium 3.7 3.5 - 5.5 mmol/L    Chloride 109 100 - 111 mmol/L    CO2 29 21 - 32 mmol/L    Anion gap 5 3.0 - 18 mmol/L    Glucose 93 74 - 99 mg/dL    BUN 10 7.0 - 18 MG/DL    Creatinine 0.43 (L) 0.6 - 1.3 MG/DL    BUN/Creatinine ratio 23 (H) 12 - 20      GFR est AA >60 >60 ml/min/1.73m2    GFR est non-AA >60 >60 ml/min/1.73m2    Calcium 8.5 8.5 - 10.1 MG/DL    Bilirubin, total 0.2 0.2 - 1.0 MG/DL    ALT (SGPT) 27 13 - 56 U/L    AST (SGOT) 17 10 - 38 U/L    Alk. phosphatase 69 45 - 117 U/L    Protein, total 6.7 6.4 - 8.2 g/dL    Albumin 3.3 (L) 3.4 - 5.0 g/dL    Globulin 3.4 2.0 - 4.0 g/dL    A-G Ratio 1.0 0.8 - 1.7     LIPASE    Collection Time: 02/04/22  4:35 PM   Result Value Ref Range    Lipase 56 (L) 73 - 393 U/L   HCG QL SERUM    Collection Time: 02/04/22  4:35 PM   Result Value Ref Range    HCG, Ql. Positive (A) NEG     BETA HCG, QT    Collection Time: 02/04/22  4:35 PM   Result Value Ref Range    Beta HCG, QT 10 0 - 10 MIU/ML       Assessment/Plan:   She is currently 1 year s/p laparoscopic gastric bypass surgery with a total weight loss of 129 lbs to date, struggling with abd pain. Due to reports that pain increases with eating will try PPI. She is currently taking 20 mg omeprazole. Will increase omeprazole to 40 mg once daily, sucrafate prn  Follow up in 2 weeks for symptom review. If no improvement, may need US to rule out gallstones/cholecystitis, or referral to GI for EGD. We have reviewed the components of a successful postoperative course including requirement for a high protein, low carbohydrate diet, 64 oz a day of zero calorie liquids, daily vitamin supplementation, daily exercise (150 mis/week), regular follow-up, and participation in support groups. Refer to registered dietitian for more detailed medical nutrition therapy as needed. The primary encounter diagnosis was Post-resection malabsorption. Diagnoses of S/P gastric bypass, Morbid obesity ([address] BMI 40.0-44.9, adult ([address] Periumbilical abdominal pain, and Gastroesophageal reflux disease without esophagitis were also pertinent to this visit. Follow-up and Dispositions    · Return in about 2 weeks (around 2/18/2022).        Suleman Fregoso NP

## 2022-02-04 NOTE — Clinical Note
[address] [facility] EMERGENCY DEPT  [id number] [address] [phone]    Work/School Note    Date: 2/4/2022    To Whom It May concern:    Ashly Lim was seen and treated today in the emergency room by the following provider(s):  Attending Provider: Nate Short MD.      Ashly Lim is excused from work/school on 2/4/2022 through 2/6/2022. She is medically clear to return to work/school on 2/7/2022.          Sincerely,          Luis Carr MD

## 2022-02-04 NOTE — ED NOTES
.assumed care of pt, pt returned from CT, introduced self and walked pt to car to obtain .   Pt has PIV, walked w/o difficulty    Visit Vitals  BP (!) 153/91 (BP 1 Location: Left upper arm)   Pulse 67   Temp 98.6 °F (37 °C)   Resp 16   Ht 5' 9\" (1.753 m)   Wt 129.3 kg (285 lb)   SpO2 100%   BMI 42.09 kg/m²

## 2022-02-04 NOTE — Clinical Note
[address] [facility] EMERGENCY DEPT  [id number] [address] [phone]    Work/School Note    Date: 2/4/2022    To Whom It May concern:    Brad Love was seen and treated today in the emergency room by the following provider(s):  No providers found. Brad Love is excused from work/school on 2/5/2022 through 2/7/2022. She is medically clear to return to work/school on 2/8/2022.          Sincerely,          Kina Blackwell RN

## 2022-02-04 NOTE — ED PROVIDER NOTES
EMERGENCY DEPARTMENT HISTORY AND PHYSICAL EXAM    4:12 PM  Date: 2022  Patient Name: Jeancarlos Vo    History of Presenting Illness       History Provided By:     HPI: Jeancarlos Vo is a 39 y.o. female with past medical history of hypertension, diabetes, gastric bypass presents with abdominal pain for a few months. Patient states that abdominal pain is constant, intermittent moderate severity, associated with nausea. Patient has also been having diarrhea for 4 days. Denies any fever or chills. Denies any vaginal bleeding. Patient had medical  on  but no f/u US after she took pill (info was disclosed after I told patient about pregnancy positive test). Patient was evaluated by bariatric surgery team today with no issues identified as per patient.        PCP: Genoveva Dean MD    Past History     Past Medical History:  Past Medical History:   Diagnosis Date    Asthma     Community acquired pneumonia     Diabetes ([address]     no meds    Hypertension     no meds    Obese     Umbilical hernia        Past Surgical History:  Past Surgical History:   Procedure Laterality Date    DILATION AND CURETTAGE      HX GI      gastric bypass    HX GYN      d and c       Family History:  Family History   Problem Relation Age of Onset   Seema Blank Asthma Mother     Hypertension Mother     Diabetes Mother     Asthma Father        Social History:  Social History     Tobacco Use    Smoking status: Former Smoker     Packs/day: 0.50     Types: Cigarettes     Quit date: 2019     Years since quittin.2    Smokeless tobacco: Never Used   Vaping Use    Vaping Use: Never used   Substance Use Topics    Alcohol use: Not Currently     Alcohol/week: 1.7 standard drinks     Types: 2 Standard drinks or equivalent per week     Comment: holidays/special occassion    Drug use: No       Allergies:  No Known Allergies    Review of Systems   Review of Systems   Constitutional: Negative for activity change, appetite change and chills. HENT: Negative for congestion, ear discharge, ear pain and sore throat. Eyes: Negative for photophobia and pain. Respiratory: Negative for cough and choking. Cardiovascular: Negative for palpitations and leg swelling. Gastrointestinal: Positive for abdominal pain, diarrhea and nausea. Negative for anal bleeding and rectal pain. Endocrine: Negative for polydipsia and polyuria. Genitourinary: Negative for genital sores and urgency. Musculoskeletal: Negative for arthralgias and myalgias. Neurological: Negative for dizziness, seizures and speech difficulty. Psychiatric/Behavioral: Negative for hallucinations, self-injury and suicidal ideas. Physical Exam     Patient Vitals for the past 12 hrs:   Temp Pulse Resp BP SpO2   02/04/22 1924 98.7 °F (37.1 °C) 62 15 (!) 150/88 98 %   02/04/22 1604 98.6 °F (37 °C) 67 16 (!) 153/91 100 %       Physical Exam  Vitals and nursing note reviewed. Constitutional:       Appearance: She is well-developed. HENT:      Head: Normocephalic and atraumatic. Eyes:      General:         Right eye: No discharge. Left eye: No discharge. Cardiovascular:      Rate and Rhythm: Normal rate and regular rhythm. Heart sounds: Normal heart sounds. No murmur heard. Pulmonary:      Effort: Pulmonary effort is normal. No respiratory distress. Breath sounds: Normal breath sounds. No stridor. No wheezing or rales. Chest:      Chest wall: No tenderness. Abdominal:      General: Bowel sounds are normal. There is no distension. Palpations: Abdomen is soft. Tenderness: There is generalized abdominal tenderness. There is no guarding or rebound. Hernia: A hernia is present. Hernia is present in the umbilical area. Musculoskeletal:         General: Normal range of motion. Cervical back: Normal range of motion and neck supple. Skin:     General: Skin is warm and dry.    Neurological:      Mental Status: She is alert and oriented to person, place, and time. Diagnostic Study Results     Labs -  Recent Results (from the past 12 hour(s))   URINALYSIS W/ RFLX MICROSCOPIC    Collection Time: 02/04/22  4:30 PM   Result Value Ref Range    Color YELLOW      Appearance CLOUDY      Specific gravity 1.029 1.005 - 1.030      pH (UA) 6.0 5.0 - 8.0      Protein Negative NEG mg/dL    Glucose Negative NEG mg/dL    Ketone Negative NEG mg/dL    Bilirubin Negative NEG      Blood Negative NEG      Urobilinogen 1.0 0.2 - 1.0 EU/dL    Nitrites Negative NEG      Leukocyte Esterase Negative NEG     CBC WITH AUTOMATED DIFF    Collection Time: 02/04/22  4:35 PM   Result Value Ref Range    WBC 5.0 4.6 - 13.2 K/uL    RBC 4.28 4.20 - 5.30 M/uL    HGB 11.8 (L) 12.0 - 16.0 g/dL    HCT 36.2 35.0 - 45.0 %    MCV 84.6 78.0 - 100.0 FL    MCH 27.6 24.0 - 34.0 PG    MCHC 32.6 31.0 - 37.0 g/dL    RDW 12.7 11.6 - 14.5 %    PLATELET 048 716 - 530 K/uL    MPV 10.2 9.2 - 11.8 FL    NRBC 0.0 0  WBC    ABSOLUTE NRBC 0.00 0.00 - 0.01 K/uL    NEUTROPHILS 34 (L) 40 - 73 %    LYMPHOCYTES 54 (H) 21 - 52 %    MONOCYTES 7 3 - 10 %    EOSINOPHILS 5 0 - 5 %    BASOPHILS 0 0 - 2 %    IMMATURE GRANULOCYTES 0 0.0 - 0.5 %    ABS. NEUTROPHILS 1.7 (L) 1.8 - 8.0 K/UL    ABS. LYMPHOCYTES 2.7 0.9 - 3.6 K/UL    ABS. MONOCYTES 0.4 0.05 - 1.2 K/UL    ABS. EOSINOPHILS 0.2 0.0 - 0.4 K/UL    ABS. BASOPHILS 0.0 0.0 - 0.1 K/UL    ABS. IMM.  GRANS. 0.0 0.00 - 0.04 K/UL    DF AUTOMATED     METABOLIC PANEL, COMPREHENSIVE    Collection Time: 02/04/22  4:35 PM   Result Value Ref Range    Sodium 143 136 - 145 mmol/L    Potassium 3.7 3.5 - 5.5 mmol/L    Chloride 109 100 - 111 mmol/L    CO2 29 21 - 32 mmol/L    Anion gap 5 3.0 - 18 mmol/L    Glucose 93 74 - 99 mg/dL    BUN 10 7.0 - 18 MG/DL    Creatinine 0.43 (L) 0.6 - 1.3 MG/DL    BUN/Creatinine ratio 23 (H) 12 - 20      GFR est AA >60 >60 ml/min/1.73m2    GFR est non-AA >60 >60 ml/min/1.73m2    Calcium 8.5 8.5 - 10.1 MG/DL Bilirubin, total 0.2 0.2 - 1.0 MG/DL    ALT (SGPT) 27 13 - 56 U/L    AST (SGOT) 17 10 - 38 U/L    Alk. phosphatase 69 45 - 117 U/L    Protein, total 6.7 6.4 - 8.2 g/dL    Albumin 3.3 (L) 3.4 - 5.0 g/dL    Globulin 3.4 2.0 - 4.0 g/dL    A-G Ratio 1.0 0.8 - 1.7     LIPASE    Collection Time: 22  4:35 PM   Result Value Ref Range    Lipase 56 (L) 73 - 393 U/L   HCG QL SERUM    Collection Time: 22  4:35 PM   Result Value Ref Range    HCG, Ql. Positive (A) NEG     BETA HCG, QT    Collection Time: 22  4:35 PM   Result Value Ref Range    Beta HCG, QT 10 0 - 10 MIU/ML       Radiologic Studies -   CT ABD PELV W CONT    Result Date: 2022  1. The left side of the uterus appears hyperemic. The uterus is limited for assessment on CT imaging. Given history of recent  and persistent beta hCG, ultrasound imaging of the pelvis is recommended to assess for retained products of conception or other cause. The hyperemia could also be normal aeration for this patient on CT imaging. 2. Diverticulosis and Li-en-Y gastric bypass. 3. Moderate hiatal hernia containing fat and the edge of small bowel. No gross evidence of obstruction. 4. Slightly increased hepatomegaly. Medical Decision Making     ED Course: Progress Notes, Reevaluation, and Consults:    4:12 PM Initial assessment performed. The patients presenting problems have been discussed, and they/their family are in agreement with the care plan formulated and outlined with them. I have encouraged them to ask questions as they arise throughout their visit.       Provider Notes (Medical Decision Making):   Patient presents with abdominal pain  Vitals within normal limits apart from hypertension  No leukocytosis no electrolyte abnormality  CT abdomen pelvis ordered  hCG positive  quantitative hCG 10  Patient wants to proceed with CT abdomen pelvis because he states that C has not had any other sexual encounter after medical  and she cant be pregnant. Discussed risks in case of a new pregnancy. Patient still wants to proceed  Discussed with Dr. Wilfredo Lewis radiologist who approved CT abdomen pelvis, given hx and low hcg levels  CT of the abdomen pelvis with hyperemia of the left side of uterus  Radiologist recommended ultrasound imaging of the pelvis to assess for retained products of conception    10:00 : Pt care transferred to Dr. Baljit Temple provider. History of patient complaint(s), available diagnostic reports and current treatment plan has been discussed thoroughly. Intended disposition of patient : To be determined  Pending diagnostics reports and/or labs (please list): Pending ultrasound    Dr. Rufina Brown assistance in completion of this plan is greatly appreciated but it should be noted that I will be the provider of record for this patient. Vital Signs-Reviewed the patient's vital signs. Reviewed pt's pulse ox reading. Records Reviewed: old medical records  -I am the first provider for this patient.  -I reviewed the vital signs, available nursing notes, past medical history, past surgical history, family history and social history. Current Outpatient Medications   Medication Sig Dispense Refill    omeprazole (PRILOSEC) 40 mg capsule Take 1 Capsule by mouth daily. 90 Capsule 1    sucralfate (CARAFATE) 1 gram tablet Take 1 Tablet by mouth four (4) times daily. 60 Tablet 1    albuterol (PROVENTIL HFA, VENTOLIN HFA, PROAIR HFA) 90 mcg/actuation inhaler Take 1 Puff by inhalation every four (4) hours as needed for Wheezing. 1 Each 0    lidocaine (Lidoderm) 5 % Apply patch to the affected area for 12 hours a day and remove for 12 hours a day. (Patient not taking: Reported on 2/4/2022) 15 Each 0    methocarbamoL (Robaxin) 500 mg tablet Take 1 Tablet by mouth three (3) times daily.  (Patient not taking: Reported on 2/4/2022) 15 Tablet 0    benzocaine 15 mg lozg 1 Lozenge by Mucous Membrane route four (4) times daily as needed for Pain. 100 Lozenge 0    cholecalciferol (VITAMIN D3) (5000 Units/125 mcg) tab tablet Take 2 Tablets by mouth daily. 60 Tablet 2    ondansetron (ZOFRAN ODT) 4 mg disintegrating tablet Take 1 Tablet by mouth every eight (8) hours as needed for Nausea or Vomiting. 20 Tablet 1    dicyclomine (BENTYL) 10 mg capsule Take 2 Capsules by mouth four (4) times daily as needed for Abdominal Cramps. 20 Capsule 0    cetirizine (ZyrTEC) 10 mg tablet Take 1 Tab by mouth daily. 31 Tab 0    multivit-min/iron/folic acid/K (BARIATRIC MULTIVITAMINS PO) Take  by mouth two (2) times a day.  OTHER Calcium chew 500 milligrams 3xday      cyanocobalamin (Vitamin B-12) 1,000 mcg tablet Take 1,000 mcg by mouth daily. Clinical Impression     Clinical Impression:  Retaine products of conception    Disposition: This note was dictated utilizing voice recognition software which may lead to typographical errors. I apologize in advance if the situation occurs. If questions arise please do not hesitate to contact me or call our department.     Kim Evans MD  4:12 PM

## 2022-02-05 NOTE — ED NOTES
Pt walked to lobby to obtain food & drink from vending machine, [address] arrived, redirected pt to room

## 2022-02-05 NOTE — ED NOTES
I have reviewed discharge instructions with the patient. The patient verbalized understanding. Patient armband removed and shredded    Pt is waiting for family/friend to pick her & her car up.   Pt was informed that she is not allowed to drive her vehicle, pt has had IV Pain meds

## 2022-02-05 NOTE — ED NOTES
10:15 PM :Pt care assumed from Dr. Amelia Vega , ED provider. Pt complaint(s), current treatment plan, progression and available diagnostic results have been discussed thoroughly. Rounding occurred:   Intended Disposition: as per pelvic ultra sound. Hospital course: the patient remained stable during her ER evaluation. .  CT scan of pelvis showed patient to have retained products of conception. Patient has no active bleeding or sign of infection. Consultation: [facility] gynecologist on call. Case discussed with Dr. Quyen Bhatia  He states Dr. Sultana Swan covering for their group on call tonight. ED patient is stable for outpatient treatment for D&C. Patient is to follow-up with Dr. Sultana Swan in 3 days. Patient is to call [phone]. Dr. Prakash Cowan office to set her up to have a D&C.

## 2022-02-09 ENCOUNTER — HOSPITAL ENCOUNTER (OUTPATIENT)
Dept: INFUSION THERAPY | Age: 37
Discharge: HOME | End: 2022-02-09
Payer: COMMERCIAL

## 2022-02-09 VITALS
HEART RATE: 72 BPM | SYSTOLIC BLOOD PRESSURE: 138 MMHG | RESPIRATION RATE: 18 BRPM | TEMPERATURE: 98.1 F | DIASTOLIC BLOOD PRESSURE: 79 MMHG | OXYGEN SATURATION: 100 %

## 2022-02-09 DIAGNOSIS — K95.89 IRON DEFICIENCY ANEMIA FOLLOWING BARIATRIC SURGERY: Primary | ICD-10-CM

## 2022-02-09 DIAGNOSIS — D50.8 IRON DEFICIENCY ANEMIA FOLLOWING BARIATRIC SURGERY: Primary | ICD-10-CM

## 2022-02-09 PROCEDURE — 74011250636 HC RX REV CODE- 250/636: Performed by: INTERNAL MEDICINE

## 2022-02-09 PROCEDURE — 96365 THER/PROPH/DIAG IV INF INIT: CPT

## 2022-02-09 RX ORDER — DIPHENHYDRAMINE HYDROCHLORIDE 50 MG/ML
25 INJECTION, SOLUTION INTRAMUSCULAR; INTRAVENOUS AS NEEDED
Status: CANCELLED
Start: 2022-02-16

## 2022-02-09 RX ORDER — HYDROCORTISONE SODIUM SUCCINATE 100 MG/2ML
100 INJECTION, POWDER, FOR SOLUTION INTRAMUSCULAR; INTRAVENOUS AS NEEDED
Status: CANCELLED | OUTPATIENT
Start: 2022-02-16

## 2022-02-09 RX ORDER — DIPHENHYDRAMINE HYDROCHLORIDE 50 MG/ML
50 INJECTION, SOLUTION INTRAMUSCULAR; INTRAVENOUS AS NEEDED
Status: CANCELLED
Start: 2022-02-16

## 2022-02-09 RX ORDER — SODIUM CHLORIDE 9 MG/ML
10 INJECTION INTRAMUSCULAR; INTRAVENOUS; SUBCUTANEOUS AS NEEDED
Status: CANCELLED | OUTPATIENT
Start: 2022-02-16

## 2022-02-09 RX ORDER — SODIUM CHLORIDE 9 MG/ML
25 INJECTION, SOLUTION INTRAVENOUS CONTINUOUS
Status: DISPENSED | OUTPATIENT
Start: 2022-02-09 | End: 2022-02-09

## 2022-02-09 RX ORDER — ALBUTEROL SULFATE 0.83 MG/ML
2.5 SOLUTION RESPIRATORY (INHALATION) AS NEEDED
Status: CANCELLED
Start: 2022-02-16

## 2022-02-09 RX ORDER — EPINEPHRINE 1 MG/ML
0.3 INJECTION, SOLUTION, CONCENTRATE INTRAVENOUS AS NEEDED
Status: CANCELLED | OUTPATIENT
Start: 2022-02-16

## 2022-02-09 RX ORDER — ONDANSETRON 2 MG/ML
8 INJECTION INTRAMUSCULAR; INTRAVENOUS AS NEEDED
Status: CANCELLED | OUTPATIENT
Start: 2022-02-16

## 2022-02-09 RX ORDER — ACETAMINOPHEN 325 MG/1
650 TABLET ORAL AS NEEDED
Status: CANCELLED
Start: 2022-02-16

## 2022-02-09 RX ORDER — SODIUM CHLORIDE 0.9 % (FLUSH) 0.9 %
10 SYRINGE (ML) INJECTION AS NEEDED
Status: CANCELLED | OUTPATIENT
Start: 2022-02-16

## 2022-02-09 RX ORDER — SODIUM CHLORIDE 9 MG/ML
25 INJECTION, SOLUTION INTRAVENOUS CONTINUOUS
Status: CANCELLED | OUTPATIENT
Start: 2022-02-16

## 2022-02-09 RX ORDER — HEPARIN 100 UNIT/ML
300-500 SYRINGE INTRAVENOUS AS NEEDED
Status: CANCELLED
Start: 2022-02-16

## 2022-02-09 RX ADMIN — SODIUM CHLORIDE 25 ML/HR: 9 INJECTION, SOLUTION INTRAVENOUS at 10:38

## 2022-02-09 RX ADMIN — FERRIC CARBOXYMALTOSE INJECTION 750 MG: 50 INJECTION, SOLUTION INTRAVENOUS at 10:53

## 2022-02-09 NOTE — PROGRESS NOTES
[facility] Progress Note    Date: 2022    Name: Tiffany Lima    MRN: [id number]         : 1985    Injectafer 1/ Infusion    Ms. Kim to [facility], ambulatory at 1025 accompanied by self. Pt was assessed and education was provided. Pt received injectafer infusion in 2021. States she tolerated well without any problems. Ms. Heath Coldsarthak vitals were reviewed. Visit Vitals  /77 (BP 1 Location: Right upper arm, BP Patient Position: Sitting)   Pulse 69   Temp 98.3 °F (36.8 °C)   Resp 18   SpO2 99%   Breastfeeding No       #22GA PIV started to RT [facility] [place] x1 attempt without difficulty. Flushed well with NS with positive blood return. NS started at [facility] to infuse throughout treatment. Injectafer 750mg in 250ml NS infused approximately over 20 minutes per order. Pt tolerated well without any s/s of reaction. Ms. Samira Mendez tolerated infusion, and had no complaints at this time. Pt politely declined 30 minute observation. PIV flushed after medication completed and removed. 2x2 applied with coban. Patient armband removed and shredded. Ms. Samira Mendez was discharged from [place] [address] in stable condition at 1125. She is to return on 22 at 1000 for her injectafer dose 2   appointment.     Negin Lewis RN  2022  10:56 AM

## 2022-02-16 ENCOUNTER — HOSPITAL ENCOUNTER (OUTPATIENT)
Dept: INFUSION THERAPY | Age: 37
End: 2022-02-16

## 2022-02-23 ENCOUNTER — HOSPITAL ENCOUNTER (OUTPATIENT)
Dept: INFUSION THERAPY | Age: 37
Discharge: HOME | End: 2022-02-23
Payer: COMMERCIAL

## 2022-02-23 VITALS
DIASTOLIC BLOOD PRESSURE: 73 MMHG | TEMPERATURE: 97.7 F | HEART RATE: 69 BPM | SYSTOLIC BLOOD PRESSURE: 110 MMHG | RESPIRATION RATE: 16 BRPM | OXYGEN SATURATION: 100 %

## 2022-02-23 DIAGNOSIS — K95.89 IRON DEFICIENCY ANEMIA FOLLOWING BARIATRIC SURGERY: Primary | ICD-10-CM

## 2022-02-23 DIAGNOSIS — D50.8 IRON DEFICIENCY ANEMIA FOLLOWING BARIATRIC SURGERY: Primary | ICD-10-CM

## 2022-02-23 PROCEDURE — 74011000250 HC RX REV CODE- 250: Performed by: INTERNAL MEDICINE

## 2022-02-23 PROCEDURE — 74011250636 HC RX REV CODE- 250/636: Performed by: INTERNAL MEDICINE

## 2022-02-23 PROCEDURE — 96365 THER/PROPH/DIAG IV INF INIT: CPT

## 2022-02-23 RX ORDER — HYDROCORTISONE SODIUM SUCCINATE 100 MG/2ML
100 INJECTION, POWDER, FOR SOLUTION INTRAMUSCULAR; INTRAVENOUS AS NEEDED
OUTPATIENT
Start: 2022-02-23

## 2022-02-23 RX ORDER — SODIUM CHLORIDE 0.9 % (FLUSH) 0.9 %
10 SYRINGE (ML) INJECTION AS NEEDED
Status: DISPENSED | OUTPATIENT
Start: 2022-02-23 | End: 2022-02-23

## 2022-02-23 RX ORDER — ALBUTEROL SULFATE 0.83 MG/ML
2.5 SOLUTION RESPIRATORY (INHALATION) AS NEEDED
Start: 2022-02-23

## 2022-02-23 RX ORDER — EPINEPHRINE 1 MG/ML
0.3 INJECTION, SOLUTION, CONCENTRATE INTRAVENOUS AS NEEDED
OUTPATIENT
Start: 2022-02-23

## 2022-02-23 RX ORDER — SODIUM CHLORIDE 0.9 % (FLUSH) 0.9 %
10 SYRINGE (ML) INJECTION AS NEEDED
OUTPATIENT
Start: 2022-02-23

## 2022-02-23 RX ORDER — SODIUM CHLORIDE 9 MG/ML
25 INJECTION, SOLUTION INTRAVENOUS CONTINUOUS
Status: DISPENSED | OUTPATIENT
Start: 2022-02-23 | End: 2022-02-23

## 2022-02-23 RX ORDER — SODIUM CHLORIDE 9 MG/ML
10 INJECTION INTRAMUSCULAR; INTRAVENOUS; SUBCUTANEOUS AS NEEDED
OUTPATIENT
Start: 2022-02-23

## 2022-02-23 RX ORDER — SODIUM CHLORIDE 9 MG/ML
25 INJECTION, SOLUTION INTRAVENOUS CONTINUOUS
Status: CANCELLED | OUTPATIENT
Start: 2022-02-23

## 2022-02-23 RX ORDER — DIPHENHYDRAMINE HYDROCHLORIDE 50 MG/ML
25 INJECTION, SOLUTION INTRAMUSCULAR; INTRAVENOUS AS NEEDED
Start: 2022-02-23

## 2022-02-23 RX ORDER — ONDANSETRON 2 MG/ML
8 INJECTION INTRAMUSCULAR; INTRAVENOUS AS NEEDED
OUTPATIENT
Start: 2022-02-23

## 2022-02-23 RX ORDER — HEPARIN 100 UNIT/ML
300-500 SYRINGE INTRAVENOUS AS NEEDED
Start: 2022-02-23

## 2022-02-23 RX ORDER — ACETAMINOPHEN 325 MG/1
650 TABLET ORAL AS NEEDED
Start: 2022-02-23

## 2022-02-23 RX ORDER — DIPHENHYDRAMINE HYDROCHLORIDE 50 MG/ML
50 INJECTION, SOLUTION INTRAMUSCULAR; INTRAVENOUS AS NEEDED
Start: 2022-02-23

## 2022-02-23 RX ADMIN — FERRIC CARBOXYMALTOSE INJECTION 750 MG: 50 INJECTION, SOLUTION INTRAVENOUS at 11:40

## 2022-02-23 RX ADMIN — SODIUM CHLORIDE 25 ML/HR: 9 INJECTION, SOLUTION INTRAVENOUS at 11:40

## 2022-02-23 RX ADMIN — SODIUM CHLORIDE, PRESERVATIVE FREE 20 ML: 5 INJECTION INTRAVENOUS at 12:15

## 2022-02-23 RX ADMIN — SODIUM CHLORIDE, PRESERVATIVE FREE 10 ML: 5 INJECTION INTRAVENOUS at 11:35

## 2022-02-23 NOTE — PROGRESS NOTES
[facility] Progress Note    Date: 2022    Name: Patricia Haines    MRN: [id number]         : 1985    Ms. Coty Jara arrived in the [facility] today at 1120, in stable condition, here for Dose # 2 of 2, IV INJECTAFER Infusion. She was assessed and education was provided. Ms. Nohemy Dexter vitals were reviewed. Visit Vitals  /83 (BP 1 Location: Right upper arm, BP Patient Position: Sitting)   Pulse 74   Temp 97.9 °F (36.6 °C)   Resp 16   SpO2 100%         Ms. Coty Jara stated that she tolerated dose # 1 of the IV INJECTAFER OK, but stated she did have some mild nausea after the infusion, that lasted just for a very short time. PIV # 25 G was established in her RIGHT AC at 1135 without incident, and brisk blood return was obtained. Ferric Carboxymaltose (INJECTAFER) 750 mg IV, was added to a 250 ml IV BAG NS, and was administered over approximately 20 minutes, per order and without incident. After completion of the IV INJECTAFER, her PIV was flushed very well per policy with NS, and then was removed and gauze/coban was applied. She denied having any nausea, or any other side effects from the [facility]. Ms. Coty Jara tolerated well and voiced no complaints. Ms. Coty Jara was discharged from [place] [address] in stable condition at 1220. She is to return on Monday, 22 at 0945, for her next appointment, for her pre-office visit labs. And then, she is scheduled to follow up with Dr. Prachi Blanca on 22 at [address] [place] [address]. However, she has no further [facility] appointments scheduled at this time, because as of today, she has completed all ordered doses of IV INJECTAFER.      aJy Hill RN  2022  11:30 AM

## 2022-03-18 PROBLEM — O24.419 GESTATIONAL DIABETES: Status: ACTIVE | Noted: 2018-02-11

## 2022-03-18 PROBLEM — Z87.09 HISTORY OF ASTHMA: Status: ACTIVE | Noted: 2018-02-11

## 2022-03-19 PROBLEM — Z34.90 PREGNANCY: Status: ACTIVE | Noted: 2018-02-01

## 2022-03-19 PROBLEM — I10 HYPERTENSION: Status: ACTIVE | Noted: 2018-02-11

## 2022-03-19 PROBLEM — O36.8390 ANTEPARTUM FETAL TACHYCARDIA AFFECTING CARE OF MOTHER: Status: ACTIVE | Noted: 2018-02-11

## 2022-03-19 PROBLEM — K95.89 IRON DEFICIENCY ANEMIA FOLLOWING BARIATRIC SURGERY: Status: ACTIVE | Noted: 2021-10-19

## 2022-03-19 PROBLEM — E66.01 MORBID OBESITY (HCC): Status: ACTIVE | Noted: 2018-02-11

## 2022-03-19 PROBLEM — J45.901 ASTHMA ATTACK: Status: ACTIVE | Noted: 2018-02-13

## 2022-03-19 PROBLEM — D50.8 IRON DEFICIENCY ANEMIA FOLLOWING BARIATRIC SURGERY: Status: ACTIVE | Noted: 2021-10-19

## 2022-03-19 PROBLEM — E66.01 MORBID OBESITY WITH BMI OF 60.0-69.9, ADULT (HCC): Status: ACTIVE | Noted: 2020-05-18

## 2022-03-20 PROBLEM — R06.02 SHORTNESS OF BREATH: Status: ACTIVE | Noted: 2018-02-11

## 2022-05-27 DIAGNOSIS — K95.89 IRON DEFICIENCY ANEMIA FOLLOWING BARIATRIC SURGERY: Primary | ICD-10-CM

## 2022-05-27 DIAGNOSIS — D50.8 IRON DEFICIENCY ANEMIA FOLLOWING BARIATRIC SURGERY: Primary | ICD-10-CM

## 2022-07-21 ENCOUNTER — APPOINTMENT (OUTPATIENT)
Dept: GENERAL RADIOLOGY | Age: 37
End: 2022-07-21
Attending: EMERGENCY MEDICINE
Payer: COMMERCIAL

## 2022-07-21 ENCOUNTER — HOSPITAL ENCOUNTER (EMERGENCY)
Age: 37
Discharge: HOME | End: 2022-07-21
Attending: EMERGENCY MEDICINE
Payer: COMMERCIAL

## 2022-07-21 VITALS
SYSTOLIC BLOOD PRESSURE: 131 MMHG | OXYGEN SATURATION: 98 % | BODY MASS INDEX: 41.47 KG/M2 | DIASTOLIC BLOOD PRESSURE: 74 MMHG | HEIGHT: 69 IN | WEIGHT: 280 LBS | TEMPERATURE: 98.7 F | RESPIRATION RATE: 16 BRPM | HEART RATE: 75 BPM

## 2022-07-21 DIAGNOSIS — M25.562 ACUTE PAIN OF LEFT KNEE: Primary | ICD-10-CM

## 2022-07-21 PROCEDURE — 73562 X-RAY EXAM OF KNEE 3: CPT

## 2022-07-21 PROCEDURE — 99283 EMERGENCY DEPT VISIT LOW MDM: CPT

## 2022-07-21 RX ORDER — HYDROCODONE BITARTRATE AND ACETAMINOPHEN 5; 325 MG/1; MG/1
1 TABLET ORAL
Qty: 15 TABLET | Refills: 0 | Status: SHIPPED | OUTPATIENT
Start: 2022-07-21 | End: 2022-07-25

## 2022-07-21 NOTE — ED NOTES
Knee immobilizer applied. Education on crutches given via explanation and demo. Pt verbalized understanding. Pt discharged home in stable condition reporting improvement in symptoms. Discharge instructions reviewed, questions answered, and patient verbalized understanding. Patient advised not to drink or drive if sedating medications given.

## 2022-07-21 NOTE — DISCHARGE INSTRUCTIONS
X-ray does not show any obvious acute bony abnormality. It will be read by the radiologist.  If they see any acute abnormality we will contact you. Stay well-hydrated  Healthy diet  Tylenol, 325 mg, 2 every 6 hours. You can add 1 hydrocodone with Tylenol as needed for severe pain only. Try to take the pain pills only at night. Ice to your knee, 20 minutes, hourly while awake  Knee immobilizer and crutches.   You can bear weight on your left leg as tolerated  Follow-up with your doctor or orthopedics for continued evaluation and treatment  Return to ER if new or worsening symptoms or new concerns

## 2022-07-21 NOTE — ED PROVIDER NOTES
EMERGENCY DEPARTMENT HISTORY & PHYSICAL EXAM    [facility] EMERGENCY DEPT  7/21/2022, 5:57 PM    Clinical Impression:  1. Acute pain of left knee        Assessment/Differential Diagnosis:     Ddx knee strain, ligament injury, bony injury all considered    ED Course:   Initial assessment performed. The patients presenting problems have been discussed, and they are in agreement with the care plan formulated and outlined with them. I have encouraged them to ask questions as they arise throughout their visit. With left knee pain x2 days. Occurred while walking. No previous similar symptoms. No other concerns. Patient has had previous gastric bypass and cannot take NSAIDs. Tylenol with little relief. Exam with medial joint line tenderness. No other abnormality noted on exam.  X-ray left knee  with no obvious acute finding. Reading radiologist review  Vinod results with patient. Will place in knee immobilizer, crutches and small quantity of pain medication will be prescribed. She can also use Voltaren topical.  Recommendation to follow-up with orthopedics or primary care  Return precautions given         Medical Chart Review:  I have reviewed triage nursing documentation. Review of old medical records with the following pertinent information:       Disposition:  Home  in good condition. Chief Complaint   Patient presents with    Knee Pain     HPI:    The history is provided by patient. No  used. Harris Hardy is a 39 y.o. female presenting to the Emergency Department with complaints of left knee pain. Patient states she was walking 2 days ago and experienced some medial left knee pain. Pain has continued through today. No significant relief with Tylenol. She cannot take NSAIDs due to prior gastric bypass. She denies any unusual activity or trauma. She denies any pain to her foot, ankle or hip or back.   There is been no swelling, calf tenderness. Patient denies any chronic medication use. No chronic medical problems  Prior surgery includes gastric bypass 2 years ago with no complications  Patient denies pregnancy      I have reviewed all PMHX, [address] and Social Hx as entered into the medical record in the chart below using the Epic Template. Review of Systems:  Constitutional: neg for fever, chills  ENT:  neg for URI symptoms  Respiratory:  neg for cough, shortness of breath  Cardiovascular:  neg for chest pain  GI:  neg for abdominal pain. :  No Flank pain. MSK: negative for trauma. Integumentary: no rashes, or skin trauma  Neurological: neg for headaches  All other systems reviewed negative with exception of positives in ROS and HPI.     Past Medical History:  Past Medical History:   Diagnosis Date    Anemia     iron transfusion last one 2/10/22    Asthma     Community acquired pneumonia     [facility]     no meds/better since weight loss    Hypertension     no meds/better since weight loss    Obese     Umbilical hernia        Past Surgical History:  Past Surgical History:   Procedure Laterality Date    DILATION AND CURETTAGE      HX GI      gastric bypass    HX GYN      d and c       Family History:  Family History   Problem Relation Age of Onset    Asthma Mother     Hypertension Mother     Diabetes Mother     Asthma Father        Social History:  Social History     Tobacco Use    Smoking status: Former     Packs/day: 0.50     Types: Cigarettes     Quit date: 2019     Years since quittin.7    Smokeless tobacco: Never   Vaping Use    Vaping Use: Never used   Substance Use Topics    Alcohol use: Not Currently     Alcohol/week: 1.7 standard drinks     Types: 2 Standard drinks or equivalent per week     Comment: holidays/special occassion    Drug use: No       Allergies:  No Known Allergies    Vital Signs:  Vitals:    22 1723   BP: 131/74   Pulse: 75   Resp: 16   Temp: 98.7 °F (37.1 °C)   SpO2: 98%   Weight: 127 kg (280 lb)   Height: 5' 9\" (1.753 m)     Physical Exam:  Vital Signs Reviewed. Nursing Notes Reviewed. Constitutional:  Well developed, well nourished patient. Appearance and behavior are age and situation appropriate. Head: Normocephalic, Atraumatic  Eyes: Conjunctiva clear, lids normal. Sclera anicteric. ENT:hearing grossly intact  Neck:  supple, FROM  Lungs: No respiratory distress. Extremities:  LLE- No pain with palpation or int/ext rotation of hip, foot and ankle neurovascular intact. There is no swelling. No calf tenderness. Patient is able to lift her leg off of the stretcher without assistance. Patient has point tenderness with palpation along the medial joint line. No obvious bony defect. No effusion. Skin appears healthy. Patient has good range of motion. Neuro:  A&O x 3. CN II-XII grossly intact. No gross neuro deficits. Skin:  Warm, dry, no rash. Skin intact    Diagnostics:    Labs -   No results found for this or any previous visit (from the past 12 hour(s)). Radiologic Studies -   XR KNEE LT 3 V    (Results Pending)     CT Results  (Last 48 hours)      None          CXR Results  (Last 48 hours)      None            Medications given in the ED-  Medications - No data to display    Please note that this dictation was completed with [employer], the computer voice recognition software. Quite often unanticipated grammatical, syntax, homophones, and other interpretive errors are inadvertently transcribed by the computer software. Please disregard these errors. Please excuse any errors that have escaped final proofreading.

## 2022-07-21 NOTE — ED TRIAGE NOTES
Patient c/o left knee pain that began two days ago. States pain to medial aspect of knee. She denies injury, fall or increased activity. No appreciable bruising, swelling or deformity noted.

## 2022-07-25 RX ORDER — SUCRALFATE 1 G/1
TABLET ORAL
Qty: 60 TABLET | Refills: 0 | OUTPATIENT
Start: 2022-07-25

## 2022-07-25 RX ORDER — OMEPRAZOLE 40 MG/1
CAPSULE, DELAYED RELEASE ORAL
Qty: 90 CAPSULE | Refills: 1 | OUTPATIENT
Start: 2022-07-25

## 2022-08-18 ENCOUNTER — OFFICE VISIT (OUTPATIENT)
Dept: ORTHOPEDIC SURGERY | Age: 37
End: 2022-08-18
Payer: COMMERCIAL

## 2022-08-18 VITALS — BODY MASS INDEX: 41.5 KG/M2 | TEMPERATURE: 98.2 F | WEIGHT: 280.2 LBS | HEIGHT: 69 IN

## 2022-08-18 DIAGNOSIS — M25.562 CHRONIC PAIN OF LEFT KNEE: Primary | ICD-10-CM

## 2022-08-18 DIAGNOSIS — G89.29 CHRONIC PAIN OF LEFT KNEE: Primary | ICD-10-CM

## 2022-08-18 DIAGNOSIS — M22.42 CHONDROMALACIA, PATELLA, LEFT: ICD-10-CM

## 2022-08-18 PROCEDURE — 20610 DRAIN/INJ JOINT/BURSA W/O US: CPT | Performed by: SPECIALIST

## 2022-08-18 PROCEDURE — 99203 OFFICE O/P NEW LOW 30 MIN: CPT | Performed by: SPECIALIST

## 2022-08-18 RX ORDER — BETAMETHASONE SODIUM PHOSPHATE AND BETAMETHASONE ACETATE 3; 3 MG/ML; MG/ML
3 INJECTION, SUSPENSION INTRA-ARTICULAR; INTRALESIONAL; INTRAMUSCULAR; SOFT TISSUE ONCE
Status: COMPLETED | OUTPATIENT
Start: 2022-08-18 | End: 2022-08-18

## 2022-08-18 RX ADMIN — BETAMETHASONE SODIUM PHOSPHATE AND BETAMETHASONE ACETATE 3 MG: 3; 3 INJECTION, SUSPENSION INTRA-ARTICULAR; INTRALESIONAL; INTRAMUSCULAR; SOFT TISSUE at 15:34

## 2022-08-18 NOTE — PROGRESS NOTES
Patient: Maryse Castañeda                MRN: [id number]       SSN: [id number]  YOB: 1985        AGE: 39 y.o. SEX: female    PCP: Jason Tavares MD  08/18/22    Chief Complaint   Patient presents with    Knee Pain     Left       HISTORY:  Maryse Castañeda is a 39 y.o. female who is seen for left knee pain. She has been experiencing left knee pain for the past several months. She does not recall any injury, but thinks she could have injured her knee while playing with her kids. She feels pain with standing, walking and stair climbing. She experiences  startup pain after sitting. Pain Assessment  8/18/2022   Location of Pain Knee   Location Modifiers Left   Severity of Pain 8   Quality of Pain Dull;Aching; Sharp   Duration of Pain Persistent   Frequency of Pain Intermittent   Date Pain First Started (No Data)   Date Pain First Started Comment one month ago   Aggravating Factors Other (Comment)   Aggravating Factors Comment nothing changes the pain   Limiting Behavior Yes   Relieving Factors Elevation   Result of Injury No     Occupation, etc:  Ms. Charlotte Urbina is not employed. 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 She likes to cook soul food. She is a single mom living in [place] with her seven children--six sons and one daughter. Ms. Charlotte Urbina weighs 280 lbs and is 5'9\" tall.        Lab Results   Component Value Date/Time    Hemoglobin A1c 5.3 07/30/2021 11:21 AM     Weight Metrics 8/18/2022 7/21/2022 2/10/2022 2/4/2022 2/4/2022 1/5/2022 10/14/2021   Weight 280 lb 3.2 oz 280 lb 285 lb 4.4 oz 285 lb 285 lb 6.4 oz 278 lb 270 lb   BMI 41.38 kg/m2 41.35 kg/m2 42.13 kg/m2 42.09 kg/m2 42.15 kg/m2 41.05 kg/m2 39.87 kg/m2       Patient Active Problem List   Diagnosis Code    Obesity complicating pregnancy, childbirth, or the puerperium, unspecified as to episode of care or not applicable(649.10) [id number]    Hyperglycemia R73.9    Cough R05.9    Pregnancy Z34.90 Shortness of breath R06.02    Morbid obesity ([facility]) E66.01    Gestational diabetes O24.419    Antepartum fetal tachycardia affecting care of mother E42.5397    Hypertension I10    History of asthma Z87.09    Asthma attack J45.901    Morbid obesity with BMI of 60.0-69.9, adult ([facility]) E66.01, Z68.44    Iron deficiency anemia following bariatric surgery K95.89, D50.8     REVIEW OF SYSTEMS:    Constitutional Symptoms: Negative   Eyes: Negative   Ears, Nose, Throat and Mouth: Negative   Cardiovascular: Negative   Respiratory: Negative   Genitourinary: Per HPI   Gastrointestinal: Per HPI   Integumentary (Skin and/or Breast): Negative   Musculoskeletal: Per HPI   Endocrine/Rheumatologic: Negative   Neurological: Per HPI   Hematology/Lymphatic: Negative    Allergic/Immunologic: Negative   Phychiatric: Negative    Social History     Socioeconomic History    Marital status: SINGLE     Spouse name: Not on file    Number of children: Not on file    Years of education: Not on file    Highest education level: Not on file   Occupational History    Not on file   Tobacco Use    Smoking status: Former     Packs/day: 0.50     Types: Cigarettes     Quit date: 2019     Years since quittin.7    Smokeless tobacco: Never   Vaping Use    Vaping Use: Never used   Substance and Sexual Activity    Alcohol use: Not Currently     Alcohol/week: 1.7 standard drinks     Types: 2 Standard drinks or equivalent per week     Comment: holidays/special occassion    Drug use: No    Sexual activity: Yes     Partners: Female     Birth control/protection: None   Other Topics Concern    Not on file   Social History Narrative    Not on file     Social Determinants of Health     Financial Resource Strain: Not on file   Food Insecurity: Not on file   Transportation Needs: Not on file   Physical Activity: Not on file   Stress: Not on file   Social Connections: Not on file   Intimate Partner Violence: Not on file   Housing Stability: Not on file      No Known Allergies   Current Outpatient Medications   Medication Sig    cholecalciferol (VITAMIN D3) (5000 Units/125 mcg) tab tablet Take 2 Tablets by mouth daily. multivit-min/iron/folic acid/K (BARIATRIC MULTIVITAMINS PO) Take  by mouth two (2) times a day. OTHER Calcium chew 500 milligrams 3xday    cyanocobalamin 1,000 mcg tablet Take 1,000 mcg by mouth daily. (Patient not taking: Reported on 8/18/2022)     No current facility-administered medications for this visit. PHYSICAL EXAMINATION:  Visit Vitals  Temp 98.2 °F (36.8 °C) (Temporal)   Ht 5' 9\" (1.753 m)   Wt 280 lb 3.2 oz (127.1 kg)   BMI 41.38 kg/m²      ORTHO EXAMINATION:  Examination Right knee Left knee   Skin Intact Intact   Range of motion 120-0 90-5   Effusion - -   Medial joint line tenderness + +   Lateral joint line tenderness - -   Popliteal tenderness - -   Osteophytes palpable - -   Emilys - -   Patella crepitus - +++   Anterior drawer - -   Lateral laxity - -   Medial laxity - -   Varus deformity - -   Valgus deformity - -   Pretibial edema - -   Calf tenderness - -     TIME OUT:  Chart reviewed for the following:   I, Ventura Kawasaki, MD, have reviewed the History, Physical and updated the Allergic reactions for [employer]   TIME OUT performed immediately prior to start of procedure:  Priscila Olson MD, have performed the following reviews on [employer] prior to the start of the procedure:          * Patient was identified by name and date of birth   * Agreement on procedure being performed was verified  * Risks and Benefits explained to the patient  * Procedure site verified and marked as necessary  * Patient was positioned for comfort  * Consent was obtained     Time: 3:20 PM     Date of procedure: 8/18/2022  Procedure performed by:  Ventura Kawasaki, MD  Ms. Kim tolerated the procedure well with no complications. RADIOGRAPHS:  XR LEFT KNEE 07/21/22 [facility] ED  IMPRESSION  Normal knee.     IMPRESSION:      ICD-10-CM ICD-9-CM    1. Chronic pain of left knee  M25.562 719.46 betamethasone (CELESTONE) injection 3 mg    G89.29 338.29 DRAIN/INJECT LARGE JOINT/BURSA      2. Chondromalacia, patella, left  M22.42 717.7 betamethasone (CELESTONE) injection 3 mg      DRAIN/INJECT LARGE JOINT/BURSA        PLAN:  Home knee exercises were provided. After discussing treatment options, patient's left knee was injected with 4 cc Marcaine and 1/2 cc Celestone. There is no need for surgery at this time. She will follow up as needed.       Scribed by Sita Mahan (Richelle Dunn) as dictated by Allan Pike MD

## 2023-03-17 ENCOUNTER — TELEPHONE (OUTPATIENT)
Facility: HOSPITAL | Age: 38
End: 2023-03-17

## 2023-03-28 PROBLEM — J45.902 STATUS ASTHMATICUS: Status: ACTIVE | Noted: 2023-03-28

## 2023-03-29 PROBLEM — O36.8390 ANTEPARTUM FETAL TACHYCARDIA AFFECTING CARE OF MOTHER: Status: RESOLVED | Noted: 2018-02-11 | Resolved: 2023-03-29

## 2023-03-29 PROBLEM — E66.01 MORBID OBESITY WITH BMI OF 60.0-69.9, ADULT (HCC): Status: RESOLVED | Noted: 2020-05-18 | Resolved: 2023-03-29

## 2023-03-31 PROBLEM — O24.419 GESTATIONAL DIABETES: Status: ACTIVE | Noted: 2018-02-11

## 2023-03-31 PROBLEM — J45.902 STATUS ASTHMATICUS: Status: RESOLVED | Noted: 2023-03-28 | Resolved: 2023-03-31

## 2023-03-31 PROBLEM — E66.01 MORBID OBESITY (HCC): Status: ACTIVE | Noted: 2018-02-11

## 2023-04-03 ENCOUNTER — HOSPITAL ENCOUNTER (OUTPATIENT)
Facility: HOSPITAL | Age: 38
Setting detail: INFUSION SERIES
End: 2023-04-03
Payer: COMMERCIAL

## 2023-04-03 VITALS
BODY MASS INDEX: 45.99 KG/M2 | RESPIRATION RATE: 16 BRPM | SYSTOLIC BLOOD PRESSURE: 122 MMHG | HEIGHT: 67 IN | OXYGEN SATURATION: 98 % | HEART RATE: 86 BPM | WEIGHT: 293 LBS | TEMPERATURE: 97.9 F | DIASTOLIC BLOOD PRESSURE: 84 MMHG

## 2023-04-03 DIAGNOSIS — D50.8 IRON DEFICIENCY ANEMIA FOLLOWING BARIATRIC SURGERY: Primary | ICD-10-CM

## 2023-04-03 DIAGNOSIS — K95.89 IRON DEFICIENCY ANEMIA FOLLOWING BARIATRIC SURGERY: Primary | ICD-10-CM

## 2023-04-03 PROCEDURE — 6360000002 HC RX W HCPCS: Performed by: INTERNAL MEDICINE

## 2023-04-03 PROCEDURE — 96365 THER/PROPH/DIAG IV INF INIT: CPT

## 2023-04-03 PROCEDURE — 2580000003 HC RX 258: Performed by: INTERNAL MEDICINE

## 2023-04-03 RX ORDER — SODIUM CHLORIDE 9 MG/ML
5-250 INJECTION, SOLUTION INTRAVENOUS PRN
Status: CANCELLED | OUTPATIENT
Start: 2023-04-10

## 2023-04-03 RX ORDER — SODIUM CHLORIDE 9 MG/ML
5-250 INJECTION, SOLUTION INTRAVENOUS PRN
Status: ACTIVE | OUTPATIENT
Start: 2023-04-03 | End: 2023-04-04

## 2023-04-03 RX ORDER — EPINEPHRINE 1 MG/ML
0.3 INJECTION, SOLUTION, CONCENTRATE INTRAVENOUS PRN
Status: CANCELLED | OUTPATIENT
Start: 2023-04-10

## 2023-04-03 RX ORDER — DIPHENHYDRAMINE HYDROCHLORIDE 50 MG/ML
50 INJECTION INTRAMUSCULAR; INTRAVENOUS
Status: CANCELLED | OUTPATIENT
Start: 2023-04-10

## 2023-04-03 RX ORDER — ALBUTEROL SULFATE 90 UG/1
4 AEROSOL, METERED RESPIRATORY (INHALATION) PRN
Status: CANCELLED | OUTPATIENT
Start: 2023-04-10

## 2023-04-03 RX ORDER — HEPARIN SODIUM (PORCINE) LOCK FLUSH IV SOLN 100 UNIT/ML 100 UNIT/ML
500 SOLUTION INTRAVENOUS PRN
Status: CANCELLED | OUTPATIENT
Start: 2023-04-10

## 2023-04-03 RX ORDER — SODIUM CHLORIDE 0.9 % (FLUSH) 0.9 %
5-40 SYRINGE (ML) INJECTION PRN
Status: ACTIVE | OUTPATIENT
Start: 2023-04-03 | End: 2023-04-04

## 2023-04-03 RX ORDER — SODIUM CHLORIDE 0.9 % (FLUSH) 0.9 %
5-40 SYRINGE (ML) INJECTION PRN
Status: CANCELLED | OUTPATIENT
Start: 2023-04-10

## 2023-04-03 RX ORDER — ONDANSETRON 2 MG/ML
8 INJECTION INTRAMUSCULAR; INTRAVENOUS
Status: CANCELLED | OUTPATIENT
Start: 2023-04-10

## 2023-04-03 RX ORDER — SODIUM CHLORIDE 9 MG/ML
INJECTION, SOLUTION INTRAVENOUS CONTINUOUS
Status: CANCELLED | OUTPATIENT
Start: 2023-04-10

## 2023-04-03 RX ORDER — ACETAMINOPHEN 325 MG/1
650 TABLET ORAL
Status: CANCELLED | OUTPATIENT
Start: 2023-04-10

## 2023-04-03 RX ADMIN — SODIUM CHLORIDE 30 ML/HR: 9 INJECTION, SOLUTION INTRAVENOUS at 15:40

## 2023-04-03 RX ADMIN — IRON SUCROSE 200 MG: 20 INJECTION, SOLUTION INTRAVENOUS at 15:45

## 2023-04-03 RX ADMIN — SODIUM CHLORIDE, PRESERVATIVE FREE 10 ML: 5 INJECTION INTRAVENOUS at 15:35

## 2023-04-03 ASSESSMENT — PAIN - FUNCTIONAL ASSESSMENT: PAIN_FUNCTIONAL_ASSESSMENT: NONE - DENIES PAIN

## 2023-04-03 NOTE — PROGRESS NOTES
[facility] Progress Note    Date: April 3, 2023    Name: Bettye Medina    MRN: [id number]         : 1985    Ms. Olmstead arrived in the [facility] today at 1520, in stable condition, here for DOSE # 1 OF 5, IV VENOFER INFUSION (ORDERED WEEKLY X 5 DOSES). She was assessed and education was provided. Ms. José Og vitals were reviewed. Vitals:    23 1520   BP: 129/79   Pulse: 97   Resp: 16   Temp: 98.2 °F (36.8 °C)   SpO2: 98%       Ms. Deandra Strong presented to the infusion center today stating that she was doing well, and voicing no major complaints. Per Ms. Deandra Strong, she is currently 33 weeks pregnant. Both verbal and written patient education on the IV VENOFER infusion and associated potential side effects and adverse reactions was provided, and Ms. Olmstead verbalized understanding. PIV # 25 G was established in her RIGHT AC at 32 61 16 without incident, and brisk blood return was obtained.  ml IV BAG was initiated to infuse @ [facility] throughout treatment today. Iron Sucrose (VENOFER) 200 mg IV, was administered over approximately 15 minutes, per order and without incident. After completion of the IV VENOFER infusion, Ms. Deandra Strong was kept for approximately 30 minutes of observation, since today was her 1st dose of the medication. The NS continued to infuse @ [facility] throughout the observation period. After completion of the observation period, her PIV was removed and gauze//coban was applied. Ms. Deandra Strong tolerated treatment very well today, and voiced no complaints. Ms. Deandra Strong was discharged from [place] [address] in stable condition at [phone]. She is to return in one week, on next Monday, 4-10-23 at 1400, for her next appointment, for DOSE # 2 of 5, WEEKLY IV VENOFER INFUSION.      Olivia Mallory RN  April 3, 2023  3:49 PM

## 2023-04-10 ENCOUNTER — HOSPITAL ENCOUNTER (OUTPATIENT)
Facility: HOSPITAL | Age: 38
Setting detail: INFUSION SERIES
Discharge: HOME | End: 2023-04-13
Payer: COMMERCIAL

## 2023-04-10 VITALS
HEART RATE: 94 BPM | RESPIRATION RATE: 18 BRPM | OXYGEN SATURATION: 100 % | TEMPERATURE: 98.2 F | DIASTOLIC BLOOD PRESSURE: 80 MMHG | SYSTOLIC BLOOD PRESSURE: 131 MMHG

## 2023-04-10 DIAGNOSIS — D50.8 IRON DEFICIENCY ANEMIA FOLLOWING BARIATRIC SURGERY: Primary | ICD-10-CM

## 2023-04-10 DIAGNOSIS — K95.89 IRON DEFICIENCY ANEMIA FOLLOWING BARIATRIC SURGERY: Primary | ICD-10-CM

## 2023-04-10 PROCEDURE — 96365 THER/PROPH/DIAG IV INF INIT: CPT

## 2023-04-10 PROCEDURE — 6360000002 HC RX W HCPCS: Performed by: INTERNAL MEDICINE

## 2023-04-10 PROCEDURE — 2580000003 HC RX 258: Performed by: INTERNAL MEDICINE

## 2023-04-10 RX ORDER — ALBUTEROL SULFATE 90 UG/1
4 AEROSOL, METERED RESPIRATORY (INHALATION) PRN
Status: CANCELLED | OUTPATIENT
Start: 2023-04-17

## 2023-04-10 RX ORDER — SODIUM CHLORIDE 9 MG/ML
5-250 INJECTION, SOLUTION INTRAVENOUS PRN
Status: CANCELLED | OUTPATIENT
Start: 2023-04-17

## 2023-04-10 RX ORDER — EPINEPHRINE 1 MG/ML
0.3 INJECTION, SOLUTION, CONCENTRATE INTRAVENOUS PRN
Status: CANCELLED | OUTPATIENT
Start: 2023-04-17

## 2023-04-10 RX ORDER — SODIUM CHLORIDE 0.9 % (FLUSH) 0.9 %
5-40 SYRINGE (ML) INJECTION PRN
Status: CANCELLED | OUTPATIENT
Start: 2023-04-17

## 2023-04-10 RX ORDER — ONDANSETRON 2 MG/ML
8 INJECTION INTRAMUSCULAR; INTRAVENOUS
Status: CANCELLED | OUTPATIENT
Start: 2023-04-17

## 2023-04-10 RX ORDER — SODIUM CHLORIDE 0.9 % (FLUSH) 0.9 %
5-40 SYRINGE (ML) INJECTION PRN
Status: ACTIVE | OUTPATIENT
Start: 2023-04-10 | End: 2023-04-11

## 2023-04-10 RX ORDER — SODIUM CHLORIDE 9 MG/ML
INJECTION, SOLUTION INTRAVENOUS CONTINUOUS
Status: CANCELLED | OUTPATIENT
Start: 2023-04-17

## 2023-04-10 RX ORDER — HEPARIN SODIUM (PORCINE) LOCK FLUSH IV SOLN 100 UNIT/ML 100 UNIT/ML
500 SOLUTION INTRAVENOUS PRN
Status: CANCELLED | OUTPATIENT
Start: 2023-04-17

## 2023-04-10 RX ORDER — ACETAMINOPHEN 325 MG/1
650 TABLET ORAL
Status: CANCELLED | OUTPATIENT
Start: 2023-04-17

## 2023-04-10 RX ORDER — DIPHENHYDRAMINE HYDROCHLORIDE 50 MG/ML
50 INJECTION INTRAMUSCULAR; INTRAVENOUS
Status: CANCELLED | OUTPATIENT
Start: 2023-04-17

## 2023-04-10 RX ADMIN — IRON SUCROSE 200 MG: 20 INJECTION, SOLUTION INTRAVENOUS at 14:28

## 2023-04-10 RX ADMIN — SODIUM CHLORIDE, PRESERVATIVE FREE 10 ML: 5 INJECTION INTRAVENOUS at 14:17

## 2023-04-10 RX ADMIN — SODIUM CHLORIDE, PRESERVATIVE FREE 10 ML: 5 INJECTION INTRAVENOUS at 14:50

## 2023-04-10 NOTE — PROGRESS NOTES
[facility] Progress Note    Date: April 10, 2023    Name: Reed Chopra    MRN: [id number]         : 1985    Ms. Olmstead arrived in the [facility] today at 1410, in stable condition, here for DOSE # 2 OF 5, IV VENOFER INFUSION (ORDERED WEEKLY X 5 DOSES). She was assessed and education was provided. Pt reports she tolerated her last dose well. Ms. Sathish Terrazas vitals were reviewed. Vitals:    04/10/23 1448   BP: 131/80   Pulse: 94   Resp:    Temp: 98.2 °F (36.8 °C)   SpO2: 100%     Per Ms. Jozef Long, she is currently 34 weeks pregnant. PIV # 25 G was established in her RIGHT AC without incident, and brisk blood return was obtained. Iron Sucrose (VENOFER) 200 mg IV, was administered over approximately 15 minutes per order and without incident. PIV was removed and gauze/coban was applied. Ms. Jozef Long tolerated treatment well today, and voiced no complaints. Ms. Jozef Long was discharged from [place] [address] in stable condition at 1455. She is to return in one week, on next Monday, 23 at 0900 for her next appointment for DOSE # 3 of 5 WEEKLY IV VENOFER INFUSION.      Diana Lopez RN  April 10, 2023  2:54 PM

## 2023-05-01 ENCOUNTER — HOSPITAL ENCOUNTER (OUTPATIENT)
Facility: HOSPITAL | Age: 38
Setting detail: INFUSION SERIES
End: 2023-05-01
Payer: COMMERCIAL

## 2023-05-01 VITALS
OXYGEN SATURATION: 98 % | DIASTOLIC BLOOD PRESSURE: 71 MMHG | RESPIRATION RATE: 18 BRPM | TEMPERATURE: 98.7 F | SYSTOLIC BLOOD PRESSURE: 114 MMHG | HEART RATE: 89 BPM

## 2023-05-01 DIAGNOSIS — K95.89 IRON DEFICIENCY ANEMIA FOLLOWING BARIATRIC SURGERY: Primary | ICD-10-CM

## 2023-05-01 DIAGNOSIS — D50.8 IRON DEFICIENCY ANEMIA FOLLOWING BARIATRIC SURGERY: Primary | ICD-10-CM

## 2023-05-01 PROCEDURE — 6360000002 HC RX W HCPCS: Performed by: INTERNAL MEDICINE

## 2023-05-01 PROCEDURE — 96365 THER/PROPH/DIAG IV INF INIT: CPT

## 2023-05-01 PROCEDURE — 2580000003 HC RX 258: Performed by: INTERNAL MEDICINE

## 2023-05-01 RX ORDER — SODIUM CHLORIDE 9 MG/ML
INJECTION, SOLUTION INTRAVENOUS CONTINUOUS
OUTPATIENT
Start: 2023-05-08

## 2023-05-01 RX ORDER — SODIUM CHLORIDE 9 MG/ML
5-250 INJECTION, SOLUTION INTRAVENOUS PRN
Status: ACTIVE | OUTPATIENT
Start: 2023-05-01 | End: 2023-05-02

## 2023-05-01 RX ORDER — SODIUM CHLORIDE 9 MG/ML
5-250 INJECTION, SOLUTION INTRAVENOUS PRN
OUTPATIENT
Start: 2023-05-08

## 2023-05-01 RX ORDER — DIPHENHYDRAMINE HYDROCHLORIDE 50 MG/ML
50 INJECTION INTRAMUSCULAR; INTRAVENOUS
OUTPATIENT
Start: 2023-05-08

## 2023-05-01 RX ORDER — ACETAMINOPHEN 325 MG/1
650 TABLET ORAL
OUTPATIENT
Start: 2023-05-08

## 2023-05-01 RX ORDER — SODIUM CHLORIDE 0.9 % (FLUSH) 0.9 %
5-40 SYRINGE (ML) INJECTION PRN
Status: ACTIVE | OUTPATIENT
Start: 2023-05-01 | End: 2023-05-02

## 2023-05-01 RX ORDER — ONDANSETRON 2 MG/ML
8 INJECTION INTRAMUSCULAR; INTRAVENOUS
OUTPATIENT
Start: 2023-05-08

## 2023-05-01 RX ORDER — HEPARIN SODIUM (PORCINE) LOCK FLUSH IV SOLN 100 UNIT/ML 100 UNIT/ML
500 SOLUTION INTRAVENOUS PRN
Status: CANCELLED | OUTPATIENT
Start: 2023-05-08

## 2023-05-01 RX ORDER — EPINEPHRINE 1 MG/ML
0.3 INJECTION, SOLUTION, CONCENTRATE INTRAVENOUS PRN
OUTPATIENT
Start: 2023-05-08

## 2023-05-01 RX ORDER — ALBUTEROL SULFATE 90 UG/1
4 AEROSOL, METERED RESPIRATORY (INHALATION) PRN
OUTPATIENT
Start: 2023-05-08

## 2023-05-01 RX ORDER — SODIUM CHLORIDE 0.9 % (FLUSH) 0.9 %
5-40 SYRINGE (ML) INJECTION PRN
OUTPATIENT
Start: 2023-05-08

## 2023-05-01 RX ADMIN — IRON SUCROSE 200 MG: 20 INJECTION, SOLUTION INTRAVENOUS at 13:42

## 2023-05-01 RX ADMIN — Medication 10 ML: at 13:40

## 2023-05-01 RX ADMIN — Medication 10 ML: at 14:02

## 2023-05-01 NOTE — PROGRESS NOTES
[facility] Progress Note    Date: May 1, 2023    Name: Tai Bhagat    MRN: [id number]         : 1985    Venofer Infusion 3 of 5     Ms. Olmstead to [facility], ambulatory, at 1325. Pt was assessed and education was provided. Pt reports she tolerated her previous venofer infusions well. Ms. Zheng Abel vitals were reviewed and patient was observed for 5 minutes prior to treatment. Vitals:    23 1325   BP: 132/80   Pulse: 79   Resp: 20   Temp: 98.4 °F (36.9 °C)   SpO2: 98%       22 g PIV placed in R AC x 2 attempt. PIV flushed easily and had brisk blood return. Venofer 200 mg was infused over approx 15 minutes as ordered. VS stable and pt denied complaints of itching, lip/tongue/facial swelling, SOB, CP or other complaints. Ms. Licha Romero tolerated the infusion, and had no complaints. VS remained stable. Patient Vitals for the past 12 hrs:   Temp Pulse Resp BP SpO2   23 1359 98.7 °F (37.1 °C) 89 18 114/71 98 %   23 1325 98.4 °F (36.9 °C) 79 20 132/80 98 %        PIV flushed with NS 10 ml and removed. No bleeding or hematoma noted at site. Gauze and coban applied. Patient armband removed and shredded    Ms. Licha Romero was discharged from [place] [address] in stable condition at 1405. She is to return on 2023 at 1400 for her next venofer appointment.     Palmira Latham  May 1, 2023  1:47 PM

## 2023-05-04 ENCOUNTER — OFFICE VISIT (OUTPATIENT)
Age: 38
End: 2023-05-04

## 2023-05-04 VITALS — WEIGHT: 293 LBS | BODY MASS INDEX: 45.99 KG/M2 | HEIGHT: 67 IN | TEMPERATURE: 98.6 F

## 2023-05-04 DIAGNOSIS — M22.42 CHONDROMALACIA PATELLAE, LEFT KNEE: ICD-10-CM

## 2023-05-04 DIAGNOSIS — G89.29 CHRONIC PAIN OF LEFT KNEE: ICD-10-CM

## 2023-05-04 DIAGNOSIS — M25.562 CHRONIC PAIN OF LEFT KNEE: ICD-10-CM

## 2023-05-04 DIAGNOSIS — M17.12 PRIMARY OSTEOARTHRITIS OF LEFT KNEE: Primary | ICD-10-CM

## 2023-05-04 RX ORDER — BETAMETHASONE SODIUM PHOSPHATE AND BETAMETHASONE ACETATE 3; 3 MG/ML; MG/ML
3 INJECTION, SUSPENSION INTRA-ARTICULAR; INTRALESIONAL; INTRAMUSCULAR; SOFT TISSUE ONCE
Status: COMPLETED | OUTPATIENT
Start: 2023-05-04 | End: 2023-05-04

## 2023-05-04 RX ADMIN — BETAMETHASONE SODIUM PHOSPHATE AND BETAMETHASONE ACETATE 3 MG: 3; 3 INJECTION, SUSPENSION INTRA-ARTICULAR; INTRALESIONAL; INTRAMUSCULAR; SOFT TISSUE at 10:05

## 2023-05-29 PROBLEM — J45.901 ASTHMA ATTACK: Status: RESOLVED | Noted: 2018-02-13 | Resolved: 2023-05-29

## 2023-05-29 PROBLEM — O24.419 GESTATIONAL DIABETES: Status: RESOLVED | Noted: 2018-02-11 | Resolved: 2023-05-29

## 2023-05-29 PROBLEM — R06.02 SHORTNESS OF BREATH: Status: RESOLVED | Noted: 2018-02-11 | Resolved: 2023-05-29

## 2023-05-30 PROBLEM — Z87.09 HISTORY OF ASTHMA: Status: RESOLVED | Noted: 2018-02-11 | Resolved: 2023-05-30

## 2023-05-30 PROBLEM — I10 HYPERTENSION: Status: ACTIVE | Noted: 2018-02-11

## 2023-05-30 PROBLEM — Z34.90 PREGNANCY: Status: RESOLVED | Noted: 2018-02-01 | Resolved: 2023-05-30

## 2023-05-31 NOTE — TELEPHONE ENCOUNTER
Patient left message on nurse line asking for call back to figure out her vitamins. Pt had GBP 5/18/2020. Contacted patient and verified identity using name and date of birth. Patient sated she had already spoke with Mrs. Latoya Alexander and got the information she requested. Pt then asked to confirm that she was supposed to drink at least 64 ounces on non calorie liquids per day. I informed her that was correct and to call us back if she had any other questions and reminded her of her Post OP appointment on 6/5/2020. Pt verbalized understanding. Detail Level: Detailed X Size Of Lesion In Cm (Optional): 0

## 2023-12-13 ENCOUNTER — OFFICE VISIT (OUTPATIENT)
Age: 38
End: 2023-12-13
Payer: COMMERCIAL

## 2023-12-13 VITALS — TEMPERATURE: 97.5 F | HEIGHT: 69 IN | BODY MASS INDEX: 42.95 KG/M2 | WEIGHT: 290 LBS

## 2023-12-13 DIAGNOSIS — M94.262 CHONDROMALACIA, LEFT KNEE: ICD-10-CM

## 2023-12-13 DIAGNOSIS — M94.261 CHONDROMALACIA, RIGHT KNEE: ICD-10-CM

## 2023-12-13 DIAGNOSIS — G89.29 CHRONIC PAIN OF LEFT KNEE: ICD-10-CM

## 2023-12-13 DIAGNOSIS — M17.11 UNILATERAL PRIMARY OSTEOARTHRITIS, RIGHT KNEE: ICD-10-CM

## 2023-12-13 DIAGNOSIS — M25.561 CHRONIC PAIN OF RIGHT KNEE: ICD-10-CM

## 2023-12-13 DIAGNOSIS — M17.12 UNILATERAL PRIMARY OSTEOARTHRITIS, LEFT KNEE: Primary | ICD-10-CM

## 2023-12-13 DIAGNOSIS — M25.562 CHRONIC PAIN OF LEFT KNEE: ICD-10-CM

## 2023-12-13 DIAGNOSIS — G89.29 CHRONIC PAIN OF RIGHT KNEE: ICD-10-CM

## 2023-12-13 PROCEDURE — 20610 DRAIN/INJ JOINT/BURSA W/O US: CPT | Performed by: SPECIALIST

## 2023-12-13 PROCEDURE — 99213 OFFICE O/P EST LOW 20 MIN: CPT | Performed by: SPECIALIST

## 2023-12-13 RX ORDER — BETAMETHASONE SODIUM PHOSPHATE AND BETAMETHASONE ACETATE 3; 3 MG/ML; MG/ML
3 INJECTION, SUSPENSION INTRA-ARTICULAR; INTRALESIONAL; INTRAMUSCULAR; SOFT TISSUE ONCE
Status: COMPLETED | OUTPATIENT
Start: 2023-12-13 | End: 2023-12-13

## 2023-12-13 RX ADMIN — BETAMETHASONE SODIUM PHOSPHATE AND BETAMETHASONE ACETATE 3 MG: 3; 3 INJECTION, SUSPENSION INTRA-ARTICULAR; INTRALESIONAL; INTRAMUSCULAR; SOFT TISSUE at 10:30

## 2024-01-24 NOTE — PROGRESS NOTES
present. Condylar flattening and squaring.    XR LEFT KNEE 07/21/22 [facility] ED  IMPRESSION  Normal knee.    PROCEDURE: Patient's knees injected 4 cc 0.25% Marcaine and 0.5 cc Celestone.  Chart reviewed for the following:   Chucho CELESTIN MD, have reviewed the History, Physical and updated the Allergic reactions for Cara Olmstead     TIME OUT performed immediately prior to start of procedure:  Chucho CELESTIN MD, have performed the following reviews on Cara Olmstead prior to the start of the procedure:            * Patient was identified by name and date of birth   * Agreement on procedure being performed was verified  * Risks and Benefits explained to the patient  * Procedure site verified and marked as necessary  * Patient was positioned for comfort  * Consent was obtained  Time: 11:04 AM  Date of procedure: 1/25/2024    Procedure performed by:  Dr. Dewitt    Ms. Olmstead tolerated the procedure well with no complications.    IMPRESSION:      ICD-10-CM    1. Unilateral primary osteoarthritis, left knee  M17.12 Ambulatory Referral to Ortho Injection     DRAIN/INJECT LARGE JOINT/BURSA     BUPivacaine (MARCAINE) 0.5 % injection 20 mg     betamethasone acetate-betamethasone sodium phosphate (CELESTONE) injection 3 mg     [facility]     External Referral To Pain Clinic      2. Unilateral primary osteoarthritis, right knee  M17.11 Ambulatory Referral to Ortho Injection     DRAIN/INJECT LARGE JOINT/BURSA     betamethasone acetate-betamethasone sodium phosphate (CELESTONE) injection 3 mg     BUPivacaine (MARCAINE) 0.5 % injection 20 mg     [facility]     External Referral To Pain Clinic      3. Chronic pain of right knee  M25.561 DRAIN/INJECT LARGE JOINT/BURSA    G89.29 betamethasone acetate-betamethasone sodium phosphate (CELESTONE) injection 3 mg     BUPivacaine (MARCAINE) 0.5 % injection 20 mg     [20149] Knee 3V

## 2024-01-25 ENCOUNTER — OFFICE VISIT (OUTPATIENT)
Age: 39
End: 2024-01-25

## 2024-01-25 VITALS — BODY MASS INDEX: 43.4 KG/M2 | HEIGHT: 69 IN | WEIGHT: 293 LBS | TEMPERATURE: 97.3 F

## 2024-01-25 DIAGNOSIS — M94.262 CHONDROMALACIA, LEFT KNEE: ICD-10-CM

## 2024-01-25 DIAGNOSIS — M17.12 UNILATERAL PRIMARY OSTEOARTHRITIS, LEFT KNEE: Primary | ICD-10-CM

## 2024-01-25 DIAGNOSIS — G89.29 CHRONIC PAIN OF RIGHT KNEE: ICD-10-CM

## 2024-01-25 DIAGNOSIS — M94.261 CHONDROMALACIA, RIGHT KNEE: ICD-10-CM

## 2024-01-25 DIAGNOSIS — M25.562 CHRONIC PAIN OF LEFT KNEE: ICD-10-CM

## 2024-01-25 DIAGNOSIS — G89.29 CHRONIC PAIN OF LEFT KNEE: ICD-10-CM

## 2024-01-25 DIAGNOSIS — M17.11 UNILATERAL PRIMARY OSTEOARTHRITIS, RIGHT KNEE: ICD-10-CM

## 2024-01-25 DIAGNOSIS — M25.561 CHRONIC PAIN OF RIGHT KNEE: ICD-10-CM

## 2024-01-25 RX ORDER — BUPIVACAINE HYDROCHLORIDE 5 MG/ML
4 INJECTION, SOLUTION PERINEURAL ONCE
Status: COMPLETED | OUTPATIENT
Start: 2024-01-25 | End: 2024-01-25

## 2024-01-25 RX ORDER — BETAMETHASONE SODIUM PHOSPHATE AND BETAMETHASONE ACETATE 3; 3 MG/ML; MG/ML
3 INJECTION, SUSPENSION INTRA-ARTICULAR; INTRALESIONAL; INTRAMUSCULAR; SOFT TISSUE ONCE
Status: COMPLETED | OUTPATIENT
Start: 2024-01-25 | End: 2024-01-25

## 2024-01-25 RX ADMIN — BETAMETHASONE SODIUM PHOSPHATE AND BETAMETHASONE ACETATE 3 MG: 3; 3 INJECTION, SUSPENSION INTRA-ARTICULAR; INTRALESIONAL; INTRAMUSCULAR; SOFT TISSUE at 10:59

## 2024-01-25 RX ADMIN — BUPIVACAINE HYDROCHLORIDE 20 MG: 5 INJECTION, SOLUTION PERINEURAL at 10:59

## 2024-01-25 RX ADMIN — BUPIVACAINE HYDROCHLORIDE 20 MG: 5 INJECTION, SOLUTION PERINEURAL at 11:00

## 2024-01-25 RX ADMIN — BETAMETHASONE SODIUM PHOSPHATE AND BETAMETHASONE ACETATE 3 MG: 3; 3 INJECTION, SUSPENSION INTRA-ARTICULAR; INTRALESIONAL; INTRAMUSCULAR; SOFT TISSUE at 10:58

## 2024-04-11 ENCOUNTER — TELEPHONE (OUTPATIENT)
Age: 39
End: 2024-04-11

## 2024-04-11 ENCOUNTER — OFFICE VISIT (OUTPATIENT)
Age: 39
End: 2024-04-11
Payer: COMMERCIAL

## 2024-04-11 VITALS — WEIGHT: 293 LBS | TEMPERATURE: 97 F | BODY MASS INDEX: 43.4 KG/M2 | HEIGHT: 69 IN

## 2024-04-11 DIAGNOSIS — M25.561 CHRONIC PAIN OF RIGHT KNEE: ICD-10-CM

## 2024-04-11 DIAGNOSIS — G89.29 CHRONIC PAIN OF RIGHT KNEE: ICD-10-CM

## 2024-04-11 DIAGNOSIS — M25.562 CHRONIC PAIN OF LEFT KNEE: ICD-10-CM

## 2024-04-11 DIAGNOSIS — M94.262 CHONDROMALACIA, LEFT KNEE: ICD-10-CM

## 2024-04-11 DIAGNOSIS — G89.29 CHRONIC PAIN OF LEFT KNEE: ICD-10-CM

## 2024-04-11 DIAGNOSIS — M94.261 CHONDROMALACIA, RIGHT KNEE: ICD-10-CM

## 2024-04-11 DIAGNOSIS — M17.12 UNILATERAL PRIMARY OSTEOARTHRITIS, LEFT KNEE: Primary | ICD-10-CM

## 2024-04-11 DIAGNOSIS — M17.11 UNILATERAL PRIMARY OSTEOARTHRITIS, RIGHT KNEE: ICD-10-CM

## 2024-04-11 PROCEDURE — 99213 OFFICE O/P EST LOW 20 MIN: CPT | Performed by: SPECIALIST

## 2024-04-11 PROCEDURE — 20610 DRAIN/INJ JOINT/BURSA W/O US: CPT | Performed by: SPECIALIST

## 2024-04-11 RX ORDER — BETAMETHASONE SODIUM PHOSPHATE AND BETAMETHASONE ACETATE 3; 3 MG/ML; MG/ML
3 INJECTION, SUSPENSION INTRA-ARTICULAR; INTRALESIONAL; INTRAMUSCULAR; SOFT TISSUE ONCE
Status: COMPLETED | OUTPATIENT
Start: 2024-04-11 | End: 2024-04-11

## 2024-04-11 RX ORDER — BUPIVACAINE HYDROCHLORIDE 5 MG/ML
4 INJECTION, SOLUTION PERINEURAL ONCE
Status: COMPLETED | OUTPATIENT
Start: 2024-04-11 | End: 2024-04-11

## 2024-04-11 RX ADMIN — BUPIVACAINE HYDROCHLORIDE 20 MG: 5 INJECTION, SOLUTION PERINEURAL at 11:10

## 2024-04-11 RX ADMIN — BETAMETHASONE SODIUM PHOSPHATE AND BETAMETHASONE ACETATE 3 MG: 3; 3 INJECTION, SUSPENSION INTRA-ARTICULAR; INTRALESIONAL; INTRAMUSCULAR; SOFT TISSUE at 11:07

## 2024-04-11 RX ADMIN — BETAMETHASONE SODIUM PHOSPHATE AND BETAMETHASONE ACETATE 3 MG: 3; 3 INJECTION, SUSPENSION INTRA-ARTICULAR; INTRALESIONAL; INTRAMUSCULAR; SOFT TISSUE at 11:08

## 2024-04-11 RX ADMIN — BUPIVACAINE HYDROCHLORIDE 20 MG: 5 INJECTION, SOLUTION PERINEURAL at 11:09

## 2024-04-11 NOTE — PROGRESS NOTES
132.9 kg (293 lb)      There is no height or weight on file to calculate BMI.    Patient Active Problem List   Diagnosis    Morbid obesity (HCC)    Iron deficiency anemia following bariatric surgery    Hypertension       Social History     Tobacco Use    Smoking status: Former     Current packs/day: 0.00     Types: Cigarettes     Quit date: 2019     Years since quittin.4    Smokeless tobacco: Never   Vaping Use    Vaping Use: Never used   Substance Use Topics    Alcohol use: Not Currently     Alcohol/week: 1.7 standard drinks of alcohol    Drug use: No        Allergies   Allergen Reactions    Nsaids      Other reaction(s): gi distress        Current Outpatient Medications   Medication Sig    HYDROcodone-acetaminophen (NORCO) 5-325 MG per tablet Take 1 tablet by mouth every 6 hours as needed for Pain for up to 3 days. Intended supply: 3 days. Take lowest dose possible to manage pain Max Daily Amount: 4 tablets    NIFEdipine (PROCARDIA XL) 30 MG extended release tablet Take 1 tablet by mouth 2 times daily for 95 doses    hydroCHLOROthiazide (MICROZIDE) 12.5 MG capsule Take 1 capsule by mouth 1 (one) time if needed (elevated blood pressures)    budesonide-formoterol (SYMBICORT) 160-4.5 MCG/ACT AERO Inhale 2 puffs into the lungs 2 times daily    Prenatal Vit-Fe Fumarate-FA (PRENATAL VITAMIN AND MINERAL) 28-0.8 MG TABS     docusate sodium (COLACE) 100 MG capsule     FEROSUL 325 (65 Fe) MG tablet     cyanocobalamin 1000 MCG tablet Take 1,000 mcg by mouth daily     Current Facility-Administered Medications   Medication Dose Route Frequency    BUPivacaine (MARCAINE) 0.5 % injection 20 mg  4 mL Intra-artICUlar Once    betamethasone acetate-betamethasone sodium phosphate (CELESTONE) injection 3 mg  3 mg Intra-artICUlar Once    BUPivacaine (MARCAINE) 0.5 % injection 20 mg  4 mL Intra-artICUlar Once        PHYSICAL EXAMINATION:  There were no vitals taken for this visit.     ORTHO EXAMINATION:  Examination Right knee

## 2024-04-25 DIAGNOSIS — K91.2 POSTOPERATIVE MALABSORPTION: Primary | ICD-10-CM

## 2024-04-29 ENCOUNTER — OFFICE VISIT (OUTPATIENT)
Age: 39
End: 2024-04-29
Payer: COMMERCIAL

## 2024-04-29 VITALS — BODY MASS INDEX: 42.65 KG/M2 | WEIGHT: 288 LBS | TEMPERATURE: 97 F | HEIGHT: 69 IN

## 2024-04-29 DIAGNOSIS — M17.11 UNILATERAL PRIMARY OSTEOARTHRITIS, RIGHT KNEE: ICD-10-CM

## 2024-04-29 DIAGNOSIS — M17.12 UNILATERAL PRIMARY OSTEOARTHRITIS, LEFT KNEE: Primary | ICD-10-CM

## 2024-04-29 PROCEDURE — 20610 DRAIN/INJ JOINT/BURSA W/O US: CPT | Performed by: SPECIALIST

## 2024-04-29 NOTE — PROGRESS NOTES
Patient: Cara Olmstead                MRN: [id number]       SSN: [id number]  YOB: 1985        AGE: 38 y.o.        SEX: female      PCP: Darwin Rawls MD  04/29/24    Chief Complaint   Patient presents with    Knee Pain     BILATERAL KNEE VISCO 3 #1     HISTORY:  Cara Olmstead is a 38 y.o. female who is seen for bilateral knee pain. She presents today for her first injection in the Visco-3 visco supplementation series.    She was previously seen for increased bilateral knee pain (L>R). She responded to her injection last ov but her pains have returned. She feels pain with standing, walking and stair climbing. She experiences startup pain after sitting. Her pain is mostly when going up stairs and located to the anterior medial aspect of her knee. She has tried a variety of conservative measures including injections and activity restrictions all with incomplete temporary relief. She has been taking OTC Tylenol for her pain but it is no longer helping. She does not recall any injury, but thinks she could have injured her knee while playing with her kids. She has tried to make an appointment with pain management but they are scheduling months out. She is unable to take NSAIDs due to her gastric bypass.     She was previously seen for increased bilateral knee pain.      Occupation, etc: Ms. Olmstead is not employed. 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 She likes to cook soul food. She is a single mom living in [place] with her seven children--six sons and one daughter. She recently had a grandchild. She had a baby in May 2023. Her mother passed away from COVID pneumonia. Her brother is a [facility] patient. Ms. Olmstead weighs 290 lbs and is 5'9\" tall.  She underwent gastric bypass in 2020.  She lost weight from 415 lbs to 290 lbs. She has gained some weight recently due to stress.  She has developed an abdominal hernia and she is scheduled to seen by her

## 2024-05-31 ENCOUNTER — HOSPITAL ENCOUNTER (OUTPATIENT)
Facility: HOSPITAL | Age: 39
End: 2024-05-31
Payer: COMMERCIAL

## 2024-05-31 DIAGNOSIS — K91.2 POSTOPERATIVE MALABSORPTION: ICD-10-CM

## 2024-05-31 LAB
25(OH)D3 SERPL-MCNC: 9.7 NG/ML (ref 30–100)
FERRITIN SERPL-MCNC: 7 NG/ML (ref 8–388)
FOLATE SERPL-MCNC: 3.9 NG/ML (ref 3.1–17.5)
IRON SERPL-MCNC: 55 UG/DL (ref 50–175)
VIT B12 SERPL-MCNC: 376 PG/ML (ref 211–911)

## 2024-05-31 PROCEDURE — 82607 VITAMIN B-12: CPT

## 2024-05-31 PROCEDURE — 82728 ASSAY OF FERRITIN: CPT

## 2024-05-31 PROCEDURE — 84425 ASSAY OF VITAMIN B-1: CPT

## 2024-05-31 PROCEDURE — 82746 ASSAY OF FOLIC ACID SERUM: CPT

## 2024-05-31 PROCEDURE — 82306 VITAMIN D 25 HYDROXY: CPT

## 2024-05-31 PROCEDURE — 36415 COLL VENOUS BLD VENIPUNCTURE: CPT

## 2024-05-31 PROCEDURE — 83540 ASSAY OF IRON: CPT

## 2024-06-03 ENCOUNTER — OFFICE VISIT (OUTPATIENT)
Age: 39
End: 2024-06-03
Payer: COMMERCIAL

## 2024-06-03 VITALS
HEIGHT: 67 IN | TEMPERATURE: 98 F | SYSTOLIC BLOOD PRESSURE: 136 MMHG | BODY MASS INDEX: 45.2 KG/M2 | HEART RATE: 62 BPM | DIASTOLIC BLOOD PRESSURE: 76 MMHG | RESPIRATION RATE: 18 BRPM | WEIGHT: 288 LBS

## 2024-06-03 DIAGNOSIS — D50.8 IRON DEFICIENCY ANEMIA FOLLOWING BARIATRIC SURGERY: ICD-10-CM

## 2024-06-03 DIAGNOSIS — E66.01 MORBID OBESITY (HCC): ICD-10-CM

## 2024-06-03 DIAGNOSIS — Z98.84 BARIATRIC SURGERY STATUS: Primary | ICD-10-CM

## 2024-06-03 DIAGNOSIS — K95.89 IRON DEFICIENCY ANEMIA FOLLOWING BARIATRIC SURGERY: ICD-10-CM

## 2024-06-03 DIAGNOSIS — R11.0 NAUSEA: ICD-10-CM

## 2024-06-03 DIAGNOSIS — K43.2 RECURRENT VENTRAL HERNIA: ICD-10-CM

## 2024-06-03 DIAGNOSIS — E55.9 VITAMIN D DEFICIENCY: ICD-10-CM

## 2024-06-03 PROCEDURE — 3078F DIAST BP <80 MM HG: CPT | Performed by: SURGERY

## 2024-06-03 PROCEDURE — 3075F SYST BP GE 130 - 139MM HG: CPT | Performed by: SURGERY

## 2024-06-03 PROCEDURE — 99214 OFFICE O/P EST MOD 30 MIN: CPT | Performed by: SURGERY

## 2024-06-03 NOTE — H&P (VIEW-ONLY)
Bariatric Postoperative Progress Note    Cara Olmtsead is a 38 y.o. female now 4 years status post laparoscopic gastric bypass surgery performed on 5/18/2020 with Dr. Crowley.    -# of grams of protein daily? 30  -sources of protein? Shake, meats. Feels restriction.    -Reports 2 meals per day on average. Feels full very quickly. Palm-sized meals  -# of oz of sugar free fluids from all sources daily?  32+  -Nausea? Yes, unknown triggers  -Vomiting? No  -Difficulty swallow/food sticking? No  -Heartburn/regurgitation? Yes, heartburn  -Character of bowel movements (diarrhea/constipation/bloody stools?) alternating between constipation and regularity  -Which multivitamin product are you taking? Procare   -What dose and how frequently are you taking calcium citrate? not taking will start  - from any iron-containing multivitamin by 2 hours?  Is aware    -Ulcer risk exposures:   NSAID No  Tobacco No  Alcohol Yes  Steroids No  -Minutes of physical activity and what type? 30 min daily          6/3/2024    10:29 AM 4/29/2024     3:11 PM 4/11/2024    11:03 AM 4/8/2024     1:38 PM 2/22/2024     9:42 AM 1/25/2024    10:40 AM 12/13/2023    10:19 AM   Weight Loss Metrics   Pre-op weight (manual entry) 415 lbs         Height 5' 7\" 5' 9\" 5' 9\" 5' 9\" 5' 9\" 5' 9\" 5' 9\"   Weight - Scale 288 lbs 288 lbs 303 lbs 290 lbs 250 lbs 293 lbs 290 lbs   BMI (Calculated) 45.2 kg/m2 42.6 kg/m2 44.8 kg/m2 42.9 kg/m2 37 kg/m2 43.4 kg/m2 42.9 kg/m2   Ideal body weight (manual entry) 140 lbs         EBW in lbs. 275         Weight loss to date in lbs. 127         Percent weight loss (%) 30.6 %         Percent EBW loss (%) 46.2 %               Comorbidities:  Hypertension: resolved  Diabetes: resolved  Obstructive Sleep Apnea: resolved  Hyperlipidemia: not applicable  Stress Urinary Incontinence: resolved  Gastroesophageal Reflux: improved  Weight related arthropathy:worsened      Past Medical History:   Diagnosis Date    Anemia     iron

## 2024-06-03 NOTE — PROGRESS NOTES
Chief Complaint   Patient presents with    Follow-up     Weight regain abdominal pain nausea intermittent but denies vomiting feels like also has reoccurring hernia      Bariatric Postoperative Nurse Note      Cara Olmstead is a 38 y.o. female status post laparoscopic gastric bypass surgery performed on 5/18/2020.    All Post-Ops (including two weeks)  -# of grams of protein daily? 30  -sources of protein? Shake meats  -# of oz of sugar free fluids from all sources daily?  32  -Nausea? Yes  -Vomiting? No  -Difficulty swallow/food sticking? No  -Heartburn/regurgitation? Yes heartburn  -Character of bowel movements (diarrhea/constipation/bloody stools?) alternating between constipation and regularity  -Which multivitamin product are you taking? Procare   -What dose and how frequently are you taking calcium citrate? not taking will start  - from any iron-containing multivitamin by 2 hours?  Is aware  -Ulcer risk exposures:   NSAID No  Tobacco No  Alcohol Yes  Steroids No  -Minutes of physical activity and what type? 30 min daily       
rate and rhythm, no edema, capillary refill normal 1 second  Pulmonary: Symmetric chest rise with normal effort, clear to auscultation though mildly obscured by body habitus  GI: Soft, NT, ND, ~5cm recurrent ventral/umbilical hernia that is reducible and without overlying skin changes.   Skin: Warm without rash  Extremities: No edema or joint stiffness, moves all extremities symmetrically, steady gait  Psych: Appropriate mood and affect    Labs:   Recent Results (from the past 672 hour(s))   Ferritin    Collection Time: 05/31/24  9:41 AM   Result Value Ref Range    Ferritin 7 (L) 8 - 388 NG/ML   Iron    Collection Time: 05/31/24  9:41 AM   Result Value Ref Range    Iron 55 50 - 175 ug/dL   Vitamin D 25 Hydroxy    Collection Time: 05/31/24  9:41 AM   Result Value Ref Range    Vit D, 25-Hydroxy 9.7 (L) 30 - 100 ng/mL   Vitamin B12 & Folate    Collection Time: 05/31/24  9:41 AM   Result Value Ref Range    Vitamin B-12 376 211 - 911 pg/mL    Folate 3.9 3.10 - 17.50 ng/mL       Assessment/Plan:   He/She is currently 4 years s/p laparoscopic gastric bypass surgery  with a total weight loss of 127lbs to date.  Labs were reviewer and pt was instructed to continue follow up with [facility] related to chronic treatment of iron deficiency anemia.     Reviewed need for ongoing weight loss optimization with \"back on track\" pathway and potentially utilizing medical weight loss options if accessible.     Ordered EGD to evaluate gastric pouch anatomy and for any pouch abnormalities due to nausea symptoms. Ordered UGI (marshmallow/bagel) to evaluate motility.     Ordered CT AP to get more anatomic detail of the recurrent, reducible ventral hernia. Reviewed that MIS repair does carry risk of recurrence due to ongoing morbid obesity, but she is much more weight-optimized now after her gastric bypass. Continued weight loss may improve hernia repair outcomes if we can achieve it.     We have reviewed the components of a

## 2024-06-03 NOTE — PROGRESS NOTES
of the left knee     RADIOGRAPHS:   01/25/24  3 VIEWS BILAT KNEE [facility]  Three views of bilateral knee: no fractures, no effusion, Kellgren Errol grade 1 with mild joint space narrowing, no osteophytes present. Condylar flattening and squaring.     XR LEFT KNEE 07/21/22 [facility]  IMPRESSION  Normal knee.    PROCEDURE: Both knees injected 2.5 cc Visco 3  Chart reviewed for the following:   Chucho CELESTIN MD, have reviewed the History, Physical and updated the Allergic reactions for Cara Olmstead     TIME OUT performed immediately prior to start of procedure:  Chucho CELESTIN MD, have performed the following reviews on Cara Olmstead prior to the start of the procedure:            * Patient was identified by name and date of birth   * Agreement on procedure being performed was verified  * Risks and Benefits explained to the patient  * Procedure site verified and marked as necessary  * Patient was positioned for comfort  * Consent was obtained  Time: 10:30 AM  Date of procedure: 6/6/2024    Procedure performed by:  Dr. Dewitt    Ms. Olmstead tolerated the procedure well with no complications.    IMPRESSION:      ICD-10-CM    1. Unilateral primary osteoarthritis, left knee  M17.12 sodium hyaluronate (SUPARTZ) injection 25 mg     DRAIN/INJECT LARGE JOINT/BURSA      2. Unilateral primary osteoarthritis, right knee  M17.11 sodium hyaluronate (SUPARTZ) injection 25 mg     DRAIN/INJECT LARGE JOINT/BURSA      3. Chronic pain of right knee  M25.561 sodium hyaluronate (SUPARTZ) injection 25 mg    G89.29 DRAIN/INJECT LARGE JOINT/BURSA      4. Chronic pain of left knee  M25.562 sodium hyaluronate (SUPARTZ) injection 25 mg    G89.29 DRAIN/INJECT LARGE JOINT/BURSA        PLAN:  Both knees injected 2.5 cc Visco 3.  Follow-up in 1 week for to complete her Visco 3 injection series.    Documentation by kalie Reeder, as documented by Chucho Dewitt MD.

## 2024-06-05 LAB — VIT B1 BLD-SCNC: 71 NMOL/L (ref 66.5–200)

## 2024-06-06 ENCOUNTER — OFFICE VISIT (OUTPATIENT)
Age: 39
End: 2024-06-06

## 2024-06-06 VITALS — WEIGHT: 288 LBS | BODY MASS INDEX: 45.2 KG/M2 | TEMPERATURE: 98 F | HEIGHT: 67 IN

## 2024-06-06 VITALS
BODY MASS INDEX: 45.83 KG/M2 | SYSTOLIC BLOOD PRESSURE: 124 MMHG | RESPIRATION RATE: 18 BRPM | HEIGHT: 67 IN | HEART RATE: 72 BPM | OXYGEN SATURATION: 99 % | WEIGHT: 292 LBS | DIASTOLIC BLOOD PRESSURE: 65 MMHG | TEMPERATURE: 98 F

## 2024-06-06 DIAGNOSIS — K91.2 POSTOPERATIVE INTESTINAL MALABSORPTION: Primary | ICD-10-CM

## 2024-06-06 DIAGNOSIS — E66.01 MORBID OBESITY (HCC): ICD-10-CM

## 2024-06-06 DIAGNOSIS — G89.29 CHRONIC PAIN OF LEFT KNEE: ICD-10-CM

## 2024-06-06 DIAGNOSIS — M17.11 UNILATERAL PRIMARY OSTEOARTHRITIS, RIGHT KNEE: ICD-10-CM

## 2024-06-06 DIAGNOSIS — M25.561 CHRONIC PAIN OF RIGHT KNEE: ICD-10-CM

## 2024-06-06 DIAGNOSIS — M17.12 UNILATERAL PRIMARY OSTEOARTHRITIS, LEFT KNEE: Primary | ICD-10-CM

## 2024-06-06 DIAGNOSIS — R11.0 NAUSEA: ICD-10-CM

## 2024-06-06 DIAGNOSIS — M25.562 CHRONIC PAIN OF LEFT KNEE: ICD-10-CM

## 2024-06-06 DIAGNOSIS — K21.9 GASTROESOPHAGEAL REFLUX DISEASE, UNSPECIFIED WHETHER ESOPHAGITIS PRESENT: ICD-10-CM

## 2024-06-06 DIAGNOSIS — G89.29 CHRONIC PAIN OF RIGHT KNEE: ICD-10-CM

## 2024-06-06 DIAGNOSIS — Z98.84 S/P GASTRIC BYPASS: ICD-10-CM

## 2024-06-06 RX ORDER — SEMAGLUTIDE 0.5 MG/.5ML
0.5 INJECTION, SOLUTION SUBCUTANEOUS
Qty: 2 ML | Refills: 0 | Status: SHIPPED | OUTPATIENT
Start: 2024-06-06

## 2024-06-06 RX ORDER — SEMAGLUTIDE 0.25 MG/.5ML
0.25 INJECTION, SOLUTION SUBCUTANEOUS
Qty: 2 ML | Refills: 0 | Status: SHIPPED | OUTPATIENT
Start: 2024-06-06

## 2024-06-06 RX ORDER — THIAMINE HYDROCHLORIDE 100 MG/ML
200 INJECTION, SOLUTION INTRAMUSCULAR; INTRAVENOUS ONCE
Status: COMPLETED | OUTPATIENT
Start: 2024-06-06 | End: 2024-06-06

## 2024-06-06 RX ORDER — ONDANSETRON 4 MG/1
4 TABLET, ORALLY DISINTEGRATING ORAL EVERY 8 HOURS PRN
Qty: 30 TABLET | Refills: 1 | Status: SHIPPED | OUTPATIENT
Start: 2024-06-06

## 2024-06-06 RX ORDER — OMEPRAZOLE 40 MG/1
CAPSULE, DELAYED RELEASE ORAL
COMMUNITY
Start: 2024-04-02 | End: 2024-06-06

## 2024-06-06 RX ORDER — OMEPRAZOLE 40 MG/1
40 CAPSULE, DELAYED RELEASE ORAL DAILY
Qty: 90 CAPSULE | Refills: 1 | Status: SHIPPED | OUTPATIENT
Start: 2024-06-06 | End: 2024-12-03

## 2024-06-06 RX ADMIN — THIAMINE HYDROCHLORIDE 200 MG: 100 INJECTION, SOLUTION INTRAMUSCULAR; INTRAVENOUS at 16:14

## 2024-06-06 NOTE — PROGRESS NOTES
Bariatric Postoperative Nurse Note      Cara Olmtsead is a 38 y.o. female status post laparoscopic gastric bypass surgery performed on 05/18/2020.    All Post-Ops (including two weeks)  -# of grams of protein daily? 60g  -sources of protein? Chicken, Eggs, Protein shakes, Turkey.  -# of oz of sugar free fluids from all sources daily?  50oz daily.  -Nausea? Yes  -Vomiting? No  -Difficulty swallow/food sticking? Yes  -Heartburn/regurgitation? Yes  -Character of bowel movements (diarrhea/constipation/bloody stools?) constipation and no relief with Colace.  -Which multivitamin product are you taking? Procare   -What dose and how frequently are you taking calcium citrate? Not taking  - from any iron-containing multivitamin by 2 hours? Yes  -Ulcer risk exposures:   NSAID No  Tobacco No  Alcohol No  Steroids No  -Minutes of physical activity and what type? Treadmill 4 days weekly x 20-30 mins.

## 2024-06-06 NOTE — PERIOP NOTE
Instructions for your surgery at [facility]      Today's Date:  6/6/2024      Patient's Name:  Cara Olmstead           Surgery Date:  6/11              Please enter the main entrance of the hospital and check-in at the  located in the lobby. Once checked in at the , you will take the elevators to the second floor, and report to the waiting room on the left. The room will say Procedure Registration.    Do NOT eat or drink anything, including candy, gum, or ice chips after midnight prior to your surgery, unless you have specific instructions from your surgeon or anesthesia provider to do so.  Brush your teeth before coming to the hospital. You may swish with water, but do not swallow.  No smoking/Vaping/E-Cigarettes 24 hours prior to the day of surgery.  No alcohol 24 hours prior to the day of surgery.  No recreational drugs for one week prior to the day of surgery.  Bring Photo ID, Insurance information, and Co-pay if required on day of surgery.  Bring in pertinent legal documents, such as, Medical Power of , DNR, Advance Directive, etc.  Leave all valuables, including money/purse, at home.  Remove all jewelry, including ALL body piercings, nail polish, acrylic nails, and makeup (including mascara); no lotions, powders, deodorant, or perfume/cologne/after shave on the skin.  Follow instruction for Hibiclens washes and CHG wipes from surgeon's office.   Glasses and dentures may be worn to the hospital. They must be removed prior to surgery. Please bring case/container for glasses or dentures.   Contact lenses should not be worn on day of surgery.   Call your doctor's office if symptoms of a cold or illness develop within 24-48 hours prior to your surgery.  Call your doctor's office if you have any questions concerning insurance or co-pays.  15. AN ADULT (relative or friend 18 years or older) MUST DRIVE YOU HOME AFTER YOUR SURGERY.  16. Please make arrangements for a

## 2024-06-06 NOTE — PROGRESS NOTES
Bariatric Postoperative Progress Note    Chief Complaint   Patient presents with    Weight Management     LGBP (05/18/2020) - Back on Track referred by Dr. Mccauley       History of Present Illness  38-year-old female presenting for weight management, she is status post laparoscopic gastric bypass surgery performed on 5/18/2020.    Saw Dr. Mccauley on 6/3 for annual bariatric follow-up as well as discussion regarding bariatric surgery revision and ventral hernia repair.  He referred her to myself to assist with weight optimization before moving forward with any type of surgery.  The patient gave birth last year and is uncertain of the exact weight she gained during her pregnancy but reports that she went back to her baseline weight shortly after delivery. She maintains an active lifestyle, engaging in daily walks on the treadmill and incorporating protein-rich foods into her diet, albeit in small portions. She is scheduled for an EGD on 06/11/2024 with Dr. Mccauley as she has been experiencing nausea for the past year, which intensifies postprandially. She denies any episodes of vomiting. She occasionally experiences heartburn and is currently on omeprazole 40 mg. She is interested in weight loss medication and has previously tried phentermine in 2012, which resulted in chest pain and heart palpitations.  Requesting refills of omeprazole and Zofran.     Supplemental Information  She is anemic and has been craving ice. She is taking ProCare vitamin every day. She is also taking vitamin D and calcium.    See nurse note for additional subjective information.         6/6/2024     3:28 PM 6/6/2024    10:16 AM 6/6/2024     9:48 AM 6/3/2024    10:29 AM 4/29/2024     3:11 PM 4/11/2024    11:03 AM 4/8/2024     1:38 PM   Weight Loss Metrics   Pre-op weight (manual entry) 415 lbs   415 lbs      Height 5' 7\" 5' 7\" 5' 7\" 5' 7\" 5' 9\" 5' 9\" 5' 9\"   Weight - Scale 292 lbs 280 lbs 288 lbs 288 lbs 288 lbs 303 lbs 290 lbs   BMI (Calculated)

## 2024-06-07 ASSESSMENT — ENCOUNTER SYMPTOMS
DIARRHEA: 0
VOMITING: 0
SHORTNESS OF BREATH: 0
CONSTIPATION: 0
ABDOMINAL PAIN: 1
NAUSEA: 1
COUGH: 0

## 2024-06-08 ENCOUNTER — ANESTHESIA EVENT (OUTPATIENT)
Facility: HOSPITAL | Age: 39
End: 2024-06-08
Payer: COMMERCIAL

## 2024-06-10 ENCOUNTER — CLINICAL DOCUMENTATION (OUTPATIENT)
Age: 39
End: 2024-06-10

## 2024-06-10 ENCOUNTER — TELEPHONE (OUTPATIENT)
Age: 39
End: 2024-06-10

## 2024-06-11 ENCOUNTER — HOSPITAL ENCOUNTER (OUTPATIENT)
Facility: HOSPITAL | Age: 39
Setting detail: OUTPATIENT SURGERY
Discharge: HOME | End: 2024-06-11
Attending: SURGERY | Admitting: SURGERY
Payer: COMMERCIAL

## 2024-06-11 ENCOUNTER — ANESTHESIA (OUTPATIENT)
Facility: HOSPITAL | Age: 39
End: 2024-06-11
Payer: COMMERCIAL

## 2024-06-11 VITALS
BODY MASS INDEX: 45.99 KG/M2 | HEIGHT: 67 IN | TEMPERATURE: 98.8 F | SYSTOLIC BLOOD PRESSURE: 152 MMHG | WEIGHT: 293 LBS | DIASTOLIC BLOOD PRESSURE: 102 MMHG | OXYGEN SATURATION: 99 % | HEART RATE: 65 BPM | RESPIRATION RATE: 16 BRPM

## 2024-06-11 LAB — HCG UR QL: NEGATIVE

## 2024-06-11 PROCEDURE — 3700000000 HC ANESTHESIA ATTENDED CARE: Performed by: SURGERY

## 2024-06-11 PROCEDURE — 2500000003 HC RX 250 WO HCPCS: Performed by: NURSE ANESTHETIST, CERTIFIED REGISTERED

## 2024-06-11 PROCEDURE — 43235 EGD DIAGNOSTIC BRUSH WASH: CPT | Performed by: SURGERY

## 2024-06-11 PROCEDURE — 7100000011 HC PHASE II RECOVERY - ADDTL 15 MIN: Performed by: SURGERY

## 2024-06-11 PROCEDURE — 2709999900 HC NON-CHARGEABLE SUPPLY: Performed by: SURGERY

## 2024-06-11 PROCEDURE — 6360000002 HC RX W HCPCS: Performed by: NURSE ANESTHETIST, CERTIFIED REGISTERED

## 2024-06-11 PROCEDURE — 3600007502: Performed by: SURGERY

## 2024-06-11 PROCEDURE — 2580000003 HC RX 258: Performed by: NURSE ANESTHETIST, CERTIFIED REGISTERED

## 2024-06-11 PROCEDURE — 7100000010 HC PHASE II RECOVERY - FIRST 15 MIN: Performed by: SURGERY

## 2024-06-11 PROCEDURE — 81025 URINE PREGNANCY TEST: CPT

## 2024-06-11 PROCEDURE — 7100000000 HC PACU RECOVERY - FIRST 15 MIN: Performed by: SURGERY

## 2024-06-11 PROCEDURE — 7100000001 HC PACU RECOVERY - ADDTL 15 MIN: Performed by: SURGERY

## 2024-06-11 RX ORDER — SODIUM CHLORIDE, SODIUM LACTATE, POTASSIUM CHLORIDE, CALCIUM CHLORIDE 600; 310; 30; 20 MG/100ML; MG/100ML; MG/100ML; MG/100ML
INJECTION, SOLUTION INTRAVENOUS CONTINUOUS
Status: DISCONTINUED | OUTPATIENT
Start: 2024-06-11 | End: 2024-06-11 | Stop reason: HOSPADM

## 2024-06-11 RX ORDER — LIDOCAINE HYDROCHLORIDE 10 MG/ML
1 INJECTION, SOLUTION EPIDURAL; INFILTRATION; INTRACAUDAL; PERINEURAL
Status: DISCONTINUED | OUTPATIENT
Start: 2024-06-11 | End: 2024-06-11 | Stop reason: HOSPADM

## 2024-06-11 RX ORDER — PROPOFOL 10 MG/ML
INJECTION, EMULSION INTRAVENOUS PRN
Status: DISCONTINUED | OUTPATIENT
Start: 2024-06-11 | End: 2024-06-11 | Stop reason: SDUPTHER

## 2024-06-11 RX ADMIN — SODIUM CHLORIDE, SODIUM LACTATE, POTASSIUM CHLORIDE, AND CALCIUM CHLORIDE: 600; 310; 30; 20 INJECTION, SOLUTION INTRAVENOUS at 11:23

## 2024-06-11 RX ADMIN — PROPOFOL 30 MG: 10 INJECTION, EMULSION INTRAVENOUS at 11:56

## 2024-06-11 RX ADMIN — PROPOFOL 30 MG: 10 INJECTION, EMULSION INTRAVENOUS at 11:57

## 2024-06-11 RX ADMIN — PROPOFOL 60 MG: 10 INJECTION, EMULSION INTRAVENOUS at 11:54

## 2024-06-11 RX ADMIN — PROPOFOL 50 MG: 10 INJECTION, EMULSION INTRAVENOUS at 11:59

## 2024-06-11 RX ADMIN — DEXMEDETOMIDINE HYDROCHLORIDE 10 MCG: 100 INJECTION, SOLUTION INTRAVENOUS at 11:50

## 2024-06-11 RX ADMIN — PROPOFOL 30 MG: 10 INJECTION, EMULSION INTRAVENOUS at 11:58

## 2024-06-11 RX ADMIN — PROPOFOL 40 MG: 10 INJECTION, EMULSION INTRAVENOUS at 11:55

## 2024-06-11 ASSESSMENT — PAIN - FUNCTIONAL ASSESSMENT
PAIN_FUNCTIONAL_ASSESSMENT: NONE - DENIES PAIN
PAIN_FUNCTIONAL_ASSESSMENT: 0-10

## 2024-06-11 ASSESSMENT — ENCOUNTER SYMPTOMS: SHORTNESS OF BREATH: 1

## 2024-06-11 ASSESSMENT — PAIN SCALES - GENERAL: PAINLEVEL_OUTOF10: 0

## 2024-06-11 NOTE — INTERVAL H&P NOTE
Update History & Physical    The patient's History and Physical of Claire 3, history and procedure was reviewed with the patient and I examined the patient. There was no change. The surgical site was confirmed by the patient and me.     Plan: The risks, benefits, expected outcome, and alternative to the recommended procedure have been discussed with the patient. Patient understands and wants to proceed with the procedure.     Electronically signed by Andrzej Mccauley MD on 6/11/2024 at 10:38 AM

## 2024-06-11 NOTE — PERIOP NOTE
Patient /Family /Designee has been informed that [facility] is not responsible for patient belongings per policy and the signed [facility] Patient Agreement document.  Personal items should be sent home or checked in with security.  Patient /Family /Designee selected the following action:                            [x]  Send personal items home with a family member or friend                                                 []  Check in personal items with security, excluding clothing                            []  Maintain personal items at the bedside, against recommendation                                 by Maykel Lynn [facility]                                   ** If patient /family /designee chooses to maintain personal items at the bedside,                                      Complete the patient belongings inventory in the EMR.

## 2024-06-11 NOTE — ANESTHESIA POSTPROCEDURE EVALUATION
Department of Anesthesiology  Postprocedure Note    Patient: Cara Olmstead  MRN: [id number]  YOB: 1985  Date of evaluation: 6/11/2024    Procedure Summary       Date: 06/11/24 Room / Location: [facility] ENDO 01 / [facility] ENDOSCOPY    Anesthesia Start: 1149 Anesthesia Stop: 1209    Procedure: ESOPHAGOGASTRODUODENOSCOPY (Upper GI Region) Diagnosis:       Bariatric surgery status      Morbid obesity (HCC)      Iron deficiency      Recurrent ventral hernia      Nausea      (Bariatric surgery status [Z98.84])      (Morbid obesity (HCC) [E66.01])      (Iron deficiency [E61.1])      (Recurrent ventral hernia [K43.2])      (Nausea [R11.0])    Surgeons: Andrzej Mccauley MD Responsible Provider: Raciel White MD    Anesthesia Type: MAC ASA Status: 3            Anesthesia Type: MAC    Myke Phase I: Myke Score: 10    Myke Phase II: Myke Score: 10    Anesthesia Post Evaluation    Patient location during evaluation: bedside  Patient participation: complete - patient participated  Level of consciousness: responsive to verbal stimuli  Airway patency: patent  Nausea & Vomiting: no nausea  Respiratory status: acceptable  Hydration status: euvolemic    No notable events documented.

## 2024-06-11 NOTE — DISCHARGE INSTRUCTIONS
changes in your health, and be sure to contact your doctor if:    Your throat still hurts after a day or two.     You do not get better as expected.   Where can you learn more?  Go to [email or web address]  Enter J454 in the search box to learn more about \"Upper GI Endoscopy: What to Expect at Home.\"  Current as of: September 8, 2021               Content Version: 13.2  © 2006-2022 [employer].   Care instructions adapted under license by [employer] (which disclaims liability or warranty for this information). If you have questions about a medical condition or this instruction, always ask your healthcare professional. [employer] disclaims any warranty or liability for your use of this information.      DISCHARGE SUMMARY from Nurse     POST-PROCEDURE INSTRUCTIONS:    Call your Physician if you:  Observe any excess bleeding.  Develop a temperature over 100.5o F.  Experience abdominal, shoulder or chest pain.  Notice any signs of decreased circulation or nerve impairment to an extremity such as a change in color, persistent numbness, tingling, coldness or increase in pain.  Vomit blood or you have nausea and vomiting lasting longer than 4 hours.  Are unable to take medications.  Are unable to urinate within 8 hours after discharge following general anesthesia or intravenous sedation.    For the next 24 hours after receiving general anesthesia or intravenous sedation, or while taking prescription Narcotics, limit your activities:  Do NOT drive a motor vehicle, operate hazard machinery or power tools, or perform tasks that require coordination.  The medication you received during your procedure may have some effect on your mental awareness.  Do NOT make important personal or business decisions.  The medication you received during your procedure may have some effect on your mental awareness.  Do NOT drink alcoholic beverages.  These drinks do not mix well with  No

## 2024-06-11 NOTE — OP NOTE
Operative Report    Patient: Cara Olmstead MRN: [id number]  SSN: [id number]    YOB: 1985  Age: 38 y.o.  Sex: female       Date of Surgery: 06/11/24     Preoperative Diagnosis: Pre-Op Diagnosis Codes:     * Bariatric surgery status [Z98.84]     * Morbid obesity (HCC) [E66.01]     * Iron deficiency [E61.1]     * Recurrent ventral hernia [K43.2]     * Nausea [R11.0]      Postoperative Diagnosis: Pre-Op Diagnosis Codes:     * Bariatric surgery status [Z98.84]     * Morbid obesity (HCC) [E66.01]     * Iron deficiency [E61.1]     * Recurrent ventral hernia [K43.2]     * Nausea [R11.0]   Hiatal hernia    Surgeon(s) and Role:     * Andrzej Mccauley MD - Primary    Anesthesia: Monitor Anesthesia Care     Procedure:   Esophagogastroduodenoscopy    Procedure in Detail: Cara Olmstead was identified in the pre-operative holding area. Informed consent was obtained after a complete discussion of risks, benefits and alternatives to surgery were had with the patient.    The patient was brought back to the endoscopy room and placed under MAC in the supine position on the operating room table. A timeout was performed verifying patient identity, planned procedure, medications, and all other pertinent aspects of the case.  The patient was appropriately padded and secured to the table. A bite block was applied.     Once adequate sedation was achieved, the gastroscope was introduced transorally into the esophagus. The esophagus was traversed.     FINDINGS:   1. Irregular Z line  2. Small hiatal hernia with proximal migration of Z line above diaphragmatic pinch by 1-2cm  3. Patulous GEJ, retroflexed view of hiatus reveals 3cm CC diameter of diaphragm  4. 2.5cm GJ stoma without marginal ulceration  5. Normal lance limb mucosa, minimal candy cane.  6. No bile reflux appreciated    The scope was utilized to suction flat the stomach and was removed.     The patient tolerated the procedure without incident and was awakened and

## 2024-06-11 NOTE — ANESTHESIA PRE PROCEDURE
obesity          Endo/Other:    (+) DiabetesType II DM, well controlled.                 Abdominal:             Vascular:          Other Findings:       Anesthesia Plan      MAC     ASA 3       Induction: intravenous.      Anesthetic plan and risks discussed with patient.                    LINNETTE ORO MD   6/11/2024

## 2024-06-13 ENCOUNTER — TELEPHONE (OUTPATIENT)
Age: 39
End: 2024-06-13

## 2024-06-13 NOTE — TELEPHONE ENCOUNTER
Left message for Ashley with Radiology Authorization informing that patient CT order was being corrected.

## 2024-06-14 ENCOUNTER — HOSPITAL ENCOUNTER (OUTPATIENT)
Facility: HOSPITAL | Age: 39
End: 2024-06-14
Attending: SURGERY
Payer: COMMERCIAL

## 2024-06-14 DIAGNOSIS — K43.2 RECURRENT VENTRAL HERNIA: ICD-10-CM

## 2024-06-14 PROCEDURE — 6360000004 HC RX CONTRAST MEDICATION: Performed by: SURGERY

## 2024-06-14 PROCEDURE — 74177 CT ABD & PELVIS W/CONTRAST: CPT

## 2024-06-14 RX ADMIN — DIATRIZOATE MEGLUMINE AND DIATRIZOATE SODIUM 30 ML: 660; 100 LIQUID ORAL; RECTAL at 11:58

## 2024-06-14 RX ADMIN — IOPAMIDOL 100 ML: 612 INJECTION, SOLUTION INTRAVENOUS at 11:57

## 2024-08-12 DIAGNOSIS — E66.01 MORBID OBESITY (HCC): ICD-10-CM

## 2024-08-13 ENCOUNTER — TELEPHONE (OUTPATIENT)
Age: 39
End: 2024-08-13

## 2024-08-13 DIAGNOSIS — E66.01 MORBID OBESITY (HCC): Primary | ICD-10-CM

## 2024-08-13 RX ORDER — SEMAGLUTIDE 1 MG/.5ML
1 INJECTION, SOLUTION SUBCUTANEOUS
Qty: 2 ML | Refills: 0 | Status: SHIPPED | OUTPATIENT
Start: 2024-08-13

## 2024-08-13 NOTE — TELEPHONE ENCOUNTER
She has missed two appointments with me since starting the medication. I have sent in the 1 mg dose (month 3) and she will have to show up for her next appointment or no more refills. I need a documented history of weights in order to continue prior authorization renewals.

## 2024-08-13 NOTE — TELEPHONE ENCOUNTER
Notified patient of Rachell's instructions to keep next appt in order to get further refills. Patient understands and will be sure to come to her next appt in September.

## 2024-08-15 RX ORDER — SEMAGLUTIDE 0.5 MG/.5ML
0.5 INJECTION, SOLUTION SUBCUTANEOUS
Qty: 2 ML | Refills: 0 | OUTPATIENT
Start: 2024-08-15

## 2024-09-04 DIAGNOSIS — E66.01 MORBID OBESITY (HCC): ICD-10-CM

## 2024-09-06 ENCOUNTER — OFFICE VISIT (OUTPATIENT)
Age: 39
End: 2024-09-06
Payer: COMMERCIAL

## 2024-09-06 ENCOUNTER — TELEPHONE (OUTPATIENT)
Age: 39
End: 2024-09-06

## 2024-09-06 VITALS
HEIGHT: 68 IN | BODY MASS INDEX: 41.22 KG/M2 | WEIGHT: 272 LBS | RESPIRATION RATE: 18 BRPM | SYSTOLIC BLOOD PRESSURE: 151 MMHG | DIASTOLIC BLOOD PRESSURE: 113 MMHG | HEART RATE: 69 BPM | TEMPERATURE: 97.8 F | OXYGEN SATURATION: 98 %

## 2024-09-06 DIAGNOSIS — E66.01 CLASS 3 SEVERE OBESITY WITHOUT SERIOUS COMORBIDITY WITH BODY MASS INDEX (BMI) OF 40.0 TO 44.9 IN ADULT, UNSPECIFIED OBESITY TYPE (HCC): Primary | ICD-10-CM

## 2024-09-06 DIAGNOSIS — K91.2 POSTOPERATIVE INTESTINAL MALABSORPTION: ICD-10-CM

## 2024-09-06 DIAGNOSIS — E55.9 VITAMIN D DEFICIENCY: ICD-10-CM

## 2024-09-06 DIAGNOSIS — K21.9 GASTROESOPHAGEAL REFLUX DISEASE, UNSPECIFIED WHETHER ESOPHAGITIS PRESENT: ICD-10-CM

## 2024-09-06 DIAGNOSIS — R11.0 NAUSEA: ICD-10-CM

## 2024-09-06 DIAGNOSIS — Z98.84 S/P GASTRIC BYPASS: ICD-10-CM

## 2024-09-06 DIAGNOSIS — J45.909 ACUTE ASTHMA: ICD-10-CM

## 2024-09-06 PROCEDURE — 3077F SYST BP >= 140 MM HG: CPT | Performed by: NURSE PRACTITIONER

## 2024-09-06 PROCEDURE — 99213 OFFICE O/P EST LOW 20 MIN: CPT | Performed by: NURSE PRACTITIONER

## 2024-09-06 PROCEDURE — 3080F DIAST BP >= 90 MM HG: CPT | Performed by: NURSE PRACTITIONER

## 2024-09-06 RX ORDER — BUDESONIDE AND FORMOTEROL FUMARATE DIHYDRATE 160; 4.5 UG/1; UG/1
2 AEROSOL RESPIRATORY (INHALATION) 2 TIMES DAILY
Qty: 10.2 G | Refills: 3 | Status: SHIPPED | OUTPATIENT
Start: 2024-09-06 | End: 2024-09-06

## 2024-09-06 RX ORDER — ONDANSETRON 4 MG/1
4 TABLET, ORALLY DISINTEGRATING ORAL EVERY 8 HOURS PRN
Qty: 30 TABLET | Refills: 1 | Status: SHIPPED | OUTPATIENT
Start: 2024-09-06

## 2024-09-06 RX ORDER — BUDESONIDE AND FORMOTEROL FUMARATE DIHYDRATE 160; 4.5 UG/1; UG/1
2 AEROSOL RESPIRATORY (INHALATION) 2 TIMES DAILY
Qty: 10.2 G | Refills: 1 | Status: SHIPPED | OUTPATIENT
Start: 2024-09-06

## 2024-09-06 RX ORDER — ALBUTEROL SULFATE 90 UG/1
AEROSOL, METERED RESPIRATORY (INHALATION)
COMMUNITY

## 2024-09-06 RX ORDER — SEMAGLUTIDE 1.7 MG/.75ML
1.7 INJECTION, SOLUTION SUBCUTANEOUS
Qty: 3 ML | Refills: 0 | Status: SHIPPED | OUTPATIENT
Start: 2024-09-06

## 2024-09-06 RX ORDER — SIMVASTATIN 10 MG
TABLET ORAL
COMMUNITY
Start: 2024-07-09

## 2024-09-06 RX ORDER — OMEPRAZOLE 40 MG/1
40 CAPSULE, DELAYED RELEASE ORAL DAILY
Qty: 90 CAPSULE | Refills: 1 | Status: SHIPPED | OUTPATIENT
Start: 2024-09-06 | End: 2025-03-05

## 2024-09-06 RX ORDER — CHOLECALCIFEROL (VITAMIN D3) 1250 MCG
50000 CAPSULE ORAL
Qty: 12 CAPSULE | Refills: 0 | Status: SHIPPED | OUTPATIENT
Start: 2024-09-06

## 2024-09-06 ASSESSMENT — ENCOUNTER SYMPTOMS
CONSTIPATION: 1
RESPIRATORY NEGATIVE: 1

## 2024-09-06 NOTE — PROGRESS NOTES
Bariatric Postoperative Nurse Note      Cara Olmstead is a 38 y.o. female status post laparoscopic gastric bypass surgery performed on 05/18/2020.    All Post-Ops (including two weeks)  -# of grams of protein daily? 60g  -sources of protein? Chicken, Eggs, Protein shakes, Turkey.  -# of oz of sugar free fluids from all sources daily? 64oz daily.  -Nausea? Yes  -Vomiting? No  -Difficulty swallow/food sticking? Yes  -Heartburn/regurgitation? Yes  -Character of bowel movements (diarrhea/constipation/bloody stools?) constipation. Takes Colace daily.  -Which multivitamin product are you taking? Procare   -What dose and how frequently are you taking calcium citrate? Not taking  - from any iron-containing multivitamin by 2 hours? Yes  -Ulcer risk exposures:   NSAID No  Tobacco No  Alcohol No  Steroids No  -Minutes of physical activity and what type? Treadmill 3 days weekly x 20-30 mins.

## 2024-09-06 NOTE — PROGRESS NOTES
Bariatric Postoperative Progress Note    Chief Complaint   Patient presents with    Weight Management     LGBP (05/18/2020)       Cara Olmstead is a 38 y.o. female status post laparoscopic gastric bypass surgery performed on 5/18/2020. She is currently prescribed Wegovy 1 mg.    Ms. Olmstead states that she is ready to move up to Wegovy 1.7 mg. She would also like refills on her other medications as well. She states that the colace does not help her completely with her bowel movements and she will consider taking miralax in addition to the colace. She endorses feeling stressed due to her children returning to school this week. She does plan on rescheduling her appointment for her UGI to address the sticking feeling in her throat. Patient shared concerns about how to manage her pain in her abdomen and at what weight will she be eligible for her ventral hernia repair.    She is down 20 lbs since starting Wegovy which is 7% of her starting weight of 292 lbs.    See nurse note for additional subjective information.         9/6/2024     9:10 AM 7/1/2024     1:28 PM 6/11/2024    10:50 AM 6/6/2024     3:28 PM 6/6/2024    10:16 AM 6/6/2024     9:48 AM 6/3/2024    10:29 AM   Weight Loss Metrics   Pre-op weight (manual entry) 415 lbs   415 lbs   415 lbs   Height 5' 8\" 5' 8\"  5' 7\" 5' 7\" 5' 7\" 5' 7\"   Weight - Scale 272 lbs 280 lbs 295 lbs 6 oz 292 lbs 280 lbs 288 lbs 288 lbs   BMI (Calculated) 41.4 kg/m2 42.7 kg/m2 46.4 kg/m2 45.8 kg/m2 43.9 kg/m2 45.2 kg/m2 45.2 kg/m2   Ideal body weight (manual entry) 140 lbs   140 lbs   140 lbs   EBW in lbs. 275   275   275   Weight loss to date in lbs. 143   123   127   Percent weight loss (%) 34.46 %   29.64 %   30.6 %   Percent EBW loss (%) 52 %   44.7 %   46.2 %          Past Medical History:   Diagnosis Date    Anemia     iron transfusion last one 2/10/22    Antepartum fetal tachycardia affecting care of mother 2/11/2018    Asthma     Asthma attack 2/13/2018    Community acquired pneumonia

## 2024-09-11 RX ORDER — SEMAGLUTIDE 1 MG/.5ML
1 INJECTION, SOLUTION SUBCUTANEOUS
Qty: 2 ML | Refills: 0 | OUTPATIENT
Start: 2024-09-11

## 2024-09-12 ENCOUNTER — TELEPHONE (OUTPATIENT)
Age: 39
End: 2024-09-12

## 2024-09-13 DIAGNOSIS — E66.01 CLASS 3 SEVERE OBESITY WITHOUT SERIOUS COMORBIDITY WITH BODY MASS INDEX (BMI) OF 40.0 TO 44.9 IN ADULT, UNSPECIFIED OBESITY TYPE (HCC): ICD-10-CM

## 2024-09-16 RX ORDER — SEMAGLUTIDE 1.7 MG/.75ML
1.7 INJECTION, SOLUTION SUBCUTANEOUS
Qty: 3 ML | Refills: 0 | OUTPATIENT
Start: 2024-09-16

## (undated) DEVICE — SOFT SILICONE HYDROCELLULAR SACRUM DRESSING WITH LOCK AWAY LAYER: Brand: ALLEVYN LIFE SACRUM (LARGE) PACK OF 10

## (undated) DEVICE — SET SUCT IRR TIP DISP STRYKEFLOW2

## (undated) DEVICE — 3M™ STERI-STRIP™ COMPOUND BENZOIN TINCTURE 40 BAGS/CARTON 4 CARTONS/CASE C1544: Brand: 3M™ STERI-STRIP™

## (undated) DEVICE — TELFA NON-ADHERENT ABSORBENT DRESSING: Brand: TELFA

## (undated) DEVICE — STAPLER SKIN L440MM 32MM LNG 12 FIRING B FRM PWR + GRIPPING

## (undated) DEVICE — TROCAR ENDOSCP L100MM DIA12MM STBL SL BLDELSS ENDOPATH XCEL

## (undated) DEVICE — TROCAR ENDOSCP SHFT L100MM DIA12MM INTEGR STBL ENDOPATH

## (undated) DEVICE — PACK PROCEDURE SURG LAPAROSCOPY 17X7 MM BRTRC PRIMUS

## (undated) DEVICE — COVER,LIGHT HANDLE,FLX,1/PK: Brand: MEDLINE INDUSTRIES, INC.

## (undated) DEVICE — BAG SPEC RETRV 275ML 10ML DISPOSABLE RELIACATCH

## (undated) DEVICE — SYRINGE MED 25GA 3ML L5/8IN SUBQ PLAS W/ DETACH NDL SFTY

## (undated) DEVICE — BITE BLOCK ENDOSCP UNIV AD 6 TO 9.4 MM

## (undated) DEVICE — GARMENT,MEDLINE,DVT,INT,CALF,MED, GEN2: Brand: MEDLINE

## (undated) DEVICE — SOLUTION LACTATED RINGERS INJECTION USP

## (undated) DEVICE — BASIN EMSIS 16OZ GRAPHITE PLAS KID SHP MOLD GRAD FOR ORAL

## (undated) DEVICE — TAPE ADH W3INXL10YD PLAS TRNSPAR H2O RESIST HYPOALRG CURAD

## (undated) DEVICE — RELOAD STPL L60MM H1-2.6MM MESENTERY THN TISS WHT 6 ROW

## (undated) DEVICE — RELOAD STPL L60MM H1.5-3.6MM REG TISS BLU GRIPPING SURF B

## (undated) DEVICE — SUTURE MCRYL SZ 4-0 L27IN ABSRB UD L24MM PS-1 3/8 CIR PRIM Y935H

## (undated) DEVICE — LINER SUCT CANSTR 3000CC PLAS SFT PRE ASSEMB W/OUT TBNG W/

## (undated) DEVICE — SHEAR HARMONIC ACET 5MMX36CM -- ACE PLUS

## (undated) DEVICE — SUTURE VCRL SZ 3-0 L27IN ABSRB VLT L26MM SH 1/2 CIR J316H

## (undated) DEVICE — SOLUTION IRRIG 1000ML H2O STRL BLT

## (undated) DEVICE — HANDLE PRB DIA5MM HND CTRL PSTL GRP ENDOPATH PRB + II

## (undated) DEVICE — ENDOSCOPY PUMP TUBING/ CAP SET: Brand: ERBE

## (undated) DEVICE — SYRINGE MED 50ML LUERSLIP TIP

## (undated) DEVICE — GAUZE SPONGES,8 PLY: Brand: CURITY

## (undated) DEVICE — YANKAUER,SMOOTH HANDLE,HIGH CAPACITY: Brand: MEDLINE INDUSTRIES, INC.

## (undated) DEVICE — UNDERPAD INCONT W23XL36IN STD BLU POLYPR BK FLUF SFT

## (undated) DEVICE — FLEX ADVANTAGE 3000CC: Brand: FLEX ADVANTAGE

## (undated) DEVICE — INTENDED FOR TISSUE SEPARATION, AND OTHER PROCEDURES THAT REQUIRE A SHARP SURGICAL BLADE TO PUNCTURE OR CUT.: Brand: BARD-PARKER SAFETY BLADES SIZE 11, STERILE

## (undated) DEVICE — 3M™ MEDITPORE™ SOFT CLOTH TAPE 6 IN X 10 YD 12 ROLLS/CASE 2966: Brand: 3M™ MEDIPORE™

## (undated) DEVICE — SUTURE VCRL SZ 2-0 L54IN ABSRB VLT W/O NDL POLYGLACTIN 910 J618H

## (undated) DEVICE — CANNULA NSL AD TBNG L14FT STD PVC O2 CRV CONN NONFLARED NSL

## (undated) DEVICE — MEDI-VAC NON-CONDUCTIVE SUCTION TUBING: Brand: CARDINAL HEALTH

## (undated) DEVICE — GAMMEX® NON-LATEX SIZE 7.5, STERILE NEOPRENE POWDER-FREE SURGICAL GLOVE: Brand: GAMMEX

## (undated) DEVICE — SUT SLK 2-0SH 30IN BLK --

## (undated) DEVICE — SCISSORS ENDOSCP DIA5MM CRV MPLR CAUT W/ RATCH HNDL

## (undated) DEVICE — TRAY,URINE METER,100% SILICONE,16FR10ML: Brand: MEDLINE

## (undated) DEVICE — Z DISCONTINUED BY MEDLINE USE 2711682 TRAY SKIN PREP DRY W/ PREM GLV

## (undated) DEVICE — BLANKET WRM AD W50XL85.8IN PACU FULL BODY FORC AIR

## (undated) DEVICE — GAUZE,SPONGE,4"X4",16PLY,STRL,LF,10/TRAY: Brand: MEDLINE

## (undated) DEVICE — DISPOSABLE DISTAL ATTACHMENT: Brand: DISPOSABLE DISTAL ATTACHMENT

## (undated) DEVICE — CATHETER SUCT TR FL TIP 14FR W/ O CTRL

## (undated) DEVICE — STERILE POLYISOPRENE POWDER-FREE SURGICAL GLOVES: Brand: PROTEXIS

## (undated) DEVICE — STAPLE INT WHT BLU G GRN BLK REINF FOR ENDOPATH ECHELON FLX

## (undated) DEVICE — TRUE CONTENT TO BE POPULATED AS PART OF REBRANDING: Brand: ARGYLE

## (undated) DEVICE — STRIP,CLOSURE,WOUND,MEDI-STRIP,1/2X4: Brand: MEDLINE

## (undated) DEVICE — REM POLYHESIVE ADULT PATIENT RETURN ELECTRODE: Brand: VALLEYLAB